# Patient Record
Sex: MALE | Race: WHITE | NOT HISPANIC OR LATINO | Employment: OTHER | URBAN - NONMETROPOLITAN AREA
[De-identification: names, ages, dates, MRNs, and addresses within clinical notes are randomized per-mention and may not be internally consistent; named-entity substitution may affect disease eponyms.]

---

## 2017-01-31 ENCOUNTER — LAB (OUTPATIENT)
Dept: LAB | Facility: HOSPITAL | Age: 77
End: 2017-01-31

## 2017-01-31 ENCOUNTER — TRANSCRIBE ORDERS (OUTPATIENT)
Dept: ADMINISTRATIVE | Facility: HOSPITAL | Age: 77
End: 2017-01-31

## 2017-01-31 DIAGNOSIS — E87.6 HYPOKALEMIA: Primary | ICD-10-CM

## 2017-01-31 DIAGNOSIS — E87.6 HYPOKALEMIA: ICD-10-CM

## 2017-01-31 LAB — POTASSIUM BLD-SCNC: 5.4 MMOL/L (ref 3.5–5.3)

## 2017-01-31 PROCEDURE — 84132 ASSAY OF SERUM POTASSIUM: CPT | Performed by: NURSE PRACTITIONER

## 2017-01-31 PROCEDURE — 36415 COLL VENOUS BLD VENIPUNCTURE: CPT

## 2017-02-08 ENCOUNTER — OFFICE VISIT (OUTPATIENT)
Dept: SURGERY | Facility: CLINIC | Age: 77
End: 2017-02-08

## 2017-02-08 VITALS — WEIGHT: 169.5 LBS | HEIGHT: 66 IN | BODY MASS INDEX: 27.24 KG/M2

## 2017-02-08 DIAGNOSIS — L72.0 EPIDERMAL CYST: Primary | ICD-10-CM

## 2017-02-08 PROCEDURE — 99212 OFFICE O/P EST SF 10 MIN: CPT | Performed by: SURGERY

## 2017-02-08 NOTE — PROGRESS NOTES
Subjective   Aramis Carson is a 76 y.o. male is being seen for consultation today at the request of Kieran ESPARZA    76 y.o. male presenting for evaluation of skin lesion. Lesion on Left posterior scalp with patient's observations stated as Growing cystic lesion, exam of this area shows sebaceous cyst, features raised, Growing, size 10 mm.    Past Medical History   Diagnosis Date   • Atrial fibrillation    • Coronary artery disease    • Hyperlipidemia        Family History   Problem Relation Age of Onset   • Cancer Sister    • Diabetes Sister    • Cancer Brother    • Heart disease Brother    • Diabetes Maternal Grandmother    • Hypertension Neg Hx        Social History     Social History   • Marital status:      Spouse name: N/A   • Number of children: N/A   • Years of education: N/A     Occupational History   • Not on file.     Social History Main Topics   • Smoking status: Former Smoker     Years: 20.00     Types: Pipe, Cigars   • Smokeless tobacco: Never Used   • Alcohol use No   • Drug use: No   • Sexual activity: Defer     Other Topics Concern   • Not on file     Social History Narrative       Past Surgical History   Procedure Laterality Date   • Cardiac catheterization     • Inguinal hernia repair     • Colonoscopy     • Laparoscopic cholecystectomy         The following portions of the patient's history were reviewed and updated as appropriate: allergies, current medications, past family history, past medical history, past social history, past surgical history and problem list.    Review of Systems   Constitutional: Negative for activity change, appetite change, chills and fever.   HENT: Negative for sore throat and trouble swallowing.    Eyes: Negative for visual disturbance.   Respiratory: Negative for cough and shortness of breath.    Cardiovascular: Negative for chest pain and palpitations.   Gastrointestinal: Negative for abdominal distention, abdominal pain, blood in stool, constipation,  "diarrhea, nausea and vomiting.   Endocrine: Negative for cold intolerance and heat intolerance.   Genitourinary: Negative for dysuria.   Musculoskeletal: Negative for joint swelling.   Skin: Negative for color change, rash and wound.   Allergic/Immunologic: Negative for immunocompromised state.   Neurological: Negative for dizziness, seizures, weakness and headaches.   Hematological: Negative for adenopathy. Does not bruise/bleed easily.   Psychiatric/Behavioral: Negative for agitation and confusion.         Visit Vitals   • Ht 66\" (167.6 cm)   • Wt 169 lb 8 oz (76.9 kg)   • BMI 27.36 kg/m2     Objective   Physical Exam   Constitutional: He is oriented to person, place, and time. He appears well-developed.   HENT:   Head: Normocephalic and atraumatic.   Mouth/Throat: Mucous membranes are normal.   Eyes: Conjunctivae are normal. Pupils are equal, round, and reactive to light.   Neck: Neck supple. No JVD present. No tracheal deviation present. No thyromegaly present.   Cardiovascular: Normal rate and regular rhythm.  Exam reveals no gallop and no friction rub.    No murmur heard.  Pulmonary/Chest: Effort normal and breath sounds normal.   Abdominal: Soft. He exhibits no distension. There is no splenomegaly or hepatomegaly. There is no tenderness. No hernia.   Musculoskeletal: Normal range of motion. He exhibits no deformity.   Neurological: He is alert and oriented to person, place, and time.   Skin: Skin is warm and dry.   1 cm left posterior scalp cyst no evidence of infection or drainage no tenderness to palpation   Psychiatric: He has a normal mood and affect.         Aramis was seen today for scalp cyst.    Diagnoses and all orders for this visit:    Epidermal cyst      Assessment     76-year-old male with a benign cyst of the left posterior scalp.  The patient is on Coumadin for atrial fibrillation.  Given the benign nature and asymptomatic behavior of the lesion we will defer excision at this time.  Should the " lesion continued to grow or become bothersome we'll hold Coumadin and excise the lesion in the office.

## 2017-02-13 RX ORDER — METOPROLOL SUCCINATE 50 MG
TABLET, EXTENDED RELEASE 24 HR ORAL
Qty: 90 TABLET | Refills: 1 | OUTPATIENT
Start: 2017-02-13

## 2017-05-03 ENCOUNTER — OFFICE VISIT (OUTPATIENT)
Dept: CARDIOLOGY | Facility: CLINIC | Age: 77
End: 2017-05-03

## 2017-05-03 VITALS
BODY MASS INDEX: 27.71 KG/M2 | WEIGHT: 172.4 LBS | DIASTOLIC BLOOD PRESSURE: 75 MMHG | HEART RATE: 96 BPM | OXYGEN SATURATION: 97 % | SYSTOLIC BLOOD PRESSURE: 133 MMHG | HEIGHT: 66 IN

## 2017-05-03 DIAGNOSIS — I48.20 CHRONIC ATRIAL FIBRILLATION (HCC): Primary | ICD-10-CM

## 2017-05-03 DIAGNOSIS — E78.01 FAMILIAL HYPERCHOLESTEROLEMIA: ICD-10-CM

## 2017-05-03 DIAGNOSIS — I10 ESSENTIAL HYPERTENSION: ICD-10-CM

## 2017-05-03 DIAGNOSIS — E11.9 CONTROLLED TYPE 2 DIABETES MELLITUS WITHOUT COMPLICATION, WITHOUT LONG-TERM CURRENT USE OF INSULIN (HCC): ICD-10-CM

## 2017-05-03 PROCEDURE — 99214 OFFICE O/P EST MOD 30 MIN: CPT | Performed by: INTERNAL MEDICINE

## 2017-05-03 RX ORDER — GABAPENTIN 400 MG/1
400 CAPSULE ORAL 3 TIMES DAILY
COMMUNITY
End: 2019-06-03 | Stop reason: ALTCHOICE

## 2017-05-08 ENCOUNTER — HOSPITAL ENCOUNTER (OUTPATIENT)
Dept: RESPIRATORY THERAPY | Facility: HOSPITAL | Age: 77
Discharge: HOME | End: 2017-05-08
Attending: INTERNAL MEDICINE | Admitting: INTERNAL MEDICINE

## 2017-05-08 DIAGNOSIS — I48.20 CHRONIC ATRIAL FIBRILLATION (HCC): ICD-10-CM

## 2017-05-08 PROCEDURE — 93226 XTRNL ECG REC<48 HR SCAN A/R: CPT

## 2017-05-08 PROCEDURE — 93227 XTRNL ECG REC<48 HR R&I: CPT | Performed by: INTERNAL MEDICINE

## 2017-05-08 PROCEDURE — 93225 XTRNL ECG REC<48 HRS REC: CPT

## 2017-05-12 ENCOUNTER — HOSPITAL ENCOUNTER (EMERGENCY)
Facility: HOSPITAL | Age: 77
Discharge: HOME | End: 2017-05-12
Attending: EMERGENCY MEDICINE | Admitting: EMERGENCY MEDICINE

## 2017-05-12 VITALS
TEMPERATURE: 98.7 F | RESPIRATION RATE: 17 BRPM | WEIGHT: 173 LBS | BODY MASS INDEX: 27.8 KG/M2 | HEIGHT: 66 IN | HEART RATE: 92 BPM | SYSTOLIC BLOOD PRESSURE: 133 MMHG | OXYGEN SATURATION: 97 % | DIASTOLIC BLOOD PRESSURE: 7 MMHG

## 2017-05-12 DIAGNOSIS — R04.0 ANTERIOR EPISTAXIS: Primary | ICD-10-CM

## 2017-05-12 PROCEDURE — 99283 EMERGENCY DEPT VISIT LOW MDM: CPT

## 2017-05-12 RX ORDER — OXYMETAZOLINE HYDROCHLORIDE 0.05 G/100ML
1 SPRAY NASAL ONCE
Status: COMPLETED | OUTPATIENT
Start: 2017-05-12 | End: 2017-05-12

## 2017-05-12 RX ADMIN — OXYMETAZOLINE HYDROCHLORIDE 1 SPRAY: 5 SPRAY NASAL at 19:57

## 2017-05-17 RX ORDER — METOPROLOL SUCCINATE 50 MG/1
50 TABLET, EXTENDED RELEASE ORAL DAILY
Qty: 30 TABLET | Refills: 0 | Status: SHIPPED | OUTPATIENT
Start: 2017-05-17 | End: 2017-05-17 | Stop reason: SDUPTHER

## 2017-05-17 RX ORDER — METOPROLOL SUCCINATE 50 MG/1
50 TABLET, EXTENDED RELEASE ORAL DAILY
Qty: 90 TABLET | Refills: 4 | Status: SHIPPED | OUTPATIENT
Start: 2017-05-17 | End: 2017-05-22 | Stop reason: SDUPTHER

## 2017-05-22 ENCOUNTER — TELEPHONE (OUTPATIENT)
Dept: CARDIOLOGY | Facility: CLINIC | Age: 77
End: 2017-05-22

## 2017-05-22 RX ORDER — METOPROLOL SUCCINATE 100 MG/1
100 TABLET, EXTENDED RELEASE ORAL DAILY
Qty: 90 TABLET | Refills: 4 | Status: SHIPPED | OUTPATIENT
Start: 2017-05-22 | End: 2018-01-22 | Stop reason: SDUPTHER

## 2017-05-22 RX ORDER — LOSARTAN POTASSIUM 25 MG/1
25 TABLET ORAL DAILY
Qty: 90 TABLET | Refills: 4 | Status: SHIPPED | OUTPATIENT
Start: 2017-05-22 | End: 2018-10-02 | Stop reason: SDUPTHER

## 2017-06-14 ENCOUNTER — OFFICE VISIT (OUTPATIENT)
Dept: CARDIOLOGY | Facility: CLINIC | Age: 77
End: 2017-06-14

## 2017-06-14 VITALS
BODY MASS INDEX: 27.93 KG/M2 | DIASTOLIC BLOOD PRESSURE: 80 MMHG | OXYGEN SATURATION: 96 % | WEIGHT: 173.8 LBS | HEIGHT: 66 IN | SYSTOLIC BLOOD PRESSURE: 127 MMHG | HEART RATE: 89 BPM

## 2017-06-14 DIAGNOSIS — E11.9 CONTROLLED TYPE 2 DIABETES MELLITUS WITHOUT COMPLICATION, WITHOUT LONG-TERM CURRENT USE OF INSULIN (HCC): ICD-10-CM

## 2017-06-14 DIAGNOSIS — E78.01 FAMILIAL HYPERCHOLESTEROLEMIA: ICD-10-CM

## 2017-06-14 DIAGNOSIS — I48.20 CHRONIC ATRIAL FIBRILLATION (HCC): Primary | ICD-10-CM

## 2017-06-14 DIAGNOSIS — I10 ESSENTIAL HYPERTENSION: ICD-10-CM

## 2017-06-14 PROCEDURE — 99214 OFFICE O/P EST MOD 30 MIN: CPT | Performed by: INTERNAL MEDICINE

## 2017-06-14 NOTE — PROGRESS NOTES
Subjective   Aramis Carson is a 77 y.o. male.     Chief Complaint   Patient presents with   • Follow-up   • Hypertension   • Atrial Fibrillation       History of Present Illness     Mr. Carson is a very pleasant 77-year-old gentleman who presents for follow-up of atrial fibrillation. More recently he states that he has been concerned that his heart rate seems to have been elevated to the high 90s and low 100s. Approximately one year ago he apparently presented with hypotension and very low heart rates and his diltiazem was discontinued at that time. It is unclear but it appears that he has had chronic atrial fibrillation for more than 20 years. Initially, he thought that he might be going in and out of atrial fibrillation however, record review over the past 2 years suggests that every ECG and telemetry tracing demonstrated atrial fibrillation. He did undergo a Holter monitor placement and with this he was noted to have a persistently elevated heart rate.  His metoprolol was increased and his losartan was decreased.  He notes that he feels better since these changes were made however, he has had several episodes of lightheadedness especially at night.  He denies any angina or anginal-like symptoms and lives an active lifestyle. He did have a stress Cardiolite test approximately one year ago which demonstrated no evidence of ischemia. He has been previously offered novel anticoagulants but prefers to continue Coumadin. He denies any bleeding events. He denies a prior stroke. He is not being treated for congestive heart failure.  He denies any heart failure symptoms.  He does have a history of hypertension and diabetes mellitus     The following portions of the patient's history were reviewed and updated as appropriate: allergies, current medications, past family history, past medical history, past social history, past surgical history and problem list.    Review of Systems   Constitutional: Negative for activity  change, appetite change and fatigue.   HENT: Negative for congestion and tinnitus.    Eyes: Negative for visual disturbance.   Respiratory: Negative for cough, chest tightness and shortness of breath.    Cardiovascular: Negative for chest pain, palpitations and leg swelling.   Gastrointestinal: Negative for blood in stool, nausea and vomiting.   Endocrine: Negative for cold intolerance, heat intolerance and polyuria.   Genitourinary: Negative for dysuria.   Musculoskeletal: Negative for myalgias and neck pain.   Skin: Negative for rash.   Neurological: Negative for dizziness, syncope, weakness and light-headedness.   Hematological: Bruises/bleeds easily.   Psychiatric/Behavioral: Negative for confusion and sleep disturbance.       Objective   Physical Exam   Constitutional: He is oriented to person, place, and time. He appears well-developed and well-nourished. No distress.   HENT:   Head: Normocephalic and atraumatic.   Nose: Nose normal.   Mouth/Throat: Oropharynx is clear and moist.   Eyes: Conjunctivae and EOM are normal. Right eye exhibits no discharge. Left eye exhibits no discharge. No scleral icterus.   Neck: Normal range of motion. No hepatojugular reflux and no JVD present. Carotid bruit is not present. No tracheal deviation present.   Cardiovascular: Normal rate, S1 normal, S2 normal and intact distal pulses.  An irregularly irregular rhythm present. PMI is not displaced.  Exam reveals no gallop, no S3, no S4 and no friction rub.    No murmur heard.  Pulmonary/Chest: Effort normal and breath sounds normal. No accessory muscle usage. No respiratory distress. He has no wheezes. He has no rales. He exhibits no tenderness.   Abdominal: Soft. Bowel sounds are normal. He exhibits no distension. There is no tenderness.   Musculoskeletal: Normal range of motion. He exhibits no edema or tenderness.       Vascular Status -  His exam exhibits no right foot edema. His exam exhibits no left foot edema.  Neurological:  He is alert and oriented to person, place, and time. No cranial nerve deficit. Coordination normal.   Skin: Skin is warm and dry. He is not diaphoretic.   Nursing note and vitals reviewed.      Procedures    Assessment/Plan     Chronic Afib In regard to his history of atrial fibrillation, this appears to be chronic atrial fibrillation. As best as can be ascertained he does not have any significant symptoms from his A. fib itself.  His rate is better controlled on his increased dose of metoprolol however, he may be having some episodes of symptomatic hypotension.  For now I've asked him to maintain a blood pressure diary as noted below.  I did again discuss the option of novel anticoagulants with him and at this time he does not wish to change from Coumadin.     Hypertension. The home blood pressure diary suggests reasonable control of the patient's HTN.  However, I am concerned that he may be having episodes of symptomatic hypotension.  I have asked them to maintain a blood pressure diary and if in fact his blood pressures are routinely low then I will discontinue his losartan.     DM  In regard to the patient's diabetes I have asked him to comply with his primary providers instructions and have discussed the importance of diabetes as it relates to coronary artery disease.     Hyperlipidemia In regard to their history of hyperlipidemia, again, I've asked him to follow with his primary provider.    In short, it is been a pleasure to participate in Aramis's care, I look forward to seeing him back in 4 months.

## 2017-07-15 ENCOUNTER — TRANSCRIBE ORDERS (OUTPATIENT)
Dept: LAB | Facility: HOSPITAL | Age: 77
End: 2017-07-15

## 2017-07-15 ENCOUNTER — LAB (OUTPATIENT)
Dept: LAB | Facility: HOSPITAL | Age: 77
End: 2017-07-15

## 2017-07-15 DIAGNOSIS — I10 ESSENTIAL HYPERTENSION: Primary | ICD-10-CM

## 2017-07-15 DIAGNOSIS — E78.5 HYPERLIPIDEMIA, UNSPECIFIED HYPERLIPIDEMIA TYPE: Primary | ICD-10-CM

## 2017-07-15 DIAGNOSIS — M77.11 LATERAL EPICONDYLITIS OF RIGHT ELBOW: ICD-10-CM

## 2017-07-15 DIAGNOSIS — I10 ESSENTIAL HYPERTENSION: ICD-10-CM

## 2017-07-15 DIAGNOSIS — E11.9 DIABETES MELLITUS WITHOUT COMPLICATION (HCC): ICD-10-CM

## 2017-07-15 LAB
ALBUMIN SERPL-MCNC: 4.2 G/DL (ref 3.4–4.8)
ALBUMIN/GLOB SERPL: 1.7 G/DL (ref 1.5–2.5)
ALP SERPL-CCNC: 59 U/L (ref 40–129)
ALT SERPL W P-5'-P-CCNC: 23 U/L (ref 10–44)
ANION GAP SERPL CALCULATED.3IONS-SCNC: 1 MMOL/L (ref 3.6–11.2)
AST SERPL-CCNC: 21 U/L (ref 10–34)
BASOPHILS # BLD AUTO: 0.02 10*3/MM3 (ref 0–0.3)
BASOPHILS NFR BLD AUTO: 0.3 % (ref 0–2)
BILIRUB SERPL-MCNC: 0.4 MG/DL (ref 0.2–1.8)
BUN BLD-MCNC: 20 MG/DL (ref 7–21)
BUN/CREAT SERPL: 19.4 (ref 7–25)
CALCIUM SPEC-SCNC: 10.1 MG/DL (ref 7.7–10)
CHLORIDE SERPL-SCNC: 109 MMOL/L (ref 99–112)
CHOLEST SERPL-MCNC: 127 MG/DL (ref 0–200)
CO2 SERPL-SCNC: 32 MMOL/L (ref 24.3–31.9)
CREAT BLD-MCNC: 1.03 MG/DL (ref 0.43–1.29)
DEPRECATED RDW RBC AUTO: 44.8 FL (ref 37–54)
EOSINOPHIL # BLD AUTO: 0.08 10*3/MM3 (ref 0–0.7)
EOSINOPHIL NFR BLD AUTO: 1 % (ref 0–7)
ERYTHROCYTE [DISTWIDTH] IN BLOOD BY AUTOMATED COUNT: 14.3 % (ref 11.5–14.5)
GFR SERPL CREATININE-BSD FRML MDRD: 70 ML/MIN/1.73
GLOBULIN UR ELPH-MCNC: 2.5 GM/DL
GLUCOSE BLD-MCNC: 116 MG/DL (ref 70–110)
HBA1C MFR BLD: 6.1 % (ref 4.5–5.7)
HCT VFR BLD AUTO: 42.7 % (ref 42–52)
HDLC SERPL-MCNC: 33 MG/DL (ref 60–100)
HGB BLD-MCNC: 13.6 G/DL (ref 14–18)
IMM GRANULOCYTES # BLD: 0.02 10*3/MM3 (ref 0–0.03)
IMM GRANULOCYTES NFR BLD: 0.3 % (ref 0–0.5)
LDLC SERPL CALC-MCNC: 52 MG/DL (ref 0–100)
LDLC/HDLC SERPL: 1.56 {RATIO}
LYMPHOCYTES # BLD AUTO: 2.36 10*3/MM3 (ref 1–3)
LYMPHOCYTES NFR BLD AUTO: 30.9 % (ref 16–46)
MCH RBC QN AUTO: 27.8 PG (ref 27–33)
MCHC RBC AUTO-ENTMCNC: 31.9 G/DL (ref 33–37)
MCV RBC AUTO: 87.3 FL (ref 80–94)
MONOCYTES # BLD AUTO: 0.58 10*3/MM3 (ref 0.1–0.9)
MONOCYTES NFR BLD AUTO: 7.6 % (ref 0–12)
NEUTROPHILS # BLD AUTO: 4.57 10*3/MM3 (ref 1.4–6.5)
NEUTROPHILS NFR BLD AUTO: 59.9 % (ref 40–75)
OSMOLALITY SERPL CALC.SUM OF ELEC: 286.7 MOSM/KG (ref 273–305)
PLATELET # BLD AUTO: 176 10*3/MM3 (ref 130–400)
PMV BLD AUTO: 10.9 FL (ref 6–10)
POTASSIUM BLD-SCNC: 5.7 MMOL/L (ref 3.5–5.3)
PROT SERPL-MCNC: 6.7 G/DL (ref 6–8)
RBC # BLD AUTO: 4.89 10*6/MM3 (ref 4.7–6.1)
SODIUM BLD-SCNC: 142 MMOL/L (ref 135–153)
TRIGL SERPL-MCNC: 212 MG/DL (ref 0–150)
TSH SERPL DL<=0.05 MIU/L-ACNC: 2.01 MIU/ML (ref 0.55–4.78)
VLDLC SERPL-MCNC: 42.4 MG/DL
WBC NRBC COR # BLD: 7.63 10*3/MM3 (ref 4.5–12.5)

## 2017-07-15 PROCEDURE — 85025 COMPLETE CBC W/AUTO DIFF WBC: CPT | Performed by: NURSE PRACTITIONER

## 2017-07-15 PROCEDURE — 80053 COMPREHEN METABOLIC PANEL: CPT | Performed by: NURSE PRACTITIONER

## 2017-07-15 PROCEDURE — 36415 COLL VENOUS BLD VENIPUNCTURE: CPT | Performed by: NURSE PRACTITIONER

## 2017-07-15 PROCEDURE — 84443 ASSAY THYROID STIM HORMONE: CPT | Performed by: NURSE PRACTITIONER

## 2017-07-15 PROCEDURE — 80061 LIPID PANEL: CPT | Performed by: NURSE PRACTITIONER

## 2017-07-15 PROCEDURE — 83036 HEMOGLOBIN GLYCOSYLATED A1C: CPT | Performed by: NURSE PRACTITIONER

## 2017-08-14 ENCOUNTER — TELEPHONE (OUTPATIENT)
Dept: CARDIOLOGY | Facility: CLINIC | Age: 77
End: 2017-08-14

## 2017-08-14 NOTE — TELEPHONE ENCOUNTER
----- Message from Michel Gusman MD sent at 8/14/2017  2:28 PM EDT -----  Let him know his BP and HR were all good.  If he is still having episodes of light-headedness then have him stop his losartan    Dr. Gusman      CALLED PT TO MAKE HIM AWARE THAT PER DR. GUSMAN HIS B/P & HR WERE ALL GOOD. & THAT IF HE IS STILL HAVING EPISODES OF LIGHT HEADEDNESS THEN HE CAN STOP LOSARTAN.    PT STATED HE DIDN'T HAVE ANY COMPLAINTS AT THE MOMENT & HE'D STAY ON THE MEDICINE.    PT AWARE & UNDERSTANDS.    CSMA

## 2018-01-22 ENCOUNTER — OFFICE VISIT (OUTPATIENT)
Dept: CARDIOLOGY | Facility: CLINIC | Age: 78
End: 2018-01-22

## 2018-01-22 VITALS
OXYGEN SATURATION: 92 % | HEIGHT: 66 IN | HEART RATE: 85 BPM | DIASTOLIC BLOOD PRESSURE: 78 MMHG | SYSTOLIC BLOOD PRESSURE: 153 MMHG | WEIGHT: 190 LBS | BODY MASS INDEX: 30.53 KG/M2

## 2018-01-22 DIAGNOSIS — E78.01 FAMILIAL HYPERCHOLESTEROLEMIA: ICD-10-CM

## 2018-01-22 DIAGNOSIS — I48.20 CHRONIC ATRIAL FIBRILLATION (HCC): ICD-10-CM

## 2018-01-22 DIAGNOSIS — I10 ESSENTIAL HYPERTENSION: Primary | ICD-10-CM

## 2018-01-22 PROCEDURE — 99213 OFFICE O/P EST LOW 20 MIN: CPT | Performed by: INTERNAL MEDICINE

## 2018-01-22 PROCEDURE — 93000 ELECTROCARDIOGRAM COMPLETE: CPT | Performed by: INTERNAL MEDICINE

## 2018-01-22 RX ORDER — LANOLIN ALCOHOL/MO/W.PET/CERES
1000 CREAM (GRAM) TOPICAL DAILY
COMMUNITY

## 2018-01-22 RX ORDER — METOPROLOL SUCCINATE 25 MG/1
75 TABLET, EXTENDED RELEASE ORAL DAILY
Qty: 270 TABLET | Refills: 6 | Status: SHIPPED | OUTPATIENT
Start: 2018-01-22 | End: 2018-01-29 | Stop reason: SDUPTHER

## 2018-01-22 RX ORDER — CETIRIZINE HYDROCHLORIDE 10 MG/1
10 TABLET ORAL DAILY
COMMUNITY
End: 2020-12-14 | Stop reason: ALTCHOICE

## 2018-01-22 NOTE — PROGRESS NOTES
[facility] CARDIOLOGY  [address]  [place] [address]  Phone: [phone]  Fax: [phone]    01/22/2018    Chief Complaint: Routine followup    History:   Aramis Carson is a 77 y.o. male seen in followup, hx chronic Afib on rate control and anticoagulation. EKg goes 1960's was known to have appearance of MI but has never had MI. Echo and ST are normal however. HR in 50's but no syncope. No dizzyness. No CP or SOB. Coumadin followed by PCP.    Past Medical History:   Diagnosis Date   • Atrial fibrillation    • Coronary artery disease    • Hyperlipidemia        Past Social History:  Social History     Social History   • Marital status:      Spouse name: N/A   • Number of children: N/A   • Years of education: N/A     Social History Main Topics   • Smoking status: Former Smoker     Years: 20.00     Types: Pipe, Cigars   • Smokeless tobacco: Never Used   • Alcohol use No   • Drug use: No   • Sexual activity: Defer     Other Topics Concern   • None     Social History Narrative       Past Family History:  Family History   Problem Relation Age of Onset   • Cancer Sister    • Diabetes Sister    • Cancer Brother    • Heart disease Brother    • Diabetes Maternal Grandmother    • Hypertension Neg Hx        Review of Systems:   Please see HPI  Constitution: No chills, no rigors, no unexplained weight loss or weight gain  Eyes:  No diplopia, no blurred vision, no loss of vision, conjunctiva is pink and sclera is anicteric  ENT:  No tinnitus, no otorrhea, no epistaxis, no sore throat   Respiratory: No cough, no hemoptysis  Cardiovascular: see HPI  Gastrointestinal: No nausea, no vomiting, no hematemesis, no diarrhea or constipation, no melena  Genitourinary: No frequency of dysuria no hematuria  Integument: No pruritis and  no skin rash  Hematologic / Lymphatic: No excessive bleeding, easy bruising, fatigue, lymphadenopathy and petechiae  Musculoskeletal: No joint pain, joint  stiffness, joint swelling, muscle pain, muscle weakness and neck pain  Neurological: No dizziness, headaches, light headedness, seizures and vertigo  Endocrine: No frequent urination and nocturia, temperature intolerance, weight gain, unintended and weight loss, unintended      Current Outpatient Prescriptions on File Prior to Visit   Medication Sig Dispense Refill   • esomeprazole (NexIUM) 40 MG capsule Take 40 mg by mouth Every Morning Before Breakfast.     • gabapentin (NEURONTIN) 400 MG capsule Take 400 mg by mouth 3 (Three) Times a Day.     • HYDROcodone-acetaminophen (NORCO) 7.5-325 MG per tablet Take 1 tablet by mouth Every 6 (Six) Hours As Needed for moderate pain (4-6).     • losartan (COZAAR) 25 MG tablet Take 1 tablet by mouth Daily. 90 tablet 4   • metFORMIN (GLUCOPHAGE) 500 MG tablet Take 500 mg by mouth 2 (Two) Times a Day With Meals.     • metoprolol succinate XL (TOPROL XL) 100 MG 24 hr tablet Take 1 tablet by mouth Daily. 90 tablet 4   • ranitidine (ZANTAC) 150 MG tablet Take 150 mg by mouth As Needed for heartburn.     • simvastatin (ZOCOR) 40 MG tablet Take 40 mg by mouth Daily.     • warfarin (COUMADIN) 5 MG tablet Take 5 mg by mouth Daily.       No current facility-administered medications on file prior to visit.        Allergies   Allergen Reactions   • Lisinopril        Objective:  Vitals:    01/22/18 0959   BP: 153/78   Pulse: 85   SpO2: 92%     Comfortable NAD  PERRL, conjunctiva clear  Neck supple, no JVD or thyromegaly appreciated  Irreg irrreg .S1/S2 RRR, no m/r/g  Lungs CTA B, normal effort  Abdomen S/NT/ND (+) BS, no HSM appreciated  Extremities warm, no clubbing, cyanosis, or edema  Normal gait  No visible or palpable skin lesions  A/Ox4, mood and affect appropriate  Pulse exam: Gabe's:    DATA:       Results for orders placed during the hospital encounter of 10/12/16   Adult Transthoracic Echo Complete    Narrative · All left ventricular wall segments contract normally.  · Left  ventricular function is normal. Estimated EF = 60%.  · Trace to mild aortic and mitral insufficiency  · Mild tricuspid valve regurgitation is present.  · Mild pulmonary hypertension  · Left atrial cavity size is moderately dilated.          Results for orders placed during the hospital encounter of 10/12/16   Stress Test With Myocardial Perfusion One Day    Narrative · Impressions are consistent with a low risk study.  · Left ventricular ejection fraction is hyperdynamic (Calculated EF >   70%).  · Myocardial perfusion imaging indicates a normal myocardial perfusion   study with no evidence of ischemia.  · Gated wall motion is normal          Results for orders placed during the hospital encounter of 10/12/16   Stress Test With Myocardial Perfusion One Day    Narrative · Impressions are consistent with a low risk study.  · Left ventricular ejection fraction is hyperdynamic (Calculated EF >   70%).  · Myocardial perfusion imaging indicates a normal myocardial perfusion   study with no evidence of ischemia.  · Gated wall motion is normal                   ECG 12 Lead  Date/Time: 1/22/2018 10:01 AM  Performed by: BARBIE BUTT  Authorized by: BARBIE BUTT               I personally viewed and interpreted the patient's EKG:      A/P:    Chornic Afib: continue rate control and anticoagulation. Will diminish toprol xl to 75mg daily due to bradycardia.      Thank you for allowing me to participate in the care of Aramis NICK Mariselakendall. Feel free to contact me directly with any further questions or concerns.

## 2018-01-29 ENCOUNTER — TELEPHONE (OUTPATIENT)
Dept: CARDIOLOGY | Facility: CLINIC | Age: 78
End: 2018-01-29

## 2018-01-29 RX ORDER — METOPROLOL SUCCINATE 25 MG/1
25 TABLET, EXTENDED RELEASE ORAL DAILY
Qty: 90 TABLET | Refills: 3 | Status: SHIPPED | OUTPATIENT
Start: 2018-01-29 | End: 2019-01-02 | Stop reason: SDUPTHER

## 2018-01-29 NOTE — TELEPHONE ENCOUNTER
Patient is calling requesting us to [facility] his Metoprolol XL 25mg. Dr. Welch had prescribed this at his last appointment but had sent it to wrong pharmacy. I told him that we would send this in.     Patient then added he could take his 100mg he had previously been prescribed and cut them into 4 pieces then take 3/4 of the tablet. I put patient on hold s/w Jaimie Nuno to confirm that because he is taking the XL tablet it cannot be crushed, chewed, or broken into pieces. Patient asked if he should dispose of these tablets since he no longer took that dose. I told him if he wanted to do that it would be completely up to him; Dr. Welch has him on 75mg.     Waiting on [facility] to send over request for 90 day supply.

## 2018-02-20 RX ORDER — METOPROLOL SUCCINATE 50 MG/1
50 TABLET, EXTENDED RELEASE ORAL DAILY
Qty: 90 TABLET | Refills: 3 | Status: SHIPPED | OUTPATIENT
Start: 2018-02-20 | End: 2018-07-02 | Stop reason: DRUGHIGH

## 2018-02-23 ENCOUNTER — APPOINTMENT (OUTPATIENT)
Dept: CT IMAGING | Facility: HOSPITAL | Age: 78
End: 2018-02-23

## 2018-02-23 ENCOUNTER — HOSPITAL ENCOUNTER (EMERGENCY)
Facility: HOSPITAL | Age: 78
Discharge: HOME | End: 2018-02-23
Admitting: EMERGENCY MEDICINE

## 2018-02-23 ENCOUNTER — TELEPHONE (OUTPATIENT)
Dept: CARDIOLOGY | Facility: CLINIC | Age: 78
End: 2018-02-23

## 2018-02-23 VITALS
HEART RATE: 75 BPM | OXYGEN SATURATION: 99 % | HEIGHT: 66 IN | WEIGHT: 179 LBS | SYSTOLIC BLOOD PRESSURE: 115 MMHG | TEMPERATURE: 98.2 F | BODY MASS INDEX: 28.77 KG/M2 | RESPIRATION RATE: 16 BRPM | DIASTOLIC BLOOD PRESSURE: 72 MMHG

## 2018-02-23 DIAGNOSIS — I48.91 ATRIAL FIBRILLATION, UNSPECIFIED TYPE (HCC): ICD-10-CM

## 2018-02-23 DIAGNOSIS — R55 ATYPICAL SYNCOPE: Primary | ICD-10-CM

## 2018-02-23 LAB
6-ACETYL MORPHINE: NEGATIVE
ALBUMIN SERPL-MCNC: 4.4 G/DL (ref 3.4–4.8)
ALBUMIN/GLOB SERPL: 1.8 G/DL (ref 1.5–2.5)
ALP SERPL-CCNC: 57 U/L (ref 40–129)
ALT SERPL W P-5'-P-CCNC: 35 U/L (ref 10–44)
AMPHET+METHAMPHET UR QL: NEGATIVE
ANION GAP SERPL CALCULATED.3IONS-SCNC: 4.7 MMOL/L (ref 3.6–11.2)
APTT PPP: 36.6 SECONDS (ref 23.8–36.1)
AST SERPL-CCNC: 34 U/L (ref 10–34)
BARBITURATES UR QL SCN: NEGATIVE
BASOPHILS # BLD AUTO: 0.03 10*3/MM3 (ref 0–0.3)
BASOPHILS NFR BLD AUTO: 0.4 % (ref 0–2)
BENZODIAZ UR QL SCN: NEGATIVE
BILIRUB SERPL-MCNC: 0.7 MG/DL (ref 0.2–1.8)
BILIRUB UR QL STRIP: NEGATIVE
BUN BLD-MCNC: 20 MG/DL (ref 7–21)
BUN/CREAT SERPL: 18 (ref 7–25)
BUPRENORPHINE SERPL-MCNC: NEGATIVE NG/ML
CALCIUM SPEC-SCNC: 9.5 MG/DL (ref 7.7–10)
CANNABINOIDS SERPL QL: NEGATIVE
CHLORIDE SERPL-SCNC: 109 MMOL/L (ref 99–112)
CLARITY UR: CLEAR
CO2 SERPL-SCNC: 27.3 MMOL/L (ref 24.3–31.9)
COCAINE UR QL: NEGATIVE
COLOR UR: YELLOW
CREAT BLD-MCNC: 1.11 MG/DL (ref 0.43–1.29)
DEPRECATED RDW RBC AUTO: 43.9 FL (ref 37–54)
EOSINOPHIL # BLD AUTO: 0.07 10*3/MM3 (ref 0–0.7)
EOSINOPHIL NFR BLD AUTO: 0.9 % (ref 0–7)
ERYTHROCYTE [DISTWIDTH] IN BLOOD BY AUTOMATED COUNT: 14.3 % (ref 11.5–14.5)
GFR SERPL CREATININE-BSD FRML MDRD: 64 ML/MIN/1.73
GLOBULIN UR ELPH-MCNC: 2.4 GM/DL
GLUCOSE BLD-MCNC: 95 MG/DL (ref 70–110)
GLUCOSE UR STRIP-MCNC: NEGATIVE MG/DL
HCT VFR BLD AUTO: 45.3 % (ref 42–52)
HGB BLD-MCNC: 14.6 G/DL (ref 14–18)
HGB UR QL STRIP.AUTO: NEGATIVE
IMM GRANULOCYTES # BLD: 0.01 10*3/MM3 (ref 0–0.03)
IMM GRANULOCYTES NFR BLD: 0.1 % (ref 0–0.5)
INR PPP: 2.03 (ref 0.9–1.1)
KETONES UR QL STRIP: NEGATIVE
LEUKOCYTE ESTERASE UR QL STRIP.AUTO: NEGATIVE
LYMPHOCYTES # BLD AUTO: 2.1 10*3/MM3 (ref 1–3)
LYMPHOCYTES NFR BLD AUTO: 26.6 % (ref 16–46)
MCH RBC QN AUTO: 27.1 PG (ref 27–33)
MCHC RBC AUTO-ENTMCNC: 32.2 G/DL (ref 33–37)
MCV RBC AUTO: 84.2 FL (ref 80–94)
METHADONE UR QL SCN: NEGATIVE
MONOCYTES # BLD AUTO: 0.75 10*3/MM3 (ref 0.1–0.9)
MONOCYTES NFR BLD AUTO: 9.5 % (ref 0–12)
NEUTROPHILS # BLD AUTO: 4.94 10*3/MM3 (ref 1.4–6.5)
NEUTROPHILS NFR BLD AUTO: 62.5 % (ref 40–75)
NITRITE UR QL STRIP: NEGATIVE
OPIATES UR QL: NEGATIVE
OSMOLALITY SERPL CALC.SUM OF ELEC: 283.7 MOSM/KG (ref 273–305)
OXYCODONE UR QL SCN: NEGATIVE
PCP UR QL SCN: NEGATIVE
PH UR STRIP.AUTO: 8 [PH] (ref 5–8)
PLATELET # BLD AUTO: 222 10*3/MM3 (ref 130–400)
PMV BLD AUTO: 10.8 FL (ref 6–10)
POTASSIUM BLD-SCNC: 5.5 MMOL/L (ref 3.5–5.3)
PROT SERPL-MCNC: 6.8 G/DL (ref 6–8)
PROT UR QL STRIP: NEGATIVE
PROTHROMBIN TIME: 23.2 SECONDS (ref 11–15.4)
RBC # BLD AUTO: 5.38 10*6/MM3 (ref 4.7–6.1)
SODIUM BLD-SCNC: 141 MMOL/L (ref 135–153)
SP GR UR STRIP: 1.02 (ref 1–1.03)
TROPONIN I SERPL-MCNC: <0.006 NG/ML
TROPONIN I SERPL-MCNC: <0.006 NG/ML
UROBILINOGEN UR QL STRIP: NORMAL
WBC NRBC COR # BLD: 7.9 10*3/MM3 (ref 4.5–12.5)

## 2018-02-23 PROCEDURE — 80307 DRUG TEST PRSMV CHEM ANLYZR: CPT | Performed by: NURSE PRACTITIONER

## 2018-02-23 PROCEDURE — 85730 THROMBOPLASTIN TIME PARTIAL: CPT | Performed by: NURSE PRACTITIONER

## 2018-02-23 PROCEDURE — 84484 ASSAY OF TROPONIN QUANT: CPT | Performed by: NURSE PRACTITIONER

## 2018-02-23 PROCEDURE — 93010 ELECTROCARDIOGRAM REPORT: CPT | Performed by: INTERNAL MEDICINE

## 2018-02-23 PROCEDURE — 36415 COLL VENOUS BLD VENIPUNCTURE: CPT

## 2018-02-23 PROCEDURE — 70450 CT HEAD/BRAIN W/O DYE: CPT | Performed by: RADIOLOGY

## 2018-02-23 PROCEDURE — 80053 COMPREHEN METABOLIC PANEL: CPT | Performed by: NURSE PRACTITIONER

## 2018-02-23 PROCEDURE — 99284 EMERGENCY DEPT VISIT MOD MDM: CPT

## 2018-02-23 PROCEDURE — 85025 COMPLETE CBC W/AUTO DIFF WBC: CPT | Performed by: NURSE PRACTITIONER

## 2018-02-23 PROCEDURE — 70450 CT HEAD/BRAIN W/O DYE: CPT

## 2018-02-23 PROCEDURE — 85610 PROTHROMBIN TIME: CPT | Performed by: NURSE PRACTITIONER

## 2018-02-23 PROCEDURE — 93005 ELECTROCARDIOGRAM TRACING: CPT | Performed by: NURSE PRACTITIONER

## 2018-02-23 PROCEDURE — 81003 URINALYSIS AUTO W/O SCOPE: CPT | Performed by: NURSE PRACTITIONER

## 2018-02-23 RX ORDER — SODIUM CHLORIDE 0.9 % (FLUSH) 0.9 %
10 SYRINGE (ML) INJECTION AS NEEDED
Status: DISCONTINUED | OUTPATIENT
Start: 2018-02-23 | End: 2018-02-23 | Stop reason: HOSPADM

## 2018-02-23 NOTE — TELEPHONE ENCOUNTER
Patient's wife Kelley called today stating Aramis had fell twice this morning and that his blood pressure was low. She had wanted him to come in today and I explained that  was not here today and that the best option would be to go to the ER. Kelley had stated that Aramis felt better and that his blood pressure had came back up and that he wasn't feeling dizzy anymore. She was concerned that his change of medicines may be a leading cause. Patient states that she will contact his PCP and schedule an appointment to get checked out and that they will use the ER as a last resort. Patient's wife did understand that no one was in office to see him today and did not seem upset.

## 2018-02-28 ENCOUNTER — TRANSCRIBE ORDERS (OUTPATIENT)
Dept: ADMINISTRATIVE | Facility: HOSPITAL | Age: 78
End: 2018-02-28

## 2018-02-28 ENCOUNTER — HOSPITAL ENCOUNTER (OUTPATIENT)
Dept: RESPIRATORY THERAPY | Facility: HOSPITAL | Age: 78
Discharge: HOME | End: 2018-02-28
Attending: INTERNAL MEDICINE | Admitting: INTERNAL MEDICINE

## 2018-02-28 DIAGNOSIS — R00.2 PALPITATIONS: Primary | ICD-10-CM

## 2018-02-28 DIAGNOSIS — R00.2 PALPITATIONS: ICD-10-CM

## 2018-02-28 PROCEDURE — 93226 XTRNL ECG REC<48 HR SCAN A/R: CPT

## 2018-02-28 PROCEDURE — 93225 XTRNL ECG REC<48 HRS REC: CPT

## 2018-02-28 PROCEDURE — 93227 XTRNL ECG REC<48 HR R&I: CPT | Performed by: INTERNAL MEDICINE

## 2018-03-05 ENCOUNTER — OFFICE VISIT (OUTPATIENT)
Dept: CARDIOLOGY | Facility: CLINIC | Age: 78
End: 2018-03-05

## 2018-03-05 VITALS
HEART RATE: 76 BPM | WEIGHT: 187.2 LBS | OXYGEN SATURATION: 95 % | SYSTOLIC BLOOD PRESSURE: 142 MMHG | HEIGHT: 66 IN | BODY MASS INDEX: 30.08 KG/M2 | DIASTOLIC BLOOD PRESSURE: 83 MMHG

## 2018-03-05 DIAGNOSIS — E78.01 FAMILIAL HYPERCHOLESTEROLEMIA: ICD-10-CM

## 2018-03-05 DIAGNOSIS — I48.20 CHRONIC ATRIAL FIBRILLATION (HCC): ICD-10-CM

## 2018-03-05 DIAGNOSIS — I10 ESSENTIAL HYPERTENSION: ICD-10-CM

## 2018-03-05 DIAGNOSIS — R55 SYNCOPE, UNSPECIFIED SYNCOPE TYPE: Primary | ICD-10-CM

## 2018-03-05 DIAGNOSIS — R94.31 ABNORMAL ELECTROCARDIOGRAM: ICD-10-CM

## 2018-03-05 PROCEDURE — 99214 OFFICE O/P EST MOD 30 MIN: CPT | Performed by: NURSE PRACTITIONER

## 2018-03-05 RX ORDER — OMEPRAZOLE 40 MG/1
40 CAPSULE, DELAYED RELEASE ORAL DAILY
COMMUNITY
End: 2021-11-19 | Stop reason: SDUPTHER

## 2018-03-05 RX ORDER — RANITIDINE 300 MG/1
300 TABLET ORAL 3 TIMES DAILY
COMMUNITY
End: 2018-07-02 | Stop reason: ALTCHOICE

## 2018-03-05 NOTE — PROGRESS NOTES
Subjective     Chief Complaint: Loss of Consciousness; Hypertension; and Atrial Fibrillation    History of Present Illness   Aramis Carson is a 78 y.o. male who presents with an ER follow-up for a syncopal event.  Mr. Greenberg is well-known to the practice.  We follow him for persistent atrial fibrillation and hypertension.  He is on Coumadin and followed by his PCP for this.  He was in his usual state of health when on February 23, 2018 he awoke at approximately 5:30 AM, went to the bathroom, had a bowel movement, and after leaving the bathroom he apparently had a syncopal episode.  It is uncertain how long the loss of consciousness lasted.  However his wife, who has accompanied him to this visit today, states that she heard him and immediately went to him and he was alert.  When he got up and walked through the koenig back to his bedroom he apparently had a near syncopal event in which he was simply lowered to the floor.  Later that day he presented to the ED for evaluation.  Labs that day were essentially normal with the exception of a slightly elevated potassium of 5.5.  Troponin was negative ×2.  CT showed no evidence of an acute intracranial abnormality.  He was sent home on a Holter monitor which simply showed atrial fibrillation.  He denies fever and chills on that date.  He denies chest pain on that date.  Both he and  his wife did not note any neuro deficits.    At today's visit, in addition to describe in the above event, Mr. Greenberg complains of lower extremity swelling.  He denies chest pain and palpitations.  He has not had any subsequent episodes of syncope or near syncope.       Current Outpatient Prescriptions:   •  cetirizine (zyrTEC) 10 MG tablet, Take 10 mg by mouth Daily., Disp: , Rfl:   •  GlipiZIDE (GLUCOTROL PO), Take 300 mg by mouth., Disp: , Rfl:   •  HYDROcodone-acetaminophen (NORCO) 7.5-325 MG per tablet, Take 1 tablet by mouth Every 6 (Six) Hours As Needed for moderate pain (4-6)., Disp: ,  Rfl:   •  losartan (COZAAR) 25 MG tablet, Take 1 tablet by mouth Daily., Disp: 90 tablet, Rfl: 4  •  metoprolol succinate XL (TOPROL-XL) 25 MG 24 hr tablet, Take 1 tablet by mouth Daily., Disp: 90 tablet, Rfl: 3  •  metoprolol succinate XL (TOPROL-XL) 50 MG 24 hr tablet, Take 1 tablet by mouth Daily. TO BE TAKEN WITH METOPROLOL XL 25MG TO PRODUCE 75MG DAILY, Disp: 90 tablet, Rfl: 3  •  omeprazole (PRILOSEC) 40 MG capsule, Take 40 mg by mouth Daily., Disp: , Rfl:   •  ranitidine (ZANTAC) 150 MG tablet, Take 150 mg by mouth As Needed for heartburn., Disp: , Rfl:   •  raNITIdine (ZANTAC) 300 MG tablet, Take 300 mg by mouth 3 (Three) Times a Day., Disp: , Rfl:   •  simvastatin (ZOCOR) 40 MG tablet, Take 40 mg by mouth Daily., Disp: , Rfl:   •  vitamin B-12 (CYANOCOBALAMIN) 1000 MCG tablet, Take 1,000 mcg by mouth Daily., Disp: , Rfl:   •  warfarin (COUMADIN) 5 MG tablet, Take 5 mg by mouth Daily., Disp: , Rfl:   •  esomeprazole (NexIUM) 40 MG capsule, Take 40 mg by mouth Every Morning Before Breakfast., Disp: , Rfl:   •  gabapentin (NEURONTIN) 400 MG capsule, Take 400 mg by mouth 3 (Three) Times a Day., Disp: , Rfl:      The following portions of the patient's history were reviewed and updated as appropriate: allergies, current medications, past family history, past medical history, past social history, past surgical history and problem list.    Review of Systems   Constitutional: Positive for fatigue. Negative for activity change and appetite change.   HENT: Negative for congestion and tinnitus.    Eyes: Negative for visual disturbance.   Respiratory: Negative for cough, chest tightness and shortness of breath.    Cardiovascular: Positive for leg swelling. Negative for chest pain and palpitations.   Gastrointestinal: Negative for blood in stool, nausea and vomiting.   Endocrine: Negative for cold intolerance, heat intolerance and polyuria.   Genitourinary: Negative for dysuria.   Musculoskeletal: Negative for myalgias  "and neck pain.   Skin: Negative for rash.   Neurological: Positive for dizziness, syncope, weakness and light-headedness.   Hematological: Does not bruise/bleed easily.   Psychiatric/Behavioral: Positive for behavioral problems. Negative for confusion and sleep disturbance.       Objective     /83 (BP Location: Left arm)  Pulse 76  Ht 167.6 cm (66\")  Wt 84.9 kg (187 lb 3.2 oz)  SpO2 95%  BMI 30.21 kg/m2    Physical Exam   Constitutional: He appears well-developed and well-nourished.   HENT:   Head: Normocephalic and atraumatic.   Eyes: Pupils are equal, round, and reactive to light.   Neck: No JVD present.   Cardiovascular: Normal rate, regular rhythm and intact distal pulses.  Exam reveals no gallop and no friction rub.    No murmur heard.  Pulses:       Dorsalis pedis pulses are 2+ on the right side, and 2+ on the left side.        Posterior tibial pulses are 2+ on the right side, and 2+ on the left side.   No lower extremity edema   Pulmonary/Chest: Effort normal and breath sounds normal. No respiratory distress. He has no wheezes. He has no rales.   Abdominal: Soft. He exhibits no mass. There is no tenderness. No hernia.   Skin: Skin is warm and dry.   Psychiatric: He has a normal mood and affect.   Vitals reviewed.      Procedures      Assessment/Plan       Aramis was seen today for loss of consciousness, hypertension and atrial fibrillation.    Diagnoses and all orders for this visit:    Syncope, unspecified syncope type      Work up for cardiac source of syncope-- echo, stress, event monitor   Carotid ultrasound    Chronic atrial fibrillation     Stable.   Anticoagulated on coumadin     Essential hypertension     Stable.   Continue current medications:  Metoprolol, losartan    Familial hypercholesterolemia                   ISAIAS Adames  "

## 2018-04-04 ENCOUNTER — APPOINTMENT (OUTPATIENT)
Dept: CARDIOLOGY | Facility: HOSPITAL | Age: 78
End: 2018-04-04

## 2018-04-04 ENCOUNTER — APPOINTMENT (OUTPATIENT)
Dept: NUCLEAR MEDICINE | Facility: HOSPITAL | Age: 78
End: 2018-04-04

## 2018-04-11 ENCOUNTER — HOSPITAL ENCOUNTER (OUTPATIENT)
Dept: NUCLEAR MEDICINE | Facility: HOSPITAL | Age: 78
Discharge: HOME | End: 2018-04-11

## 2018-04-11 ENCOUNTER — HOSPITAL ENCOUNTER (OUTPATIENT)
Dept: CARDIOLOGY | Facility: HOSPITAL | Age: 78
Discharge: HOME | End: 2018-04-11

## 2018-04-11 VITALS — BODY MASS INDEX: 29.21 KG/M2 | WEIGHT: 181 LBS

## 2018-04-11 DIAGNOSIS — R55 SYNCOPE, UNSPECIFIED SYNCOPE TYPE: ICD-10-CM

## 2018-04-11 DIAGNOSIS — R94.31 ABNORMAL ELECTROCARDIOGRAM: ICD-10-CM

## 2018-04-11 LAB
BH CV NUCLEAR PRIOR STUDY: 3
BH CV STRESS BP STAGE 1: NORMAL
BH CV STRESS BP STAGE 2: NORMAL
BH CV STRESS COMMENTS STAGE 1: NORMAL
BH CV STRESS COMMENTS STAGE 2: NORMAL
BH CV STRESS DOSE REGADENOSON STAGE 1: 0.4
BH CV STRESS DURATION MIN STAGE 1: 0
BH CV STRESS DURATION MIN STAGE 2: 4
BH CV STRESS DURATION SEC STAGE 1: 10
BH CV STRESS DURATION SEC STAGE 2: 0
BH CV STRESS HR STAGE 1: 83
BH CV STRESS HR STAGE 2: 76
BH CV STRESS PROTOCOL 1: NORMAL
BH CV STRESS RECOVERY BP: NORMAL MMHG
BH CV STRESS RECOVERY HR: 66 BPM
BH CV STRESS STAGE 1: 1
BH CV STRESS STAGE 2: 2
LV EF NUC BP: 64 %
MAXIMAL PREDICTED HEART RATE: 142 BPM
PERCENT MAX PREDICTED HR: 58.45 %
STRESS BASELINE BP: NORMAL MMHG
STRESS BASELINE HR: 57 BPM
STRESS PERCENT HR: 69 %
STRESS POST PEAK BP: NORMAL MMHG
STRESS POST PEAK HR: 83 BPM
STRESS TARGET HR: 121 BPM

## 2018-04-11 PROCEDURE — 93306 TTE W/DOPPLER COMPLETE: CPT

## 2018-04-11 PROCEDURE — 93880 EXTRACRANIAL BILAT STUDY: CPT

## 2018-04-11 PROCEDURE — 93306 TTE W/DOPPLER COMPLETE: CPT | Performed by: INTERNAL MEDICINE

## 2018-04-11 PROCEDURE — 0 TECHNETIUM SESTAMIBI: Performed by: NURSE PRACTITIONER

## 2018-04-11 PROCEDURE — 25010000002 REGADENOSON 0.4 MG/5ML SOLUTION: Performed by: NURSE PRACTITIONER

## 2018-04-11 PROCEDURE — 93880 EXTRACRANIAL BILAT STUDY: CPT | Performed by: RADIOLOGY

## 2018-04-11 PROCEDURE — A9500 TC99M SESTAMIBI: HCPCS | Performed by: NURSE PRACTITIONER

## 2018-04-11 PROCEDURE — 93017 CV STRESS TEST TRACING ONLY: CPT

## 2018-04-11 PROCEDURE — 78452 HT MUSCLE IMAGE SPECT MULT: CPT

## 2018-04-11 RX ADMIN — REGADENOSON 0.4 MG: 0.08 INJECTION, SOLUTION INTRAVENOUS at 11:05

## 2018-04-11 RX ADMIN — TECHNETIUM TC 99M SESTAMIBI 1 DOSE: 1 INJECTION INTRAVENOUS at 11:05

## 2018-04-11 RX ADMIN — TECHNETIUM TC 99M SESTAMIBI 1 DOSE: 1 INJECTION INTRAVENOUS at 08:50

## 2018-04-19 ENCOUNTER — TELEPHONE (OUTPATIENT)
Dept: CARDIOLOGY | Facility: CLINIC | Age: 78
End: 2018-04-19

## 2018-04-19 NOTE — TELEPHONE ENCOUNTER
----- Message from ISAIAS Samano sent at 4/17/2018  9:17 AM EDT -----  Please call Mr. Greenberg and tell him that his carotid Doppler was normal.  There is mild atherosclerosis noted however it is not hemodynamically significant.  He also has a stress test that was completed and is normal, it is consistent with a low risk study.    Thanks, Jaimie

## 2018-04-19 NOTE — TELEPHONE ENCOUNTER
Called patient and told him per Jaimie Nuno, we got there results of his carotid doppler and it was normal. There is mild atherosclerosis noted however it is not hemodynamically significant. I told him that we also received his stress test and that it was normal and consistent with a low risk study.    Jaimie had requested one of the MA's to also check and see if he had had any syncopal episodes since wearing his event monitor. Patient states that he has not.

## 2018-04-20 LAB
BH CV ECHO MEAS - % IVS THICK: 82.2 %
BH CV ECHO MEAS - % LVPW THICK: 59 %
BH CV ECHO MEAS - ACS: 2.1 CM
BH CV ECHO MEAS - AO MAX PG: 3.6 MMHG
BH CV ECHO MEAS - AO MEAN PG: 2 MMHG
BH CV ECHO MEAS - AO ROOT AREA (BSA CORRECTED): 1.5
BH CV ECHO MEAS - AO ROOT AREA: 6.6 CM^2
BH CV ECHO MEAS - AO ROOT DIAM: 2.9 CM
BH CV ECHO MEAS - AO V2 MAX: 95 CM/SEC
BH CV ECHO MEAS - AO V2 MEAN: 67.5 CM/SEC
BH CV ECHO MEAS - AO V2 VTI: 17.6 CM
BH CV ECHO MEAS - BSA(HAYCOCK): 2 M^2
BH CV ECHO MEAS - BSA: 1.9 M^2
BH CV ECHO MEAS - BZI_BMI: 30.2 KILOGRAMS/M^2
BH CV ECHO MEAS - BZI_METRIC_HEIGHT: 167.6 CM
BH CV ECHO MEAS - BZI_METRIC_WEIGHT: 84.8 KG
BH CV ECHO MEAS - CONTRAST EF 4CH: 66.1 ML/M^2
BH CV ECHO MEAS - EDV(CUBED): 94.1 ML
BH CV ECHO MEAS - EDV(MOD-SP4): 56 ML
BH CV ECHO MEAS - EDV(TEICH): 94.8 ML
BH CV ECHO MEAS - EF(CUBED): 69.4 %
BH CV ECHO MEAS - EF(MOD-SP4): 66.1 %
BH CV ECHO MEAS - EF(TEICH): 61.1 %
BH CV ECHO MEAS - ESV(CUBED): 28.7 ML
BH CV ECHO MEAS - ESV(MOD-SP4): 19 ML
BH CV ECHO MEAS - ESV(TEICH): 36.8 ML
BH CV ECHO MEAS - FS: 32.6 %
BH CV ECHO MEAS - IVS/LVPW: 1.1
BH CV ECHO MEAS - IVSD: 1 CM
BH CV ECHO MEAS - IVSS: 1.9 CM
BH CV ECHO MEAS - LA DIMENSION: 5.1 CM
BH CV ECHO MEAS - LA/AO: 1.8
BH CV ECHO MEAS - LV DIASTOLIC VOL/BSA (35-75): 28.8 ML/M^2
BH CV ECHO MEAS - LV MASS(C)D: 159.1 GRAMS
BH CV ECHO MEAS - LV MASS(C)DI: 81.8 GRAMS/M^2
BH CV ECHO MEAS - LV MASS(C)S: 208.6 GRAMS
BH CV ECHO MEAS - LV MASS(C)SI: 107.3 GRAMS/M^2
BH CV ECHO MEAS - LV SYSTOLIC VOL/BSA (12-30): 9.8 ML/M^2
BH CV ECHO MEAS - LVIDD: 4.5 CM
BH CV ECHO MEAS - LVIDS: 3.1 CM
BH CV ECHO MEAS - LVLD AP4: 6.2 CM
BH CV ECHO MEAS - LVLS AP4: 5.6 CM
BH CV ECHO MEAS - LVOT AREA (M): 3.8 CM^2
BH CV ECHO MEAS - LVOT AREA: 3.8 CM^2
BH CV ECHO MEAS - LVOT DIAM: 2.2 CM
BH CV ECHO MEAS - LVPWD: 0.99 CM
BH CV ECHO MEAS - LVPWS: 1.6 CM
BH CV ECHO MEAS - MV A MAX VEL: 22.2 CM/SEC
BH CV ECHO MEAS - MV E MAX VEL: 114.5 CM/SEC
BH CV ECHO MEAS - MV E/A: 5.2
BH CV ECHO MEAS - PA ACC SLOPE: 853.6 CM/SEC^2
BH CV ECHO MEAS - PA ACC TIME: 0.07 SEC
BH CV ECHO MEAS - PA PR(ACCEL): 47.3 MMHG
BH CV ECHO MEAS - RAP SYSTOLE: 10 MMHG
BH CV ECHO MEAS - RVDD: 2.9 CM
BH CV ECHO MEAS - RVSP: 43 MMHG
BH CV ECHO MEAS - SI(AO): 59.9 ML/M^2
BH CV ECHO MEAS - SI(CUBED): 33.6 ML/M^2
BH CV ECHO MEAS - SI(MOD-SP4): 19 ML/M^2
BH CV ECHO MEAS - SI(TEICH): 29.8 ML/M^2
BH CV ECHO MEAS - SV(AO): 116.5 ML
BH CV ECHO MEAS - SV(CUBED): 65.3 ML
BH CV ECHO MEAS - SV(MOD-SP4): 37 ML
BH CV ECHO MEAS - SV(TEICH): 57.9 ML
BH CV ECHO MEAS - TR MAX VEL: 287.2 CM/SEC
MAXIMAL PREDICTED HEART RATE: 142 BPM
STRESS TARGET HR: 121 BPM

## 2018-04-24 ENCOUNTER — TELEPHONE (OUTPATIENT)
Dept: CARDIOLOGY | Facility: CLINIC | Age: 78
End: 2018-04-24

## 2018-04-24 NOTE — TELEPHONE ENCOUNTER
----- Message from ISAIAS Samano sent at 4/23/2018  8:41 AM EDT -----  Please call pt.  His echo is fine.      Does he have an event monitor?  I do not see the results.    Thanks, Jaimie

## 2018-04-24 NOTE — TELEPHONE ENCOUNTER
Called patient to let him know per Jaimie Nuno, echo is fine. Patient aware and understands.    Confirmed he is still wearing his event monitor. Patient states that he is, I told him we will keep our eyes open for those daily reports. Patient understands. I asked per Jaimie's request if he had had anymore syncopal episodes and he states he has not.

## 2018-04-25 ENCOUNTER — TELEPHONE (OUTPATIENT)
Dept: CARDIOLOGY | Facility: CLINIC | Age: 78
End: 2018-04-25

## 2018-04-25 NOTE — TELEPHONE ENCOUNTER
Called pt to see if he was symptomatic of pauses noted on Event Monitor for 4/21, 4/23 and 4/24.  He denies syncope or near syncope.  Discussed the presence of pauses with Dr Chamorro.  He recommended that Mr Carson decrease his Toprol XL dose to 25 mg daily.      I asked Mr Carson to decrease his Toprol dose to 25 mg daily and he agreed to do so.  Event Monitor has 19 days left.    Jaimie Nuno, APRN

## 2018-05-02 ENCOUNTER — APPOINTMENT (OUTPATIENT)
Dept: CARDIOLOGY | Facility: HOSPITAL | Age: 78
End: 2018-05-02

## 2018-05-02 ENCOUNTER — OFFICE VISIT (OUTPATIENT)
Dept: CARDIOLOGY | Facility: CLINIC | Age: 78
End: 2018-05-02

## 2018-05-02 VITALS
SYSTOLIC BLOOD PRESSURE: 144 MMHG | HEART RATE: 81 BPM | HEIGHT: 66 IN | OXYGEN SATURATION: 96 % | DIASTOLIC BLOOD PRESSURE: 74 MMHG | BODY MASS INDEX: 30.33 KG/M2 | WEIGHT: 188.7 LBS

## 2018-05-02 DIAGNOSIS — I48.20 CHRONIC ATRIAL FIBRILLATION (HCC): ICD-10-CM

## 2018-05-02 DIAGNOSIS — I10 ESSENTIAL HYPERTENSION: Primary | ICD-10-CM

## 2018-05-02 DIAGNOSIS — E78.01 FAMILIAL HYPERCHOLESTEROLEMIA: ICD-10-CM

## 2018-05-02 PROCEDURE — 93000 ELECTROCARDIOGRAM COMPLETE: CPT | Performed by: NURSE PRACTITIONER

## 2018-05-02 PROCEDURE — 99214 OFFICE O/P EST MOD 30 MIN: CPT | Performed by: NURSE PRACTITIONER

## 2018-05-02 NOTE — PROGRESS NOTES
Subjective     Chief Complaint: Atrial Fibrillation and Hypertension    History of Present Illness   Aramis Carson is a 78 y.o. male who presents for follow-up of recent syncopal event and subsequent placement of a infant monitor.  His a past medical history significant for persistent atrial fibrillation and hypertension.  For the former he is on Coumadin and his PCP follows his INR.  In February 2018 he had 2 syncopal events which occurred within minutes of one another in the early morning hours.  He did present to the ED for evaluation and the only noted abnormality was a potassium elevation to 5.5.  CT showed no evidence of an acute intracranial abnormality.  He was discharged with a Holter monitor which showed atrial fibrillation which is his usual rhythm.  An event monitor was placed in mid April and there have been several occasions where he has had 3 second pauses.  He was notified of these pauses and his metoprolol was decreased to 25 mg daily.  At this point since the decrease of his metoprolol it does not appear that he has had any continued 3 second pauses noted on the event monitor.    Patient denies episode of syncope since his initial presentation to the ED in February.  He states that he feels good.  He denies chest pain, leg swelling and palpitations.  He denies chest tightness, cough and shortness of breath.  He denies orthopnea, PND.  He denies dizziness, lightheadedness and weakness.  He does report that he bruises easily.  He denies bright red blood per rectum or black tarry stools.    Activity: usually walks 30 minutes daily but has recently had to take care of his wife and has not been able to exercise regularly.  Plans to get back to it as she is starting to do better.        Current Outpatient Prescriptions:   •  cetirizine (zyrTEC) 10 MG tablet, Take 10 mg by mouth Daily., Disp: , Rfl:   •  esomeprazole (NexIUM) 40 MG capsule, Take 40 mg by mouth Every Morning Before Breakfast., Disp: ,  Rfl:   •  gabapentin (NEURONTIN) 400 MG capsule, Take 400 mg by mouth 3 (Three) Times a Day., Disp: , Rfl:   •  GlipiZIDE (GLUCOTROL PO), Take 300 mg by mouth., Disp: , Rfl:   •  HYDROcodone-acetaminophen (NORCO) 7.5-325 MG per tablet, Take 1 tablet by mouth Every 6 (Six) Hours As Needed for moderate pain (4-6)., Disp: , Rfl:   •  losartan (COZAAR) 25 MG tablet, Take 1 tablet by mouth Daily., Disp: 90 tablet, Rfl: 4  •  metoprolol succinate XL (TOPROL-XL) 25 MG 24 hr tablet, Take 1 tablet by mouth Daily., Disp: 90 tablet, Rfl: 3  •  omeprazole (PRILOSEC) 40 MG capsule, Take 40 mg by mouth Daily., Disp: , Rfl:   •  ranitidine (ZANTAC) 150 MG tablet, Take 150 mg by mouth As Needed for heartburn., Disp: , Rfl:   •  raNITIdine (ZANTAC) 300 MG tablet, Take 300 mg by mouth 3 (Three) Times a Day., Disp: , Rfl:   •  simvastatin (ZOCOR) 40 MG tablet, Take 40 mg by mouth Daily., Disp: , Rfl:   •  vitamin B-12 (CYANOCOBALAMIN) 1000 MCG tablet, Take 1,000 mcg by mouth Daily., Disp: , Rfl:   •  warfarin (COUMADIN) 5 MG tablet, Take 5 mg by mouth Daily., Disp: , Rfl:   •  metoprolol succinate XL (TOPROL-XL) 50 MG 24 hr tablet, Take 1 tablet by mouth Daily. TO BE TAKEN WITH METOPROLOL XL 25MG TO PRODUCE 75MG DAILY, Disp: 90 tablet, Rfl: 3     The following portions of the patient's history were reviewed and updated as appropriate: allergies, current medications, past family history, past medical history, past social history, past surgical history and problem list.    Review of Systems   Constitutional: Negative for activity change, appetite change and fatigue.   HENT: Negative for congestion and tinnitus.    Eyes: Negative for visual disturbance.   Respiratory: Negative for cough, chest tightness and shortness of breath.    Cardiovascular: Negative for chest pain, palpitations and leg swelling.   Gastrointestinal: Negative for blood in stool, nausea and vomiting.   Endocrine: Negative for cold intolerance, heat intolerance and  "polyuria.   Genitourinary: Negative for dysuria.   Musculoskeletal: Negative for myalgias and neck pain.   Skin: Negative for rash.   Neurological: Negative for dizziness, syncope, weakness and light-headedness.   Hematological: Bruises/bleeds easily.   Psychiatric/Behavioral: Negative for confusion and sleep disturbance.       Objective     /74 (BP Location: Right arm)   Pulse 81   Ht 167.6 cm (66\")   Wt 85.6 kg (188 lb 11.2 oz)   SpO2 96%   BMI 30.46 kg/m²     Physical Exam   Constitutional: He appears well-developed and well-nourished.   HENT:   Head: Normocephalic and atraumatic.   Eyes: Pupils are equal, round, and reactive to light.   Neck: Normal range of motion. No JVD present.   Cardiovascular: Normal rate and intact distal pulses.  An irregularly irregular rhythm present.   Pulmonary/Chest: Effort normal and breath sounds normal.         ECG 12 Lead  Date/Time: 5/2/2018 1:14 PM  Performed by: JASMIN VICTORIA  Authorized by: JASMIN VICTORIA   Comparison: compared with previous ECG from 2/23/2018  Similar to previous ECG  Comments: ECG 12-lead with atrial fibrillation with slow ventricular response.  Q waves present in leads 2, aVF, V 3 and V4.  Also present on EKG of 2/23/18  QTc 448              Assessment/Plan       Aramis was seen today for atrial fibrillation and hypertension.    Diagnoses and all orders for this visit:      Assessment:  1. Chronic atrial fibrillation  2. Essential hypertension  3. Familial hypercholesterolemia      Plan:  1. I have discussed with Mr. Greenberg the treatment plan which includes continued dosage of metoprolol XL 25 mg daily.  I have advised him to monitor his heart rate as with the decrease of the metoprolol his rate may increase.  I also discussed with him the possibility that he may need a permanent pacemaker at some point if we are unable to control his rate.  2. Patient to keep a blood pressures and heart rate diary and notify this office if his resting " heart rate increases to above 100  3. Return to clinic in 2 months        Jaimie Nuno, APRN

## 2018-07-02 ENCOUNTER — OFFICE VISIT (OUTPATIENT)
Dept: CARDIOLOGY | Facility: CLINIC | Age: 78
End: 2018-07-02

## 2018-07-02 VITALS
DIASTOLIC BLOOD PRESSURE: 79 MMHG | SYSTOLIC BLOOD PRESSURE: 148 MMHG | HEART RATE: 66 BPM | OXYGEN SATURATION: 99 % | BODY MASS INDEX: 30.46 KG/M2 | HEIGHT: 66 IN | WEIGHT: 189.5 LBS

## 2018-07-02 DIAGNOSIS — I10 ESSENTIAL HYPERTENSION: ICD-10-CM

## 2018-07-02 DIAGNOSIS — I48.20 CHRONIC ATRIAL FIBRILLATION (HCC): Primary | ICD-10-CM

## 2018-07-02 DIAGNOSIS — E78.01 FAMILIAL HYPERCHOLESTEROLEMIA: ICD-10-CM

## 2018-07-02 PROCEDURE — 99213 OFFICE O/P EST LOW 20 MIN: CPT | Performed by: NURSE PRACTITIONER

## 2018-07-02 NOTE — PATIENT INSTRUCTIONS
MyPlate from [employer]  The general, healthful diet is based on the 2010 Dietary Guidelines for Americans. The amount of food you need to eat from each food group depends on your age, sex, and level of physical activity and can be individualized by a dietitian. Go to ChooseMyPlate.gov for more information.  What do I need to know about the MyPlate plan?  · Enjoy your food, but eat less.  · Avoid oversized portions.  ? ½ of your plate should include fruits and vegetables.  ? ¼ of your plate should be grains.  ? ¼ of your plate should be protein.  Grains  · Make at least half of your grains whole grains.  · For a 2,000 calorie daily food plan, eat 6 oz every day.  · 1 oz is about 1 slice bread, 1 cup cereal, or ½ cup cooked rice, cereal, or pasta.  Vegetables  · Make half your plate fruits and vegetables.  · For a 2,000 calorie daily food plan, eat 2½ cups every day.  · 1 cup is about 1 cup raw or cooked vegetables or vegetable juice or 2 cups raw leafy greens.  Fruits  · Make half your plate fruits and vegetables.  · For a 2,000 calorie daily food plan, eat 2 cups every day.  · 1 cup is about 1 cup fruit or 100% fruit juice or ½ cup dried fruit.  Protein  · For a 2,000 calorie daily food plan, eat 5½ oz every day.  · 1 oz is about 1 oz meat, poultry, or fish, ¼ cup cooked beans, 1 egg, 1 Tbsp peanut butter, or ½ oz nuts or seeds.  Dairy  · Switch to fat-free or low-fat (1%) milk.  · For a 2,000 calorie daily food plan, eat 3 cups every day.  · 1 cup is about 1 cup milk or yogurt or soy milk (soy beverage), 1½ oz natural cheese, or 2 oz processed cheese.  Fats, Oils, and Empty Calories  · Only small amounts of oils are recommended.  · Empty calories are calories from solid fats or added sugars.  · Compare sodium in foods like soup, bread, and frozen meals. Choose the foods with lower numbers.  · Drink water instead of sugary drinks.  What foods can I eat?  Grains  Whole grains such as whole wheat, quinoa, millet, and  bulgur. Bread, rolls, and pasta made from whole grains. Brown or wild rice. Hot or cold cereals made from whole grains and without added sugar.  Vegetables  All fresh vegetables, especially fresh red, dark green, or orange vegetables. Peas and beans. Low-sodium frozen or canned vegetables prepared without added salt. Low-sodium vegetable juices.  Fruits  All fresh, frozen, and dried fruits. Canned fruit packed in water or fruit juice without added sugar. Fruit juices without added sugar.  Meats and Other Protein Sources  Boiled, baked, or grilled lean meat trimmed of fat. Skinless poultry. Fresh seafood and shellfish. Canned seafood packed in water. Unsalted nuts and unsalted nut butters. Tofu. Dried beans and pea. Eggs.  Dairy  Low-fat or fat-free milk, yogurt, and cheeses.  Sweets and Desserts  Frozen desserts made from low-fat milk.  Fats and Oils  Olive, peanut, and canola oils and margarine. Salad dressing and mayonnaise made from these oils.  Other  Soups and casseroles made from allowed ingredients and without added fat or salt.  The items listed above may not be a complete list of recommended foods or beverages. Contact your dietitian for more options.  What foods are not recommended?  Grains  Sweetened, low-fiber cereals. Packaged baked goods. Snack crackers and chips. Cheese crackers, butter crackers, and biscuits. Frozen waffles, sweet breads, doughnuts, pastries, packaged baking mixes, pancakes, cakes, and cookies.  Vegetables  Regular canned or frozen vegetables or vegetables prepared with salt. Canned tomatoes. Canned tomato sauce. Fried vegetables. Vegetables in cream sauce or cheese sauce.  Fruits  Fruits packed in syrup or made with added sugar.  Meats and Other Protein Sources  Marbled or fatty meats such as ribs. Poultry with skin. Fried meats, poultry, eggs, or fish. Sausages, hot dogs, and deli meats such as pastrami, bologna, or salami.  Dairy  Whole milk, cream, cheeses made from whole milk,  sour cream. Ice cream or yogurt made from whole milk or with added sugar.  Beverages  For adults, no more than one alcoholic drink per day. Regular soft drinks or other sugary beverages. Juice drinks.  Sweets and Desserts  Sugary or fatty desserts, candy, and other sweets.  Fats and Oils  Solid shortening or partially hydrogenated oils. Solid margarine. Margarine that contains trans fats. Butter.  The items listed above may not be a complete list of foods and beverages to avoid. Contact your dietitian for more information.  This information is not intended to replace advice given to you by your health care provider. Make sure you discuss any questions you have with your health care provider.  Document Released: 01/06/2009 Document Revised: 05/25/2017 Document Reviewed: 11/26/2014  Elsevier Interactive Patient Education © 2018 [employer]

## 2018-07-02 NOTE — PROGRESS NOTES
[facility]  [address] [place] [address] [address]  [place] [address]  Phone: [phone]  Fax: [phone]    07/02/2018    Chief Complaint: {[facility]:[id number]}    History:   Aramis Carson is a 78 y.o. male seen in followup,     Past Medical History:   Diagnosis Date   • Atrial fibrillation    • Coronary artery disease    • Hyperlipidemia    • Pre-diabetes        Past Social History:  Social History     Social History   • Marital status:      Social History Main Topics   • Smoking status: Former Smoker     Years: 20.00     Types: Pipe, Cigars   • Smokeless tobacco: Never Used   • Alcohol use No   • Drug use: No   • Sexual activity: Defer     Other Topics Concern   • Not on file       Past Family History:  Family History   Problem Relation Age of Onset   • Cancer Sister    • Diabetes Sister    • Cancer Brother    • Heart disease Brother    • Diabetes Maternal Grandmother    • Hypertension Neg Hx        Review of Systems:   Please see HPI  Constitution: No chills, no rigors, no unexplained weight loss or weight gain  Eyes:  No diplopia, no blurred vision, no loss of vision, conjunctiva is pink and sclera is anicteric  ENT:  No tinnitus, no otorrhea, no epistaxis, no sore throat   Respiratory: No cough, no hemoptysis  Cardiovascular: see HPI  Gastrointestinal: No nausea, no vomiting, no hematemesis, no diarrhea or constipation, no melena  Genitourinary: No frequency of dysuria no hematuria  Integument: No pruritis and  no skin rash  Hematologic / Lymphatic: No excessive bleeding, easy bruising, fatigue, lymphadenopathy and petechiae  Musculoskeletal: No joint pain, joint stiffness, joint swelling, muscle pain, muscle weakness and neck pain  Neurological: No dizziness, headaches, light headedness, seizures and vertigo  Endocrine: No frequent urination and nocturia, temperature intolerance, weight gain, unintended and weight loss, unintended      Current Outpatient Prescriptions on File Prior to  Visit   Medication Sig Dispense Refill   • cetirizine (zyrTEC) 10 MG tablet Take 10 mg by mouth Daily.     • gabapentin (NEURONTIN) 400 MG capsule Take 400 mg by mouth 3 (Three) Times a Day.     • GlipiZIDE (GLUCOTROL PO) Take 300 mg by mouth.     • HYDROcodone-acetaminophen (NORCO) 7.5-325 MG per tablet Take 1 tablet by mouth Every 6 (Six) Hours As Needed for moderate pain (4-6).     • losartan (COZAAR) 25 MG tablet Take 1 tablet by mouth Daily. 90 tablet 4   • metoprolol succinate XL (TOPROL-XL) 25 MG 24 hr tablet Take 1 tablet by mouth Daily. 90 tablet 3   • omeprazole (PRILOSEC) 40 MG capsule Take 40 mg by mouth Daily.     • raNITIdine (ZANTAC) 300 MG tablet Take 300 mg by mouth 3 (Three) Times a Day.     • simvastatin (ZOCOR) 40 MG tablet Take 40 mg by mouth Daily.     • vitamin B-12 (CYANOCOBALAMIN) 1000 MCG tablet Take 1,000 mcg by mouth Daily.     • warfarin (COUMADIN) 5 MG tablet Take 5 mg by mouth Daily.     • esomeprazole (NexIUM) 40 MG capsule Take 40 mg by mouth Every Morning Before Breakfast.     • metoprolol succinate XL (TOPROL-XL) 50 MG 24 hr tablet Take 1 tablet by mouth Daily. TO BE TAKEN WITH METOPROLOL XL 25MG TO PRODUCE 75MG DAILY 90 tablet 3   • ranitidine (ZANTAC) 150 MG tablet Take 150 mg by mouth As Needed for heartburn.       No current facility-administered medications on file prior to visit.        No Known Allergies    Objective:  There were no vitals filed for this visit.  Comfortable NAD  PERRL, conjunctiva clear  Neck supple, no JVD or thyromegaly appreciated  S1/S2 RRR, no m/r/g  Lungs CTA B, normal effort  Abdomen S/NT/ND (+) BS, no HSM appreciated  Extremities warm, no clubbing, cyanosis, or edema  Normal gait  No visible or palpable skin lesions  A/Ox4, mood and affect appropriate  Pulse exam: Gabe's:    DATA:       Results for orders placed during the hospital encounter of 04/11/18   Adult Transthoracic Echo Complete W/ Cont if Necessary Per Protocol    Narrative · Left  ventricular systolic function is normal. Estimated EF appears to be   in the range of 61 - 65%. Normal left ventricular cavity size and wall   thickness noted. All left ventricular wall segments contract normally.  · Left atrial cavity size is borderline dilated.  · Right atrial cavity size is mildly dilated.  · Mild pulmonic valve regurgitation is present.  · Mild mitral valve regurgitation is present  · Mild tricuspid valve regurgitation is present.          Results for orders placed during the hospital encounter of 04/11/18   Stress Test With Myocardial Perfusion One Day    Narrative · Patient experienced no chest pain and ECG showed no evidence of   ischemia.  · Myocardial perfusion imaging indicates a normal myocardial perfusion   study with no evidence of ischemia.  · Left ventricular ejection fraction is normal (Calculated EF = 64%).  · Normal LV cavity size. Normal LV wall motion noted.  · Impressions are consistent with a low risk study.          Results for orders placed during the hospital encounter of 04/11/18   Stress Test With Myocardial Perfusion One Day    Narrative · Patient experienced no chest pain and ECG showed no evidence of   ischemia.  · Myocardial perfusion imaging indicates a normal myocardial perfusion   study with no evidence of ischemia.  · Left ventricular ejection fraction is normal (Calculated EF = 64%).  · Normal LV cavity size. Normal LV wall motion noted.  · Impressions are consistent with a low risk study.               I personally viewed and interpreted the patient's EKG:      A/P:    Thank you for allowing me to participate in the care of Aramis Carson. Feel free to contact me directly with any further questions or concerns.

## 2018-07-02 NOTE — PROGRESS NOTES
"Subjective     Chief Complaint: Atrial Fibrillation and Hypertension    History of Present Illness   Aramis Carson is a 78 y.o. male who presents for follow-up of recent syncopal event and subsequent placement of a event monitor.  His a past medical history significant for persistent atrial fibrillation and hypertension.  For the former he is on Coumadin and his PCP follows his INR.  In February 2018 he had 2 syncopal events which occurred within minutes of one another in the early morning hours.  He did present to the ED for evaluation and the only noted abnormality was a potassium elevation to 5.5.  CT showed no evidence of an acute intracranial abnormality.  He was discharged with a Holter monitor which showed atrial fibrillation which is his usual rhythm.  An event monitor was placed in mid April and there were several occasions where he had 3 second pauses.  He was notified of these pauses and his metoprolol was decreased to 25 mg daily.   The 3 second pauses diminished after the reduction of metoprolol.  Mr. Greenberg is here today for follow-up of the reduction of his metoprolol succinate.    He does admit to me that he has not been following his blood pressures at home.  He denies chest pain and shortness of air.  He does report an irregular heartbeat which is \"normal\" for him.  He reports some minimal lower extremity swelling, resolves when he puts his feet up, and is stable and chronic.  He reports that he bruises easily.  He denies bright red blood per rectum or black tarry stools.      Current Outpatient Prescriptions:   •  cetirizine (zyrTEC) 10 MG tablet, Take 10 mg by mouth Daily., Disp: , Rfl:   •  gabapentin (NEURONTIN) 400 MG capsule, Take 400 mg by mouth 3 (Three) Times a Day., Disp: , Rfl:   •  GlipiZIDE (GLUCOTROL PO), Take 300 mg by mouth., Disp: , Rfl:   •  HYDROcodone-acetaminophen (NORCO) 7.5-325 MG per tablet, Take 1 tablet by mouth Every 6 (Six) Hours As Needed for moderate pain (4-6)., Disp: " ", Rfl:   •  losartan (COZAAR) 25 MG tablet, Take 1 tablet by mouth Daily., Disp: 90 tablet, Rfl: 4  •  metoprolol succinate XL (TOPROL-XL) 25 MG 24 hr tablet, Take 1 tablet by mouth Daily., Disp: 90 tablet, Rfl: 3  •  omeprazole (PRILOSEC) 40 MG capsule, Take 40 mg by mouth Daily., Disp: , Rfl:   •  simvastatin (ZOCOR) 40 MG tablet, Take 40 mg by mouth Daily., Disp: , Rfl:   •  vitamin B-12 (CYANOCOBALAMIN) 1000 MCG tablet, Take 1,000 mcg by mouth Daily., Disp: , Rfl:   •  warfarin (COUMADIN) 5 MG tablet, Take 5 mg by mouth Daily., Disp: , Rfl:      The following portions of the patient's history were reviewed and updated as appropriate: allergies, current medications, past family history, past medical history, past social history, past surgical history and problem list.    Review of Systems   Constitution: Positive for weight gain.   HENT: Negative.    Eyes: Negative.    Cardiovascular: Positive for irregular heartbeat and leg swelling.   Endocrine: Negative.    Hematologic/Lymphatic: Bruises/bleeds easily.   Skin: Negative.    Musculoskeletal: Negative.    Gastrointestinal: Negative.    Genitourinary: Negative.    Neurological: Negative.    Psychiatric/Behavioral: Negative.    Allergic/Immunologic: Negative.          Objective     /79 (BP Location: Right arm)   Pulse 66   Ht 167.6 cm (66\")   Wt 86 kg (189 lb 8 oz)   SpO2 99%   BMI 30.59 kg/m²     Physical Exam   Constitutional: He appears well-developed and well-nourished.   HENT:   Head: Normocephalic and atraumatic.   Eyes: Pupils are equal, round, and reactive to light.   Neck: No JVD present.   Cardiovascular: Normal rate, regular rhythm and intact distal pulses.  Exam reveals no gallop and no friction rub.    No murmur heard.  Pulses:       Dorsalis pedis pulses are 2+ on the right side, and 2+ on the left side.        Posterior tibial pulses are 2+ on the right side, and 2+ on the left side.   Minimal lower extremity edema   Pulmonary/Chest: " Effort normal and breath sounds normal. No respiratory distress. He has no wheezes. He has no rales.   Abdominal: Soft. He exhibits no mass. There is no tenderness. No hernia.   Skin: Skin is warm and dry.   Psychiatric: He has a normal mood and affect.   Vitals reviewed.      Procedures      Assessment/Plan       There are no diagnoses linked to this encounter.      Assessment:  1. Atrial fibrillation, chronic anticoagulation with Coumadin, followed per PCP  2. Essential hypertension      Plan:  1. CHADsVASc is at least 4 for hypertension, age, and diabetes.  Patient to continue Coumadin for anticoagulation and metoprolol succinate for rate control/hypertension.  2. I have asked Mr. Greenberg to keep a blood pressure diary and to notify this office if his systolic blood pressures are greater than 150.  Educational materials were given.  3. Patient to return to clinic in 3 months, sooner for any worsening or concerning symptoms.       Return in about 3 months (around 10/2/2018).      ISAIAS Adames

## 2018-10-02 ENCOUNTER — OFFICE VISIT (OUTPATIENT)
Dept: CARDIOLOGY | Facility: CLINIC | Age: 78
End: 2018-10-02

## 2018-10-02 VITALS
HEIGHT: 66 IN | SYSTOLIC BLOOD PRESSURE: 121 MMHG | HEART RATE: 51 BPM | OXYGEN SATURATION: 99 % | BODY MASS INDEX: 30.1 KG/M2 | WEIGHT: 187.3 LBS | DIASTOLIC BLOOD PRESSURE: 76 MMHG

## 2018-10-02 DIAGNOSIS — I10 ESSENTIAL HYPERTENSION: ICD-10-CM

## 2018-10-02 DIAGNOSIS — E78.01 FAMILIAL HYPERCHOLESTEROLEMIA: ICD-10-CM

## 2018-10-02 DIAGNOSIS — I48.20 CHRONIC ATRIAL FIBRILLATION (HCC): Primary | ICD-10-CM

## 2018-10-02 PROCEDURE — 93000 ELECTROCARDIOGRAM COMPLETE: CPT | Performed by: NURSE PRACTITIONER

## 2018-10-02 PROCEDURE — 99213 OFFICE O/P EST LOW 20 MIN: CPT | Performed by: NURSE PRACTITIONER

## 2018-10-02 RX ORDER — LOSARTAN POTASSIUM 25 MG/1
37.5 TABLET ORAL DAILY
Qty: 135 TABLET | Refills: 3 | Status: SHIPPED | OUTPATIENT
Start: 2018-10-02 | End: 2019-06-03 | Stop reason: ALTCHOICE

## 2018-10-02 NOTE — PROGRESS NOTES
Subjective     Chief Complaint: Atrial Fibrillation; Hypertension; and Hyperlipidemia    History of Present Illness   Aramis Carson is a 78 y.o. male who presents with a past medical history significant for persistent atrial fibrillation and hypertension.  For the former he is on Coumadin and his PCP follows his INR.  In February 2018 he had 2 syncopal events which occurred within minutes of one another in the early morning hours.  He did present to the ED for evaluation and the only noted abnormality was a potassium elevation to 5.5.  CT showed no evidence of an acute intracranial abnormality.  He was discharged with a Holter monitor which showed atrial fibrillation which is his usual rhythm.  An event monitor was placed in mid April and there were several occasions where he had 3 second pauses.  He was notified of these pauses and his metoprolol was decreased to 25 mg daily.   The 3 second pauses diminished after the reduction of metoprolol.   at his last visit his blood pressure was mildly elevated and I asked him to keep a blood pressure diary.  He is here today for follow-up    At today's visit Mr. Greenberg is accompanied by his wife.  He denies chest pain, palpitations, syncope or near syncopal event.  He does report stable, non worsening dyspnea on exertion.   He reports bruising easily, but denies bright red blood per rectum or black tarry stools.  He reports ankle swelling which occurs after prolonged standing.  Swelling decreases with elevation of legs and is usually gone by morning.      Mr Carson remains active in his garden and he and his wife have recently started to take brisk daily 30 minute walks.      He has brought his blood pressure diary with him today.  Blood pressures are generally 130s to 150s systolically and mostly 140s.      Current Outpatient Prescriptions:   •  cetirizine (zyrTEC) 10 MG tablet, Take 10 mg by mouth Daily., Disp: , Rfl:   •  gabapentin (NEURONTIN) 400 MG capsule, Take 400 mg  "by mouth 3 (Three) Times a Day., Disp: , Rfl:   •  GlipiZIDE (GLUCOTROL PO), Take 300 mg by mouth., Disp: , Rfl:   •  HYDROcodone-acetaminophen (NORCO) 7.5-325 MG per tablet, Take 1 tablet by mouth Every 6 (Six) Hours As Needed for moderate pain (4-6)., Disp: , Rfl:   •  losartan (COZAAR) 25 MG tablet, Take 1.5 tablets by mouth Daily., Disp: 135 tablet, Rfl: 3  •  metoprolol succinate XL (TOPROL-XL) 25 MG 24 hr tablet, Take 1 tablet by mouth Daily., Disp: 90 tablet, Rfl: 3  •  omeprazole (PRILOSEC) 40 MG capsule, Take 40 mg by mouth Daily., Disp: , Rfl:   •  simvastatin (ZOCOR) 40 MG tablet, Take 40 mg by mouth Daily., Disp: , Rfl:   •  vitamin B-12 (CYANOCOBALAMIN) 1000 MCG tablet, Take 1,000 mcg by mouth Daily., Disp: , Rfl:   •  warfarin (COUMADIN) 5 MG tablet, Take 5 mg by mouth Daily., Disp: , Rfl:      The following portions of the patient's history were reviewed and updated as appropriate: allergies, current medications, past family history, past medical history, past social history, past surgical history and problem list.    Review of Systems   Constitution: Positive for weight loss.   HENT: Negative.    Eyes: Negative.    Cardiovascular: Positive for dyspnea on exertion and leg swelling.   Endocrine: Negative.    Hematologic/Lymphatic: Bruises/bleeds easily.   Skin: Negative.    Musculoskeletal: Negative.    Gastrointestinal: Negative.    Genitourinary: Negative.    Neurological: Negative.    Psychiatric/Behavioral: Negative.    Allergic/Immunologic: Negative.          Objective     /76 (BP Location: Left arm)   Pulse 51   Ht 167.6 cm (66\")   Wt 85 kg (187 lb 4.8 oz)   SpO2 99%   BMI 30.23 kg/m²     Physical Exam   Constitutional: He appears well-developed and well-nourished.   HENT:   Head: Normocephalic and atraumatic.   Eyes: Pupils are equal, round, and reactive to light.   Neck: No JVD present.   Cardiovascular: Normal rate and intact distal pulses.  An irregularly irregular rhythm present. " Exam reveals no gallop and no friction rub.    No murmur heard.  Minimal LE swelling   Pulmonary/Chest: Effort normal and breath sounds normal. No respiratory distress. He has no wheezes. He has no rales.   Abdominal: Soft. He exhibits no mass. There is no tenderness. No hernia.   Skin: Skin is warm and dry.   Psychiatric: He has a normal mood and affect.   Vitals reviewed.        ECG 12 Lead  Date/Time: 10/2/2018 10:12 AM  Performed by: JASMIN VICTORIA  Authorized by: JASMIN VICTORIA   Comparison: compared with previous ECG from 5/2/2018  Similar to previous ECG  Comparison to previous ECG: Atrial fibrillation with slow ventricular rate, low QRS voltage in precordial leads, Q waves in II, aVF, V3 and V4  Rhythm: atrial fibrillation  Q waves: II, aVF, V3 and V4  Comments: QTc 435          4/11/2018 nuclear stress test    Interpretation Summary     · Patient experienced no chest pain and ECG showed no evidence of ischemia.  · Myocardial perfusion imaging indicates a normal myocardial perfusion study with no evidence of ischemia.  · Left ventricular ejection fraction is normal (Calculated EF = 64%).  · Normal LV cavity size. Normal LV wall motion noted.  · Impressions are consistent with a low risk study.     4/11/2018 transthoracic echocardiogram    Interpretation Summary     · Left ventricular systolic function is normal. Estimated EF appears to be in the range of 61 - 65%. Normal left ventricular cavity size and wall thickness noted. All left ventricular wall segments contract normally.  · Left atrial cavity size is borderline dilated.  · Right atrial cavity size is mildly dilated.  · Mild pulmonic valve regurgitation is present.  · Mild mitral valve regurgitation is present  · Mild tricuspid valve regurgitation is present.         Assessment/Plan       Aramis was seen today for atrial fibrillation, hypertension and hyperlipidemia.    Diagnoses and all orders for this visit:    Assessment:  1. Chronic atrial  fibrillation, rate controlled.  Anticoagulation with warfarin.  INRs followed per PCP  2. Essential hypertension, uncontrolled  3. Hyperlipidemia, followed per PCP      Plan:  1. Continue metoprolol as prescribed.  Continue warfarin  2. Increase losartan to 37.5 mg daily.  Monitor BP.  3. Return to clinic in 6 months, sooner for worsening or concerning symptoms.      Return in about 6 months (around 4/2/2019).      Jaimie Nuno, APRN

## 2019-01-02 RX ORDER — METOPROLOL SUCCINATE 25 MG/1
25 TABLET, EXTENDED RELEASE ORAL DAILY
Qty: 90 TABLET | Refills: 3 | Status: SHIPPED | OUTPATIENT
Start: 2019-01-02 | End: 2020-01-06

## 2019-04-16 ENCOUNTER — OFFICE VISIT (OUTPATIENT)
Dept: SURGERY | Facility: CLINIC | Age: 79
End: 2019-04-16

## 2019-04-16 VITALS — WEIGHT: 182 LBS | BODY MASS INDEX: 29.25 KG/M2 | HEIGHT: 66 IN

## 2019-04-16 DIAGNOSIS — M67.449 GANGLION CYST OF FINGER: Primary | ICD-10-CM

## 2019-04-16 PROCEDURE — 99213 OFFICE O/P EST LOW 20 MIN: CPT | Performed by: SURGERY

## 2019-04-17 PROBLEM — M67.449 GANGLION CYST OF FINGER: Status: ACTIVE | Noted: 2019-04-17

## 2019-04-17 NOTE — PROGRESS NOTES
Subjective   Aramis Carson is a 79 y.o. male is being seen for consultation today at the request of Kieran Blanca APRN    Aramis Carson is a 79 y.o. male With ganglion cyst of the right index finger at the DIP joint.  The patient has increasing size of the cyst and discomfort and has a tendency to both the cyst against things as he is right-hand dominant.  He also states he felt a pop in his finger and woke up one morning and the finger was in a flexed position and he had to extend it forcefully and since that time has had pain and swelling.There is no cellulitis or edema.         Past Medical History:   Diagnosis Date   • Atrial fibrillation (CMS/HCC)    • Coronary artery disease    • Hyperlipidemia    • Pre-diabetes        Family History   Problem Relation Age of Onset   • Cancer Sister    • Diabetes Sister    • Cancer Brother    • Heart disease Brother    • Diabetes Maternal Grandmother    • Hypertension Neg Hx        Social History     Socioeconomic History   • Marital status:      Spouse name: Not on file   • Number of children: Not on file   • Years of education: Not on file   • Highest education level: Not on file   Tobacco Use   • Smoking status: Former Smoker     Years: 20.00     Types: Pipe, Cigars   • Smokeless tobacco: Never Used   Substance and Sexual Activity   • Alcohol use: No   • Drug use: No   • Sexual activity: Defer       Past Surgical History:   Procedure Laterality Date   • CARDIAC CATHETERIZATION     • COLONOSCOPY     • INGUINAL HERNIA REPAIR     • LAPAROSCOPIC CHOLECYSTECTOMY         Review of Systems   Constitutional: Negative for activity change, appetite change, chills and fever.   HENT: Negative for sore throat and trouble swallowing.    Eyes: Negative for visual disturbance.   Respiratory: Negative for cough and shortness of breath.    Cardiovascular: Negative for chest pain and palpitations.   Gastrointestinal: Negative for abdominal distention, abdominal pain, blood  "in stool, constipation, diarrhea, nausea and vomiting.   Endocrine: Negative for cold intolerance and heat intolerance.   Genitourinary: Negative for dysuria.   Musculoskeletal: Positive for joint swelling.   Skin: Negative for color change, rash and wound.   Allergic/Immunologic: Negative for immunocompromised state.   Neurological: Negative for dizziness, seizures, weakness and headaches.   Hematological: Negative for adenopathy. Does not bruise/bleed easily.   Psychiatric/Behavioral: Negative for agitation and confusion.         Ht 167.6 cm (66\")   Wt 82.6 kg (182 lb)   BMI 29.38 kg/m²   Objective   Physical Exam   Constitutional: He is oriented to person, place, and time. He appears well-developed.   HENT:   Head: Normocephalic and atraumatic.   Mouth/Throat: Mucous membranes are normal.   Eyes: Conjunctivae are normal. Pupils are equal, round, and reactive to light.   Neck: Neck supple. No JVD present. No tracheal deviation present. No thyromegaly present.   Cardiovascular: Normal rate and regular rhythm. Exam reveals no gallop and no friction rub.   No murmur heard.  Pulmonary/Chest: Effort normal and breath sounds normal.   Abdominal: Soft. He exhibits no distension. There is no splenomegaly or hepatomegaly. There is no tenderness. No hernia.   Musculoskeletal: Normal range of motion. He exhibits no deformity.   Neurological: He is alert and oriented to person, place, and time.   Skin: Skin is warm and dry.   Right index finger distal interphalangeal 7 mm sebaceous cyst   Psychiatric: He has a normal mood and affect.             Giana Self was seen today for ganglion cyst.    Diagnoses and all orders for this visit:    Ganglion cyst of finger      Aramis Carson is a 79 y.o. male with right index finger ganglion cyst causing discomfort and increasing in size as well as possible tendinous injury or abnormality of the proximal finger.  He will be referred to orthopedic surgery to evaluate both " lesions and follow-up in my office as needed.    Patient's Body mass index is 29.38 kg/m². BMI is above normal parameters. Recommendations include: educational material.

## 2019-04-18 DIAGNOSIS — M67.40 GANGLION CYST: Primary | ICD-10-CM

## 2019-04-19 ENCOUNTER — OFFICE VISIT (OUTPATIENT)
Dept: CARDIOLOGY | Facility: CLINIC | Age: 79
End: 2019-04-19

## 2019-04-19 VITALS
HEIGHT: 66 IN | WEIGHT: 189.2 LBS | SYSTOLIC BLOOD PRESSURE: 125 MMHG | HEART RATE: 61 BPM | DIASTOLIC BLOOD PRESSURE: 83 MMHG | BODY MASS INDEX: 30.41 KG/M2 | OXYGEN SATURATION: 97 %

## 2019-04-19 DIAGNOSIS — I10 ESSENTIAL HYPERTENSION: ICD-10-CM

## 2019-04-19 DIAGNOSIS — I48.20 CHRONIC ATRIAL FIBRILLATION (HCC): Primary | ICD-10-CM

## 2019-04-19 DIAGNOSIS — R42 LIGHT HEADED: ICD-10-CM

## 2019-04-19 DIAGNOSIS — R07.2 PRECORDIAL PAIN: ICD-10-CM

## 2019-04-19 DIAGNOSIS — E78.01 FAMILIAL HYPERCHOLESTEROLEMIA: ICD-10-CM

## 2019-04-19 DIAGNOSIS — R53.83 FATIGUE, UNSPECIFIED TYPE: ICD-10-CM

## 2019-04-19 PROCEDURE — 99213 OFFICE O/P EST LOW 20 MIN: CPT | Performed by: NURSE PRACTITIONER

## 2019-04-19 NOTE — PROGRESS NOTES
Subjective     Chief Complaint: Atrial Fibrillation; Fatigue; Hypertension; and Hyperlipidemia    History of Present Illness   Aramis Carson is a 79 y.o. male who presents with a past medical history significant for persistent atrial fibrillation and hypertension.  For the former he is on Coumadin and his PCP follows his INR.  In February 2018 he had 2 syncopal events which occurred within minutes of one another in the early morning hours.  Workup included a event monitor which revealed occasional 3-second pauses.  His metoprolol was adjusted and the syncopal episodes have since resolved.  Also at that time, as part of the workup he underwent a echocardiogram and stress test both of which were benign.  He is here today for follow-up.    At today's visit Mr. Carson reports that he has been having intermittent chest pain.  He describes this as a sharp pain which lasts a minute or 2.  Has been happening about every couple of weeks.  Generally occurs at rest.  He does walk greater than a mile most days of the week and also uses exercise equipment.  In total he generally works out 40 minutes a day about 5 times a week.  He denies chest pain during that time.  Mr. Greenberg reports fatigue and lightheadedness as well as occasional lower extremity swelling.  He states that this is chronic and is not worsening.  He does bruise easily however denies bright red blood per rectum or black tarry stools.  INR is followed by PCP.      Current Outpatient Medications:   •  cetirizine (zyrTEC) 10 MG tablet, Take 10 mg by mouth Daily., Disp: , Rfl:   •  gabapentin (NEURONTIN) 400 MG capsule, Take 400 mg by mouth 3 (Three) Times a Day., Disp: , Rfl:   •  GlipiZIDE (GLUCOTROL PO), Take 300 mg by mouth., Disp: , Rfl:   •  HYDROcodone-acetaminophen (NORCO) 7.5-325 MG per tablet, Take 1 tablet by mouth Every 6 (Six) Hours As Needed for moderate pain (4-6)., Disp: , Rfl:   •  losartan (COZAAR) 25 MG tablet, Take 1.5 tablets by mouth Daily.,  "Disp: 135 tablet, Rfl: 3  •  metoprolol succinate XL (TOPROL-XL) 25 MG 24 hr tablet, Take 1 tablet by mouth Daily., Disp: 90 tablet, Rfl: 3  •  omeprazole (PRILOSEC) 40 MG capsule, Take 40 mg by mouth Daily., Disp: , Rfl:   •  simvastatin (ZOCOR) 40 MG tablet, Take 40 mg by mouth Daily., Disp: , Rfl:   •  vitamin B-12 (CYANOCOBALAMIN) 1000 MCG tablet, Take 1,000 mcg by mouth Daily., Disp: , Rfl:   •  warfarin (COUMADIN) 5 MG tablet, Take 5 mg by mouth Daily., Disp: , Rfl:      On this date:  The following portions of the patient's history were reviewed and updated as appropriate: allergies, current medications, past family history, past medical history, past social history, past surgical history and problem list.    Review of Systems   Constitution: Positive for malaise/fatigue.   HENT: Negative.    Eyes: Negative.    Cardiovascular: Positive for leg swelling.   Endocrine: Negative.    Hematologic/Lymphatic: Bruises/bleeds easily.   Skin: Negative.    Musculoskeletal: Negative.    Gastrointestinal: Negative.    Genitourinary: Negative.    Neurological: Positive for light-headedness.   Psychiatric/Behavioral: Negative.    Allergic/Immunologic: Negative.          Objective     /83 (BP Location: Left arm)   Pulse 61   Ht 167.6 cm (66\")   Wt 85.8 kg (189 lb 3.2 oz)   SpO2 97%   BMI 30.54 kg/m²     Physical Exam   Constitutional: He is oriented to person, place, and time. He appears well-developed and well-nourished.   HENT:   Head: Normocephalic and atraumatic.   Eyes: Pupils are equal, round, and reactive to light.   Neck: No JVD present.   Cardiovascular: Normal rate, regular rhythm and intact distal pulses. Exam reveals no gallop and no friction rub.   No murmur heard.  Pulmonary/Chest: Effort normal and breath sounds normal. No respiratory distress. He has no wheezes. He has no rales.   Neurological: He is alert and oriented to person, place, and time.   Skin: Skin is warm and dry.   Psychiatric: He has a " normal mood and affect.       Procedures      Assessment/Plan       Aramis was seen today for atrial fibrillation, fatigue, hypertension and hyperlipidemia.    Diagnoses and all orders for this visit:    Assessment:  1. Chest pain with typical and atypical features.  2. Chronic atrial fibrillation, rate controlled  3. Essential hypertension  4. Fatigue      Plan:  1. Nuclear stress test and echocardiogram ordered to evaluate patient's chest pain.  2. Continue medications as is  3. Return to clinic in 4 weeks, sooner for any worsening or concerning symptoms.      Return in about 4 weeks (around 5/17/2019).      Jaimie Nuno, APRN

## 2019-04-26 RX ORDER — AMLODIPINE BESYLATE 5 MG/1
5 TABLET ORAL DAILY
Qty: 30 TABLET | Refills: 5 | Status: SHIPPED | OUTPATIENT
Start: 2019-04-26 | End: 2019-04-26

## 2019-04-26 RX ORDER — AMLODIPINE BESYLATE 5 MG/1
5 TABLET ORAL DAILY
Qty: 30 TABLET | Refills: 0 | Status: SHIPPED | OUTPATIENT
Start: 2019-04-26 | End: 2019-10-30 | Stop reason: SDUPTHER

## 2019-04-26 RX ORDER — AMLODIPINE BESYLATE 5 MG/1
5 TABLET ORAL DAILY
Qty: 90 TABLET | Refills: 1 | Status: SHIPPED | OUTPATIENT
Start: 2019-04-26 | End: 2019-06-03 | Stop reason: SDUPTHER

## 2019-04-26 NOTE — TELEPHONE ENCOUNTER
Patient called stating his pharmacy had notified him that there had been a recall on Losartan and he would need to be switched to something else.     D/w Dr. Guerrero who is switching him to Amlodipine 5mg QD

## 2019-05-07 ENCOUNTER — OFFICE VISIT (OUTPATIENT)
Dept: ORTHOPEDIC SURGERY | Facility: CLINIC | Age: 79
End: 2019-05-07

## 2019-05-07 ENCOUNTER — HOSPITAL ENCOUNTER (OUTPATIENT)
Dept: GENERAL RADIOLOGY | Facility: HOSPITAL | Age: 79
Discharge: HOME | End: 2019-05-07
Admitting: ORTHOPAEDIC SURGERY

## 2019-05-07 VITALS
DIASTOLIC BLOOD PRESSURE: 83 MMHG | WEIGHT: 189 LBS | HEART RATE: 71 BPM | BODY MASS INDEX: 30.37 KG/M2 | SYSTOLIC BLOOD PRESSURE: 151 MMHG | HEIGHT: 66 IN

## 2019-05-07 DIAGNOSIS — M79.644 PAIN OF FINGER OF RIGHT HAND: Primary | ICD-10-CM

## 2019-05-07 PROCEDURE — 73140 X-RAY EXAM OF FINGER(S): CPT

## 2019-05-07 PROCEDURE — 73140 X-RAY EXAM OF FINGER(S): CPT | Performed by: RADIOLOGY

## 2019-05-07 PROCEDURE — 99202 OFFICE O/P NEW SF 15 MIN: CPT | Performed by: ORTHOPAEDIC SURGERY

## 2019-05-07 NOTE — PROGRESS NOTES
"Patient: Aramis Carson    YOB: 1940    Chief Complaint   Patient presents with   • Right Hand - new patient, Pain, Edema         History of Present Illness: Patient presents with complaint of pain and locking of his right index finger.  States is been going on for about 4 to 5 weeks now.  States he is always had a cyst at the end of his finger for about 15 years his wife thinks it might be getting a little bit bigger but he does not.  Denies any specific injury or trauma to his hand.  He is not diabetic.  States he woke up in the morning and just his DIP was flexed down and he had a pop and straight.  Denies any pain or catching or locking in his palm.  Denies any paresthesias or numbness.  Says he has had had some mild swelling of the finger    Past Medical History:   Diagnosis Date   • Atrial fibrillation (CMS/HCC)    • Coronary artery disease    • Hyperlipidemia    • Pre-diabetes         Social History     Socioeconomic History   • Marital status:      Spouse name: Not on file   • Number of children: Not on file   • Years of education: Not on file   • Highest education level: Not on file   Tobacco Use   • Smoking status: Former Smoker     Years: 20.00     Types: Pipe, Cigars   • Smokeless tobacco: Never Used   Substance and Sexual Activity   • Alcohol use: No   • Drug use: No   • Sexual activity: Defer           Physical Exam: 79 y.o. male  General Appearance:    Alert and oriented x 3, cooperative, in no acute distress                   Vitals:    05/07/19 1046   BP: 151/83   Pulse: 71   Weight: 85.7 kg (189 lb)   Height: 167.6 cm (65.98\")          Exam shows right hand grossly neurovascular intact.  She is to be just a mild amount of swelling of the entire index finger.  He does have a firm nodule in the middle aspect of his middle phalanx on the radial aspect.  Not particular tender to touch.  He has no pain along the flexor tendon.  There is no evidence of any catching or locking.  " There is no nodules noted.  No varus or valgus instability noted in any of the joints.  Sensation is grossly intact.  Good capillary refill      Radiology:       X-rays taken today are reviewed by myself show no obvious degenerative changes.  There is no acute findings.  There might be just a very mild calcification of the extensor tendon very small      Assessment/Plan: Right index finger cyst and pain and swelling.  This might be just simple dactylitis.  The cyst is been there for a long time.  Under a significant distance this morning I tried aspirated with 18-gauge needle into got nothing out of it suggesting may be an inclusion cyst.  He has no exam consistent with a trigger finger.  While I's I think he might just have an extensor tendon strain is a simple trial of nighttime splinting with AlumaFoam splint.  This will keep him from flexing his fingers at night.  We will see him back in 4 weeks and see how he is doing            Patient's Body mass index is 30.52 kg/m². BMI is above normal parameters. Recommendations include: referral to primary care.      Discussion/Summary:                This chart was completed utilizing the dragon speech recognition software.  Grammatical errors, random word insertions, pronoun errors, and incomplete sentences or occasional consequences of the system due to software limitations, ambient noise, and hardware issues.  Any questions or concerns about the content, text, or information contained within the body of this dictation should be directly addressed to the physician for clarification        This document was signed by George Woodard M.D. May 7, 2019 11:50 AM

## 2019-05-16 ENCOUNTER — HOSPITAL ENCOUNTER (OUTPATIENT)
Dept: NUCLEAR MEDICINE | Facility: HOSPITAL | Age: 79
Discharge: HOME | End: 2019-05-16

## 2019-05-16 ENCOUNTER — HOSPITAL ENCOUNTER (OUTPATIENT)
Dept: CARDIOLOGY | Facility: HOSPITAL | Age: 79
Discharge: HOME | End: 2019-05-16

## 2019-05-16 DIAGNOSIS — R07.2 PRECORDIAL PAIN: ICD-10-CM

## 2019-05-16 DIAGNOSIS — R53.83 FATIGUE, UNSPECIFIED TYPE: ICD-10-CM

## 2019-05-16 DIAGNOSIS — E78.01 FAMILIAL HYPERCHOLESTEROLEMIA: ICD-10-CM

## 2019-05-16 DIAGNOSIS — I48.20 CHRONIC ATRIAL FIBRILLATION (HCC): ICD-10-CM

## 2019-05-16 LAB
BH CV ECHO MEAS - % IVS THICK: 18.5 %
BH CV ECHO MEAS - % LVPW THICK: 76.9 %
BH CV ECHO MEAS - ACS: 2.2 CM
BH CV ECHO MEAS - AO MAX PG: 7 MMHG
BH CV ECHO MEAS - AO MEAN PG: 3.5 MMHG
BH CV ECHO MEAS - AO ROOT AREA (BSA CORRECTED): 1.6
BH CV ECHO MEAS - AO ROOT AREA: 7.2 CM^2
BH CV ECHO MEAS - AO ROOT DIAM: 3 CM
BH CV ECHO MEAS - AO V2 MAX: 132.7 CM/SEC
BH CV ECHO MEAS - AO V2 MEAN: 86.8 CM/SEC
BH CV ECHO MEAS - AO V2 VTI: 30.3 CM
BH CV ECHO MEAS - BSA(HAYCOCK): 2 M^2
BH CV ECHO MEAS - BSA: 1.9 M^2
BH CV ECHO MEAS - BZI_BMI: 31.5 KILOGRAMS/M^2
BH CV ECHO MEAS - BZI_METRIC_HEIGHT: 165.1 CM
BH CV ECHO MEAS - BZI_METRIC_WEIGHT: 85.7 KG
BH CV ECHO MEAS - EDV(CUBED): 68.2 ML
BH CV ECHO MEAS - EDV(MOD-SP4): 59 ML
BH CV ECHO MEAS - EDV(TEICH): 73.6 ML
BH CV ECHO MEAS - EF(CUBED): 75 %
BH CV ECHO MEAS - EF(MOD-SP4): 66.1 %
BH CV ECHO MEAS - EF(TEICH): 67.4 %
BH CV ECHO MEAS - ESV(CUBED): 17.1 ML
BH CV ECHO MEAS - ESV(MOD-SP4): 20 ML
BH CV ECHO MEAS - ESV(TEICH): 24 ML
BH CV ECHO MEAS - FS: 37 %
BH CV ECHO MEAS - IVS/LVPW: 1.3
BH CV ECHO MEAS - IVSD: 1.3 CM
BH CV ECHO MEAS - IVSS: 1.6 CM
BH CV ECHO MEAS - LA DIMENSION: 4.4 CM
BH CV ECHO MEAS - LA/AO: 1.5
BH CV ECHO MEAS - LV DIASTOLIC VOL/BSA (35-75): 30.6 ML/M^2
BH CV ECHO MEAS - LV MASS(C)D: 166 GRAMS
BH CV ECHO MEAS - LV MASS(C)DI: 85.9 GRAMS/M^2
BH CV ECHO MEAS - LV MASS(C)S: 162.8 GRAMS
BH CV ECHO MEAS - LV MASS(C)SI: 84.3 GRAMS/M^2
BH CV ECHO MEAS - LV SYSTOLIC VOL/BSA (12-30): 10.4 ML/M^2
BH CV ECHO MEAS - LVIDD: 4.1 CM
BH CV ECHO MEAS - LVIDS: 2.6 CM
BH CV ECHO MEAS - LVLD AP4: 6.9 CM
BH CV ECHO MEAS - LVLS AP4: 5.9 CM
BH CV ECHO MEAS - LVOT AREA (M): 2.8 CM^2
BH CV ECHO MEAS - LVOT AREA: 3 CM^2
BH CV ECHO MEAS - LVOT DIAM: 1.9 CM
BH CV ECHO MEAS - LVPWD: 1 CM
BH CV ECHO MEAS - LVPWS: 1.8 CM
BH CV ECHO MEAS - MV A MAX VEL: 32.6 CM/SEC
BH CV ECHO MEAS - MV E MAX VEL: 108.6 CM/SEC
BH CV ECHO MEAS - MV E/A: 3.3
BH CV ECHO MEAS - PA ACC SLOPE: 1328 CM/SEC^2
BH CV ECHO MEAS - PA ACC TIME: 0.06 SEC
BH CV ECHO MEAS - PA PR(ACCEL): 50.5 MMHG
BH CV ECHO MEAS - RAP SYSTOLE: 10 MMHG
BH CV ECHO MEAS - RVSP: 42.2 MMHG
BH CV ECHO MEAS - SI(AO): 112.7 ML/M^2
BH CV ECHO MEAS - SI(CUBED): 26.5 ML/M^2
BH CV ECHO MEAS - SI(MOD-SP4): 20.2 ML/M^2
BH CV ECHO MEAS - SI(TEICH): 25.7 ML/M^2
BH CV ECHO MEAS - SV(AO): 217.6 ML
BH CV ECHO MEAS - SV(CUBED): 51.2 ML
BH CV ECHO MEAS - SV(MOD-SP4): 39 ML
BH CV ECHO MEAS - SV(TEICH): 49.6 ML
BH CV ECHO MEAS - TR MAX VEL: 283.9 CM/SEC
BH CV NUCLEAR PRIOR STUDY: 3
BH CV STRESS BP STAGE 1: NORMAL
BH CV STRESS BP STAGE 2: NORMAL
BH CV STRESS COMMENTS STAGE 1: NORMAL
BH CV STRESS COMMENTS STAGE 2: NORMAL
BH CV STRESS DOSE REGADENOSON STAGE 1: 0.4
BH CV STRESS DURATION MIN STAGE 1: 0
BH CV STRESS DURATION MIN STAGE 2: 4
BH CV STRESS DURATION SEC STAGE 1: 10
BH CV STRESS DURATION SEC STAGE 2: 0
BH CV STRESS HR STAGE 1: 85
BH CV STRESS HR STAGE 2: 70
BH CV STRESS PROTOCOL 1: NORMAL
BH CV STRESS RECOVERY HR: 70 BPM
BH CV STRESS STAGE 1: 1
BH CV STRESS STAGE 2: 2
LV EF 2D ECHO EST: 70 %
LV EF NUC BP: 70 %
MAXIMAL PREDICTED HEART RATE: 141 BPM
MAXIMAL PREDICTED HEART RATE: 141 BPM
PERCENT MAX PREDICTED HR: 60.28 %
STRESS BASELINE BP: NORMAL MMHG
STRESS BASELINE HR: 59 BPM
STRESS PERCENT HR: 71 %
STRESS POST PEAK BP: NORMAL MMHG
STRESS POST PEAK HR: 85 BPM
STRESS TARGET HR: 120 BPM
STRESS TARGET HR: 120 BPM

## 2019-05-16 PROCEDURE — 0 TECHNETIUM SESTAMIBI: Performed by: NURSE PRACTITIONER

## 2019-05-16 PROCEDURE — A9500 TC99M SESTAMIBI: HCPCS | Performed by: NURSE PRACTITIONER

## 2019-05-16 PROCEDURE — 25010000002 REGADENOSON 0.4 MG/5ML SOLUTION: Performed by: NURSE PRACTITIONER

## 2019-05-16 PROCEDURE — 78452 HT MUSCLE IMAGE SPECT MULT: CPT

## 2019-05-16 PROCEDURE — 93017 CV STRESS TEST TRACING ONLY: CPT

## 2019-05-16 PROCEDURE — 78452 HT MUSCLE IMAGE SPECT MULT: CPT | Performed by: INTERNAL MEDICINE

## 2019-05-16 PROCEDURE — 93306 TTE W/DOPPLER COMPLETE: CPT

## 2019-05-16 PROCEDURE — 93306 TTE W/DOPPLER COMPLETE: CPT | Performed by: INTERNAL MEDICINE

## 2019-05-16 PROCEDURE — 93018 CV STRESS TEST I&R ONLY: CPT | Performed by: INTERNAL MEDICINE

## 2019-05-16 RX ADMIN — TECHNETIUM TC 99M SESTAMIBI 1 DOSE: 1 INJECTION INTRAVENOUS at 07:45

## 2019-05-16 RX ADMIN — REGADENOSON 0.4 MG: 0.08 INJECTION, SOLUTION INTRAVENOUS at 09:28

## 2019-05-16 RX ADMIN — TECHNETIUM TC 99M SESTAMIBI 1 DOSE: 1 INJECTION INTRAVENOUS at 09:28

## 2019-05-21 ENCOUNTER — TELEPHONE (OUTPATIENT)
Dept: CARDIOLOGY | Facility: CLINIC | Age: 79
End: 2019-05-21

## 2019-05-21 RX ORDER — AMLODIPINE BESYLATE 5 MG/1
5 TABLET ORAL DAILY
Qty: 30 TABLET | Refills: 0 | Status: SHIPPED | OUTPATIENT
Start: 2019-05-21 | End: 2019-06-03 | Stop reason: SDUPTHER

## 2019-05-21 RX ORDER — SIMVASTATIN 20 MG
20 TABLET ORAL NIGHTLY
Qty: 90 TABLET | Refills: 3 | Status: SHIPPED | OUTPATIENT
Start: 2019-05-21 | End: 2020-06-15

## 2019-05-21 NOTE — TELEPHONE ENCOUNTER
Patient called.  He stated that he was having a problem with [facility].  They would not fill his amlodipine until they spoke with someone here at this office.  He further stated that [facility] had told him that they had tried to contact the office several times but had no response.    I called [facility] at [phone] with reference #[id number] per Mr. Greenberg's instructions.  There is an interaction between amlodipine and simvastatin.  I will decrease simvastatin from 40 mg daily to 20 mg daily and he can continue amlodipine.    The patient was called back and informed to decrease simvastatin to 20 mg daily by either cutting his current dosage of 40 mg in half or alternately he could take 40 mg every other day until his current supply has been depleted.  I have sent a new prescription for simvastatin 20 to [facility]  Additionally since his mail order amlodipine has not yet been filled and he has only 4 doses left, I have sent a 30-day supply to [facility] for him.

## 2019-06-03 ENCOUNTER — OFFICE VISIT (OUTPATIENT)
Dept: CARDIOLOGY | Facility: CLINIC | Age: 79
End: 2019-06-03

## 2019-06-03 VITALS
WEIGHT: 190.6 LBS | SYSTOLIC BLOOD PRESSURE: 115 MMHG | OXYGEN SATURATION: 98 % | HEIGHT: 66 IN | DIASTOLIC BLOOD PRESSURE: 70 MMHG | HEART RATE: 71 BPM | BODY MASS INDEX: 30.63 KG/M2

## 2019-06-03 DIAGNOSIS — I10 ESSENTIAL HYPERTENSION: ICD-10-CM

## 2019-06-03 DIAGNOSIS — E11.9 CONTROLLED TYPE 2 DIABETES MELLITUS WITHOUT COMPLICATION, WITHOUT LONG-TERM CURRENT USE OF INSULIN (HCC): ICD-10-CM

## 2019-06-03 DIAGNOSIS — E78.01 FAMILIAL HYPERCHOLESTEROLEMIA: Primary | ICD-10-CM

## 2019-06-03 DIAGNOSIS — I48.20 CHRONIC ATRIAL FIBRILLATION (HCC): ICD-10-CM

## 2019-06-03 PROCEDURE — 99213 OFFICE O/P EST LOW 20 MIN: CPT | Performed by: NURSE PRACTITIONER

## 2019-06-03 NOTE — PATIENT INSTRUCTIONS
Mediterranean Diet  A Mediterranean diet refers to food and lifestyle choices that are based on the traditions of countries located on the [place]. This way of eating has been shown to help prevent certain conditions and improve outcomes for people who have chronic diseases, like kidney disease and heart disease.  What are tips for following this plan?  Lifestyle  · Cook and eat meals together with your family, when possible.  · Drink enough fluid to keep your urine clear or pale yellow.  · Be physically active every day. This includes:  ? Aerobic exercise like running or swimming.  ? Leisure activities like gardening, walking, or housework.  · Get 7-8 hours of sleep each night.  · If recommended by your health care provider, drink red wine in moderation. This means 1 glass a day for nonpregnant women and 2 glasses a day for men. A glass of wine equals 5 oz (150 mL).  Reading food labels  · Check the serving size of packaged foods. For foods such as rice and pasta, the serving size refers to the amount of cooked product, not dry.  · Check the total fat in packaged foods. Avoid foods that have saturated fat or trans fats.  · Check the ingredients list for added sugars, such as corn syrup.  Shopping  · At the grocery store, buy most of your food from the areas near the walls of the store. This includes:  ? Fresh fruits and vegetables (produce).  ? Grains, beans, nuts, and seeds. Some of these may be available in unpackaged forms or large amounts (in bulk).  ? Fresh seafood.  ? Poultry and eggs.  ? Low-fat dairy products.  · Buy whole ingredients instead of prepackaged foods.  · Buy fresh fruits and vegetables in-season from local farmers markets.  · Buy frozen fruits and vegetables in resealable bags.  · If you do not have access to quality fresh seafood, buy precooked frozen shrimp or canned fish, such as tuna, salmon, or sardines.  · Buy small amounts of raw or cooked vegetables, salads, or olives from the  deli or salad bar at your store.  · Stock your pantry so you always have certain foods on hand, such as olive oil, canned tuna, canned tomatoes, rice, pasta, and beans.  Cooking  · Cook foods with extra-virgin olive oil instead of using butter or other vegetable oils.  · Have meat as a side dish, and have vegetables or grains as your main dish. This means having meat in small portions or adding small amounts of meat to foods like pasta or stew.  · Use beans or vegetables instead of meat in common dishes like chili or lasagna.  · [place] with different cooking methods. Try roasting or broiling vegetables instead of steaming or sautéeing them.  · Add frozen vegetables to soups, stews, pasta, or rice.  · Add nuts or seeds for added healthy fat at each meal. You can add these to yogurt, salads, or vegetable dishes.  · Marinate fish or vegetables using olive oil, lemon juice, garlic, and fresh herbs.  Meal planning  · Plan to eat 1 vegetarian meal one day each week. Try to work up to 2 vegetarian meals, if possible.  · Eat seafood 2 or more times a week.  · Have healthy snacks readily available, such as:  ? Vegetable sticks with hummus.  ? Greek yogurt.  ? Fruit and nut trail mix.  · Eat balanced meals throughout the week. This includes:  ? Fruit: 2-3 servings a day  ? Vegetables: 4-5 servings a day  ? Low-fat dairy: 2 servings a day  ? Fish, poultry, or lean meat: 1 serving a day  ? Beans and legumes: 2 or more servings a week  ? Nuts and seeds: 1-2 servings a day  ? Whole grains: 6-8 servings a day  ? Extra-virgin olive oil: 3-4 servings a day  · Limit red meat and sweets to only a few servings a month  What are my food choices?  · Mediterranean diet  ? Recommended  ? Grains: Whole-grain pasta. Brown rice. Bulgar wheat. Polenta. Couscous. Whole-wheat bread. Oatmeal. Quinoa.  ? Vegetables: Artichokes. Beets. Broccoli. Cabbage. Carrots. Eggplant. Green beans. Chard. Kale. Spinach. Onions. Leeks. Peas. Squash.  Tomatoes. Peppers. Radishes.  ? Fruits: Apples. Apricots. Avocado. Berries. Bananas. Cherries. Dates. Figs. Grapes. [place]. Melon. Oranges. Peaches. Plums. Pomegranate.  ? Meats and other protein foods: Beans. Almonds. Sunflower seeds. Pine nuts. Peanuts. Cod. [place]. Scallops. Shrimp. Tuna. Tilapia. Clams. Oysters. Eggs.  ? Dairy: Low-fat milk. Cheese. Greek yogurt.  ? Beverages: Water. Red wine. Herbal tea.  ? Fats and oils: Extra virgin olive oil. Avocado oil. Grape seed oil.  ? Sweets and desserts: Greek yogurt with honey. Baked apples. Poached pears. Trail mix.  ? Seasoning and other foods: Basil. Cilantro. Coriander. Cumin. Mint. Parsley. John. Rosemary. Tarragon. Garlic. Oregano. Thyme. Pepper. Balsalmic vinegar. Tahini. Hummus. Tomato sauce. Olives. Mushrooms.  ? Limit these  ? Grains: Prepackaged pasta or rice dishes. Prepackaged cereal with added sugar.  ? Vegetables: Deep fried potatoes (french fries).  ? Fruits: Fruit canned in syrup.  ? Meats and other protein foods: Beef. Pork. Lamb. Poultry with skin. Hot dogs. [place].  ? Dairy: Ice cream. Sour cream. Whole milk.  ? Beverages: Juice. Sugar-sweetened soft drinks. Beer. Liquor and spirits.  ? Fats and oils: Butter. Canola oil. Vegetable oil. Beef fat (tallow). Lard.  ? Sweets and desserts: Cookies. Cakes. Pies. Candy.  ? Seasoning and other foods: Mayonnaise. Premade sauces and marinades.  ? The items listed may not be a complete list. Talk with your dietitian about what dietary choices are right for you.  Summary  · The Mediterranean diet includes both food and lifestyle choices.  · Eat a variety of fresh fruits and vegetables, beans, nuts, seeds, and whole grains.  · Limit the amount of red meat and sweets that you eat.  · Talk with your health care provider about whether it is safe for you to drink red wine in moderation. This means 1 glass a day for nonpregnant women and 2 glasses a day for men. A glass of wine equals 5 oz (150 mL).  This information  is not intended to replace advice given to you by your health care provider. Make sure you discuss any questions you have with your health care provider.  Document Released: 08/10/2017 Document Revised: 09/12/2017 Document Reviewed: 08/10/2017  [employer] Patient Education © 2019 [employer]

## 2019-06-03 NOTE — PROGRESS NOTES
Subjective     Chief Complaint: Chest Pain; Atrial Fibrillation; Hypertension; and Fatigue    History of Present Illness   Aramis Carson is a 79 y.o. male who presents with a past medical history significant for persistent atrial fibrillation and hypertension.  For the former he is on Coumadin and his PCP follows his INR.  In July 2018 Mr. Miramontes had 2 syncopal episodes, event monitor showed several 3-second, his metoprolol was adjusted and he has had no further problems.  His last visit here he reported some chest pain.  Echocardiogram and nuclear stress test were ordered.  He is here today for those results.    At today's visit Mr. Miramontes states that he has had no further episodes of chest pain.  He denies palpitations.  He denies dyspnea on exertion.  He denies syncope or near syncopal event.      Current Outpatient Medications:   •  amLODIPine (NORVASC) 5 MG tablet, Take 1 tablet by mouth Daily., Disp: 30 tablet, Rfl: 0  •  cetirizine (zyrTEC) 10 MG tablet, Take 10 mg by mouth Daily., Disp: , Rfl:   •  GlipiZIDE (GLUCOTROL PO), Take 2.5 mg by mouth., Disp: , Rfl:   •  HYDROcodone-acetaminophen (NORCO) 7.5-325 MG per tablet, Take 1 tablet by mouth Every 6 (Six) Hours As Needed for moderate pain (4-6)., Disp: , Rfl:   •  metoprolol succinate XL (TOPROL-XL) 25 MG 24 hr tablet, Take 1 tablet by mouth Daily., Disp: 90 tablet, Rfl: 3  •  omeprazole (PRILOSEC) 40 MG capsule, Take 40 mg by mouth Daily., Disp: , Rfl:   •  simvastatin (ZOCOR) 20 MG tablet, Take 1 tablet by mouth Every Night., Disp: 90 tablet, Rfl: 3  •  vitamin B-12 (CYANOCOBALAMIN) 1000 MCG tablet, Take 1,000 mcg by mouth Daily., Disp: , Rfl:   •  warfarin (COUMADIN) 5 MG tablet, Take 5 mg by mouth Daily., Disp: , Rfl:      On this date:  The following portions of the patient's history were reviewed and updated as appropriate: allergies, current medications, past family history, past medical history, past social history, past surgical history and problem  "list.    Review of Systems   Constitution: Negative for weakness and malaise/fatigue.   Cardiovascular: Negative for chest pain, dyspnea on exertion, irregular heartbeat, leg swelling, near-syncope, orthopnea, palpitations, paroxysmal nocturnal dyspnea and syncope.   Respiratory: Negative for shortness of breath.    Hematologic/Lymphatic: Does not bruise/bleed easily.   Neurological: Negative for dizziness and light-headedness.         Objective     /70 (BP Location: Left arm, Patient Position: Sitting)   Pulse 71   Ht 167.6 cm (66\")   Wt 86.5 kg (190 lb 9.6 oz)   SpO2 98%   BMI 30.76 kg/m²     Physical Exam   Constitutional: He appears well-developed and well-nourished.   HENT:   Head: Normocephalic and atraumatic.   Eyes: Pupils are equal, round, and reactive to light.   Neck: No JVD present.   Cardiovascular: Normal rate and intact distal pulses. An irregularly irregular rhythm present. Exam reveals no gallop and no friction rub.   No murmur heard.  No lower extremity edema     Pulmonary/Chest: Effort normal and breath sounds normal. No respiratory distress. He has no wheezes. He has no rales.   Abdominal: Soft. He exhibits no mass. There is no tenderness. No hernia.   Skin: Skin is warm and dry.   Psychiatric: He has a normal mood and affect.   Vitals reviewed.      Procedures    5/16/2019 nuclear stress test     Interpretation Summary     · Left ventricular ejection fraction is normal (Calculated EF = 70%).  · Findings consistent with a normal ECG stress test.  · Myocardial perfusion imaging indicates a small-sized, mildly severe area of ischemia located in the basal inferior lateral wall.  · Impressions are consistent with a low risk study.  · Defect inferolateral segment is small and medical management is appropriate. Clinical correlation is required.     5/16/2019 transthoracic echocardiogram    Interpretation Summary     · Estimated EF = 70%.  · Left ventricular systolic function is normal.  · No " significant valvular abnormality  · Left atrial cavity size is borderline dilated.  · Bi-atrial mild enlargement  · No significant change since prior study of 4/11/18         Assessment/Plan       Aramis was seen today for chest pain, atrial fibrillation, hypertension and fatigue.    Diagnoses and all orders for this visit:    Assessment:  1. Chest pain with typical and atypical features, resolved.  2. Chronic atrial fibrillation, rate controlled  3. Essential hypertension  4. Fatigue, chronic        Plan:  1. Nuclear stress test and echocardiogram results were reviewed with the patient.  2. Plan of care will continue as previously.  3. Patient to continue Toprol XL, Norvasc, and simvastatin.  He is to continue Coumadin, INR is to be checked by his PCP.  4. Return to clinic in 6 months, sooner for any worsening or concerning symptoms.        Return in about 6 months (around 12/3/2019).    ISAIAS Adames

## 2019-08-24 ENCOUNTER — APPOINTMENT (OUTPATIENT)
Dept: GENERAL RADIOLOGY | Facility: HOSPITAL | Age: 79
End: 2019-08-24

## 2019-08-24 ENCOUNTER — HOSPITAL ENCOUNTER (EMERGENCY)
Facility: HOSPITAL | Age: 79
Discharge: HOME | End: 2019-08-24
Attending: EMERGENCY MEDICINE | Admitting: EMERGENCY MEDICINE

## 2019-08-24 VITALS
WEIGHT: 182 LBS | OXYGEN SATURATION: 98 % | RESPIRATION RATE: 18 BRPM | TEMPERATURE: 97.8 F | SYSTOLIC BLOOD PRESSURE: 143 MMHG | HEIGHT: 66 IN | DIASTOLIC BLOOD PRESSURE: 84 MMHG | BODY MASS INDEX: 29.25 KG/M2 | HEART RATE: 59 BPM

## 2019-08-24 DIAGNOSIS — R07.89 ATYPICAL CHEST PAIN: Primary | ICD-10-CM

## 2019-08-24 LAB
ALBUMIN SERPL-MCNC: 4.22 G/DL (ref 3.5–5.2)
ALBUMIN/GLOB SERPL: 1.6 G/DL
ALP SERPL-CCNC: 73 U/L (ref 39–117)
ALT SERPL W P-5'-P-CCNC: 24 U/L (ref 1–41)
ANION GAP SERPL CALCULATED.3IONS-SCNC: 9.6 MMOL/L (ref 5–15)
AST SERPL-CCNC: 23 U/L (ref 1–40)
BASOPHILS # BLD AUTO: 0.02 10*3/MM3 (ref 0–0.2)
BASOPHILS NFR BLD AUTO: 0.2 % (ref 0–1.5)
BILIRUB SERPL-MCNC: 0.4 MG/DL (ref 0.2–1.2)
BUN BLD-MCNC: 22 MG/DL (ref 8–23)
BUN/CREAT SERPL: 19.1 (ref 7–25)
CALCIUM SPEC-SCNC: 9.7 MG/DL (ref 8.6–10.5)
CHLORIDE SERPL-SCNC: 107 MMOL/L (ref 98–107)
CO2 SERPL-SCNC: 27.4 MMOL/L (ref 22–29)
CREAT BLD-MCNC: 1.15 MG/DL (ref 0.76–1.27)
D DIMER PPP FEU-MCNC: <0.27 MCGFEU/ML (ref 0–0.5)
DEPRECATED RDW RBC AUTO: 44 FL (ref 37–54)
EOSINOPHIL # BLD AUTO: 0.09 10*3/MM3 (ref 0–0.4)
EOSINOPHIL NFR BLD AUTO: 1 % (ref 0.3–6.2)
ERYTHROCYTE [DISTWIDTH] IN BLOOD BY AUTOMATED COUNT: 14.3 % (ref 12.3–15.4)
GFR SERPL CREATININE-BSD FRML MDRD: 61 ML/MIN/1.73
GLOBULIN UR ELPH-MCNC: 2.7 GM/DL
GLUCOSE BLD-MCNC: 120 MG/DL (ref 65–99)
HCT VFR BLD AUTO: 45.3 % (ref 37.5–51)
HGB BLD-MCNC: 14.4 G/DL (ref 13–17.7)
HOLD SPECIMEN: NORMAL
HOLD SPECIMEN: NORMAL
IMM GRANULOCYTES # BLD AUTO: 0.01 10*3/MM3 (ref 0–0.05)
IMM GRANULOCYTES NFR BLD AUTO: 0.1 % (ref 0–0.5)
INR PPP: 1.95 (ref 0.9–1.1)
LIPASE SERPL-CCNC: 40 U/L (ref 13–60)
LYMPHOCYTES # BLD AUTO: 2.21 10*3/MM3 (ref 0.7–3.1)
LYMPHOCYTES NFR BLD AUTO: 25.6 % (ref 19.6–45.3)
MCH RBC QN AUTO: 26.9 PG (ref 26.6–33)
MCHC RBC AUTO-ENTMCNC: 31.8 G/DL (ref 31.5–35.7)
MCV RBC AUTO: 84.5 FL (ref 79–97)
MONOCYTES # BLD AUTO: 0.72 10*3/MM3 (ref 0.1–0.9)
MONOCYTES NFR BLD AUTO: 8.4 % (ref 5–12)
NEUTROPHILS # BLD AUTO: 5.57 10*3/MM3 (ref 1.7–7)
NEUTROPHILS NFR BLD AUTO: 64.7 % (ref 42.7–76)
NT-PROBNP SERPL-MCNC: 934.2 PG/ML (ref 5–1800)
PLATELET # BLD AUTO: 210 10*3/MM3 (ref 140–450)
PMV BLD AUTO: 10.9 FL (ref 6–12)
POTASSIUM BLD-SCNC: 5 MMOL/L (ref 3.5–5.2)
PROT SERPL-MCNC: 6.9 G/DL (ref 6–8.5)
PROTHROMBIN TIME: 23.2 SECONDS (ref 11–15.4)
RBC # BLD AUTO: 5.36 10*6/MM3 (ref 4.14–5.8)
SODIUM BLD-SCNC: 144 MMOL/L (ref 136–145)
TROPONIN T SERPL-MCNC: <0.01 NG/ML (ref 0–0.03)
WBC NRBC COR # BLD: 8.62 10*3/MM3 (ref 3.4–10.8)
WHOLE BLOOD HOLD SPECIMEN: NORMAL
WHOLE BLOOD HOLD SPECIMEN: NORMAL

## 2019-08-24 PROCEDURE — 83690 ASSAY OF LIPASE: CPT | Performed by: EMERGENCY MEDICINE

## 2019-08-24 PROCEDURE — 85610 PROTHROMBIN TIME: CPT | Performed by: EMERGENCY MEDICINE

## 2019-08-24 PROCEDURE — 71045 X-RAY EXAM CHEST 1 VIEW: CPT

## 2019-08-24 PROCEDURE — 85025 COMPLETE CBC W/AUTO DIFF WBC: CPT | Performed by: EMERGENCY MEDICINE

## 2019-08-24 PROCEDURE — 85379 FIBRIN DEGRADATION QUANT: CPT | Performed by: EMERGENCY MEDICINE

## 2019-08-24 PROCEDURE — 93005 ELECTROCARDIOGRAM TRACING: CPT | Performed by: EMERGENCY MEDICINE

## 2019-08-24 PROCEDURE — 99284 EMERGENCY DEPT VISIT MOD MDM: CPT

## 2019-08-24 PROCEDURE — 80053 COMPREHEN METABOLIC PANEL: CPT | Performed by: EMERGENCY MEDICINE

## 2019-08-24 PROCEDURE — 93010 ELECTROCARDIOGRAM REPORT: CPT | Performed by: INTERNAL MEDICINE

## 2019-08-24 PROCEDURE — 83880 ASSAY OF NATRIURETIC PEPTIDE: CPT | Performed by: EMERGENCY MEDICINE

## 2019-08-24 PROCEDURE — 84484 ASSAY OF TROPONIN QUANT: CPT | Performed by: EMERGENCY MEDICINE

## 2019-08-24 RX ORDER — SODIUM CHLORIDE 0.9 % (FLUSH) 0.9 %
10 SYRINGE (ML) INJECTION AS NEEDED
Status: DISCONTINUED | OUTPATIENT
Start: 2019-08-24 | End: 2019-08-24 | Stop reason: HOSPADM

## 2019-08-24 RX ORDER — ASPIRIN 325 MG
325 TABLET ORAL ONCE
Status: COMPLETED | OUTPATIENT
Start: 2019-08-24 | End: 2019-08-24

## 2019-08-24 RX ADMIN — ASPIRIN 325 MG: 325 TABLET ORAL at 13:59

## 2019-10-14 ENCOUNTER — APPOINTMENT (OUTPATIENT)
Dept: LAB | Facility: HOSPITAL | Age: 79
End: 2019-10-14

## 2019-10-14 ENCOUNTER — TRANSCRIBE ORDERS (OUTPATIENT)
Dept: ADMINISTRATIVE | Facility: HOSPITAL | Age: 79
End: 2019-10-14

## 2019-10-14 DIAGNOSIS — E87.5 HYPERKALEMIA: Primary | ICD-10-CM

## 2019-10-14 PROCEDURE — 36415 COLL VENOUS BLD VENIPUNCTURE: CPT | Performed by: NURSE PRACTITIONER

## 2019-10-14 PROCEDURE — 84132 ASSAY OF SERUM POTASSIUM: CPT | Performed by: NURSE PRACTITIONER

## 2019-10-15 LAB — POTASSIUM BLD-SCNC: 4.7 MMOL/L (ref 3.5–5.2)

## 2019-10-30 RX ORDER — AMLODIPINE BESYLATE 5 MG/1
TABLET ORAL
Qty: 90 TABLET | Refills: 4 | Status: SHIPPED | OUTPATIENT
Start: 2019-10-30 | End: 2020-12-14 | Stop reason: DRUGHIGH

## 2019-12-06 ENCOUNTER — OFFICE VISIT (OUTPATIENT)
Dept: CARDIOLOGY | Facility: CLINIC | Age: 79
End: 2019-12-06

## 2019-12-06 VITALS
SYSTOLIC BLOOD PRESSURE: 157 MMHG | BODY MASS INDEX: 31.18 KG/M2 | WEIGHT: 194 LBS | HEIGHT: 66 IN | HEART RATE: 80 BPM | DIASTOLIC BLOOD PRESSURE: 78 MMHG | OXYGEN SATURATION: 98 %

## 2019-12-06 DIAGNOSIS — I10 ESSENTIAL HYPERTENSION: ICD-10-CM

## 2019-12-06 DIAGNOSIS — I48.20 CHRONIC ATRIAL FIBRILLATION (HCC): Primary | ICD-10-CM

## 2019-12-06 DIAGNOSIS — E78.01 FAMILIAL HYPERCHOLESTEROLEMIA: ICD-10-CM

## 2019-12-06 DIAGNOSIS — E11.9 CONTROLLED TYPE 2 DIABETES MELLITUS WITHOUT COMPLICATION, WITHOUT LONG-TERM CURRENT USE OF INSULIN (HCC): ICD-10-CM

## 2019-12-06 PROCEDURE — 99213 OFFICE O/P EST LOW 20 MIN: CPT | Performed by: NURSE PRACTITIONER

## 2019-12-06 NOTE — PROGRESS NOTES
Subjective     Chief Complaint: Atrial Fibrillation; Hypertension; and Hyperlipidemia    History of Present Illness   Aramis Carson is a 79 y.o. male who presents with a past medical history significant for persistent atrial fibrillation and hypertension.  For the former he is on Coumadin and his PCP follows his INR.  In July 2018 Mr. Carson had 2 syncopal episodes, event monitor showed several 3-second pauses, his metoprolol was adjusted and he has had no further problems. He is here today for follow up.    He denies chest pain, SOA, DURANT, orthopnea.  He states that he has been doing well.  He is in physical therapy for hip bursitis.  INR checked by PCP has been doing well.  Reports no recent labs at PCP office.      Mr Carson state that his blood presure at home is generally in the 120s systolically.      Current Outpatient Medications:   •  amLODIPine (NORVASC) 5 MG tablet, TAKE 1 TABLET DAILY, Disp: 90 tablet, Rfl: 4  •  cetirizine (zyrTEC) 10 MG tablet, Take 10 mg by mouth Daily., Disp: , Rfl:   •  GlipiZIDE (GLUCOTROL PO), Take 2.5 mg by mouth., Disp: , Rfl:   •  HYDROcodone-acetaminophen (NORCO) 7.5-325 MG per tablet, Take 1 tablet by mouth Every 6 (Six) Hours As Needed for moderate pain (4-6)., Disp: , Rfl:   •  metoprolol succinate XL (TOPROL-XL) 25 MG 24 hr tablet, Take 1 tablet by mouth Daily., Disp: 90 tablet, Rfl: 3  •  omeprazole (PRILOSEC) 40 MG capsule, Take 40 mg by mouth Daily., Disp: , Rfl:   •  simvastatin (ZOCOR) 20 MG tablet, Take 1 tablet by mouth Every Night., Disp: 90 tablet, Rfl: 3  •  vitamin B-12 (CYANOCOBALAMIN) 1000 MCG tablet, Take 1,000 mcg by mouth Daily., Disp: , Rfl:   •  warfarin (COUMADIN) 5 MG tablet, Take 5 mg by mouth Daily., Disp: , Rfl:      On this date:  The following portions of the patient's history were reviewed and updated as appropriate: allergies, current medications, past family history, past medical history, past social history, past surgical history and problem  "list.    Review of Systems   Constitution: Negative for weakness and malaise/fatigue.   Cardiovascular: Positive for leg swelling (mild ankle swelling at times). Negative for chest pain, dyspnea on exertion, irregular heartbeat, near-syncope, orthopnea, palpitations, paroxysmal nocturnal dyspnea and syncope.   Respiratory: Negative for shortness of breath.    Hematologic/Lymphatic: Does not bruise/bleed easily.   Neurological: Negative for dizziness and light-headedness.         Objective     /78 (BP Location: Left arm, Patient Position: Sitting, Cuff Size: Adult)   Pulse 80   Ht 167.6 cm (66\")   Wt 88 kg (194 lb)   SpO2 98%   BMI 31.31 kg/m²     Physical Exam   Constitutional: He appears well-developed and well-nourished.   HENT:   Head: Normocephalic and atraumatic.   Eyes: Pupils are equal, round, and reactive to light.   Neck: No JVD present.   Cardiovascular: Normal rate and intact distal pulses. An irregularly irregular rhythm present. Exam reveals no gallop and no friction rub.   No murmur heard.  No lower extremity edema   Pulmonary/Chest: Effort normal and breath sounds normal. No respiratory distress. He has no wheezes. He has no rales.   Abdominal: Soft. He exhibits no mass. There is no tenderness. No hernia.   Skin: Skin is warm and dry.   Psychiatric: He has a normal mood and affect.   Vitals reviewed.      Procedures      Assessment/Plan       Aramis was seen today for atrial fibrillation, hypertension and hyperlipidemia.    Diagnoses and all orders for this visit:    Chronic atrial fibrillation   Rate is controlled   CHADsVASc is at least 3 for hypertension and age.   Patient is chronically anticoagulated on Coumadin, followed per PCP   Continue metoprolol succinate and Coumadin    Familial hypercholesterolemia   Labs have been ordered today, including lipid panel   Continue simvastatin    Controlled type 2 diabetes mellitus without complication, without long-term current use of insulin " (CMS/[facility])  -     Hemoglobin A1c     Essential hypertension   Blood pressures at home are controlled   Continue metoprolol succinate and amlodipine   Recommended patient keep a blood pressure diary.    Patient's medications and plan of care were reviewed.  No changes were made to his medication regimen at this time.  Since patient had not had labs completed recently at PCP office and states he prefers to have his labs drawn here at the hospital, I have ordered labs.  He will complete them next week.    Return in about 6 months (around 6/6/2020).      Jaimie Nuno, APRN

## 2019-12-09 ENCOUNTER — APPOINTMENT (OUTPATIENT)
Dept: LAB | Facility: HOSPITAL | Age: 79
End: 2019-12-09

## 2019-12-09 LAB
ALBUMIN SERPL-MCNC: 4 G/DL (ref 3.5–5.2)
ALBUMIN/GLOB SERPL: 1.4 G/DL
ALP SERPL-CCNC: 63 U/L (ref 39–117)
ALT SERPL W P-5'-P-CCNC: 28 U/L (ref 1–41)
ANION GAP SERPL CALCULATED.3IONS-SCNC: 10.3 MMOL/L (ref 5–15)
AST SERPL-CCNC: 23 U/L (ref 1–40)
BILIRUB SERPL-MCNC: 0.3 MG/DL (ref 0.2–1.2)
BUN BLD-MCNC: 17 MG/DL (ref 8–23)
BUN/CREAT SERPL: 16.2 (ref 7–25)
CALCIUM SPEC-SCNC: 9.6 MG/DL (ref 8.6–10.5)
CHLORIDE SERPL-SCNC: 107 MMOL/L (ref 98–107)
CHOLEST SERPL-MCNC: 125 MG/DL (ref 0–200)
CO2 SERPL-SCNC: 27.7 MMOL/L (ref 22–29)
CREAT BLD-MCNC: 1.05 MG/DL (ref 0.76–1.27)
DEPRECATED RDW RBC AUTO: 41 FL (ref 37–54)
ERYTHROCYTE [DISTWIDTH] IN BLOOD BY AUTOMATED COUNT: 13.9 % (ref 12.3–15.4)
GFR SERPL CREATININE-BSD FRML MDRD: 68 ML/MIN/1.73
GLOBULIN UR ELPH-MCNC: 2.9 GM/DL
GLUCOSE BLD-MCNC: 118 MG/DL (ref 65–99)
HBA1C MFR BLD: 6.4 % (ref 4.8–5.6)
HCT VFR BLD AUTO: 43.1 % (ref 37.5–51)
HDLC SERPL-MCNC: 33 MG/DL (ref 40–60)
HGB BLD-MCNC: 14.4 G/DL (ref 13–17.7)
LDLC SERPL CALC-MCNC: 53 MG/DL (ref 0–100)
LDLC/HDLC SERPL: 1.61 {RATIO}
MCH RBC QN AUTO: 27.6 PG (ref 26.6–33)
MCHC RBC AUTO-ENTMCNC: 33.4 G/DL (ref 31.5–35.7)
MCV RBC AUTO: 82.6 FL (ref 79–97)
PLATELET # BLD AUTO: 237 10*3/MM3 (ref 140–450)
PMV BLD AUTO: 10.9 FL (ref 6–12)
POTASSIUM BLD-SCNC: 5.3 MMOL/L (ref 3.5–5.2)
PROT SERPL-MCNC: 6.9 G/DL (ref 6–8.5)
RBC # BLD AUTO: 5.22 10*6/MM3 (ref 4.14–5.8)
SODIUM BLD-SCNC: 145 MMOL/L (ref 136–145)
TRIGL SERPL-MCNC: 194 MG/DL (ref 0–150)
VLDLC SERPL-MCNC: 38.8 MG/DL (ref 5–40)
WBC NRBC COR # BLD: 7.58 10*3/MM3 (ref 3.4–10.8)

## 2019-12-09 PROCEDURE — 36415 COLL VENOUS BLD VENIPUNCTURE: CPT | Performed by: NURSE PRACTITIONER

## 2019-12-09 PROCEDURE — 80053 COMPREHEN METABOLIC PANEL: CPT | Performed by: NURSE PRACTITIONER

## 2019-12-09 PROCEDURE — 85027 COMPLETE CBC AUTOMATED: CPT | Performed by: NURSE PRACTITIONER

## 2019-12-09 PROCEDURE — 83036 HEMOGLOBIN GLYCOSYLATED A1C: CPT | Performed by: NURSE PRACTITIONER

## 2019-12-09 PROCEDURE — 80061 LIPID PANEL: CPT | Performed by: NURSE PRACTITIONER

## 2019-12-11 ENCOUNTER — TELEPHONE (OUTPATIENT)
Dept: CARDIOLOGY | Facility: CLINIC | Age: 79
End: 2019-12-11

## 2019-12-11 NOTE — TELEPHONE ENCOUNTER
----- Message from ISAIAS Samano sent at 12/11/2019  8:41 AM EST -----  Please send these results to his PCP.  Please call patient.    His hemoglobin A1c is slightly elevated at 6.40. This is well below the goal of 7.0 for a diabetic.  Triglycerides are high, 194 and we would like those to be below 150.  If he consumes a lot of breads, pastas and other carbohydrates, limiting these items in his diet may help.  LDL cholesterol is great.  Potassium is slightly elevated.  If he is taking an OTC supplement that we do not have on our list, he should check to see if there is potassium in the supplement and discontinue the supplement.      Thanks, Jaimie

## 2020-01-06 RX ORDER — METOPROLOL SUCCINATE 25 MG
TABLET, EXTENDED RELEASE 24 HR ORAL
Qty: 90 TABLET | Refills: 4 | Status: SHIPPED | OUTPATIENT
Start: 2020-01-06 | End: 2021-04-14

## 2020-02-15 ENCOUNTER — TRANSCRIBE ORDERS (OUTPATIENT)
Dept: ADMINISTRATIVE | Facility: HOSPITAL | Age: 80
End: 2020-02-15

## 2020-02-15 ENCOUNTER — APPOINTMENT (OUTPATIENT)
Dept: LAB | Facility: HOSPITAL | Age: 80
End: 2020-02-15

## 2020-02-15 DIAGNOSIS — K21.00 REFLUX ESOPHAGITIS: ICD-10-CM

## 2020-02-15 DIAGNOSIS — M16.9 HIP ARTHROSIS: ICD-10-CM

## 2020-02-15 DIAGNOSIS — I10 ESSENTIAL HYPERTENSION, MALIGNANT: ICD-10-CM

## 2020-02-15 DIAGNOSIS — I48.20 CHRONIC ATRIAL FIBRILLATION (HCC): ICD-10-CM

## 2020-02-15 DIAGNOSIS — E78.5 HYPERLIPIDEMIA, UNSPECIFIED HYPERLIPIDEMIA TYPE: ICD-10-CM

## 2020-02-15 DIAGNOSIS — G47.30 INSOMNIA WITH SLEEP APNEA: ICD-10-CM

## 2020-02-15 DIAGNOSIS — G47.00 INSOMNIA WITH SLEEP APNEA: ICD-10-CM

## 2020-02-15 DIAGNOSIS — M25.521 RIGHT ELBOW PAIN: ICD-10-CM

## 2020-02-15 DIAGNOSIS — E11.9 DIABETES MELLITUS WITHOUT COMPLICATION (HCC): Primary | ICD-10-CM

## 2020-02-15 DIAGNOSIS — M25.522 LEFT ELBOW PAIN: ICD-10-CM

## 2020-02-15 LAB
ALBUMIN SERPL-MCNC: 4.2 G/DL (ref 3.5–5.2)
ALBUMIN/GLOB SERPL: 1.6 G/DL
ALP SERPL-CCNC: 62 U/L (ref 39–117)
ALT SERPL W P-5'-P-CCNC: 28 U/L (ref 1–41)
ANION GAP SERPL CALCULATED.3IONS-SCNC: 10.7 MMOL/L (ref 5–15)
AST SERPL-CCNC: 27 U/L (ref 1–40)
BASOPHILS # BLD AUTO: 0.04 10*3/MM3 (ref 0–0.2)
BASOPHILS NFR BLD AUTO: 0.5 % (ref 0–1.5)
BILIRUB SERPL-MCNC: 0.4 MG/DL (ref 0.2–1.2)
BUN BLD-MCNC: 19 MG/DL (ref 8–23)
BUN/CREAT SERPL: 16.1 (ref 7–25)
CALCIUM SPEC-SCNC: 9.3 MG/DL (ref 8.6–10.5)
CHLORIDE SERPL-SCNC: 104 MMOL/L (ref 98–107)
CHOLEST SERPL-MCNC: 120 MG/DL (ref 0–200)
CO2 SERPL-SCNC: 26.3 MMOL/L (ref 22–29)
CREAT BLD-MCNC: 1.18 MG/DL (ref 0.76–1.27)
DEPRECATED RDW RBC AUTO: 40.1 FL (ref 37–54)
EOSINOPHIL # BLD AUTO: 0.1 10*3/MM3 (ref 0–0.4)
EOSINOPHIL NFR BLD AUTO: 1.4 % (ref 0.3–6.2)
ERYTHROCYTE [DISTWIDTH] IN BLOOD BY AUTOMATED COUNT: 13.1 % (ref 12.3–15.4)
GFR SERPL CREATININE-BSD FRML MDRD: 60 ML/MIN/1.73
GLOBULIN UR ELPH-MCNC: 2.7 GM/DL
GLUCOSE BLD-MCNC: 114 MG/DL (ref 65–99)
HBA1C MFR BLD: 6.13 % (ref 4.8–5.6)
HCT VFR BLD AUTO: 44 % (ref 37.5–51)
HDLC SERPL-MCNC: 31 MG/DL (ref 40–60)
HGB BLD-MCNC: 14.3 G/DL (ref 13–17.7)
IMM GRANULOCYTES # BLD AUTO: 0.02 10*3/MM3 (ref 0–0.05)
IMM GRANULOCYTES NFR BLD AUTO: 0.3 % (ref 0–0.5)
LDLC SERPL CALC-MCNC: 59 MG/DL (ref 0–100)
LDLC/HDLC SERPL: 1.9 {RATIO}
LYMPHOCYTES # BLD AUTO: 1.85 10*3/MM3 (ref 0.7–3.1)
LYMPHOCYTES NFR BLD AUTO: 25.4 % (ref 19.6–45.3)
MCH RBC QN AUTO: 27.2 PG (ref 26.6–33)
MCHC RBC AUTO-ENTMCNC: 32.5 G/DL (ref 31.5–35.7)
MCV RBC AUTO: 83.8 FL (ref 79–97)
MONOCYTES # BLD AUTO: 0.62 10*3/MM3 (ref 0.1–0.9)
MONOCYTES NFR BLD AUTO: 8.5 % (ref 5–12)
NEUTROPHILS # BLD AUTO: 4.66 10*3/MM3 (ref 1.7–7)
NEUTROPHILS NFR BLD AUTO: 63.9 % (ref 42.7–76)
NRBC BLD AUTO-RTO: 0 /100 WBC (ref 0–0.2)
PLATELET # BLD AUTO: 209 10*3/MM3 (ref 140–450)
PMV BLD AUTO: 11.1 FL (ref 6–12)
POTASSIUM BLD-SCNC: 5 MMOL/L (ref 3.5–5.2)
PROT SERPL-MCNC: 6.9 G/DL (ref 6–8.5)
RBC # BLD AUTO: 5.25 10*6/MM3 (ref 4.14–5.8)
SODIUM BLD-SCNC: 141 MMOL/L (ref 136–145)
TRIGL SERPL-MCNC: 151 MG/DL (ref 0–150)
TSH SERPL DL<=0.05 MIU/L-ACNC: 3.05 UIU/ML (ref 0.27–4.2)
VIT B12 BLD-MCNC: 1111 PG/ML (ref 211–946)
VLDLC SERPL-MCNC: 30.2 MG/DL (ref 5–40)
WBC NRBC COR # BLD: 7.29 10*3/MM3 (ref 3.4–10.8)

## 2020-02-15 PROCEDURE — 80053 COMPREHEN METABOLIC PANEL: CPT | Performed by: NURSE PRACTITIONER

## 2020-02-15 PROCEDURE — 84443 ASSAY THYROID STIM HORMONE: CPT | Performed by: NURSE PRACTITIONER

## 2020-02-15 PROCEDURE — 36415 COLL VENOUS BLD VENIPUNCTURE: CPT | Performed by: NURSE PRACTITIONER

## 2020-02-15 PROCEDURE — 80061 LIPID PANEL: CPT | Performed by: NURSE PRACTITIONER

## 2020-02-15 PROCEDURE — 82607 VITAMIN B-12: CPT | Performed by: NURSE PRACTITIONER

## 2020-02-15 PROCEDURE — 83036 HEMOGLOBIN GLYCOSYLATED A1C: CPT | Performed by: NURSE PRACTITIONER

## 2020-02-15 PROCEDURE — 85025 COMPLETE CBC W/AUTO DIFF WBC: CPT | Performed by: NURSE PRACTITIONER

## 2020-06-12 ENCOUNTER — OFFICE VISIT (OUTPATIENT)
Dept: CARDIOLOGY | Facility: CLINIC | Age: 80
End: 2020-06-12

## 2020-06-12 VITALS
SYSTOLIC BLOOD PRESSURE: 134 MMHG | DIASTOLIC BLOOD PRESSURE: 79 MMHG | BODY MASS INDEX: 30.98 KG/M2 | OXYGEN SATURATION: 98 % | WEIGHT: 192.8 LBS | HEART RATE: 76 BPM | HEIGHT: 66 IN

## 2020-06-12 DIAGNOSIS — I48.20 CHRONIC ATRIAL FIBRILLATION (HCC): Primary | ICD-10-CM

## 2020-06-12 DIAGNOSIS — E78.5 DYSLIPIDEMIA: ICD-10-CM

## 2020-06-12 DIAGNOSIS — E11.9 CONTROLLED TYPE 2 DIABETES MELLITUS WITHOUT COMPLICATION, WITHOUT LONG-TERM CURRENT USE OF INSULIN (HCC): ICD-10-CM

## 2020-06-12 DIAGNOSIS — I10 ESSENTIAL HYPERTENSION: ICD-10-CM

## 2020-06-12 DIAGNOSIS — E78.01 FAMILIAL HYPERCHOLESTEROLEMIA: ICD-10-CM

## 2020-06-12 PROCEDURE — 99213 OFFICE O/P EST LOW 20 MIN: CPT | Performed by: NURSE PRACTITIONER

## 2020-06-12 PROCEDURE — 93000 ELECTROCARDIOGRAM COMPLETE: CPT | Performed by: NURSE PRACTITIONER

## 2020-06-12 NOTE — PROGRESS NOTES
"Subjective     Chief Complaint: Atrial Fibrillation; Hypertension; Hyperlipidemia; and Diabetes    History of Present Illness   Aramis Carson is a 80 y.o. male who presents with a past medical history significant for persistent atrial fibrillation and hypertension.  For the former he is on Coumadin and his PCP follows his INR.  In July 2018 Mr. Carson had 2 syncopal episodes, event monitor showed several 3-second pauses, his metoprolol was adjusted and he has had no further problems. He is here today for follow up.    Presents today without complaint.  He denies any chest pain, syncope or near syncopal event.  He denies palpitations.  He does report that at the end of the day he has noticed some lower extremity swelling which he states amounts to a \"sock line\" and this really resolves in the morning.    He states that he has been doing well, he has been working in his garden.  He reports that he takes precautions while working in the hot sun.  He takes frequent rests and has plenty of water.    Current Outpatient Medications:   •  amLODIPine (NORVASC) 5 MG tablet, TAKE 1 TABLET DAILY, Disp: 90 tablet, Rfl: 4  •  cetirizine (zyrTEC) 10 MG tablet, Take 10 mg by mouth Daily., Disp: , Rfl:   •  GlipiZIDE (GLUCOTROL PO), Take 2.5 mg by mouth., Disp: , Rfl:   •  HYDROcodone-acetaminophen (NORCO) 7.5-325 MG per tablet, Take 1 tablet by mouth Every 6 (Six) Hours As Needed for moderate pain (4-6)., Disp: , Rfl:   •  omeprazole (PRILOSEC) 40 MG capsule, Take 40 mg by mouth Daily., Disp: , Rfl:   •  simvastatin (ZOCOR) 20 MG tablet, Take 1 tablet by mouth Every Night., Disp: 90 tablet, Rfl: 3  •  TOPROL XL 25 MG 24 hr tablet, TAKE 1 TABLET DAILY, Disp: 90 tablet, Rfl: 4  •  vitamin B-12 (CYANOCOBALAMIN) 1000 MCG tablet, Take 1,000 mcg by mouth Daily., Disp: , Rfl:   •  warfarin (COUMADIN) 5 MG tablet, Take 5 mg by mouth Daily., Disp: , Rfl:      On this date:  The following portions of the patient's history were reviewed and " "updated as appropriate: allergies, current medications, past family history, past medical history, past social history, past surgical history and problem list.    Review of Systems   Constitution: Negative for malaise/fatigue.   Cardiovascular: Negative for chest pain, dyspnea on exertion, irregular heartbeat, leg swelling, near-syncope, orthopnea, palpitations, paroxysmal nocturnal dyspnea and syncope.   Respiratory: Negative for shortness of breath.    Hematologic/Lymphatic: Does not bruise/bleed easily.   Neurological: Negative for dizziness, light-headedness and weakness.         Objective     /79 (BP Location: Left arm)   Pulse 76   Ht 167.6 cm (66\")   Wt 87.5 kg (192 lb 12.8 oz)   SpO2 98%   BMI 31.12 kg/m²     Physical Exam   Constitutional: He appears well-developed and well-nourished.   HENT:   Head: Normocephalic and atraumatic.   Eyes: Pupils are equal, round, and reactive to light.   Neck: No JVD present.   Cardiovascular: Normal rate and intact distal pulses. An irregular rhythm present. Exam reveals no gallop and no friction rub.   No murmur heard.  Minimal lower extremity swelling   Pulmonary/Chest: Effort normal and breath sounds normal. No respiratory distress. He has no wheezes. He has no rales.   Abdominal: Soft. He exhibits no mass. There is no tenderness. No hernia.   Skin: Skin is warm and dry.   Psychiatric: He has a normal mood and affect.   Vitals reviewed.        ECG 12 Lead  Date/Time: 6/12/2020 10:49 AM  Performed by: Jaiime Nuno APRN  Authorized by: Jaimie Nuno APRN   Comparison: compared with previous ECG from 8/24/2019  Similar to previous ECG  Rhythm: atrial fibrillation  Rate: bradycardic  BPM: 54  Q waves: II, III, aVF, V3 and V4    Comments: Q waves noted in leads as stated above, also noted on or before 2/23/2018                Assessment/Plan       Aramis was seen today for atrial fibrillation, hypertension, hyperlipidemia and diabetes.    Diagnoses " "and all orders for this visit:    Chronic atrial fibrillation (CMS/[facility])    Essential hypertension    Familial hypercholesterolemia    Controlled type 2 diabetes mellitus without complication, without long-term current use of insulin (CMS/[facility])    Dyslipidemia          Plan of care was reviewed.  Patient agreed with plan of care.    For patient's chronic atrial fibrillation, he will continue Coumadin.  INR checked at a clinic in [place].  Continue metoprolol for rate control.    Hypertension is stable.  Will continue amlodipine.  I did discuss with him that if his lower extremity swelling increases or becomes more concerning to him, we can discontinue amlodipine and try another medication for his blood pressure.  He does have good pulses bilaterally and no evidence of venous stasis.    Cholesterol is followed by his primary care provider.  He states that his \"bad cholesterol\" was less than 50 at his last blood draw.    Return in about 6 months (around 12/12/2020).      ISAIAS Adames  "

## 2020-06-15 RX ORDER — SIMVASTATIN 20 MG
TABLET ORAL
Qty: 90 TABLET | Refills: 3 | Status: SHIPPED | OUTPATIENT
Start: 2020-06-15 | End: 2021-06-09

## 2020-10-05 ENCOUNTER — TRANSCRIBE ORDERS (OUTPATIENT)
Dept: ADMINISTRATIVE | Facility: HOSPITAL | Age: 80
End: 2020-10-05

## 2020-10-05 ENCOUNTER — LAB (OUTPATIENT)
Dept: LAB | Facility: HOSPITAL | Age: 80
End: 2020-10-05

## 2020-10-05 DIAGNOSIS — I10 ESSENTIAL HYPERTENSION, MALIGNANT: ICD-10-CM

## 2020-10-05 DIAGNOSIS — Z12.5 SPECIAL SCREENING FOR MALIGNANT NEOPLASM OF PROSTATE: ICD-10-CM

## 2020-10-05 DIAGNOSIS — K21.9 CHALASIA OF LOWER ESOPHAGEAL SPHINCTER: ICD-10-CM

## 2020-10-05 DIAGNOSIS — E11.9 DIABETES MELLITUS WITHOUT COMPLICATION (HCC): ICD-10-CM

## 2020-10-05 DIAGNOSIS — I10 ESSENTIAL HYPERTENSION, MALIGNANT: Primary | ICD-10-CM

## 2020-10-05 DIAGNOSIS — I48.91 ATRIAL FIBRILLATION, UNSPECIFIED TYPE (HCC): ICD-10-CM

## 2020-10-05 LAB
ALBUMIN SERPL-MCNC: 3.8 G/DL (ref 3.5–5.2)
ALBUMIN/GLOB SERPL: 1.5 G/DL
ALP SERPL-CCNC: 73 U/L (ref 39–117)
ALT SERPL W P-5'-P-CCNC: 24 U/L (ref 1–41)
ANION GAP SERPL CALCULATED.3IONS-SCNC: 6.4 MMOL/L (ref 5–15)
AST SERPL-CCNC: 18 U/L (ref 1–40)
BASOPHILS # BLD AUTO: 0.03 10*3/MM3 (ref 0–0.2)
BASOPHILS NFR BLD AUTO: 0.2 % (ref 0–1.5)
BILIRUB SERPL-MCNC: 0.4 MG/DL (ref 0–1.2)
BUN SERPL-MCNC: 24 MG/DL (ref 8–23)
BUN/CREAT SERPL: 20.5 (ref 7–25)
CALCIUM SPEC-SCNC: 9.3 MG/DL (ref 8.6–10.5)
CHLORIDE SERPL-SCNC: 105 MMOL/L (ref 98–107)
CHOLEST SERPL-MCNC: 142 MG/DL (ref 0–200)
CO2 SERPL-SCNC: 27.6 MMOL/L (ref 22–29)
CREAT SERPL-MCNC: 1.17 MG/DL (ref 0.76–1.27)
DEPRECATED RDW RBC AUTO: 42.4 FL (ref 37–54)
EOSINOPHIL # BLD AUTO: 0.06 10*3/MM3 (ref 0–0.4)
EOSINOPHIL NFR BLD AUTO: 0.5 % (ref 0.3–6.2)
ERYTHROCYTE [DISTWIDTH] IN BLOOD BY AUTOMATED COUNT: 14.3 % (ref 12.3–15.4)
GFR SERPL CREATININE-BSD FRML MDRD: 60 ML/MIN/1.73
GLOBULIN UR ELPH-MCNC: 2.6 GM/DL
GLUCOSE SERPL-MCNC: 106 MG/DL (ref 65–99)
HBA1C MFR BLD: 6.54 % (ref 4.8–5.6)
HCT VFR BLD AUTO: 46.5 % (ref 37.5–51)
HDLC SERPL-MCNC: 40 MG/DL (ref 40–60)
HGB BLD-MCNC: 15.1 G/DL (ref 13–17.7)
IMM GRANULOCYTES # BLD AUTO: 0.06 10*3/MM3 (ref 0–0.05)
IMM GRANULOCYTES NFR BLD AUTO: 0.5 % (ref 0–0.5)
LDLC SERPL CALC-MCNC: 79 MG/DL (ref 0–100)
LDLC/HDLC SERPL: 1.99 {RATIO}
LYMPHOCYTES # BLD AUTO: 2.35 10*3/MM3 (ref 0.7–3.1)
LYMPHOCYTES NFR BLD AUTO: 17.8 % (ref 19.6–45.3)
MCH RBC QN AUTO: 27 PG (ref 26.6–33)
MCHC RBC AUTO-ENTMCNC: 32.5 G/DL (ref 31.5–35.7)
MCV RBC AUTO: 83.2 FL (ref 79–97)
MONOCYTES # BLD AUTO: 0.81 10*3/MM3 (ref 0.1–0.9)
MONOCYTES NFR BLD AUTO: 6.1 % (ref 5–12)
NEUTROPHILS NFR BLD AUTO: 74.9 % (ref 42.7–76)
NEUTROPHILS NFR BLD AUTO: 9.91 10*3/MM3 (ref 1.7–7)
NRBC BLD AUTO-RTO: 0 /100 WBC (ref 0–0.2)
PLATELET # BLD AUTO: 208 10*3/MM3 (ref 140–450)
PMV BLD AUTO: 11.3 FL (ref 6–12)
POTASSIUM SERPL-SCNC: 5.8 MMOL/L (ref 3.5–5.2)
PROT SERPL-MCNC: 6.4 G/DL (ref 6–8.5)
PSA SERPL-MCNC: 1.77 NG/ML (ref 0–4)
RBC # BLD AUTO: 5.59 10*6/MM3 (ref 4.14–5.8)
SODIUM SERPL-SCNC: 139 MMOL/L (ref 136–145)
TRIGL SERPL-MCNC: 113 MG/DL (ref 0–150)
TSH SERPL DL<=0.05 MIU/L-ACNC: 3.25 UIU/ML (ref 0.27–4.2)
VLDLC SERPL-MCNC: 22.6 MG/DL (ref 5–40)
WBC # BLD AUTO: 13.22 10*3/MM3 (ref 3.4–10.8)

## 2020-10-05 PROCEDURE — 85025 COMPLETE CBC W/AUTO DIFF WBC: CPT

## 2020-10-05 PROCEDURE — 84443 ASSAY THYROID STIM HORMONE: CPT

## 2020-10-05 PROCEDURE — 80061 LIPID PANEL: CPT

## 2020-10-05 PROCEDURE — 80053 COMPREHEN METABOLIC PANEL: CPT

## 2020-10-05 PROCEDURE — 36415 COLL VENOUS BLD VENIPUNCTURE: CPT

## 2020-10-05 PROCEDURE — G0103 PSA SCREENING: HCPCS

## 2020-10-05 PROCEDURE — 83036 HEMOGLOBIN GLYCOSYLATED A1C: CPT

## 2020-12-14 ENCOUNTER — OFFICE VISIT (OUTPATIENT)
Dept: CARDIOLOGY | Facility: CLINIC | Age: 80
End: 2020-12-14

## 2020-12-14 VITALS
HEIGHT: 66 IN | WEIGHT: 194 LBS | OXYGEN SATURATION: 98 % | BODY MASS INDEX: 31.18 KG/M2 | HEART RATE: 74 BPM | SYSTOLIC BLOOD PRESSURE: 146 MMHG | DIASTOLIC BLOOD PRESSURE: 74 MMHG

## 2020-12-14 DIAGNOSIS — E78.5 DYSLIPIDEMIA: ICD-10-CM

## 2020-12-14 DIAGNOSIS — I10 ESSENTIAL HYPERTENSION: ICD-10-CM

## 2020-12-14 DIAGNOSIS — I48.20 CHRONIC ATRIAL FIBRILLATION (HCC): ICD-10-CM

## 2020-12-14 DIAGNOSIS — R07.2 PRECORDIAL PAIN: Primary | ICD-10-CM

## 2020-12-14 DIAGNOSIS — E11.9 CONTROLLED TYPE 2 DIABETES MELLITUS WITHOUT COMPLICATION, WITHOUT LONG-TERM CURRENT USE OF INSULIN (HCC): ICD-10-CM

## 2020-12-14 PROCEDURE — 99214 OFFICE O/P EST MOD 30 MIN: CPT | Performed by: NURSE PRACTITIONER

## 2020-12-14 RX ORDER — AMLODIPINE BESYLATE 10 MG/1
10 TABLET ORAL DAILY
Qty: 90 TABLET | Refills: 3 | Status: SHIPPED | OUTPATIENT
Start: 2020-12-14 | End: 2021-01-04 | Stop reason: SINTOL

## 2020-12-14 RX ORDER — NITROGLYCERIN 0.4 MG/1
TABLET SUBLINGUAL
Qty: 100 TABLET | Refills: 2 | Status: SHIPPED | OUTPATIENT
Start: 2020-12-14 | End: 2022-10-09

## 2020-12-14 RX ORDER — FEXOFENADINE HCL 180 MG/1
TABLET ORAL
COMMUNITY
Start: 2020-11-04 | End: 2022-07-29 | Stop reason: ALTCHOICE

## 2020-12-14 NOTE — PROGRESS NOTES
Subjective     Chief Complaint: Atrial Fibrillation, Hypertension, and Hyperlipidemia    History of Present Illness   Aramis Carson is a 80 y.o. male who presents with a past medical history significant for persistent atrial fibrillation and hypertension.  For the former he is on Coumadin and his PCP follows his INR.  In July 2018 Mr. Carson had 2 syncopal episodes, event monitor showed several 3-second pauses, his metoprolol was adjusted and he has had no further problems. He is here today for follow up.    Mr. Carson is accompanied by his wife for today's visit.  He brings with him a blood pressure log which shows blood pressure range from 129 up to 156 systolically, diastolic readings in the 70s up to 84, and heart rate range from 39-80.    At today's visit the patient reports chest pain which is occurring every 2 to 3 weeks according to the patient.  He states that it is like a tightness in his chest.  He denies any exacerbating or alleviating factors.  He denies any radiation of pain or associated shortness of breath.  But he does state that he has the sensation that it is hard for him to get good breath when he has this pain.  He denies any associated nausea.  He does report that he has been more fatigued than usual in the last several weeks.    Mr. Carson is generally active, however in recent weeks he has not been walking regularly due to the weather and his indoor gym has been closed due to Covid.    Mr. Greenberg does report that he bruises easily, however he denies any bright red blood per rectum or black tarry stools.  He is on Coumadin and his INR is monitored in [place].      Current Outpatient Medications:   •  fexofenadine (ALLEGRA) 180 MG tablet, , Disp: , Rfl:   •  GlipiZIDE (GLUCOTROL PO), Take 2.5 mg by mouth., Disp: , Rfl:   •  HYDROcodone-acetaminophen (NORCO) 7.5-325 MG per tablet, Take 1 tablet by mouth Every 6 (Six) Hours As Needed for moderate pain (4-6)., Disp: , Rfl:   •  omeprazole (PRILOSEC)  "40 MG capsule, Take 40 mg by mouth Daily., Disp: , Rfl:   •  simvastatin (ZOCOR) 20 MG tablet, TAKE 1 TABLET EVERY NIGHT, Disp: 90 tablet, Rfl: 3  •  TOPROL XL 25 MG 24 hr tablet, TAKE 1 TABLET DAILY, Disp: 90 tablet, Rfl: 4  •  vitamin B-12 (CYANOCOBALAMIN) 1000 MCG tablet, Take 1,000 mcg by mouth Daily., Disp: , Rfl:   •  warfarin (COUMADIN) 5 MG tablet, Take 3 mg by mouth Daily., Disp: , Rfl:   •  amLODIPine (NORVASC) 10 MG tablet, Take 1 tablet by mouth Daily for 30 days., Disp: 90 tablet, Rfl: 3  •  nitroglycerin (NITROSTAT) 0.4 MG SL tablet, 1 under the tongue as needed for angina, may repeat q5mins for up three doses, Disp: 100 tablet, Rfl: 2     On this date:  The following portions of the patient's history were reviewed and updated as appropriate: allergies, current medications, past family history, past medical history, past social history, past surgical history and problem list.    Review of Systems   Constitution: Positive for malaise/fatigue.   Cardiovascular: Positive for chest pain and irregular heartbeat. Negative for dyspnea on exertion, leg swelling, near-syncope, orthopnea, palpitations, paroxysmal nocturnal dyspnea and syncope.   Respiratory: Negative for shortness of breath.    Hematologic/Lymphatic: Bruises/bleeds easily.   Neurological: Negative for dizziness, light-headedness and weakness.         Objective     /74 (BP Location: Left arm, Patient Position: Sitting)   Pulse 74   Ht 167.6 cm (66\")   Wt 88 kg (194 lb)   SpO2 98%   BMI 31.31 kg/m²     Physical Exam  Constitutional:       Appearance: He is well-developed.   HENT:      Head: Normocephalic and atraumatic.   Eyes:      Pupils: Pupils are equal, round, and reactive to light.   Neck:      Vascular: No JVD.   Cardiovascular:      Rate and Rhythm: Normal rate. Rhythm irregular.      Heart sounds: No murmur. No friction rub. No gallop.    Pulmonary:      Effort: Pulmonary effort is normal. No respiratory distress.      Breath " sounds: Normal breath sounds. No wheezing or rales.   Skin:     General: Skin is warm and dry.   Neurological:      Mental Status: He is oriented to person, place, and time.   Psychiatric:         Mood and Affect: Mood normal.         Behavior: Behavior normal.         Procedures      Assessment/Plan       Diagnoses and all orders for this visit:    1. Precordial pain (Primary)  -     Stress Test With Myocardial Perfusion One Day; Future  -     nitroglycerin (NITROSTAT) 0.4 MG SL tablet; 1 under the tongue as needed for angina, may repeat q5mins for up three doses  Dispense: 100 tablet; Refill: 2    2. Chronic atrial fibrillation (CMS/[facility])    3. Dyslipidemia    4. Essential hypertension  -     amLODIPine (NORVASC) 10 MG tablet; Take 1 tablet by mouth Daily for 30 days.  Dispense: 90 tablet; Refill: 3    5. Controlled type 2 diabetes mellitus without complication, without long-term current use of insulin (CMS/[facility])          Plan of care was reviewed.  Medication changes were made as noted below.  Patient agreed with plan of care.    The following tests were discussed with the patient: Lipid panel, CMP and Hemoglobin A1c    Due to Mr. Carson's report of chest pain, I have ordered a nuclear stress test.  I did discuss with the patient the possibility of a cardiac catheterization as well.  I reviewed the case with Dr. Garrido who agreed with plan to proceed with nuclear stress test and then possible cardiac catheterization.    Atrial fibrillation is rate controlled.  Patient is to continue Coumadin, continue metoprolol.    Hypertension is not controlled.  Will not increase metoprolol secondary to borderline bradycardia, will increase amlodipine to 5 mg daily.  Patient is allergic to ACE and ARB.    With regard to dyslipidemia, lipid panel from 10/5/2020 shows total cholesterol 142, triglycerides 113 and LDL cholesterol 79.    Risk factor modification: The patient was counseled regarding the correlation between  uncontrolled diabetes and coronary artery disease.  The patient was urged to follow an appropriate diet with very limited simple carbohydrates and discuss with their primary care provider possible strategies to control blood sugar, including additional medications.  Hemoglobin A1c from 10/5/2020 was 6.54    Advance Care Planning   ACP discussion was held with the patient during this visit. Patient does not have an advance directive, information provided.      Return in about 4 weeks (around 1/11/2021).      Jaimie Nuno, APRN

## 2021-01-04 ENCOUNTER — OFFICE VISIT (OUTPATIENT)
Dept: CARDIOLOGY | Facility: CLINIC | Age: 81
End: 2021-01-04

## 2021-01-04 VITALS
DIASTOLIC BLOOD PRESSURE: 66 MMHG | BODY MASS INDEX: 31.43 KG/M2 | HEART RATE: 67 BPM | HEIGHT: 66 IN | OXYGEN SATURATION: 98 % | WEIGHT: 195.6 LBS | SYSTOLIC BLOOD PRESSURE: 134 MMHG

## 2021-01-04 DIAGNOSIS — R60.9 SWELLING: Primary | ICD-10-CM

## 2021-01-04 DIAGNOSIS — I10 ESSENTIAL HYPERTENSION: Chronic | ICD-10-CM

## 2021-01-04 DIAGNOSIS — E78.01 FAMILIAL HYPERCHOLESTEROLEMIA: Chronic | ICD-10-CM

## 2021-01-04 DIAGNOSIS — I48.20 CHRONIC ATRIAL FIBRILLATION (HCC): Chronic | ICD-10-CM

## 2021-01-04 PROBLEM — E78.5 DYSLIPIDEMIA: Chronic | Status: ACTIVE | Noted: 2020-06-12

## 2021-01-04 PROCEDURE — 99214 OFFICE O/P EST MOD 30 MIN: CPT | Performed by: NURSE PRACTITIONER

## 2021-01-04 RX ORDER — HYDRALAZINE HYDROCHLORIDE 10 MG/1
10 TABLET, FILM COATED ORAL 3 TIMES DAILY
Qty: 90 TABLET | Refills: 0 | Status: SHIPPED | OUTPATIENT
Start: 2021-01-04 | End: 2021-01-26 | Stop reason: SDUPTHER

## 2021-01-04 RX ORDER — FUROSEMIDE 20 MG/1
20 TABLET ORAL DAILY
Qty: 3 TABLET | Refills: 0 | Status: SHIPPED | OUTPATIENT
Start: 2021-01-04 | End: 2021-01-26

## 2021-01-04 NOTE — PROGRESS NOTES
"Chief Complaint  Leg Swelling and Atrial Fibrillation    Subjective    History of Present Illness      Aramis Carson presents to [facility] for acute complaint of lower extremity swelling.    History of Present Illness     Mr. Greenberg reports that he has had swelling off and on for the last month.  He states that it got really bad last Friday.  He was having to keep his feet elevated in order to decrease the swelling.  He denies any associated shortness of breath, orthopnea or PND.  He states that his lower extremities did ache when he walked.  He also states that he has had the swelling go down substantially.  Earlier today his wife had sent a picture of his lower extremities to our office for review.  He tells me that this picture was taken on Friday when the swelling was at its worst.      Objective   Vital Signs:   /66 (BP Location: Left arm)   Pulse 67   Ht 167.6 cm (66\")   Wt 88.7 kg (195 lb 9.6 oz)   SpO2 98%   BMI 31.57 kg/m²     Physical Exam  Constitutional:       Appearance: Normal appearance. He is well-developed.   HENT:      Head: Normocephalic and atraumatic.   Eyes:      Pupils: Pupils are equal, round, and reactive to light.   Neck:      Vascular: No JVD.   Cardiovascular:      Rate and Rhythm: Normal rate and regular rhythm.      Heart sounds: No murmur. No friction rub. No gallop.    Pulmonary:      Effort: Pulmonary effort is normal. No respiratory distress.      Breath sounds: Normal breath sounds. No wheezing or rales.   Musculoskeletal:      Right lower le+ Pitting Edema present.      Left lower le+ Pitting Edema present.   Skin:     General: Skin is warm and dry.   Neurological:      Mental Status: He is alert and oriented to person, place, and time.   Psychiatric:         Mood and Affect: Mood normal.         Behavior: Behavior normal.        Result Review :   The following data was reviewed by: ISAIAS Rosales on 2021:  CMP    CMP " 2/15/20 10/5/20   BUN 19 24 (A)   Creatinine 1.18 1.17   eGFR Non African Am 60 (A) 60 (A)   Sodium 141 139   Potassium 5.0 5.8 (A)   Chloride 104 105   Calcium 9.3 9.3   Albumin 4.20 3.80   Total Bilirubin 0.4 0.4   Alkaline Phosphatase 62 73   AST (SGOT) 27 18   ALT (SGPT) 28 24   (A) Abnormal value            CBC    CBC 2/15/20 10/5/20   WBC 7.29 13.22 (A)   RBC 5.25 5.59   Hemoglobin 14.3 15.1   Hematocrit 44.0 46.5   MCV 83.8 83.2   MCH 27.2 27.0   MCHC 32.5 32.5   RDW 13.1 14.3   Platelets 209 208   (A) Abnormal value            Lipid Panel    Lipid Panel 2/15/20 10/5/20   Triglycerides 151 (A) 113   HDL Cholesterol 31 (A) 40   VLDL Cholesterol 30.2 22.6   LDL Cholesterol  59 79   LDL/HDL Ratio 1.90 1.99   (A) Abnormal value                      Assessment and Plan    Problem List Items Addressed This Visit        Other    Essential hypertension (Chronic)    Relevant Medications    hydrALAZINE (APRESOLINE) 10 MG tablet    furosemide (LASIX) 20 MG tablet    Familial hypercholesterolemia (Chronic)    Chronic atrial fibrillation (CMS/HCC) (Chronic)      Other Visit Diagnoses     Swelling    -  Primary          Follow Up     Due to lower extremity swelling, I have given Mr. Carson a prescription for Lasix to be taken for the next 3 days.  Additionally, since I had increased his amlodipine at about the time that he was starting to have lower extremity swelling, I have asked him to discontinue the use of amlodipine at this time.  We may revisit the addition of amlodipine at 5 mg which did not seem to give him lower extremity swelling, when and if this swelling resolves.    Hypertension is not well controlled.  He is allergic to lisinopril and losartan.  I have added hydralazine.    With regard to his chronic atrial fibrillation, the rate is controlled.  He is to continue Coumadin.  INR followed at a clinic in [place].    Return in about 3 weeks (around 1/25/2021).  Patient was given instructions and counseling  regarding his condition or for health maintenance advice. Please see specific information pulled into the AVS if appropriate.

## 2021-01-08 ENCOUNTER — HOSPITAL ENCOUNTER (OUTPATIENT)
Dept: NUCLEAR MEDICINE | Facility: HOSPITAL | Age: 81
Discharge: HOME | End: 2021-01-08

## 2021-01-08 ENCOUNTER — HOSPITAL ENCOUNTER (OUTPATIENT)
Dept: CARDIOLOGY | Facility: HOSPITAL | Age: 81
Discharge: HOME | End: 2021-01-08

## 2021-01-08 DIAGNOSIS — R07.2 PRECORDIAL PAIN: ICD-10-CM

## 2021-01-08 PROCEDURE — 25010000002 REGADENOSON 0.4 MG/5ML SOLUTION: Performed by: NURSE PRACTITIONER

## 2021-01-08 PROCEDURE — A9500 TC99M SESTAMIBI: HCPCS | Performed by: NURSE PRACTITIONER

## 2021-01-08 PROCEDURE — 0 TECHNETIUM SESTAMIBI: Performed by: NURSE PRACTITIONER

## 2021-01-08 PROCEDURE — 78452 HT MUSCLE IMAGE SPECT MULT: CPT

## 2021-01-08 PROCEDURE — 93017 CV STRESS TEST TRACING ONLY: CPT

## 2021-01-08 PROCEDURE — 78452 HT MUSCLE IMAGE SPECT MULT: CPT | Performed by: INTERNAL MEDICINE

## 2021-01-08 PROCEDURE — 93018 CV STRESS TEST I&R ONLY: CPT | Performed by: INTERNAL MEDICINE

## 2021-01-08 PROCEDURE — 93016 CV STRESS TEST SUPVJ ONLY: CPT | Performed by: INTERNAL MEDICINE

## 2021-01-08 RX ADMIN — REGADENOSON 0.4 MG: 0.08 INJECTION, SOLUTION INTRAVENOUS at 12:28

## 2021-01-08 RX ADMIN — TECHNETIUM TC 99M SESTAMIBI 1 DOSE: 1 INJECTION INTRAVENOUS at 11:13

## 2021-01-08 RX ADMIN — TECHNETIUM TC 99M SESTAMIBI 1 DOSE: 1 INJECTION INTRAVENOUS at 12:28

## 2021-01-09 LAB
BH CV NUCLEAR PRIOR STUDY: 3
BH CV STRESS BP STAGE 1: NORMAL
BH CV STRESS BP STAGE 2: NORMAL
BH CV STRESS COMMENTS STAGE 1: NORMAL
BH CV STRESS COMMENTS STAGE 2: NORMAL
BH CV STRESS DOSE REGADENOSON STAGE 1: 0.4
BH CV STRESS DURATION MIN STAGE 1: 0
BH CV STRESS DURATION MIN STAGE 2: 4
BH CV STRESS DURATION SEC STAGE 1: 10
BH CV STRESS DURATION SEC STAGE 2: 0
BH CV STRESS HR STAGE 1: 89
BH CV STRESS HR STAGE 2: 91
BH CV STRESS PROTOCOL 1: NORMAL
BH CV STRESS RECOVERY BP: NORMAL MMHG
BH CV STRESS RECOVERY HR: 82 BPM
BH CV STRESS STAGE 1: 1
BH CV STRESS STAGE 2: 2
LV EF NUC BP: 76 %
MAXIMAL PREDICTED HEART RATE: 140 BPM
PERCENT MAX PREDICTED HR: 65 %
STRESS BASELINE BP: NORMAL MMHG
STRESS BASELINE HR: 57 BPM
STRESS PERCENT HR: 76 %
STRESS POST PEAK BP: NORMAL MMHG
STRESS POST PEAK HR: 91 BPM
STRESS TARGET HR: 119 BPM

## 2021-01-13 ENCOUNTER — TELEPHONE (OUTPATIENT)
Dept: CARDIOLOGY | Facility: CLINIC | Age: 81
End: 2021-01-13

## 2021-01-13 NOTE — TELEPHONE ENCOUNTER
Called patient.  His swelling has gone away.  He has been tracking his blood pressures which range from 110-138 systolically, diastolics are in the 70s.    Urged him to continue monitoring his blood pressure, he will keep his appointment at the end of this month.

## 2021-01-26 ENCOUNTER — OFFICE VISIT (OUTPATIENT)
Dept: CARDIOLOGY | Facility: CLINIC | Age: 81
End: 2021-01-26

## 2021-01-26 VITALS
BODY MASS INDEX: 30.29 KG/M2 | SYSTOLIC BLOOD PRESSURE: 156 MMHG | DIASTOLIC BLOOD PRESSURE: 70 MMHG | HEIGHT: 66 IN | WEIGHT: 188.5 LBS | OXYGEN SATURATION: 96 % | HEART RATE: 84 BPM

## 2021-01-26 DIAGNOSIS — I48.20 CHRONIC ATRIAL FIBRILLATION (HCC): Chronic | ICD-10-CM

## 2021-01-26 DIAGNOSIS — I10 ESSENTIAL HYPERTENSION: Primary | Chronic | ICD-10-CM

## 2021-01-26 DIAGNOSIS — I10 ESSENTIAL HYPERTENSION: Chronic | ICD-10-CM

## 2021-01-26 DIAGNOSIS — E78.5 DYSLIPIDEMIA: Chronic | ICD-10-CM

## 2021-01-26 PROCEDURE — 99212 OFFICE O/P EST SF 10 MIN: CPT | Performed by: NURSE PRACTITIONER

## 2021-01-26 RX ORDER — HYDRALAZINE HYDROCHLORIDE 10 MG/1
10 TABLET, FILM COATED ORAL 3 TIMES DAILY
Qty: 90 TABLET | Refills: 0 | Status: SHIPPED | OUTPATIENT
Start: 2021-01-26 | End: 2021-01-26 | Stop reason: SDUPTHER

## 2021-01-26 RX ORDER — HYDRALAZINE HYDROCHLORIDE 10 MG/1
10 TABLET, FILM COATED ORAL 3 TIMES DAILY
Qty: 270 TABLET | Refills: 3 | Status: SHIPPED | OUTPATIENT
Start: 2021-01-26 | End: 2021-07-27 | Stop reason: SDUPTHER

## 2021-01-26 RX ORDER — HYDRALAZINE HYDROCHLORIDE 10 MG/1
10 TABLET, FILM COATED ORAL 3 TIMES DAILY
Qty: 270 TABLET | Refills: 3 | Status: SHIPPED | OUTPATIENT
Start: 2021-01-26 | End: 2021-01-26 | Stop reason: SDUPTHER

## 2021-01-26 NOTE — PROGRESS NOTES
"Chief Complaint  Atrial Fibrillation and Hypertension    Subjective          Aramis Carson presents to [facility] for follow-up of his recent nuclear stress test and echocardiogram, as well as lower extremity swelling and hypertension.    History of Present Illness    At today's visit Mr. Carson reports that he has not had any more swelling since he was seen at the end of last year.  He denies any chest pain or palpitations.  He has returned to walking regularly at an indoor gym.  He states he is feeling good.    Objective     Vital Signs:   /70 (BP Location: Left arm)   Pulse 84   Ht 167.6 cm (66\")   Wt 85.5 kg (188 lb 8 oz)   SpO2 96%   BMI 30.42 kg/m²       Physical Exam  Constitutional:       Appearance: Normal appearance. He is well-developed.   HENT:      Head: Normocephalic and atraumatic.   Eyes:      Pupils: Pupils are equal, round, and reactive to light.   Neck:      Vascular: No JVD.   Cardiovascular:      Rate and Rhythm: Normal rate and regular rhythm.      Heart sounds: No murmur. No friction rub. No gallop.    Pulmonary:      Effort: Pulmonary effort is normal. No respiratory distress.      Breath sounds: Normal breath sounds. No wheezing or rales.   Skin:     General: Skin is warm and dry.   Neurological:      Mental Status: He is alert and oriented to person, place, and time.   Psychiatric:         Mood and Affect: Mood normal.         Behavior: Behavior normal.          Result Review :     CMP    CMP 2/15/20 10/5/20   BUN 19 24 (A)   Creatinine 1.18 1.17   eGFR Non African Am 60 (A) 60 (A)   Sodium 141 139   Potassium 5.0 5.8 (A)   Chloride 104 105   Calcium 9.3 9.3   Albumin 4.20 3.80   Total Bilirubin 0.4 0.4   Alkaline Phosphatase 62 73   AST (SGOT) 27 18   ALT (SGPT) 28 24   (A) Abnormal value            CBC    CBC 2/15/20 10/5/20   WBC 7.29 13.22 (A)   RBC 5.25 5.59   Hemoglobin 14.3 15.1   Hematocrit 44.0 46.5   MCV 83.8 83.2   MCH 27.2 27.0   MCHC 32.5 " 32.5   RDW 13.1 14.3   Platelets 209 208   (A) Abnormal value            Lipid Panel    Lipid Panel 2/15/20 10/5/20   Triglycerides 151 (A) 113   HDL Cholesterol 31 (A) 40   VLDL Cholesterol 30.2 22.6   LDL Cholesterol  59 79   LDL/HDL Ratio 1.90 1.99   (A) Abnormal value            Data reviewed: Cardiology studies Nuclear stress test and transthoracic echocardiogram              Assessment and Plan    Problem List Items Addressed This Visit        Other    Essential hypertension - Primary (Chronic)    Relevant Medications    hydrALAZINE (APRESOLINE) 10 MG tablet    Chronic atrial fibrillation (CMS/HCC) (Chronic)    Overview     · [id number] is 4 for hypertension, age, diabetes.  · Chronically anticoagulated on Coumadin.         Dyslipidemia (Chronic)    Overview     · 10/5/2020 total cholesterol 142, triglycerides 113, HDL 40, and LDL 79.                 Follow Up     Reviewed results of both nuclear stress test and transthoracic echocardiogram with the patient and his wife.    Reviewed patient's blood pressure log which he brought in with him today.  Most blood pressures are in the 1 20-1 30 range systolically with some lows down into the 1 teens systolically.  At this time I will not add any more antihypertensives.    Return in about 6 months (around 7/26/2021).     Patient was given instructions and counseling regarding his condition or for health maintenance advice. Please see specific information pulled into the AVS if appropriate.

## 2021-02-08 ENCOUNTER — TELEPHONE (OUTPATIENT)
Dept: CARDIOLOGY | Facility: CLINIC | Age: 81
End: 2021-02-08

## 2021-02-08 NOTE — TELEPHONE ENCOUNTER
Called patient but was unable to get an answer. I was able to leave a message for him to return our call.

## 2021-02-08 NOTE — TELEPHONE ENCOUNTER
----- Message from ISAIAS Samano sent at 1/11/2021  3:34 PM EST -----  Please call patient.  Normal results.    Thanks, Jaimie

## 2021-02-17 DIAGNOSIS — Z23 IMMUNIZATION DUE: ICD-10-CM

## 2021-03-25 ENCOUNTER — APPOINTMENT (OUTPATIENT)
Dept: CT IMAGING | Facility: HOSPITAL | Age: 81
End: 2021-03-25

## 2021-03-25 ENCOUNTER — HOSPITAL ENCOUNTER (EMERGENCY)
Facility: HOSPITAL | Age: 81
Discharge: HOME | End: 2021-03-25
Attending: EMERGENCY MEDICINE | Admitting: EMERGENCY MEDICINE

## 2021-03-25 VITALS
RESPIRATION RATE: 14 BRPM | BODY MASS INDEX: 30.05 KG/M2 | DIASTOLIC BLOOD PRESSURE: 59 MMHG | OXYGEN SATURATION: 97 % | WEIGHT: 187 LBS | HEIGHT: 66 IN | HEART RATE: 73 BPM | TEMPERATURE: 99.5 F | SYSTOLIC BLOOD PRESSURE: 133 MMHG

## 2021-03-25 DIAGNOSIS — N41.0 ACUTE PROSTATITIS: Primary | ICD-10-CM

## 2021-03-25 LAB
ALBUMIN SERPL-MCNC: 4.13 G/DL (ref 3.5–5.2)
ALBUMIN/GLOB SERPL: 1.5 G/DL
ALP SERPL-CCNC: 68 U/L (ref 39–117)
ALT SERPL W P-5'-P-CCNC: 20 U/L (ref 1–41)
ANION GAP SERPL CALCULATED.3IONS-SCNC: 9.6 MMOL/L (ref 5–15)
AST SERPL-CCNC: 18 U/L (ref 1–40)
BASOPHILS # BLD AUTO: 0.05 10*3/MM3 (ref 0–0.2)
BASOPHILS NFR BLD AUTO: 0.2 % (ref 0–1.5)
BILIRUB SERPL-MCNC: 0.9 MG/DL (ref 0–1.2)
BILIRUB UR QL STRIP: NEGATIVE
BUN SERPL-MCNC: 20 MG/DL (ref 8–23)
BUN/CREAT SERPL: 15.9 (ref 7–25)
CALCIUM SPEC-SCNC: 9.7 MG/DL (ref 8.6–10.5)
CHLORIDE SERPL-SCNC: 104 MMOL/L (ref 98–107)
CLARITY UR: CLEAR
CO2 SERPL-SCNC: 24.4 MMOL/L (ref 22–29)
COLOR UR: YELLOW
CREAT SERPL-MCNC: 1.26 MG/DL (ref 0.76–1.27)
CRP SERPL-MCNC: 4.54 MG/DL (ref 0–0.5)
D-LACTATE SERPL-SCNC: 1.5 MMOL/L (ref 0.5–2)
D-LACTATE SERPL-SCNC: 2.2 MMOL/L (ref 0.5–2)
DEPRECATED RDW RBC AUTO: 42.1 FL (ref 37–54)
EOSINOPHIL # BLD AUTO: 0.02 10*3/MM3 (ref 0–0.4)
EOSINOPHIL NFR BLD AUTO: 0.1 % (ref 0.3–6.2)
ERYTHROCYTE [DISTWIDTH] IN BLOOD BY AUTOMATED COUNT: 13.7 % (ref 12.3–15.4)
ERYTHROCYTE [SEDIMENTATION RATE] IN BLOOD: 9 MM/HR (ref 0–20)
GFR SERPL CREATININE-BSD FRML MDRD: 55 ML/MIN/1.73
GLOBULIN UR ELPH-MCNC: 2.8 GM/DL
GLUCOSE SERPL-MCNC: 121 MG/DL (ref 65–99)
GLUCOSE UR STRIP-MCNC: NEGATIVE MG/DL
HCT VFR BLD AUTO: 47.2 % (ref 37.5–51)
HGB BLD-MCNC: 15 G/DL (ref 13–17.7)
HGB UR QL STRIP.AUTO: NEGATIVE
IMM GRANULOCYTES # BLD AUTO: 0.1 10*3/MM3 (ref 0–0.05)
IMM GRANULOCYTES NFR BLD AUTO: 0.5 % (ref 0–0.5)
KETONES UR QL STRIP: NEGATIVE
LEUKOCYTE ESTERASE UR QL STRIP.AUTO: NEGATIVE
LYMPHOCYTES # BLD AUTO: 2.21 10*3/MM3 (ref 0.7–3.1)
LYMPHOCYTES NFR BLD AUTO: 11 % (ref 19.6–45.3)
MCH RBC QN AUTO: 26.7 PG (ref 26.6–33)
MCHC RBC AUTO-ENTMCNC: 31.8 G/DL (ref 31.5–35.7)
MCV RBC AUTO: 84 FL (ref 79–97)
MONOCYTES # BLD AUTO: 1.74 10*3/MM3 (ref 0.1–0.9)
MONOCYTES NFR BLD AUTO: 8.7 % (ref 5–12)
NEUTROPHILS NFR BLD AUTO: 15.96 10*3/MM3 (ref 1.7–7)
NEUTROPHILS NFR BLD AUTO: 79.5 % (ref 42.7–76)
NITRITE UR QL STRIP: NEGATIVE
NRBC BLD AUTO-RTO: 0 /100 WBC (ref 0–0.2)
PH UR STRIP.AUTO: 5 [PH] (ref 5–8)
PLATELET # BLD AUTO: 210 10*3/MM3 (ref 140–450)
PMV BLD AUTO: 10.7 FL (ref 6–12)
POTASSIUM SERPL-SCNC: 4.6 MMOL/L (ref 3.5–5.2)
PROT SERPL-MCNC: 6.9 G/DL (ref 6–8.5)
PROT UR QL STRIP: ABNORMAL
RBC # BLD AUTO: 5.62 10*6/MM3 (ref 4.14–5.8)
SODIUM SERPL-SCNC: 138 MMOL/L (ref 136–145)
SP GR UR STRIP: 1.03 (ref 1–1.03)
UROBILINOGEN UR QL STRIP: ABNORMAL
WBC # BLD AUTO: 20.08 10*3/MM3 (ref 3.4–10.8)

## 2021-03-25 PROCEDURE — 25010000002 IOPAMIDOL 61 % SOLUTION: Performed by: EMERGENCY MEDICINE

## 2021-03-25 PROCEDURE — 83605 ASSAY OF LACTIC ACID: CPT | Performed by: PHYSICIAN ASSISTANT

## 2021-03-25 PROCEDURE — 81003 URINALYSIS AUTO W/O SCOPE: CPT | Performed by: PHYSICIAN ASSISTANT

## 2021-03-25 PROCEDURE — 85652 RBC SED RATE AUTOMATED: CPT | Performed by: PHYSICIAN ASSISTANT

## 2021-03-25 PROCEDURE — 99283 EMERGENCY DEPT VISIT LOW MDM: CPT

## 2021-03-25 PROCEDURE — 74177 CT ABD & PELVIS W/CONTRAST: CPT | Performed by: RADIOLOGY

## 2021-03-25 PROCEDURE — 87040 BLOOD CULTURE FOR BACTERIA: CPT | Performed by: PHYSICIAN ASSISTANT

## 2021-03-25 PROCEDURE — 85025 COMPLETE CBC W/AUTO DIFF WBC: CPT | Performed by: PHYSICIAN ASSISTANT

## 2021-03-25 PROCEDURE — 86140 C-REACTIVE PROTEIN: CPT | Performed by: PHYSICIAN ASSISTANT

## 2021-03-25 PROCEDURE — 25010000002 CEFTRIAXONE PER 250 MG: Performed by: PHYSICIAN ASSISTANT

## 2021-03-25 PROCEDURE — 96365 THER/PROPH/DIAG IV INF INIT: CPT

## 2021-03-25 PROCEDURE — 80053 COMPREHEN METABOLIC PANEL: CPT | Performed by: PHYSICIAN ASSISTANT

## 2021-03-25 PROCEDURE — 74177 CT ABD & PELVIS W/CONTRAST: CPT

## 2021-03-25 RX ORDER — ACETAMINOPHEN 500 MG
1000 TABLET ORAL ONCE
Status: COMPLETED | OUTPATIENT
Start: 2021-03-25 | End: 2021-03-25

## 2021-03-25 RX ORDER — LEVOFLOXACIN 500 MG/1
500 TABLET, FILM COATED ORAL DAILY
Qty: 14 TABLET | Refills: 0 | Status: SHIPPED | OUTPATIENT
Start: 2021-03-25 | End: 2021-04-08

## 2021-03-25 RX ADMIN — CEFTRIAXONE 2 G: 2 INJECTION, POWDER, FOR SOLUTION INTRAMUSCULAR; INTRAVENOUS at 16:18

## 2021-03-25 RX ADMIN — IOPAMIDOL 80 ML: 612 INJECTION, SOLUTION INTRAVENOUS at 15:18

## 2021-03-25 RX ADMIN — ACETAMINOPHEN 1000 MG: 500 TABLET ORAL at 14:19

## 2021-03-25 NOTE — ED PROVIDER NOTES
Subjective   Patient is an 81-year-old male with hyperlipidemia, coronary artery disease, diabetes, and atrial fibrillation that presents to the ED with complaints of dysuria and frequency that started last night.  He states that he has had some painful burning sensation whenever he tries to urinate.  He states he is also had to use the restroom more often than usual.  He denies chest pain, shortness of breath, fever, chills, nausea, vomiting, abdominal pain, headache, dizziness, hematuria, diarrhea, or constipation.      History provided by:  Patient   used: No    Dysuria  Severity:  Moderate  Onset quality:  Sudden  Duration:  1 day  Timing:  Constant  Progression:  Worsening  Chronicity:  New  Context:  Patient complaining of dysuria and frequency  Associated symptoms: no abdominal pain, no chest pain, no congestion, no cough, no diarrhea, no ear pain, no fatigue, no fever, no headaches, no loss of consciousness, no myalgias, no nausea, no rash, no rhinorrhea, no shortness of breath, no sore throat, no vomiting and no wheezing        Review of Systems   Constitutional: Positive for chills. Negative for fatigue and fever.   HENT: Negative.  Negative for congestion, drooling, ear discharge, ear pain, facial swelling, postnasal drip, rhinorrhea, sinus pressure, sinus pain, sneezing, sore throat, tinnitus and trouble swallowing.    Eyes: Negative.  Negative for photophobia, pain, discharge, redness and itching.   Respiratory: Negative.  Negative for cough, shortness of breath and wheezing.    Cardiovascular: Negative.  Negative for chest pain.   Gastrointestinal: Negative.  Negative for abdominal distention, abdominal pain, diarrhea, nausea and vomiting.   Endocrine: Negative.    Genitourinary: Positive for dysuria and frequency. Negative for difficulty urinating, flank pain and urgency.   Musculoskeletal: Negative.  Negative for arthralgias, back pain, gait problem, joint swelling, myalgias, neck  pain and neck stiffness.   Skin: Negative.  Negative for rash.   Neurological: Negative.  Negative for dizziness, loss of consciousness, syncope, facial asymmetry, weakness, light-headedness, numbness and headaches.   Psychiatric/Behavioral: Negative.  Negative for confusion.   All other systems reviewed and are negative.      Past Medical History:   Diagnosis Date   • Atrial fibrillation (CMS/HCC)    • Coronary artery disease    • Hyperlipidemia    • Pre-diabetes        Allergies   Allergen Reactions   • Lisinopril Unknown (See Comments)     Pt. States that he is allergic however, it has been so long since he took it that he cannot remember the reaction that he had at the time.    • Losartan Unknown - Low Severity       Past Surgical History:   Procedure Laterality Date   • CARDIAC CATHETERIZATION     • COLONOSCOPY     • INGUINAL HERNIA REPAIR     • LAPAROSCOPIC CHOLECYSTECTOMY         Family History   Problem Relation Age of Onset   • Cancer Sister    • Diabetes Sister    • Cancer Brother    • Heart disease Brother    • Diabetes Maternal Grandmother    • Hypertension Neg Hx        Social History     Socioeconomic History   • Marital status:      Spouse name: Not on file   • Number of children: Not on file   • Years of education: Not on file   • Highest education level: Not on file   Tobacco Use   • Smoking status: Former Smoker     Years: 20.00     Types: Pipe, Cigars   • Smokeless tobacco: Never Used   Substance and Sexual Activity   • Alcohol use: No   • Drug use: No   • Sexual activity: Defer           Objective   Physical Exam  Vitals and nursing note reviewed.   Constitutional:       General: He is not in acute distress.     Appearance: He is well-developed. He is not diaphoretic.   HENT:      Head: Normocephalic and atraumatic.      Right Ear: External ear normal.      Left Ear: External ear normal.      Nose: Nose normal.      Mouth/Throat:      Mouth: Mucous membranes are moist.      Pharynx:  Oropharynx is clear.   Eyes:      Extraocular Movements: Extraocular movements intact.      Conjunctiva/sclera: Conjunctivae normal.      Pupils: Pupils are equal, round, and reactive to light.   Neck:      Vascular: No JVD.      Trachea: No tracheal deviation.   Cardiovascular:      Rate and Rhythm: Normal rate and regular rhythm.      Heart sounds: Normal heart sounds. No murmur heard.     Pulmonary:      Effort: Pulmonary effort is normal. No respiratory distress.      Breath sounds: Normal breath sounds. No wheezing.   Abdominal:      General: Bowel sounds are normal. There is no distension.      Palpations: Abdomen is soft.      Tenderness: There is no abdominal tenderness. There is no guarding or rebound.   Musculoskeletal:         General: No deformity. Normal range of motion.      Cervical back: Normal range of motion and neck supple.      Right lower leg: No edema.      Left lower leg: No edema.   Skin:     General: Skin is warm and dry.      Coloration: Skin is not pale.      Findings: No erythema or rash.   Neurological:      Mental Status: He is alert and oriented to person, place, and time.      Cranial Nerves: No cranial nerve deficit.   Psychiatric:         Behavior: Behavior normal.         Thought Content: Thought content normal.         Procedures           ED Course  ED Course as of Mar 25 1810   Thu Mar 25, 2021   1601 IMPRESSION:     1. Prostate enlargement. Prostate is heterogeneous     2. Other incidental findings as discussed above.        3. moderate to large volume stool   CT Abdomen Pelvis With Contrast [ES]   1736 Discussed case with Dr. Ann.  We reviewed CT scan and blood work.    [MH]   1736 Discussed plan of care with patient.  Advised if symptoms worsen return to the ED.  Advised to follow-up with urology in 1 to 2 days.    []      ED Course User Index  [ES] Lowell Hall MD  [MH] Alice Benavides PA-C                                           [facility]    Final  diagnoses:   Acute prostatitis       ED Disposition  ED Disposition     ED Disposition Condition Comment    Discharge Stable           Franko Chavez MD  [address] [id number]  [phone]    Schedule an appointment as soon as possible for a visit in 1 day           Medication List      New Prescriptions    levoFLOXacin 500 MG tablet  Commonly known as: LEVAQUIN  Take 1 tablet by mouth Daily for 14 days.           Where to Get Your Medications      You can get these medications from any pharmacy    Bring a paper prescription for each of these medications  · levoFLOXacin 500 MG tablet          Alice Benavides PA-C  03/25/21 [id number]

## 2021-03-25 NOTE — ED NOTES
Gatorade given to pt per Alice MCDERMOTT request in an effort to obtain urine specimen.     Nic Spicer RN  03/25/21 6551

## 2021-03-26 ENCOUNTER — OFFICE VISIT (OUTPATIENT)
Dept: UROLOGY | Facility: CLINIC | Age: 81
End: 2021-03-26

## 2021-03-26 ENCOUNTER — EPISODE CHANGES (OUTPATIENT)
Dept: CASE MANAGEMENT | Facility: OTHER | Age: 81
End: 2021-03-26

## 2021-03-26 VITALS — TEMPERATURE: 97.8 F | WEIGHT: 187 LBS | HEIGHT: 66 IN | BODY MASS INDEX: 30.05 KG/M2

## 2021-03-26 DIAGNOSIS — N41.0 PROSTATITIS, ACUTE: Primary | ICD-10-CM

## 2021-03-26 DIAGNOSIS — N41.0 ACUTE PROSTATITIS: ICD-10-CM

## 2021-03-26 PROCEDURE — 99204 OFFICE O/P NEW MOD 45 MIN: CPT | Performed by: UROLOGY

## 2021-03-26 RX ORDER — SULFAMETHOXAZOLE AND TRIMETHOPRIM 800; 160 MG/1; MG/1
1 TABLET ORAL 2 TIMES DAILY
Qty: 84 TABLET | Refills: 2 | Status: SHIPPED | OUTPATIENT
Start: 2021-03-26 | End: 2022-01-25

## 2021-03-26 NOTE — PROGRESS NOTES
Chief Complaint:          Chief Complaint   Patient presents with   • Dysuria       HPI:   81 y.o. male is referred from the emergency room apparently was there from 11 30-5 45 yesterday.  Had prostatitis.  Gets PSAs every 6 months.  With [facility]ier[facility] Blanca had before this had a decreased force of stream no treatment got Levaquin and Rocephin said 3 TURPs he has a right inguinal hernia.  I will start him on appropriate antibiotics for prostatitis.  He is status post a laser vaporization of the prostate, bladder stone, and a repeat TURP.  He still has a significant decreased force of stream I will see him back in 3 weeks if he continues with pain and problems he will need a cystoscopy    Past Medical History:        Past Medical History:   Diagnosis Date   • Atrial fibrillation (CMS/HCC)    • Coronary artery disease    • Hyperlipidemia    • Pre-diabetes          Current Meds:     Current Outpatient Medications   Medication Sig Dispense Refill   • fexofenadine (ALLEGRA) 180 MG tablet      • GlipiZIDE (GLUCOTROL PO) Take 2.5 mg by mouth.     • hydrALAZINE (APRESOLINE) 10 MG tablet Take 1 tablet by mouth 3 (Three) Times a Day. 270 tablet 3   • HYDROcodone-acetaminophen (NORCO) 7.5-325 MG per tablet Take 1 tablet by mouth Every 6 (Six) Hours As Needed for moderate pain (4-6).     • levoFLOXacin (LEVAQUIN) 500 MG tablet Take 1 tablet by mouth Daily for 14 days. 14 tablet 0   • nitroglycerin (NITROSTAT) 0.4 MG SL tablet 1 under the tongue as needed for angina, may repeat q5mins for up three doses 100 tablet 2   • omeprazole (PRILOSEC) 40 MG capsule Take 40 mg by mouth Daily.     • simvastatin (ZOCOR) 20 MG tablet TAKE 1 TABLET EVERY NIGHT 90 tablet 3   • TOPROL XL 25 MG 24 hr tablet TAKE 1 TABLET DAILY 90 tablet 4   • vitamin B-12 (CYANOCOBALAMIN) 1000 MCG tablet Take 1,000 mcg by mouth Daily.     • warfarin (COUMADIN) 5 MG tablet Take 3 mg by mouth Daily.       No current facility-administered medications for this visit.          Allergies:      Allergies   Allergen Reactions   • Lisinopril Unknown (See Comments)     Pt. States that he is allergic however, it has been so long since he took it that he cannot remember the reaction that he had at the time.    • Losartan Unknown - Low Severity        Past Surgical History:     Past Surgical History:   Procedure Laterality Date   • CARDIAC CATHETERIZATION     • COLONOSCOPY     • INGUINAL HERNIA REPAIR     • LAPAROSCOPIC CHOLECYSTECTOMY           Social History:     Social History     Socioeconomic History   • Marital status:      Spouse name: Not on file   • Number of children: Not on file   • Years of education: Not on file   • Highest education level: Not on file   Tobacco Use   • Smoking status: Former Smoker     Years: 20.00     Types: Pipe, Cigars   • Smokeless tobacco: Never Used   Substance and Sexual Activity   • Alcohol use: No   • Drug use: No   • Sexual activity: Defer       Family History:     Family History   Problem Relation Age of Onset   • Cancer Sister    • Diabetes Sister    • Cancer Brother    • Heart disease Brother    • Diabetes Maternal Grandmother    • Hypertension Neg Hx        Review of Systems:     Review of Systems   Constitutional: Negative.    HENT: Negative.    Eyes: Negative.    Respiratory: Negative.    Cardiovascular: Negative.    Gastrointestinal: Negative.    Endocrine: Negative.    Musculoskeletal: Negative.    Allergic/Immunologic: Negative.    Neurological: Negative.    Hematological: Negative.    Psychiatric/Behavioral: Negative.        Physical Exam:     Physical Exam  Vitals and nursing note reviewed.   Constitutional:       Appearance: He is well-developed.   HENT:      Head: Normocephalic and atraumatic.   Eyes:      Conjunctiva/sclera: Conjunctivae normal.      Pupils: Pupils are equal, round, and reactive to light.   Cardiovascular:      Rate and Rhythm: Normal rate and regular rhythm.      Heart sounds: Normal heart sounds.   Pulmonary:       Effort: Pulmonary effort is normal.      Breath sounds: Normal breath sounds.   Abdominal:      General: Bowel sounds are normal.      Palpations: Abdomen is soft.   Genitourinary:     Comments: Very tender prostate enlarged  Musculoskeletal:         General: Normal range of motion.      Cervical back: Normal range of motion.   Skin:     General: Skin is warm and dry.   Neurological:      Mental Status: He is alert and oriented to person, place, and time.      Deep Tendon Reflexes: Reflexes are normal and symmetric.   Psychiatric:         Behavior: Behavior normal.         Thought Content: Thought content normal.         Judgment: Judgment normal.         I have reviewed the following portions of the patient's history: allergies, current medications, past family history, past medical history, past social history, past surgical history, problem list and ROS and confirm it's accurate.      Procedure:       Assessment/Plan:   Prostatitis-we discussed the differential diagnosis of prostatitis including the National Institutes of Health classification system I through IV.  I discussed that type I prostatitis is  acute bacterial prostatitis and an associated with high fevers typically hematuria and severe pain with voiding.  We discussed the fact that it necessitates 6 weeks of chronic antibiotics to be sure it doesn't turn into a chronic bacterial prostatitis that can have lifelong ramifications.  We discussed National Institutes of Health type II chronic bacterial prostatitis.  We discussed the fact that this a chronic bacterial infection of the prostate that often requires suppressive antibiotics.  We discussed type III being related more to nonspecific urethritis as well as tight for being related to finding inflammation and a biopsy in the absence of symptomatology.  I discussed the diagnostic strategies including the VB 1 through 4 urine culture and discussed empiric therapy.  I emphasized that with the use of  chronic antibiotics I strongly recommended the concomitant use of probiotics.  Additionally, we discussed the various antibiotics used and the risks and benefits associated with each particularly the use of long-term ciprofloxacin and the effect on joints and collagen in human beings.  He has a serious case of prostatitis  BPH: Discussed the pathophysiology of BPH and obstruction.  We discussed the static and dynamic effect effects of BPH as well as using 5 alpha reductase inhibitors versus alpha blockade.  We discussed the indications for transurethral surgery as well.  And/ or other therapeutic options available including all of the newer techniques.  He has a worsening stream despite vaporization of the prostate, and a TURP  Bladder stone-status post endoscopic removal  PSA testing-I am recommending a PSA blood test that stands for prostate specific antigen.  I discussed the pathophysiology of PSA testing indicating its use in the diagnosis and management of prostate cancer.  I discussed the normal range being 0-4 but more appropriately being much closer to 0-2 in a normal male.  I discussed the fact that after certain age we don't recommend PSA testing especially in view of numerous comorbidities.  That this will not be a useful test.  I discussed many of the things that can artificially raised PSA including a recent infection, urinary tract infection, recent sexual intercourse.  Where even the type of movement such as manipulation of the prostate  riding a bicycle.  After all this is taken into account when the test is reviewed  the most important use of PSA which is the velocity measurement in other words the change of PSA with time as a very important factor in the use and that we look for greater than 20% rise over a year to help us make the prediction of prostate cancer.  I also discussed that the use with prostate cancer indicating that after a radical prostatectomy the PSA should be 0 and any rise  indicates an early biochemical recurrence he states he gets his PSAs at Kieran Blanca's office and they have been normal            Patient's Body mass index is 30.2 kg/m². BMI is above normal parameters. Recommendations include: educational material.              This document has been electronically signed by JULIÁN CRUZ MD March 26, 2021 14:36 EDT

## 2021-03-29 PROBLEM — N41.0 ACUTE PROSTATITIS: Status: ACTIVE | Noted: 2021-03-29

## 2021-03-30 LAB
BACTERIA SPEC AEROBE CULT: NORMAL
BACTERIA SPEC AEROBE CULT: NORMAL

## 2021-04-14 RX ORDER — METOPROLOL SUCCINATE 25 MG/1
TABLET, EXTENDED RELEASE ORAL
Qty: 90 TABLET | Refills: 3 | Status: SHIPPED | OUTPATIENT
Start: 2021-04-14 | End: 2021-07-27 | Stop reason: SDUPTHER

## 2021-04-20 ENCOUNTER — OFFICE VISIT (OUTPATIENT)
Dept: UROLOGY | Facility: CLINIC | Age: 81
End: 2021-04-20

## 2021-04-20 VITALS — HEIGHT: 66 IN | BODY MASS INDEX: 31.37 KG/M2 | TEMPERATURE: 97.7 F | WEIGHT: 195.2 LBS

## 2021-04-20 DIAGNOSIS — N41.0 ACUTE PROSTATITIS: Primary | ICD-10-CM

## 2021-04-20 PROCEDURE — 99213 OFFICE O/P EST LOW 20 MIN: CPT | Performed by: UROLOGY

## 2021-04-20 NOTE — PROGRESS NOTES
Chief Complaint:          Chief Complaint   Patient presents with   • Prostatitis     3 week fu        HPI:   81 y.o. male.  Returns today had a history of prostatitis.  His PSA was noted to be 1.77 on October 5, 2020.  He has no more stinging, no dysuria, the Septra he felt messed up his INR.  Otherwise he is doing great he is pushing fluids I will see him back in 6 months.  I gave him reassurance.    Past Medical History:        Past Medical History:   Diagnosis Date   • Atrial fibrillation (CMS/HCC)    • Coronary artery disease    • Hyperlipidemia    • Pre-diabetes          Current Meds:     Current Outpatient Medications   Medication Sig Dispense Refill   • fexofenadine (ALLEGRA) 180 MG tablet      • GlipiZIDE (GLUCOTROL PO) Take 2.5 mg by mouth.     • hydrALAZINE (APRESOLINE) 10 MG tablet Take 1 tablet by mouth 3 (Three) Times a Day. 270 tablet 3   • HYDROcodone-acetaminophen (NORCO) 7.5-325 MG per tablet Take 1 tablet by mouth Every 6 (Six) Hours As Needed for moderate pain (4-6).     • metoprolol succinate XL (TOPROL-XL) 25 MG 24 hr tablet TAKE 1 TABLET DAILY 90 tablet 3   • nitroglycerin (NITROSTAT) 0.4 MG SL tablet 1 under the tongue as needed for angina, may repeat q5mins for up three doses 100 tablet 2   • omeprazole (PRILOSEC) 40 MG capsule Take 40 mg by mouth Daily.     • simvastatin (ZOCOR) 20 MG tablet TAKE 1 TABLET EVERY NIGHT 90 tablet 3   • sulfamethoxazole-trimethoprim (Bactrim DS) 800-160 MG per tablet Take 1 tablet by mouth 2 (Two) Times a Day. 84 tablet 2   • vitamin B-12 (CYANOCOBALAMIN) 1000 MCG tablet Take 1,000 mcg by mouth Daily.     • warfarin (COUMADIN) 5 MG tablet Take 3 mg by mouth Daily.       No current facility-administered medications for this visit.        Allergies:      Allergies   Allergen Reactions   • Lisinopril Unknown (See Comments)     Pt. States that he is allergic however, it has been so long since he took it that he cannot remember the reaction that he had at the time.     • Losartan Unknown - Low Severity        Past Surgical History:     Past Surgical History:   Procedure Laterality Date   • CARDIAC CATHETERIZATION     • COLONOSCOPY     • INGUINAL HERNIA REPAIR     • LAPAROSCOPIC CHOLECYSTECTOMY           Social History:     Social History     Socioeconomic History   • Marital status:      Spouse name: Not on file   • Number of children: Not on file   • Years of education: Not on file   • Highest education level: Not on file   Tobacco Use   • Smoking status: Former Smoker     Years: 20.00     Types: Pipe, Cigars   • Smokeless tobacco: Never Used   Substance and Sexual Activity   • Alcohol use: No   • Drug use: No   • Sexual activity: Defer       Family History:     Family History   Problem Relation Age of Onset   • Cancer Sister    • Diabetes Sister    • Cancer Brother    • Heart disease Brother    • Diabetes Maternal Grandmother    • Hypertension Neg Hx        Review of Systems:     Review of Systems   Constitutional: Negative.    HENT: Negative.    Eyes: Negative.    Respiratory: Negative.    Cardiovascular: Negative.    Gastrointestinal: Negative.    Endocrine: Negative.    Musculoskeletal: Negative.    Allergic/Immunologic: Negative.    Neurological: Negative.    Hematological: Negative.    Psychiatric/Behavioral: Negative.        Physical Exam:     Physical Exam  Vitals and nursing note reviewed.   Constitutional:       Appearance: He is well-developed.   HENT:      Head: Normocephalic and atraumatic.   Eyes:      Conjunctiva/sclera: Conjunctivae normal.      Pupils: Pupils are equal, round, and reactive to light.   Cardiovascular:      Rate and Rhythm: Normal rate and regular rhythm.      Heart sounds: Normal heart sounds.   Pulmonary:      Effort: Pulmonary effort is normal.      Breath sounds: Normal breath sounds.   Abdominal:      General: Bowel sounds are normal.      Palpations: Abdomen is soft.   Musculoskeletal:         General: Normal range of motion.       Cervical back: Normal range of motion.   Skin:     General: Skin is warm and dry.   Neurological:      Mental Status: He is alert and oriented to person, place, and time.      Deep Tendon Reflexes: Reflexes are normal and symmetric.   Psychiatric:         Behavior: Behavior normal.         Thought Content: Thought content normal.         Judgment: Judgment normal.         I have reviewed the following portions of the patient's history: allergies, current medications, past family history, past medical history, past social history, past surgical history, problem list and ROS and confirm it's accurate.      Procedure:       Assessment/Plan:   Prostatitis-we discussed the differential diagnosis of prostatitis including the National Institutes of Health classification system I through IV.  I discussed that type I prostatitis is  acute bacterial prostatitis and an associated with high fevers typically hematuria and severe pain with voiding.  We discussed the fact that it necessitates 6 weeks of chronic antibiotics to be sure it doesn't turn into a chronic bacterial prostatitis that can have lifelong ramifications.  We discussed National Institutes of Health type II chronic bacterial prostatitis.  We discussed the fact that this a chronic bacterial infection of the prostate that often requires suppressive antibiotics.  We discussed type III being related more to nonspecific urethritis as well as tight for being related to finding inflammation and a biopsy in the absence of symptomatology.  I discussed the diagnostic strategies including the VB 1 through 4 urine culture and discussed empiric therapy.  I emphasized that with the use of chronic antibiotics I strongly recommended the concomitant use of probiotics.  Additionally, we discussed the various antibiotics used and the risks and benefits associated with each particularly the use of long-term ciprofloxacin and the effect on joints and collagen in human beings.  He has  been treated appropriately for prostatitis we have recurrence.  PSA testing-I am recommending a PSA blood test that stands for prostate specific antigen.  I discussed the pathophysiology of PSA testing indicating its use in the diagnosis and management of prostate cancer.  I discussed the normal range being 0-4 but more appropriately being much closer to 0-2 in a normal male.  I discussed the fact that after certain age we don't recommend PSA testing especially in view of numerous comorbidities.  That this will not be a useful test.  I discussed many of the things that can artificially raised PSA including a recent infection, urinary tract infection, recent sexual intercourse.  Where even the type of movement such as manipulation of the prostate  riding a bicycle.  After all this is taken into account when the test is reviewed  the most important use of PSA which is the velocity measurement in other words the change of PSA with time as a very important factor in the use and that we look for greater than 20% rise over a year to help us make the prediction of prostate cancer.  I also discussed that the use with prostate cancer indicating that after a radical prostatectomy the PSA should be 0 and any rise indicates an early biochemical recurrence his PSA is in normal range.        Patient's Body mass index is 31.53 kg/m². BMI is above normal parameters. Recommendations include: educational material.              This document has been electronically signed by JULIÁN CRUZ MD April 20, 2021 07:58 ED

## 2021-06-09 RX ORDER — SIMVASTATIN 20 MG
TABLET ORAL
Qty: 90 TABLET | Refills: 3 | Status: SHIPPED | OUTPATIENT
Start: 2021-06-09 | End: 2021-07-27 | Stop reason: SDUPTHER

## 2021-07-27 ENCOUNTER — OFFICE VISIT (OUTPATIENT)
Dept: CARDIOLOGY | Facility: CLINIC | Age: 81
End: 2021-07-27

## 2021-07-27 VITALS
OXYGEN SATURATION: 98 % | SYSTOLIC BLOOD PRESSURE: 149 MMHG | BODY MASS INDEX: 29.81 KG/M2 | WEIGHT: 185.5 LBS | DIASTOLIC BLOOD PRESSURE: 68 MMHG | HEIGHT: 66 IN | HEART RATE: 67 BPM

## 2021-07-27 DIAGNOSIS — E78.01 FAMILIAL HYPERCHOLESTEROLEMIA: ICD-10-CM

## 2021-07-27 DIAGNOSIS — I10 ESSENTIAL HYPERTENSION: Chronic | ICD-10-CM

## 2021-07-27 DIAGNOSIS — R60.0 BILATERAL LEG EDEMA: ICD-10-CM

## 2021-07-27 DIAGNOSIS — I48.20 CHRONIC ATRIAL FIBRILLATION (HCC): Primary | Chronic | ICD-10-CM

## 2021-07-27 PROCEDURE — 99214 OFFICE O/P EST MOD 30 MIN: CPT | Performed by: NURSE PRACTITIONER

## 2021-07-27 PROCEDURE — 93000 ELECTROCARDIOGRAM COMPLETE: CPT | Performed by: NURSE PRACTITIONER

## 2021-07-27 RX ORDER — AMLODIPINE BESYLATE 5 MG/1
5 TABLET ORAL DAILY
COMMUNITY
End: 2021-07-27 | Stop reason: SDUPTHER

## 2021-07-27 RX ORDER — METOPROLOL SUCCINATE 25 MG/1
25 TABLET, EXTENDED RELEASE ORAL DAILY
Qty: 90 TABLET | Refills: 2 | Status: SHIPPED | OUTPATIENT
Start: 2021-07-27 | End: 2022-09-19 | Stop reason: SDUPTHER

## 2021-07-27 RX ORDER — FUROSEMIDE 20 MG/1
TABLET ORAL
Qty: 30 TABLET | Refills: 0 | Status: SHIPPED | OUTPATIENT
Start: 2021-07-27 | End: 2021-11-19

## 2021-07-27 RX ORDER — AMLODIPINE BESYLATE 5 MG/1
5 TABLET ORAL DAILY
Qty: 90 TABLET | Refills: 2 | Status: SHIPPED | OUTPATIENT
Start: 2021-07-27 | End: 2021-11-19

## 2021-07-27 RX ORDER — HYDRALAZINE HYDROCHLORIDE 10 MG/1
10 TABLET, FILM COATED ORAL 3 TIMES DAILY
Qty: 270 TABLET | Refills: 3 | Status: SHIPPED | OUTPATIENT
Start: 2021-07-27 | End: 2023-01-26 | Stop reason: DRUGHIGH

## 2021-07-27 RX ORDER — SIMVASTATIN 20 MG
20 TABLET ORAL NIGHTLY
Qty: 90 TABLET | Refills: 2 | Status: SHIPPED | OUTPATIENT
Start: 2021-07-27 | End: 2022-07-29

## 2021-07-27 NOTE — PROGRESS NOTES
"Chief Complaint  Hypertension, Leg Swelling, Chest Pain, and Atrial Fibrillation    Subjective          Aramis Carson presents to [facility] for his usual follow-up.    History of Present Illness    Mr. Greenberg denies any chest pain.  He does report occasional palpitations.  He states that these are no worse than usual.  He does report that he has had intermittent lower extremity swelling.  He shows me a picture from his camera of his feet which is evidence of the swelling.  He states that the photos were taken just a few days ago and the next morning much of the swelling was gone although not all of it.  He also states that he has been trying to keep his legs elevated as much as possible.  He denies any shortness of breath or dyspnea on exertion.  Denies orthopnea or PND.    Objective     Vital Signs:   /68 (BP Location: Right arm, Patient Position: Sitting)   Pulse 67   Ht 167.6 cm (65.98\")   Wt 84.1 kg (185 lb 8 oz)   SpO2 98%   BMI 29.96 kg/m²       Physical Exam  Constitutional:       Appearance: Normal appearance. He is well-developed.   Cardiovascular:      Rate and Rhythm: Normal rate and regular rhythm.      Heart sounds: No murmur heard.   No friction rub. No gallop.    Pulmonary:      Effort: Pulmonary effort is normal. No respiratory distress.      Breath sounds: Normal breath sounds. No wheezing or rales.   Musculoskeletal:      Right lower le+ Pitting Edema present.      Left lower le+ Pitting Edema present.   Skin:     General: Skin is warm and dry.   Neurological:      Mental Status: He is alert and oriented to person, place, and time.   Psychiatric:         Mood and Affect: Mood normal.         Behavior: Behavior normal.          Result Review :            ECG 12 Lead    Date/Time: 2021 11:12 AM  Performed by: Jaimie Nuno APRN  Authorized by: Jaimie Nuno APRN   Comparison: compared with previous ECG from 2020  Similar to previous " ECG  Comparison to previous ECG: A. fib with slow ventricular rate  Rhythm: atrial fibrillation  BPM: 66  Q waves: V3, V4, II, III and aVF    Comments:              Current Outpatient Medications   Medication Sig Dispense Refill   • amLODIPine (NORVASC) 5 MG tablet Take 1 tablet by mouth Daily. 90 tablet 2   • fexofenadine (ALLEGRA) 180 MG tablet      • GlipiZIDE (GLUCOTROL PO) Take 2.5 mg by mouth.     • hydrALAZINE (APRESOLINE) 10 MG tablet Take 1 tablet by mouth 3 (Three) Times a Day. 270 tablet 3   • HYDROcodone-acetaminophen (NORCO) 7.5-325 MG per tablet Take 1 tablet by mouth Every 6 (Six) Hours As Needed for moderate pain (4-6).     • metoprolol succinate XL (TOPROL-XL) 25 MG 24 hr tablet Take 1 tablet by mouth Daily. 90 tablet 2   • omeprazole (PRILOSEC) 40 MG capsule Take 40 mg by mouth Daily.     • simvastatin (ZOCOR) 20 MG tablet Take 1 tablet by mouth Every Night. 90 tablet 2   • vitamin B-12 (CYANOCOBALAMIN) 1000 MCG tablet Take 1,000 mcg by mouth Daily.     • warfarin (COUMADIN) 5 MG tablet Take 3 mg by mouth Daily.     • furosemide (LASIX) 20 MG tablet Take one tab daily for 3 days, then take as needed for LE swelling 30 tablet 0   • nitroglycerin (NITROSTAT) 0.4 MG SL tablet 1 under the tongue as needed for angina, may repeat q5mins for up three doses 100 tablet 2   • sulfamethoxazole-trimethoprim (Bactrim DS) 800-160 MG per tablet Take 1 tablet by mouth 2 (Two) Times a Day. 84 tablet 2     No current facility-administered medications for this visit.            Assessment and Plan    Problem List Items Addressed This Visit        Cardiac and Vasculature    Essential hypertension (Chronic)    Relevant Medications    hydrALAZINE (APRESOLINE) 10 MG tablet    metoprolol succinate XL (TOPROL-XL) 25 MG 24 hr tablet    amLODIPine (NORVASC) 5 MG tablet    furosemide (LASIX) 20 MG tablet    Familial hypercholesterolemia (Chronic)    Relevant Medications    simvastatin (ZOCOR) 20 MG tablet    Chronic  atrial fibrillation (CMS/HCC) - Primary (Chronic)    Overview     · [id number] is 4 for hypertension, age, diabetes.  · Chronically anticoagulated on Coumadin.         Relevant Medications    metoprolol succinate XL (TOPROL-XL) 25 MG 24 hr tablet    amLODIPine (NORVASC) 5 MG tablet    Other Relevant Orders    ECG 12 Lead      Other Visit Diagnoses     Bilateral leg edema        Relevant Medications    furosemide (LASIX) 20 MG tablet              Follow Up     Medications were reviewed with the patient.    Bilaterally Mr. Carson does have lower extremity edema, although it does not appear to be as bad as it was in the photos that he has brought with him today.  I have given him a prescription for Lasix to take for the next 3 days and then I have instructed him that should he have any further swelling he needs to take the Lasix.  I have recommended that he take Lasix no later than noon on any given day.    Mr. Carson was not given a prescription for potassium since he generally has a high serum potassium level.    Mr. Miramontes is to continue metoprolol succinate, Apresoline and amlodipine.  Continue simvastatin.  Continue warfarin, INR monitored in [place] by [facility]..    Return in about 6 months (around 1/27/2022).    Patient was given instructions and counseling regarding his condition or for health maintenance advice. Please see specific information pulled into the AVS if appropriate.

## 2021-08-02 ENCOUNTER — TELEPHONE (OUTPATIENT)
Dept: CARDIOLOGY | Facility: CLINIC | Age: 81
End: 2021-08-02

## 2021-08-02 NOTE — TELEPHONE ENCOUNTER
Patient called and stated that his Lasix was not working, and he needed to get something else, Patient's call back number is [phone].

## 2021-08-04 RX ORDER — METOLAZONE 5 MG/1
5 TABLET ORAL DAILY
Qty: 5 TABLET | Refills: 0 | Status: SHIPPED | OUTPATIENT
Start: 2021-08-04 | End: 2022-01-25

## 2021-08-04 NOTE — PROGRESS NOTES
Mr. Carson called the office and stated that he has increased lower extremity swelling and that his Lasix is not taking care of it. He denies any associated shortness of breath. I will call in a 5-day supply of metolazone for him to start taking. He is to call the office on Monday and report on his progress.

## 2021-08-09 ENCOUNTER — TELEPHONE (OUTPATIENT)
Dept: CARDIOLOGY | Facility: CLINIC | Age: 81
End: 2021-08-09

## 2021-08-09 NOTE — TELEPHONE ENCOUNTER
Pt called.  Swelling has gone.  He only took 3 days of metolazone and he became dizzy.  He felt that this was secondary to the metolazone so he stopped the metolazone.  He does however remain dizzy at times.    I have asked him to check his blood pressure and his pulse and call me back.    Patient called back.  His blood pressure was 128/74 and his pulse was 74.

## 2021-08-09 NOTE — TELEPHONE ENCOUNTER
BP:  2PM  128/74  PULSE 74    PT UNAVAILABLE UNTIL AFTER 4P  OR YOU CAN LEAVE MESSAGE ON MACHINE.    CFM

## 2021-08-10 NOTE — TELEPHONE ENCOUNTER
I called the patient.  He states that the dizziness is gone.  His blood pressure has been normal.  He has not had any more lower extremity swelling.  He states that he feels good today.  He states that he was up melo tomatoes most of yesterday as well and did not have any swelling.

## 2021-09-07 ENCOUNTER — TRANSCRIBE ORDERS (OUTPATIENT)
Dept: ADMINISTRATIVE | Facility: HOSPITAL | Age: 81
End: 2021-09-07

## 2021-09-08 ENCOUNTER — TRANSCRIBE ORDERS (OUTPATIENT)
Dept: ADMINISTRATIVE | Facility: HOSPITAL | Age: 81
End: 2021-09-08

## 2021-09-08 DIAGNOSIS — M79.604 BILATERAL LEG PAIN: Primary | ICD-10-CM

## 2021-09-08 DIAGNOSIS — M79.662 BILATERAL CALF PAIN: ICD-10-CM

## 2021-09-08 DIAGNOSIS — M79.605 BILATERAL LEG PAIN: Primary | ICD-10-CM

## 2021-09-08 DIAGNOSIS — M79.604 LEG PAIN, BILATERAL: Primary | ICD-10-CM

## 2021-09-08 DIAGNOSIS — M79.661 BILATERAL CALF PAIN: ICD-10-CM

## 2021-09-08 DIAGNOSIS — M79.605 LEG PAIN, BILATERAL: Primary | ICD-10-CM

## 2021-09-08 DIAGNOSIS — R60.0 LOCALIZED EDEMA: Primary | ICD-10-CM

## 2021-09-09 ENCOUNTER — HOSPITAL ENCOUNTER (OUTPATIENT)
Dept: CARDIOLOGY | Facility: HOSPITAL | Age: 81
Discharge: HOME | End: 2021-09-09

## 2021-09-09 ENCOUNTER — HOSPITAL ENCOUNTER (OUTPATIENT)
Dept: ULTRASOUND IMAGING | Facility: HOSPITAL | Age: 81
Discharge: HOME | End: 2021-09-09

## 2021-09-09 DIAGNOSIS — M79.604 LEG PAIN, BILATERAL: ICD-10-CM

## 2021-09-09 DIAGNOSIS — M79.605 BILATERAL LEG PAIN: ICD-10-CM

## 2021-09-09 DIAGNOSIS — M79.662 BILATERAL CALF PAIN: ICD-10-CM

## 2021-09-09 DIAGNOSIS — M79.605 LEG PAIN, BILATERAL: ICD-10-CM

## 2021-09-09 DIAGNOSIS — M79.604 BILATERAL LEG PAIN: ICD-10-CM

## 2021-09-09 DIAGNOSIS — M79.661 BILATERAL CALF PAIN: ICD-10-CM

## 2021-09-09 PROCEDURE — 93925 LOWER EXTREMITY STUDY: CPT

## 2021-09-09 PROCEDURE — 93925 LOWER EXTREMITY STUDY: CPT | Performed by: RADIOLOGY

## 2021-09-09 PROCEDURE — 93970 EXTREMITY STUDY: CPT

## 2021-09-09 PROCEDURE — 93970 EXTREMITY STUDY: CPT | Performed by: RADIOLOGY

## 2021-09-28 ENCOUNTER — OFFICE VISIT (OUTPATIENT)
Dept: SURGERY | Facility: CLINIC | Age: 81
End: 2021-09-28

## 2021-09-28 VITALS
HEIGHT: 66 IN | DIASTOLIC BLOOD PRESSURE: 75 MMHG | SYSTOLIC BLOOD PRESSURE: 131 MMHG | BODY MASS INDEX: 29.73 KG/M2 | HEART RATE: 75 BPM | WEIGHT: 185 LBS

## 2021-09-28 DIAGNOSIS — L72.0 EPIDERMAL CYST: ICD-10-CM

## 2021-09-28 DIAGNOSIS — I48.20 CHRONIC ATRIAL FIBRILLATION (HCC): Primary | ICD-10-CM

## 2021-09-28 PROBLEM — I48.91 ATRIAL FIBRILLATION: Status: ACTIVE | Noted: 2017-05-03

## 2021-09-28 PROCEDURE — 99213 OFFICE O/P EST LOW 20 MIN: CPT | Performed by: SURGERY

## 2021-09-28 NOTE — PROGRESS NOTES
Subjective   Aramis Carson is a 81 y.o. male is being seen for consultation today at the request of No ref. provider found    81 y.o. male presenting for evaluation of skin lesion. Lesion on Left scalp in the posterior auricular position with with patient's observations stated as increasing diameter, increasing thickness, exam of this area shows sebaceous cyst, features Ovoid dermal fluctuant mass consistent with sebaceous cyst, size 25 mm.    Past Medical History:   Diagnosis Date   • Atrial fibrillation (CMS/HCC)    • Coronary artery disease    • Hyperlipidemia    • Pre-diabetes        Family History   Problem Relation Age of Onset   • Cancer Sister    • Diabetes Sister    • Cancer Brother    • Heart disease Brother    • Diabetes Maternal Grandmother    • Hypertension Neg Hx        Social History     Socioeconomic History   • Marital status:      Spouse name: Not on file   • Number of children: Not on file   • Years of education: Not on file   • Highest education level: Not on file   Tobacco Use   • Smoking status: Former Smoker     Years: 20.00     Types: Pipe, Cigars   • Smokeless tobacco: Never Used   Vaping Use   • Vaping Use: Never used   Substance and Sexual Activity   • Alcohol use: No   • Drug use: No   • Sexual activity: Defer       Past Surgical History:   Procedure Laterality Date   • CARDIAC CATHETERIZATION     • COLONOSCOPY     • INGUINAL HERNIA REPAIR     • LAPAROSCOPIC CHOLECYSTECTOMY         Review of Systems   Constitutional: Negative for activity change, appetite change, chills and fever.   HENT: Negative for sore throat and trouble swallowing.    Eyes: Negative for visual disturbance.   Respiratory: Negative for cough and shortness of breath.    Cardiovascular: Negative for chest pain and palpitations.   Gastrointestinal: Negative for abdominal distention, abdominal pain, blood in stool, constipation, diarrhea, nausea and vomiting.   Endocrine: Negative for cold intolerance and heat  "intolerance.   Genitourinary: Negative for dysuria.   Musculoskeletal: Negative for joint swelling.   Skin: Negative for color change, rash and wound.   Allergic/Immunologic: Negative for immunocompromised state.   Neurological: Negative for dizziness, seizures, weakness and headaches.   Hematological: Negative for adenopathy. Does not bruise/bleed easily.   Psychiatric/Behavioral: Negative for agitation and confusion.         /75   Pulse 75   Ht 167.6 cm (65.98\")   Wt 83.9 kg (185 lb)   BMI 29.87 kg/m²   Objective   Physical Exam  Skin:     Comments: 2.5 cm sebaceous cyst posterior auricular scalp in the left           Diagnoses and all orders for this visit:    1. Chronic atrial fibrillation (HCC) (Primary)    2. Epidermal cyst        Assessment     Aramis Carson is a 81 y.o. male with 2.5 cm sebaceous cyst of the left scalp just posterior to the ear.  The patient is on chronic anticoagulation with Coumadin and will hold 5 days before in office procedure.  He is aware of the risks and benefits of the procedure as well as the risks of cessation of anticoagulation.      Patient's Body mass index is 29.87 kg/m². indicating that he is overweight (BMI 25-29.9). Obesity-related health conditions include the following: hypertension, diabetes mellitus, dyslipidemias and GERD. Obesity is unchanged. BMI is is above average; BMI management plan is completed. We discussed portion control and increasing exercise..           "

## 2021-10-19 ENCOUNTER — PROCEDURE VISIT (OUTPATIENT)
Dept: SURGERY | Facility: CLINIC | Age: 81
End: 2021-10-19

## 2021-10-19 VITALS — BODY MASS INDEX: 29.73 KG/M2 | HEIGHT: 66 IN | WEIGHT: 185 LBS

## 2021-10-19 DIAGNOSIS — L72.0 EPIDERMAL CYST: Primary | ICD-10-CM

## 2021-10-19 PROCEDURE — 11422 EXC H-F-NK-SP B9+MARG 1.1-2: CPT | Performed by: SURGERY

## 2021-10-20 NOTE — PROGRESS NOTES
Sebaceous cyst excision left posterior scalp due to increasing size and discomfort measuring 1.5 cm    Patient left posterior scalp prepped and draped in usual sterile fashion.  Timeout procedure was performed.  After injection of local anesthetic an elliptical incision around the dominant sinus of the cyst was made and dissection was carried down to the cyst capsule.  The cyst capsule was then removed intact from the surrounding tissues and sent to pathology.  The incision was hemostatic and closed with interrupted nylon sutures and dressed with bacitracin ointment.    Estimated blood loss less than 5 mL    Specimens: None    Complications: None

## 2021-11-02 ENCOUNTER — OFFICE VISIT (OUTPATIENT)
Dept: SURGERY | Facility: CLINIC | Age: 81
End: 2021-11-02

## 2021-11-02 VITALS — BODY MASS INDEX: 29.73 KG/M2 | WEIGHT: 185 LBS | HEIGHT: 66 IN

## 2021-11-02 DIAGNOSIS — L72.0 EPIDERMAL CYST: Primary | ICD-10-CM

## 2021-11-02 PROCEDURE — 99212 OFFICE O/P EST SF 10 MIN: CPT | Performed by: SURGERY

## 2021-11-02 NOTE — PROGRESS NOTES
Subjective   Aramis Carson is a 81 y.o. male  is here today for follow-up.         Aramis Carson is a 81 y.o. male here for follow up after sebaceous cyst excision from the left posterior scalp.  His incision has healed well without complication or abnormality.  Sutures removed in the office today.          Assessment     Diagnoses and all orders for this visit:    1. Epidermal cyst (Primary)      Aramis Carson is a 81 y.o. male doing well after excision of left posterior scalp sebaceous cyst.  His incision has healed well without complication or recurrence.  Follow-up as needed.

## 2021-11-19 ENCOUNTER — OFFICE VISIT (OUTPATIENT)
Dept: UROLOGY | Facility: CLINIC | Age: 81
End: 2021-11-19

## 2021-11-19 VITALS — BODY MASS INDEX: 27.8 KG/M2 | HEIGHT: 66 IN | WEIGHT: 173 LBS

## 2021-11-19 DIAGNOSIS — N41.1 CHRONIC PROSTATITIS: Primary | ICD-10-CM

## 2021-11-19 PROCEDURE — 99213 OFFICE O/P EST LOW 20 MIN: CPT | Performed by: UROLOGY

## 2021-11-19 RX ORDER — OMEPRAZOLE MAGNESIUM 10 MG/1
GRANULE, DELAYED RELEASE ORAL
COMMUNITY
Start: 2021-11-10

## 2021-11-19 RX ORDER — HYDROCHLOROTHIAZIDE 12.5 MG/1
TABLET ORAL
COMMUNITY
Start: 2021-11-16 | End: 2022-01-25

## 2021-11-19 RX ORDER — FLUTICASONE PROPIONATE 50 MCG
SPRAY, SUSPENSION (ML) NASAL
COMMUNITY
Start: 2021-09-16 | End: 2022-07-29 | Stop reason: ALTCHOICE

## 2021-11-19 RX ORDER — GLIPIZIDE 2.5 MG/1
TABLET, EXTENDED RELEASE ORAL
COMMUNITY
Start: 2021-10-06

## 2021-11-19 NOTE — PROGRESS NOTES
Chief Complaint:          Chief Complaint   Patient presents with   • Prostatitis       HPI:   81 y.o. male returns today for 6-month follow-up of prostatitis currently no dysuria, he gets up once at night, PSA 1.770, overall is doing great I will see him back on an as needed basis    Past Medical History:        Past Medical History:   Diagnosis Date   • Atrial fibrillation (HCC)    • Coronary artery disease    • Hyperlipidemia    • Pre-diabetes          Current Meds:     Current Outpatient Medications   Medication Sig Dispense Refill   • amLODIPine (NORVASC) 5 MG tablet Take 1 tablet by mouth Daily. 90 tablet 2   • fexofenadine (ALLEGRA) 180 MG tablet      • fluticasone (FLONASE) 50 MCG/ACT nasal spray USE 2 SPRAYS IN EACH NOSTRIL DAILY FOR ALLERGIES AND CONGESTION     • furosemide (LASIX) 20 MG tablet Take one tab daily for 3 days, then take as needed for LE swelling 30 tablet 0   • glipiZIDE XL 2.5 MG 24 hr tablet      • hydrALAZINE (APRESOLINE) 10 MG tablet Take 1 tablet by mouth 3 (Three) Times a Day. 270 tablet 3   • hydroCHLOROthiazide (HYDRODIURIL) 12.5 MG tablet      • HYDROcodone-acetaminophen (NORCO) 7.5-325 MG per tablet Take 1 tablet by mouth Every 6 (Six) Hours As Needed for moderate pain (4-6).     • metOLazone (ZAROXOLYN) 5 MG tablet Take 1 tablet by mouth Daily. 5 tablet 0   • metoprolol succinate XL (TOPROL-XL) 25 MG 24 hr tablet Take 1 tablet by mouth Daily. 90 tablet 2   • nitroglycerin (NITROSTAT) 0.4 MG SL tablet 1 under the tongue as needed for angina, may repeat q5mins for up three doses 100 tablet 2   • PrilOSEC 10 MG pack      • simvastatin (ZOCOR) 20 MG tablet Take 1 tablet by mouth Every Night. 90 tablet 2   • sulfamethoxazole-trimethoprim (Bactrim DS) 800-160 MG per tablet Take 1 tablet by mouth 2 (Two) Times a Day. 84 tablet 2   • vitamin B-12 (CYANOCOBALAMIN) 1000 MCG tablet Take 1,000 mcg by mouth Daily.     • warfarin (COUMADIN) 5 MG tablet Take 3 mg by mouth Daily.       No current  facility-administered medications for this visit.        Allergies:      Allergies   Allergen Reactions   • Lisinopril Unknown (See Comments)     Pt. States that he is allergic however, it has been so long since he took it that he cannot remember the reaction that he had at the time.    • Losartan Unknown - Low Severity        Past Surgical History:     Past Surgical History:   Procedure Laterality Date   • CARDIAC CATHETERIZATION     • COLONOSCOPY     • INGUINAL HERNIA REPAIR     • LAPAROSCOPIC CHOLECYSTECTOMY           Social History:     Social History     Socioeconomic History   • Marital status:    Tobacco Use   • Smoking status: Former Smoker     Years: 20.00     Types: Pipe, Cigars   • Smokeless tobacco: Never Used   Vaping Use   • Vaping Use: Never used   Substance and Sexual Activity   • Alcohol use: No   • Drug use: No   • Sexual activity: Defer       Family History:     Family History   Problem Relation Age of Onset   • Cancer Sister    • Diabetes Sister    • Cancer Brother    • Heart disease Brother    • Diabetes Maternal Grandmother    • Hypertension Neg Hx        Review of Systems:     Review of Systems   Constitutional: Negative.    HENT: Negative.    Eyes: Negative.    Respiratory: Negative.    Cardiovascular: Negative.    Gastrointestinal: Negative.    Endocrine: Negative.    Musculoskeletal: Negative.    Allergic/Immunologic: Negative.    Neurological: Negative.    Hematological: Negative.    Psychiatric/Behavioral: Negative.        Physical Exam:     Physical Exam  Vitals and nursing note reviewed.   Constitutional:       Appearance: He is well-developed.   HENT:      Head: Normocephalic and atraumatic.   Eyes:      Conjunctiva/sclera: Conjunctivae normal.      Pupils: Pupils are equal, round, and reactive to light.   Cardiovascular:      Rate and Rhythm: Normal rate and regular rhythm.      Heart sounds: Normal heart sounds.   Pulmonary:      Effort: Pulmonary effort is normal.      Breath  sounds: Normal breath sounds.   Abdominal:      General: Bowel sounds are normal.      Palpations: Abdomen is soft.   Musculoskeletal:         General: Normal range of motion.      Cervical back: Normal range of motion.   Skin:     General: Skin is warm and dry.   Neurological:      Mental Status: He is alert and oriented to person, place, and time.      Deep Tendon Reflexes: Reflexes are normal and symmetric.   Psychiatric:         Behavior: Behavior normal.         Thought Content: Thought content normal.         Judgment: Judgment normal.         I have reviewed the following portions of the patient's history: Allergies, current medications, past family history, past medical history, past social history, past surgical history, problem list, and ROS and confirm it is accurate.      Procedure:       Assessment/Plan:   Prostatitis-we discussed the differential diagnosis of prostatitis including the National Institutes of Health classification system I through IV.  I discussed that type I prostatitis is acute bacterial prostatitis and associated with high fevers, typically hematuria, and severe pain with voiding.  We discussed the fact that it necessitates 6 weeks of chronic antibiotics to be sure it doesn't turn into a chronic bacterial prostatitis that can have lifelong ramifications.  We discussed National Institutes of Health type II chronic bacterial prostatitis.  We discussed the fact that this a chronic bacterial infection of the prostate that often requires suppressive antibiotics.  We discussed type III being related more to nonspecific urethritis, as well as tight for being related to finding inflammation and a biopsy in the absence of symptomatology.  I discussed the diagnostic strategies including the VB 1 through 4 urine culture and discussed empiric therapy.  I emphasized that with the use of chronic antibiotics, I strongly recommended the concomitant use of probiotics.  Additionally, we discussed the  various antibiotics used and the risks and benefits associated with each, particularly the use of long-term ciprofloxacin and the effect on joints and collagen in human beings.  His symptoms have completely resolved we will see him back on an as-needed basis  PSA testing-I am recommending a PSA blood test that stands for prostate specific antigen.  I discussed the pathophysiology of PSA testing indicating its use in the diagnosis and management of prostate cancer.  I discussed the normal range being 0 to 4, but more appropriately being much closer to 0 to 2 in a normal male.  I discussed the fact that after a certain age we don't recommend PSA testing especially in view of numerous comorbidities, that this will not be a useful test.  I discussed many of the things that can artificially raise PSA including a recent infection, urinary tract infection, and recent sexual intercourse, or even the type of movement such as manipulation of the prostate from riding a bicycle.  After all this is taken into account when the test is reviewed, the most important use of PSA is the velocity measurement.  In other words, the change of PSA with time is a very important factor in the use and that we look for greater than 20% rise over a year to help us make the prediction of prostate cancer.  I also discussed that the use with prostate cancer indicating that after a radical prostatectomy, the PSA should be 0 and any rise indicates an early biochemical recurrence.  His PSA is 1.770 is doing great we will see him back on an as needed basis              This document has been electronically signed by JULIÁN CRUZ MD November 19, 2021 15:11 EST

## 2021-11-22 PROBLEM — N41.1 CHRONIC PROSTATITIS: Status: ACTIVE | Noted: 2021-11-22

## 2022-01-25 ENCOUNTER — OFFICE VISIT (OUTPATIENT)
Dept: CARDIOLOGY | Facility: CLINIC | Age: 82
End: 2022-01-25

## 2022-01-25 VITALS
BODY MASS INDEX: 29.16 KG/M2 | WEIGHT: 175 LBS | OXYGEN SATURATION: 98 % | HEIGHT: 65 IN | DIASTOLIC BLOOD PRESSURE: 85 MMHG | HEART RATE: 108 BPM | SYSTOLIC BLOOD PRESSURE: 127 MMHG

## 2022-01-25 DIAGNOSIS — I48.11 LONGSTANDING PERSISTENT ATRIAL FIBRILLATION: Primary | Chronic | ICD-10-CM

## 2022-01-25 DIAGNOSIS — E78.5 DYSLIPIDEMIA: Chronic | ICD-10-CM

## 2022-01-25 DIAGNOSIS — G47.00 INSOMNIA, UNSPECIFIED TYPE: ICD-10-CM

## 2022-01-25 DIAGNOSIS — I10 ESSENTIAL HYPERTENSION: Chronic | ICD-10-CM

## 2022-01-25 PROBLEM — I48.91 ATRIAL FIBRILLATION: Chronic | Status: ACTIVE | Noted: 2017-05-03

## 2022-01-25 PROCEDURE — 99213 OFFICE O/P EST LOW 20 MIN: CPT | Performed by: NURSE PRACTITIONER

## 2022-01-25 PROCEDURE — 93000 ELECTROCARDIOGRAM COMPLETE: CPT | Performed by: NURSE PRACTITIONER

## 2022-01-25 RX ORDER — ERGOCALCIFEROL 1.25 MG/1
CAPSULE ORAL
COMMUNITY
Start: 2021-11-22

## 2022-01-25 NOTE — PROGRESS NOTES
"Chief Complaint  Follow-up (patient states he hasn't been sleeping good at night, ), Excessive Sweating (patiemt states he has been sweating alot at night, to where he has to change his shirt.), and Fatigue    Subjective          Aramis Carson presents to [facility] for follow up.    History of Present Illness    Aramis was last seen in clinic on 7/27/2021.  At that visit he did report increased lower extremity swelling and was given a prescription for Lasix.  Since that time he has called the office with report that his swelling has gone down and that he has had some problems with dizziness.    At today's visit are he states that he has not had any problems with recurrent lower extremity swelling.  He denies any chest pain or shortness of breath.  He denies dyspnea on exertion.  He denies any dizziness or lightheadedness.      He does report that he is fatigued and states that he has not been sleeping well.  He denies any stressors which are interfering with his sleep.  He denies use of caffeine in the evenings.    Objective     Vital Signs:   /85   Pulse 108   Ht 165.1 cm (65\")   Wt 79.4 kg (175 lb)   SpO2 98%   BMI 29.12 kg/m²       Physical Exam  Constitutional:       Appearance: Normal appearance. He is well-developed.   Cardiovascular:      Rate and Rhythm: Normal rate. Rhythm irregular.      Heart sounds: No murmur heard.  No friction rub. No gallop.    Pulmonary:      Effort: Pulmonary effort is normal. No respiratory distress.      Breath sounds: Normal breath sounds. No wheezing or rales.   Skin:     General: Skin is warm and dry.   Neurological:      Mental Status: He is alert and oriented to person, place, and time.   Psychiatric:         Mood and Affect: Mood normal.         Behavior: Behavior normal.          Result Review :            ECG 12 Lead    Date/Time: 1/25/2022 10:20 AM  Performed by: Jaimie Nuno APRN  Authorized by: Jaimie Nuno APRN "   Comparison: compared with previous ECG from 7/27/2021  Similar to previous ECG  Rhythm: atrial fibrillation  Q waves: V3, V4, II, aVF and III    Comments: Q waves noted in the anterior and inferior leads are also noted on or before 2/23/2018             Current Outpatient Medications   Medication Sig Dispense Refill   • fexofenadine (ALLEGRA) 180 MG tablet      • fluticasone (FLONASE) 50 MCG/ACT nasal spray USE 2 SPRAYS IN EACH NOSTRIL DAILY FOR ALLERGIES AND CONGESTION     • glipiZIDE XL 2.5 MG 24 hr tablet      • hydrALAZINE (APRESOLINE) 10 MG tablet Take 1 tablet by mouth 3 (Three) Times a Day. 270 tablet 3   • HYDROcodone-acetaminophen (NORCO) 7.5-325 MG per tablet Take 1 tablet by mouth Every 6 (Six) Hours As Needed for moderate pain (4-6).     • metoprolol succinate XL (TOPROL-XL) 25 MG 24 hr tablet Take 1 tablet by mouth Daily. 90 tablet 2   • nitroglycerin (NITROSTAT) 0.4 MG SL tablet 1 under the tongue as needed for angina, may repeat q5mins for up three doses 100 tablet 2   • PrilOSEC 10 MG pack      • simvastatin (ZOCOR) 20 MG tablet Take 1 tablet by mouth Every Night. 90 tablet 2   • vitamin B-12 (CYANOCOBALAMIN) 1000 MCG tablet Take 1,000 mcg by mouth Daily.     • vitamin D (ERGOCALCIFEROL) 1.25 MG (00561 UT) capsule capsule      • warfarin (COUMADIN) 5 MG tablet Take 3 mg by mouth Daily.       No current facility-administered medications for this visit.            Assessment and Plan    Problem List Items Addressed This Visit        Cardiac and Vasculature    Essential hypertension (Chronic)    Atrial fibrillation (HCC) - Primary (Chronic)    Overview     · [id number] is 4 for hypertension, age, diabetes.  · Chronically anticoagulated on Coumadin.         Dyslipidemia (Chronic)    Overview     · 10/5/2020 total cholesterol 142, triglycerides 113, HDL 40, and LDL 79.           Other Visit Diagnoses     Insomnia, unspecified type                  Follow Up     Medications were reviewed with the  patient.    Atrial fibrillation is stable.  Patient is chronically anticoagulated on Coumadin and follows with a provider in [place].    Hypertension is controlled.    With regard to dyslipidemia, will request copy of his most recent labs from his PCP office.    With regard to patient's report of insomnia, I reviewed his medications with him and I do not believe that any of his current medications would be causing him to have any issues.  I suggested he start over-the-counter melatonin in a low dose and see if that helped.  Otherwise, recommended he speak with his primary care provider.    Return in about 6 months (around 7/25/2022).    Patient was given instructions and counseling regarding his condition or for health maintenance advice. Please see specific information pulled into the AVS if appropriate.

## 2022-03-23 ENCOUNTER — OFFICE VISIT (OUTPATIENT)
Dept: SURGERY | Facility: CLINIC | Age: 82
End: 2022-03-23

## 2022-03-23 VITALS — WEIGHT: 175 LBS | BODY MASS INDEX: 29.16 KG/M2 | HEIGHT: 65 IN

## 2022-03-23 DIAGNOSIS — R10.31 RIGHT GROIN PAIN: Primary | ICD-10-CM

## 2022-03-23 PROCEDURE — 99213 OFFICE O/P EST LOW 20 MIN: CPT | Performed by: SURGERY

## 2022-03-24 PROBLEM — R10.31 RIGHT GROIN PAIN: Status: ACTIVE | Noted: 2022-03-24

## 2022-03-24 NOTE — PROGRESS NOTES
Subjective   Aramis Carson is a 82 y.o. male is being seen for consultation today at the request of Kieran Blanca APRN    Aramis Carson is a 82 y.o. male With history of open right inguinal hernia repair in 2016.  The patient is complaining of some new right-sided pain.  On examination there is no evidence of hernia recurrence or complication.  The patient pain is only when he is sitting in his car.  When he sits in his truck there is no pain and no other activities cause discomfort.      Past Medical History:   Diagnosis Date   • Atrial fibrillation (HCC)    • Coronary artery disease    • Hyperlipidemia    • Pre-diabetes        Family History   Problem Relation Age of Onset   • Cancer Sister    • Diabetes Sister    • Cancer Brother    • Heart disease Brother    • Diabetes Maternal Grandmother    • Hypertension Neg Hx        Social History     Socioeconomic History   • Marital status:    Tobacco Use   • Smoking status: Former Smoker     Years: 20.00     Types: Pipe, Cigars   • Smokeless tobacco: Never Used   Vaping Use   • Vaping Use: Never used   Substance and Sexual Activity   • Alcohol use: No   • Drug use: No   • Sexual activity: Defer       Past Surgical History:   Procedure Laterality Date   • CARDIAC CATHETERIZATION     • COLONOSCOPY     • INGUINAL HERNIA REPAIR     • LAPAROSCOPIC CHOLECYSTECTOMY         Review of Systems   Constitutional: Negative for activity change, appetite change, chills and fever.   HENT: Negative for sore throat and trouble swallowing.    Eyes: Negative for visual disturbance.   Respiratory: Negative for cough and shortness of breath.    Cardiovascular: Negative for chest pain and palpitations.   Gastrointestinal: Positive for abdominal pain. Negative for abdominal distention, blood in stool, constipation, diarrhea, nausea and vomiting.   Endocrine: Negative for cold intolerance and heat intolerance.   Genitourinary: Negative for dysuria.   Musculoskeletal: Negative  "for joint swelling.   Skin: Negative for color change, rash and wound.   Allergic/Immunologic: Negative for immunocompromised state.   Neurological: Negative for dizziness, seizures, weakness and headaches.   Hematological: Negative for adenopathy. Does not bruise/bleed easily.   Psychiatric/Behavioral: Negative for agitation and confusion.         Ht 165.1 cm (65\")   Wt 79.4 kg (175 lb)   BMI 29.12 kg/m²   Objective   Physical Exam  Constitutional:       Appearance: He is well-developed.   HENT:      Head: Normocephalic and atraumatic.   Eyes:      Conjunctiva/sclera: Conjunctivae normal.      Pupils: Pupils are equal, round, and reactive to light.   Neck:      Thyroid: No thyromegaly.      Vascular: No JVD.      Trachea: No tracheal deviation.   Cardiovascular:      Rate and Rhythm: Normal rate and regular rhythm.      Heart sounds: No murmur heard.    No friction rub. No gallop.   Pulmonary:      Effort: Pulmonary effort is normal.      Breath sounds: Normal breath sounds.   Abdominal:      General: There is no distension.      Palpations: Abdomen is soft. There is no hepatomegaly or splenomegaly.      Tenderness: There is no abdominal tenderness.      Hernia: No hernia is present.   Musculoskeletal:         General: No deformity. Normal range of motion.      Cervical back: Neck supple.   Skin:     General: Skin is warm and dry.   Neurological:      Mental Status: He is alert and oriented to person, place, and time.               Assessment   Diagnoses and all orders for this visit:    1. Right groin pain (Primary)      Aramis Carson is a 82 y.o. male with remote history of open right inguinal hernia repair 7 years ago.  I performed this repair and the patient is doing well but developed some right-sided pain that is only noted when sitting in his car.  On examination he has no evidence of recurrence and I have encouraged him to exercise and avoid positions that cause discomfort and follow-up as " needed.    Patient's Body mass index is 29.12 kg/m². indicating that he is overweight (BMI 25-29.9). Patient's (Body mass index is 29.12 kg/m².) indicates that they are overweight with health conditions that include hypertension and coronary heart disease . Weight is unchanged. BMI is is above average; BMI management plan is completed. We discussed portion control and increasing exercise. .

## 2022-04-04 ENCOUNTER — TELEPHONE (OUTPATIENT)
Dept: CARDIOLOGY | Facility: CLINIC | Age: 82
End: 2022-04-04

## 2022-04-04 NOTE — TELEPHONE ENCOUNTER
PATIENT CAME IN THE OFFICE REQUESTING A CLEARANCE SIGNED BY A PHYSICIAN TO GET TEETH EXTRACTED. THE PATIENT CLAIMS HE IS TAKING COUMADIN AND NEEDS TO KNOW HOW SOON HE CAN STOP TAKING IT BEFORE HIS DENTAL APPT.?

## 2022-04-06 NOTE — TELEPHONE ENCOUNTER
Patient returned my call.  I did explain to him that generally whoever manages the Coumadin would be responsible for telling him when he can stop the Coumadin before a procedure.  ISAIAS Olvera, at [facility] manages his INR.    I will check with Dr. Garrido to see what he thinks about the length of time and whether or not the patient would need a bridge.    I did speak to the patient about possibly switching from Coumadin to Xarelto or Eliquis.  He has been on Coumadin for about 15 years and he has not had any problems with it thus far.  We will plan to discuss this further at his next office visit.    I will call the patient back tomorrow with further details.

## 2022-04-07 NOTE — TELEPHONE ENCOUNTER
I did speak with Dr. Garrido about Mr. Carson.  He stated that he should stop his Coumadin 7 days before procedure.  On day 6, have his INR checked.  On day 7 he can have the tooth extractions if INR is less than 2.  Resume Coumadin when hemodynamically stable.  He does not need a bridge.    I contacted Mr. Carson and discussed this with him.  Will send his dentist a release and also a copy to Mr. Carson.

## 2022-04-24 ENCOUNTER — HOSPITAL ENCOUNTER (EMERGENCY)
Facility: HOSPITAL | Age: 82
Discharge: HOME | End: 2022-04-24
Attending: STUDENT IN AN ORGANIZED HEALTH CARE EDUCATION/TRAINING PROGRAM | Admitting: STUDENT IN AN ORGANIZED HEALTH CARE EDUCATION/TRAINING PROGRAM

## 2022-04-24 VITALS
OXYGEN SATURATION: 95 % | TEMPERATURE: 97.1 F | DIASTOLIC BLOOD PRESSURE: 67 MMHG | HEIGHT: 66 IN | BODY MASS INDEX: 29.41 KG/M2 | HEART RATE: 70 BPM | WEIGHT: 183 LBS | SYSTOLIC BLOOD PRESSURE: 141 MMHG | RESPIRATION RATE: 18 BRPM

## 2022-04-24 DIAGNOSIS — T16.1XXA FOREIGN BODY OF RIGHT EAR, INITIAL ENCOUNTER: Primary | ICD-10-CM

## 2022-04-24 PROCEDURE — 99283 EMERGENCY DEPT VISIT LOW MDM: CPT

## 2022-04-24 PROCEDURE — 99282 EMERGENCY DEPT VISIT SF MDM: CPT

## 2022-05-16 ENCOUNTER — TRANSCRIBE ORDERS (OUTPATIENT)
Dept: ADMINISTRATIVE | Facility: HOSPITAL | Age: 82
End: 2022-05-16

## 2022-05-16 DIAGNOSIS — R51.9 FACIAL PAIN: Primary | ICD-10-CM

## 2022-05-27 ENCOUNTER — HOSPITAL ENCOUNTER (OUTPATIENT)
Dept: MRI IMAGING | Facility: HOSPITAL | Age: 82
Discharge: HOME | End: 2022-05-27
Admitting: NURSE PRACTITIONER

## 2022-05-27 DIAGNOSIS — R51.9 FACIAL PAIN: ICD-10-CM

## 2022-05-27 PROCEDURE — 70551 MRI BRAIN STEM W/O DYE: CPT

## 2022-05-27 PROCEDURE — 70551 MRI BRAIN STEM W/O DYE: CPT | Performed by: RADIOLOGY

## 2022-07-19 ENCOUNTER — TRANSCRIBE ORDERS (OUTPATIENT)
Dept: ADMINISTRATIVE | Facility: HOSPITAL | Age: 82
End: 2022-07-19

## 2022-07-19 ENCOUNTER — HOSPITAL ENCOUNTER (OUTPATIENT)
Dept: GENERAL RADIOLOGY | Facility: HOSPITAL | Age: 82
Discharge: HOME | End: 2022-07-19
Admitting: NURSE PRACTITIONER

## 2022-07-19 DIAGNOSIS — M54.50 LOW BACK PAIN, UNSPECIFIED BACK PAIN LATERALITY, UNSPECIFIED CHRONICITY, UNSPECIFIED WHETHER SCIATICA PRESENT: Primary | ICD-10-CM

## 2022-07-19 DIAGNOSIS — M54.50 LOW BACK PAIN, UNSPECIFIED BACK PAIN LATERALITY, UNSPECIFIED CHRONICITY, UNSPECIFIED WHETHER SCIATICA PRESENT: ICD-10-CM

## 2022-07-19 PROCEDURE — 72072 X-RAY EXAM THORAC SPINE 3VWS: CPT

## 2022-07-19 PROCEDURE — 72072 X-RAY EXAM THORAC SPINE 3VWS: CPT | Performed by: RADIOLOGY

## 2022-07-19 PROCEDURE — 72110 X-RAY EXAM L-2 SPINE 4/>VWS: CPT

## 2022-07-19 PROCEDURE — 72110 X-RAY EXAM L-2 SPINE 4/>VWS: CPT | Performed by: RADIOLOGY

## 2022-07-29 ENCOUNTER — OFFICE VISIT (OUTPATIENT)
Dept: CARDIOLOGY | Facility: CLINIC | Age: 82
End: 2022-07-29

## 2022-07-29 VITALS
BODY MASS INDEX: 29.09 KG/M2 | OXYGEN SATURATION: 98 % | HEIGHT: 66 IN | WEIGHT: 181 LBS | DIASTOLIC BLOOD PRESSURE: 84 MMHG | SYSTOLIC BLOOD PRESSURE: 128 MMHG | HEART RATE: 67 BPM

## 2022-07-29 DIAGNOSIS — E78.5 DYSLIPIDEMIA: Primary | Chronic | ICD-10-CM

## 2022-07-29 DIAGNOSIS — I48.11 LONGSTANDING PERSISTENT ATRIAL FIBRILLATION: Chronic | ICD-10-CM

## 2022-07-29 DIAGNOSIS — I10 ESSENTIAL HYPERTENSION: Chronic | ICD-10-CM

## 2022-07-29 PROCEDURE — 99214 OFFICE O/P EST MOD 30 MIN: CPT | Performed by: NURSE PRACTITIONER

## 2022-07-29 NOTE — PROGRESS NOTES
"Chief Complaint  Atrial Fibrillation (Patient states irregular heart beat )    Subjective          Aramis Carson presents to [facility] CARDIOLOG[facility] for follow up.    History of Present Illness    Patient was last seen in clinic on 1/25/2022.  Patient was doing well at that time and his only complaint was that he was not sleeping well.    Patient denies any chest pain.  He denies shortness of breath or dyspnea on exertion.  He denies PND and orthopnea.  He reports that since he stopped taking amlodipine he is lower extremity swelling has resolved.  He does report occasional sensation of irregular heartbeat.    The patient does admit that he often misses his mid afternoon dose of hydralazine.  Nonetheless, he has brought a blood pressure diary with him today and most of his blood pressures are in the 1 teens to 120s systolically.  His highest is in the 140s and occurs infrequently.  Objective     Vital Signs:   /84 (BP Location: Left arm, Patient Position: Sitting, Cuff Size: Adult)   Pulse 67   Ht 167.6 cm (66\")   Wt 82.1 kg (181 lb)   SpO2 98%   BMI 29.21 kg/m²       Physical Exam  Vitals reviewed.   Constitutional:       Appearance: Normal appearance. He is well-developed.   Cardiovascular:      Rate and Rhythm: Normal rate. Rhythm irregular.      Heart sounds: No murmur heard.    No friction rub. No gallop.   Pulmonary:      Effort: Pulmonary effort is normal. No respiratory distress.      Breath sounds: Normal breath sounds. No wheezing or rales.   Skin:     General: Skin is warm and dry.   Neurological:      Mental Status: He is alert and oriented to person, place, and time.   Psychiatric:         Mood and Affect: Mood normal.         Behavior: Behavior normal.          Result Review :                Current Outpatient Medications   Medication Sig Dispense Refill   • glipiZIDE XL 2.5 MG 24 hr tablet      • hydrALAZINE (APRESOLINE) 10 MG tablet Take 1 tablet by mouth 3 (Three) Times a " Day. 270 tablet 3   • HYDROcodone-acetaminophen (NORCO) 7.5-325 MG per tablet Take 1 tablet by mouth Every 6 (Six) Hours As Needed for moderate pain (4-6).     • metoprolol succinate XL (TOPROL-XL) 25 MG 24 hr tablet Take 1 tablet by mouth Daily. 90 tablet 2   • PrilOSEC 10 MG pack      • simvastatin (ZOCOR) 20 MG tablet Take 1 tablet by mouth Every Night. 90 tablet 2   • vitamin B-12 (CYANOCOBALAMIN) 1000 MCG tablet Take 1,000 mcg by mouth Daily.     • warfarin (COUMADIN) 5 MG tablet Take 3 mg by mouth Daily.     • nitroglycerin (NITROSTAT) 0.4 MG SL tablet 1 under the tongue as needed for angina, may repeat q5mins for up three doses 100 tablet 2   • vitamin D (ERGOCALCIFEROL) 1.25 MG (78657 UT) capsule capsule        No current facility-administered medications for this visit.            Assessment and Plan    Problem List Items Addressed This Visit        Cardiac and Vasculature    Essential hypertension (Chronic)    Atrial fibrillation (HCC) (Chronic)    Overview     · [id number] is 4 for hypertension, age, diabetes.  · Chronically anticoagulated on Coumadin.         Dyslipidemia - Primary (Chronic)    Overview     · 10/5/2020 total cholesterol 142, triglycerides 113, HDL 40, and LDL 79.                   Follow Up     Medications were reviewed with the patient.  Continue Coumadin.  Continue metoprolol for rate control.    Hypertension is stable.  Continue metoprolol and hydralazine.    With regard to dyslipidemia, labs have been requested from patient's primary care provider.  Continue simvastatin.    Atrial fibrillation is stable.    Return in about 6 months (around 1/29/2023).    Patient was given instructions and counseling regarding his condition or for health maintenance advice. Please see specific information pulled into the AVS if appropriate.

## 2022-09-19 DIAGNOSIS — I48.20 CHRONIC ATRIAL FIBRILLATION: Chronic | ICD-10-CM

## 2022-09-19 DIAGNOSIS — I10 ESSENTIAL HYPERTENSION: Chronic | ICD-10-CM

## 2022-09-19 RX ORDER — METOPROLOL SUCCINATE 25 MG/1
25 TABLET, EXTENDED RELEASE ORAL DAILY
Qty: 60 TABLET | Refills: 2 | Status: SHIPPED | OUTPATIENT
Start: 2022-09-19 | End: 2023-01-03 | Stop reason: SDUPTHER

## 2022-09-19 RX ORDER — METOPROLOL SUCCINATE 25 MG/1
25 TABLET, EXTENDED RELEASE ORAL DAILY
Qty: 30 TABLET | Refills: 0 | Status: SHIPPED | OUTPATIENT
Start: 2022-09-19 | End: 2022-09-19 | Stop reason: SDUPTHER

## 2022-09-26 ENCOUNTER — TELEPHONE (OUTPATIENT)
Dept: UROLOGY | Facility: CLINIC | Age: 82
End: 2022-09-26

## 2022-10-09 ENCOUNTER — APPOINTMENT (OUTPATIENT)
Dept: GENERAL RADIOLOGY | Facility: HOSPITAL | Age: 82
End: 2022-10-09

## 2022-10-09 ENCOUNTER — HOSPITAL ENCOUNTER (EMERGENCY)
Facility: HOSPITAL | Age: 82
Discharge: HOME | End: 2022-10-09
Attending: FAMILY MEDICINE | Admitting: FAMILY MEDICINE

## 2022-10-09 VITALS
BODY MASS INDEX: 29.25 KG/M2 | TEMPERATURE: 96.8 F | SYSTOLIC BLOOD PRESSURE: 155 MMHG | DIASTOLIC BLOOD PRESSURE: 73 MMHG | RESPIRATION RATE: 16 BRPM | HEIGHT: 66 IN | OXYGEN SATURATION: 100 % | HEART RATE: 70 BPM | WEIGHT: 182 LBS

## 2022-10-09 DIAGNOSIS — S39.012A STRAIN OF LUMBAR REGION, INITIAL ENCOUNTER: Primary | ICD-10-CM

## 2022-10-09 PROCEDURE — 25010000002 KETOROLAC TROMETHAMINE PER 15 MG: Performed by: PHYSICIAN ASSISTANT

## 2022-10-09 PROCEDURE — 25010000002 ORPHENADRINE CITRATE PER 60 MG: Performed by: PHYSICIAN ASSISTANT

## 2022-10-09 PROCEDURE — 96372 THER/PROPH/DIAG INJ SC/IM: CPT

## 2022-10-09 PROCEDURE — 72110 X-RAY EXAM L-2 SPINE 4/>VWS: CPT

## 2022-10-09 PROCEDURE — 25010000002 DEXAMETHASONE SODIUM PHOSPHATE 10 MG/ML SOLUTION: Performed by: PHYSICIAN ASSISTANT

## 2022-10-09 PROCEDURE — 99283 EMERGENCY DEPT VISIT LOW MDM: CPT

## 2022-10-09 PROCEDURE — 73502 X-RAY EXAM HIP UNI 2-3 VIEWS: CPT

## 2022-10-09 RX ORDER — OXYCODONE AND ACETAMINOPHEN 10; 325 MG/1; MG/1
1 TABLET ORAL ONCE
Status: COMPLETED | OUTPATIENT
Start: 2022-10-09 | End: 2022-10-09

## 2022-10-09 RX ORDER — DEXAMETHASONE SODIUM PHOSPHATE 10 MG/ML
10 INJECTION, SOLUTION INTRAMUSCULAR; INTRAVENOUS ONCE
Status: COMPLETED | OUTPATIENT
Start: 2022-10-09 | End: 2022-10-09

## 2022-10-09 RX ORDER — CYCLOBENZAPRINE HCL 10 MG
10 TABLET ORAL 3 TIMES DAILY PRN
Qty: 15 TABLET | Refills: 0 | Status: SHIPPED | OUTPATIENT
Start: 2022-10-09 | End: 2022-10-14

## 2022-10-09 RX ORDER — ORPHENADRINE CITRATE 30 MG/ML
30 INJECTION INTRAMUSCULAR; INTRAVENOUS ONCE
Status: COMPLETED | OUTPATIENT
Start: 2022-10-09 | End: 2022-10-09

## 2022-10-09 RX ORDER — ORPHENADRINE CITRATE 30 MG/ML
30 INJECTION INTRAMUSCULAR; INTRAVENOUS ONCE
Status: DISCONTINUED | OUTPATIENT
Start: 2022-10-09 | End: 2022-10-09

## 2022-10-09 RX ORDER — KETOROLAC TROMETHAMINE 30 MG/ML
30 INJECTION, SOLUTION INTRAMUSCULAR; INTRAVENOUS ONCE
Status: COMPLETED | OUTPATIENT
Start: 2022-10-09 | End: 2022-10-09

## 2022-10-09 RX ORDER — METHYLPREDNISOLONE 4 MG/1
TABLET ORAL
Qty: 21 TABLET | Refills: 0 | Status: SHIPPED | OUTPATIENT
Start: 2022-10-09

## 2022-10-09 RX ADMIN — ORPHENADRINE CITRATE 30 MG: 30 INJECTION INTRAMUSCULAR; INTRAVENOUS at 11:26

## 2022-10-09 RX ADMIN — DEXAMETHASONE SODIUM PHOSPHATE 10 MG: 10 INJECTION INTRAMUSCULAR; INTRAVENOUS at 11:22

## 2022-10-09 RX ADMIN — OXYCODONE HYDROCHLORIDE AND ACETAMINOPHEN 1 TABLET: 10; 325 TABLET ORAL at 13:44

## 2022-10-09 RX ADMIN — KETOROLAC TROMETHAMINE 30 MG: 30 INJECTION, SOLUTION INTRAMUSCULAR at 11:19

## 2022-10-09 NOTE — ED PROVIDER NOTES
Subjective   History of Present Illness  This is an 82 year old male patient who presents to the ER with chief complaint of right leg pain. PMH significant for A fibb on warfarin, HTN, DM not requiring insulin, HLD. 1 week ago, without precipitating known injury or accident, the patient began having right hip pain radiating into the posterior aspect of the right knee and into the right foot. No numbness/tingling. Pain is aching and moderate. It is not alleviated by neurontin or tylenol. Aggravated by movement. No bowel/bladder dysfunction.         Review of Systems   Constitutional: Negative.  Negative for fever.   HENT: Negative.    Respiratory: Negative.    Cardiovascular: Negative.  Negative for chest pain.   Gastrointestinal: Negative.  Negative for abdominal pain.   Endocrine: Negative.    Genitourinary: Negative.  Negative for dysuria.   Musculoskeletal: Positive for back pain. Negative for arthralgias, gait problem, joint swelling, myalgias, neck pain and neck stiffness.   Skin: Negative.    Neurological: Negative.    Psychiatric/Behavioral: Negative.    All other systems reviewed and are negative.      Past Medical History:   Diagnosis Date   • Atrial fibrillation (HCC)    • Coronary artery disease    • Hyperlipidemia    • Pre-diabetes        Allergies   Allergen Reactions   • Lisinopril Unknown (See Comments)     Pt. States that he is allergic however, it has been so long since he took it that he cannot remember the reaction that he had at the time.    • Losartan Unknown - Low Severity       Past Surgical History:   Procedure Laterality Date   • CARDIAC CATHETERIZATION     • COLONOSCOPY     • INGUINAL HERNIA REPAIR     • LAPAROSCOPIC CHOLECYSTECTOMY         Family History   Problem Relation Age of Onset   • Cancer Sister    • Diabetes Sister    • Cancer Brother    • Heart disease Brother    • Diabetes Maternal Grandmother    • Hypertension Neg Hx        Social History     Socioeconomic History   • Marital  status:    Tobacco Use   • Smoking status: Former     Types: Pipe, Cigars   • Smokeless tobacco: Never   Vaping Use   • Vaping Use: Never used   Substance and Sexual Activity   • Alcohol use: No   • Drug use: No   • Sexual activity: Defer           Objective   Physical Exam  Vitals and nursing note reviewed.   Constitutional:       General: He is not in acute distress.     Appearance: He is well-developed. He is not diaphoretic.   HENT:      Head: Normocephalic and atraumatic.      Right Ear: External ear normal.      Left Ear: External ear normal.      Nose: Nose normal.   Eyes:      Conjunctiva/sclera: Conjunctivae normal.      Pupils: Pupils are equal, round, and reactive to light.   Neck:      Vascular: No JVD.      Trachea: No tracheal deviation.   Cardiovascular:      Rate and Rhythm: Normal rate and regular rhythm.      Heart sounds: Normal heart sounds. No murmur heard.  Pulmonary:      Effort: Pulmonary effort is normal. No respiratory distress.      Breath sounds: Normal breath sounds. No wheezing.   Abdominal:      General: Bowel sounds are normal.      Palpations: Abdomen is soft.      Tenderness: There is no abdominal tenderness.   Musculoskeletal:         General: Tenderness present. No swelling, deformity or signs of injury. Normal range of motion.      Cervical back: Normal range of motion and neck supple.      Right lower leg: No edema.      Left lower leg: No edema.   Skin:     General: Skin is warm and dry.      Coloration: Skin is not pale.      Findings: No erythema or rash.   Neurological:      Mental Status: He is alert and oriented to person, place, and time.      Cranial Nerves: No cranial nerve deficit.      Sensory: No sensory deficit.      Motor: No weakness.      Coordination: Coordination normal.      Gait: Gait normal.      Deep Tendon Reflexes: Reflexes normal.   Psychiatric:         Behavior: Behavior normal.         Thought Content: Thought content normal.          Procedures           ED Course  ED Course as of 10/09/22 1335   Sun Oct 09, 2022   1319 XR Hip With or Without Pelvis 2 - 3 View Right  IMPRESSION:  Mild degenerative arthropathy. Right hip series is otherwise negative.     Signer Name: Delroy Rojas MD   Signed: 10/9/2022 1:10 PM   Workstation Name: [email or web address]    [facility] [MM]   1328 XR Spine Lumbar Complete 4+VW  IMPRESSION:  1. No acute osseous abnormality.  2. Degenerative changes as noted.     Signer Name: Delroy Rjoas MD   Signed: 10/9/2022 1:20 PM   [email or web address]    [facility] [MM]   1333 Patient feeling better. Will be d/c home with rx for flexeril and medrol dose packet. He already has norco at home. Will f/u with PCP in 2 days or return to ER if symptoms worsen.  [MM]      ED Course User Index  [MM] Jeanne Garcia PA                                           MDM  Number of Diagnoses or Management Options     Amount and/or Complexity of Data Reviewed  Tests in the radiology section of CPT®: ordered and reviewed  Discuss the patient with other providers: yes        Final diagnoses:   Strain of lumbar region, initial encounter       ED Disposition  ED Disposition     ED Disposition   Discharge    Condition   Stable    Comment   --             Kieran Blanca, APRJASON  [address] [address]  [phone]    In 2 days           Medication List      New Prescriptions    cyclobenzaprine 10 MG tablet  Commonly known as: FLEXERIL  Take 1 tablet by mouth 3 (Three) Times a Day As Needed for Muscle Spasms for up to 5 days.     methylPREDNISolone 4 MG dose pack  Commonly known as: MEDROL  Take as directed on package instructions.           Where to Get Your Medications      These medications were sent to [facility] #[id number] - [address] [facility] [address] - [phone]  - [phone] [address]  [address], [facility] [address]    Phone: [phone]   · cyclobenzaprine 10 MG tablet  · methylPREDNISolone 4 MG dose pack          Jeanne Garcia PA  10/09/22 9369

## 2022-10-09 NOTE — DISCHARGE INSTRUCTIONS
Please utilize your medications and rest. Please follow up with your PCP in 2 days or return to ER if symptoms worsen.

## 2022-11-12 ENCOUNTER — HOSPITAL ENCOUNTER (EMERGENCY)
Facility: HOSPITAL | Age: 82
Discharge: HOME | End: 2022-11-12
Attending: FAMILY MEDICINE | Admitting: FAMILY MEDICINE

## 2022-11-12 ENCOUNTER — APPOINTMENT (OUTPATIENT)
Dept: GENERAL RADIOLOGY | Facility: HOSPITAL | Age: 82
End: 2022-11-12

## 2022-11-12 VITALS
SYSTOLIC BLOOD PRESSURE: 167 MMHG | HEIGHT: 66 IN | BODY MASS INDEX: 29.33 KG/M2 | DIASTOLIC BLOOD PRESSURE: 86 MMHG | OXYGEN SATURATION: 95 % | RESPIRATION RATE: 17 BRPM | HEART RATE: 79 BPM | TEMPERATURE: 98.6 F | WEIGHT: 182.5 LBS

## 2022-11-12 DIAGNOSIS — M25.511 ACUTE PAIN OF RIGHT SHOULDER: Primary | ICD-10-CM

## 2022-11-12 PROCEDURE — 73030 X-RAY EXAM OF SHOULDER: CPT

## 2022-11-12 PROCEDURE — 96372 THER/PROPH/DIAG INJ SC/IM: CPT

## 2022-11-12 PROCEDURE — 72050 X-RAY EXAM NECK SPINE 4/5VWS: CPT

## 2022-11-12 PROCEDURE — 99282 EMERGENCY DEPT VISIT SF MDM: CPT

## 2022-11-12 PROCEDURE — 25010000002 METHYLPREDNISOLONE PER 125 MG: Performed by: PHYSICIAN ASSISTANT

## 2022-11-12 RX ORDER — METHYLPREDNISOLONE SODIUM SUCCINATE 125 MG/2ML
80 INJECTION, POWDER, LYOPHILIZED, FOR SOLUTION INTRAMUSCULAR; INTRAVENOUS ONCE
Status: COMPLETED | OUTPATIENT
Start: 2022-11-12 | End: 2022-11-12

## 2022-11-12 RX ADMIN — METHYLPREDNISOLONE SODIUM SUCCINATE 80 MG: 125 INJECTION, POWDER, FOR SOLUTION INTRAMUSCULAR; INTRAVENOUS at 20:53

## 2022-11-13 NOTE — ED PROVIDER NOTES
Subjective   History of Present Illness  Patient is an 82-year-old male with hyperlipidemia, atrial fibrillation, and coronary artery disease that presents to the ED with complaints of right shoulder pain.  He states his shoulder pain started about 2 weeks ago with no significant injury.  He states that he was driving his car when he felt like he pulled a muscle.  He states since then the pain just been getting progressively worse.  States this pain starts in her shoulder and radiates down his arm and up into his neck.  He states it does seem to hurt worse at the extremes of range of motion especially overhead and reaching behind him.  He states that his PCP told him it was a muscle strain and prescribed him muscle relaxers.  He states he has not had any significant relief with this medication.  He denies chest pain, shortness of breath, fever, chills, nausea, vomiting, headache, dizziness, abdominal pain, dysuria, diarrhea, or constipation.    History provided by:  Patient   used: No        Review of Systems   Constitutional: Negative.  Negative for chills, diaphoresis, fatigue and fever.   HENT: Negative.  Negative for congestion, dental problem, drooling, ear discharge, ear pain, facial swelling, nosebleeds, postnasal drip, rhinorrhea, sinus pressure, sinus pain, sneezing, sore throat, tinnitus and trouble swallowing.    Eyes: Negative.  Negative for photophobia, pain, discharge, redness and itching.   Respiratory: Negative.  Negative for cough, shortness of breath and wheezing.    Cardiovascular: Negative.  Negative for chest pain.   Gastrointestinal: Negative.  Negative for abdominal distention, abdominal pain, constipation, diarrhea, nausea and vomiting.   Endocrine: Negative.    Genitourinary: Negative.  Negative for difficulty urinating, dysuria, flank pain and frequency.   Musculoskeletal: Positive for arthralgias and neck pain. Negative for back pain, gait problem, joint swelling,  myalgias and neck stiffness.   Skin: Negative.    Neurological: Negative.  Negative for dizziness, tremors, seizures, syncope, facial asymmetry, speech difficulty, weakness, light-headedness, numbness and headaches.   Psychiatric/Behavioral: Negative.  Negative for confusion.   All other systems reviewed and are negative.      Past Medical History:   Diagnosis Date   • Atrial fibrillation (HCC)    • Coronary artery disease    • Hyperlipidemia    • Pre-diabetes        Allergies   Allergen Reactions   • Lisinopril Unknown (See Comments)     Pt. States that he is allergic however, it has been so long since he took it that he cannot remember the reaction that he had at the time.    • Losartan Unknown - Low Severity       Past Surgical History:   Procedure Laterality Date   • CARDIAC CATHETERIZATION     • COLONOSCOPY     • INGUINAL HERNIA REPAIR     • LAPAROSCOPIC CHOLECYSTECTOMY         Family History   Problem Relation Age of Onset   • Cancer Sister    • Diabetes Sister    • Cancer Brother    • Heart disease Brother    • Diabetes Maternal Grandmother    • Hypertension Neg Hx        Social History     Socioeconomic History   • Marital status:    Tobacco Use   • Smoking status: Former     Types: Pipe, Cigars   • Smokeless tobacco: Never   Vaping Use   • Vaping Use: Never used   Substance and Sexual Activity   • Alcohol use: No   • Drug use: No   • Sexual activity: Defer           Objective   Physical Exam  Vitals and nursing note reviewed.   Constitutional:       General: He is not in acute distress.     Appearance: He is well-developed. He is not diaphoretic.   HENT:      Head: Normocephalic and atraumatic.      Right Ear: External ear normal.      Left Ear: External ear normal.      Nose: Nose normal.      Mouth/Throat:      Mouth: Mucous membranes are moist.      Pharynx: Oropharynx is clear.   Eyes:      Extraocular Movements: Extraocular movements intact.      Conjunctiva/sclera: Conjunctivae normal.       Pupils: Pupils are equal, round, and reactive to light.   Neck:      Vascular: No JVD.      Trachea: No tracheal deviation.   Cardiovascular:      Rate and Rhythm: Normal rate and regular rhythm.      Pulses: Normal pulses.      Heart sounds: Normal heart sounds. No murmur heard.  Pulmonary:      Effort: Pulmonary effort is normal. No respiratory distress.      Breath sounds: Normal breath sounds. No wheezing.   Abdominal:      General: Bowel sounds are normal. There is no distension.      Palpations: Abdomen is soft.      Tenderness: There is no abdominal tenderness. There is no guarding or rebound.   Musculoskeletal:         General: No deformity. Normal range of motion.      Right upper arm: Tenderness present. No swelling, edema, deformity, lacerations or bony tenderness.      Cervical back: Normal range of motion and neck supple. Spasms and tenderness present. No swelling, edema, deformity, erythema, signs of trauma, lacerations, rigidity, torticollis, bony tenderness or crepitus. No pain with movement. Normal range of motion.      Thoracic back: Normal.      Lumbar back: Normal.      Comments: Examination of the right shoulder reveals mild tenderness along the lateral acromion.  He does have full range of motion but does have pain with full flexion as well as internal rotation.  He has good strength with Jobes maneuver and external rotation.  His neurovascular status is intact.   Skin:     General: Skin is warm and dry.      Coloration: Skin is not pale.      Findings: No erythema or rash.   Neurological:      Mental Status: He is alert and oriented to person, place, and time.      Cranial Nerves: No cranial nerve deficit.      Sensory: No sensory deficit.      Motor: No weakness.      Coordination: Coordination normal.      Gait: Gait normal.      Deep Tendon Reflexes: Reflexes normal.   Psychiatric:         Mood and Affect: Mood normal.         Behavior: Behavior normal.         Thought Content: Thought  content normal.         Procedures           ED Course  ED Course as of 11/12/22 2124   Sat Nov 12, 2022 2041 XR Shoulder 2+ View Right  IMPRESSION:  Negative right shoulder.     Signer Name: Clive Hinojosa MD   Signed: 11/12/2022 8:35 PM []   2041 XR Spine Cervical Complete 4 or 5 View  IMPRESSION:  Mild degenerative changes otherwise normal     Signer Name: Clive Hinojosa MD   Signed: 11/12/2022 8:35 PM []   2122 Discussed plan of care with patient.  Advised if symptoms worsen return to ED.  Advise follow-up with Dr. Howard in 1 to 2 days. []      ED Course User Index  [] Alice Benavides PA-C                                           [facility]      Final diagnoses:   Acute pain of right shoulder       ED Disposition  ED Disposition     ED Disposition   Discharge    Condition   Stable    Comment   --             Spencer Howard MD  [address]  [facility] [address]  [phone]    Schedule an appointment as soon as possible for a visit in 1 day           Medication List      No changes were made to your prescriptions during this visit.          Alice Benavides PA-C  11/12/22 2124

## 2023-01-03 DIAGNOSIS — I48.20 CHRONIC ATRIAL FIBRILLATION: Chronic | ICD-10-CM

## 2023-01-03 DIAGNOSIS — I10 ESSENTIAL HYPERTENSION: Chronic | ICD-10-CM

## 2023-01-03 RX ORDER — METOPROLOL SUCCINATE 25 MG/1
25 TABLET, EXTENDED RELEASE ORAL DAILY
Qty: 90 TABLET | Refills: 3 | Status: SHIPPED | OUTPATIENT
Start: 2023-01-03 | End: 2023-03-20

## 2023-01-26 ENCOUNTER — OFFICE VISIT (OUTPATIENT)
Dept: CARDIOLOGY | Facility: CLINIC | Age: 83
End: 2023-01-26
Payer: MEDICARE

## 2023-01-26 ENCOUNTER — TELEPHONE (OUTPATIENT)
Dept: CARDIOLOGY | Facility: CLINIC | Age: 83
End: 2023-01-26

## 2023-01-26 VITALS
WEIGHT: 175.5 LBS | BODY MASS INDEX: 28.21 KG/M2 | OXYGEN SATURATION: 94 % | HEIGHT: 66 IN | SYSTOLIC BLOOD PRESSURE: 149 MMHG | DIASTOLIC BLOOD PRESSURE: 69 MMHG | HEART RATE: 54 BPM

## 2023-01-26 DIAGNOSIS — I48.11 LONGSTANDING PERSISTENT ATRIAL FIBRILLATION: Chronic | ICD-10-CM

## 2023-01-26 DIAGNOSIS — I10 ESSENTIAL HYPERTENSION: Primary | Chronic | ICD-10-CM

## 2023-01-26 PROCEDURE — 99214 OFFICE O/P EST MOD 30 MIN: CPT | Performed by: NURSE PRACTITIONER

## 2023-01-26 RX ORDER — HYDRALAZINE HYDROCHLORIDE 25 MG/1
25 TABLET, FILM COATED ORAL 2 TIMES DAILY
Qty: 180 TABLET | Refills: 3 | Status: SHIPPED | OUTPATIENT
Start: 2023-01-26 | End: 2023-01-30

## 2023-01-26 RX ORDER — HYDRALAZINE HYDROCHLORIDE 25 MG/1
25 TABLET, FILM COATED ORAL 3 TIMES DAILY
Qty: 180 TABLET | Refills: 3 | Status: SHIPPED | OUTPATIENT
Start: 2023-01-26 | End: 2023-01-26

## 2023-01-26 RX ORDER — MECLIZINE HYDROCHLORIDE 25 MG/1
25 TABLET ORAL 3 TIMES DAILY
COMMUNITY
Start: 2023-01-24

## 2023-01-26 NOTE — PROGRESS NOTES
"Chief Complaint  Follow-up (6 mo follow up), Dizziness (Started 2 weeks ago, meclizine 3x daily was prescribed by pcp 1/25/23. ), and Med Management (Reviewed medications verbally with patient. Patient is tolerating medications well. )    Subjective          Aramis Carson presents to [facility] for follow up.    History of Present Illness    Aramis was last seen in clinic on 7/29/2022.  Lower extremity swelling had improved since he had stopped taking amlodipine.  He brought a blood pressure diary with him which showed good control of his blood pressure.  No changes were made to his medications.    At today's visit Aramis denies any chest pain.  He denies palpitations.  He does reports that he is being treated for vertigo.  He is very concerned about this and is going to physical therapy for a vestibular adjustment.  He is also going to PT for shoulder and neck injury that he sustained.    He does tell me that at night when he lays down he finds it difficult to breathe.  He does have sleep apnea and wears his CPAP at night.    Objective     Vital Signs:   /69 (BP Location: Right arm, Patient Position: Sitting, Cuff Size: Large Adult)   Pulse 54   Ht 167.6 cm (66\")   Wt 79.6 kg (175 lb 8 oz)   SpO2 94%   BMI 28.33 kg/m²       Physical Exam  Vitals reviewed.   Constitutional:       Appearance: Normal appearance. He is well-developed.   Cardiovascular:      Rate and Rhythm: Normal rate. Rhythm irregular.      Heart sounds: No murmur heard.    No friction rub. No gallop.   Pulmonary:      Effort: Pulmonary effort is normal. No respiratory distress.      Breath sounds: Normal breath sounds. No wheezing or rales.   Skin:     General: Skin is warm and dry.   Neurological:      Mental Status: He is alert and oriented to person, place, and time.   Psychiatric:         Mood and Affect: Mood normal.         Behavior: Behavior normal.          Result Review :                Current " Outpatient Medications   Medication Sig Dispense Refill   • glipiZIDE XL 2.5 MG 24 hr tablet      • HYDROcodone-acetaminophen (NORCO) 7.5-325 MG per tablet Take 1 tablet by mouth Every 6 (Six) Hours As Needed for moderate pain (4-6).     • meclizine (ANTIVERT) 25 MG tablet Take 25 mg by mouth 3 (Three) Times a Day.     • methylPREDNISolone (MEDROL) 4 MG dose pack Take as directed on package instructions. 21 tablet 0   • metoprolol succinate XL (TOPROL-XL) 25 MG 24 hr tablet Take 1 tablet by mouth Daily. 90 tablet 3   • PrilOSEC 10 MG pack      • vitamin B-12 (CYANOCOBALAMIN) 1000 MCG tablet Take 1,000 mcg by mouth Daily.     • vitamin D (ERGOCALCIFEROL) 1.25 MG (41236 UT) capsule capsule      • warfarin (COUMADIN) 5 MG tablet Take 3 mg by mouth Daily.     • hydrALAZINE (APRESOLINE) 25 MG tablet Take 1 tablet by mouth 2 (Two) Times a Day. 180 tablet 3   • simvastatin (ZOCOR) 20 MG tablet Take 1 tablet by mouth Every Night. 90 tablet 2     No current facility-administered medications for this visit.            Assessment and Plan    Problem List Items Addressed This Visit        Cardiac and Vasculature    Essential hypertension - Primary (Chronic)    Relevant Medications    hydrALAZINE (APRESOLINE) 25 MG tablet    Atrial fibrillation (HCC) (Chronic)    Overview     · [id number] is 4 for hypertension, age, diabetes.  · Chronically anticoagulated on Coumadin.         Relevant Orders    Adult Transthoracic Echo Complete W/ Cont if Necessary Per Protocol           Follow Up     Medications were reviewed with the patient.    Atrial fibrillation is stable.  Patient continues on Coumadin, monitored by his PCP.  His numbers are always good and he has not had to adjust his medication for some time.  I did offer him a NOAC, however he declined.    Hypertension is not well controlled today.  He states his blood pressure has been up a little bit at home.  He also reports that he often misses his mid day dose of hydralazine.  I  have increased the hydralazine to 25 mg twice daily.    Return in about 6 months (around 7/26/2023).    Patient was given instructions and counseling regarding his condition or for health maintenance advice. Please see specific information pulled into the AVS if appropriate.

## 2023-01-30 DIAGNOSIS — I10 ESSENTIAL HYPERTENSION: Primary | Chronic | ICD-10-CM

## 2023-01-30 RX ORDER — HYDRALAZINE HYDROCHLORIDE 25 MG/1
25 TABLET, FILM COATED ORAL 2 TIMES DAILY
Qty: 180 TABLET | Refills: 3 | Status: SHIPPED | OUTPATIENT
Start: 2023-01-30 | End: 2023-03-15 | Stop reason: DRUGHIGH

## 2023-02-03 ENCOUNTER — HOSPITAL ENCOUNTER (OUTPATIENT)
Dept: CARDIOLOGY | Facility: HOSPITAL | Age: 83
Discharge: HOME | End: 2023-02-03
Admitting: NURSE PRACTITIONER
Payer: MEDICARE

## 2023-02-03 DIAGNOSIS — I48.11 LONGSTANDING PERSISTENT ATRIAL FIBRILLATION: Chronic | ICD-10-CM

## 2023-02-03 LAB
BH CV ECHO MEAS - AO MAX PG: 6.9 MMHG
BH CV ECHO MEAS - AO MEAN PG: 3 MMHG
BH CV ECHO MEAS - AO ROOT DIAM: 2.8 CM
BH CV ECHO MEAS - AO V2 MAX: 131 CM/SEC
BH CV ECHO MEAS - AO V2 VTI: 22.5 CM
BH CV ECHO MEAS - EDV(CUBED): 59.3 ML
BH CV ECHO MEAS - EDV(MOD-SP4): 29.1 ML
BH CV ECHO MEAS - EF(MOD-BP): 49 %
BH CV ECHO MEAS - EF(MOD-SP4): 49.8 %
BH CV ECHO MEAS - ESV(CUBED): 35.9 ML
BH CV ECHO MEAS - ESV(MOD-SP4): 14.6 ML
BH CV ECHO MEAS - FS: 15.4 %
BH CV ECHO MEAS - IVS/LVPW: 1.23 CM
BH CV ECHO MEAS - IVSD: 1.6 CM
BH CV ECHO MEAS - LA DIMENSION: 4.2 CM
BH CV ECHO MEAS - LAT PEAK E' VEL: 11.5 CM/SEC
BH CV ECHO MEAS - LV DIASTOLIC VOL/BSA (35-75): 15.4 CM2
BH CV ECHO MEAS - LV MASS(C)D: 212.9 GRAMS
BH CV ECHO MEAS - LV SYSTOLIC VOL/BSA (12-30): 7.7 CM2
BH CV ECHO MEAS - LVIDD: 3.9 CM
BH CV ECHO MEAS - LVIDS: 3.3 CM
BH CV ECHO MEAS - LVOT AREA: 2.8 CM2
BH CV ECHO MEAS - LVOT DIAM: 1.9 CM
BH CV ECHO MEAS - LVPWD: 1.3 CM
BH CV ECHO MEAS - MED PEAK E' VEL: 9.9 CM/SEC
BH CV ECHO MEAS - MV A MAX VEL: 48.7 CM/SEC
BH CV ECHO MEAS - MV E MAX VEL: 125 CM/SEC
BH CV ECHO MEAS - MV E/A: 2.6
BH CV ECHO MEAS - PA ACC TIME: 0.07 SEC
BH CV ECHO MEAS - PA PR(ACCEL): 47.5 MMHG
BH CV ECHO MEAS - RAP SYSTOLE: 10 MMHG
BH CV ECHO MEAS - RVSP: 40.9 MMHG
BH CV ECHO MEAS - SI(MOD-SP4): 7.7 ML/M2
BH CV ECHO MEAS - SV(MOD-SP4): 14.5 ML
BH CV ECHO MEAS - TAPSE (>1.6): 1.72 CM
BH CV ECHO MEAS - TR MAX PG: 30.9 MMHG
BH CV ECHO MEAS - TR MAX VEL: 275.5 CM/SEC
BH CV ECHO MEASUREMENTS AVERAGE E/E' RATIO: 11.68
LEFT ATRIUM VOLUME INDEX: 34.8 ML/M2
MAXIMAL PREDICTED HEART RATE: 138 BPM
STRESS TARGET HR: 117 BPM

## 2023-02-03 PROCEDURE — 93306 TTE W/DOPPLER COMPLETE: CPT

## 2023-02-03 PROCEDURE — 93306 TTE W/DOPPLER COMPLETE: CPT | Performed by: INTERNAL MEDICINE

## 2023-02-10 ENCOUNTER — TELEPHONE (OUTPATIENT)
Dept: CARDIOLOGY | Facility: CLINIC | Age: 83
End: 2023-02-10
Payer: MEDICARE

## 2023-02-10 NOTE — TELEPHONE ENCOUNTER
----- Message from ISAIAS Samano sent at 2/10/2023  8:56 AM EST -----  Please call patient about their echocardiogram.    Echocardiogram shows the pumping function of your heart is on the low side of normal.  However, all parts are marcia appropriately.  There was no indication of any hemodynamically significant valvular abnormalities.    Please also check with him about his blood pressure.  I adjusted some of his medications at his last visit and would like to know if he is doing okay with everything.    Thanks, Jaimie

## 2023-02-10 NOTE — PROGRESS NOTES
I informed the patient his Echocardiogram shows the pumping function of your heart is on the low side of normal. However, all parts are marcia appropriately. There was no indication of any hemodynamically significant valvular abnormalities    I ask the patient if he was monitoring BP at home since last visit and he hasn't been. I informed the patient to keep a log, monitor 3 times a day and give me a call 02/13. Patient stated he has felt fine.

## 2023-02-14 ENCOUNTER — TELEPHONE (OUTPATIENT)
Dept: CARDIOLOGY | Facility: CLINIC | Age: 83
End: 2023-02-14
Payer: MEDICARE

## 2023-02-14 DIAGNOSIS — R06.00 PND (PAROXYSMAL NOCTURNAL DYSPNEA): Primary | ICD-10-CM

## 2023-02-14 NOTE — TELEPHONE ENCOUNTER
Called patient to discuss echo results.      He also requested that I send referral to GI because he has difficulty swallowing.  He tried to get an appointment there but they told him he must have a referral.     He also reports shortness of breath at night and I suggested he see pulmonolog for that.  Referral made.

## 2023-02-14 NOTE — TELEPHONE ENCOUNTER
PATIENT CALLED IN FOR HIS ECHO GAVE HIM THE RESULTS, PER JASMIN. AND THE PATIENT STATES HIS BP IS WITH IN NORMAL RANGES, SINCE MED CHANGES. BUT HE WOULD LIKE TO SPEAK WITH YOU REGARDING HIS ECHO.

## 2023-02-15 DIAGNOSIS — K21.9 GASTROESOPHAGEAL REFLUX DISEASE, UNSPECIFIED WHETHER ESOPHAGITIS PRESENT: Primary | ICD-10-CM

## 2023-03-03 ENCOUNTER — PATIENT ROUNDING (BHMG ONLY) (OUTPATIENT)
Dept: PULMONOLOGY | Facility: CLINIC | Age: 83
End: 2023-03-03
Payer: MEDICARE

## 2023-03-03 ENCOUNTER — HOSPITAL ENCOUNTER (OUTPATIENT)
Dept: GENERAL RADIOLOGY | Facility: HOSPITAL | Age: 83
Discharge: HOME | End: 2023-03-03
Admitting: PHYSICIAN ASSISTANT
Payer: MEDICARE

## 2023-03-03 ENCOUNTER — OFFICE VISIT (OUTPATIENT)
Dept: PULMONOLOGY | Facility: CLINIC | Age: 83
End: 2023-03-03
Payer: MEDICARE

## 2023-03-03 VITALS
HEIGHT: 66 IN | HEART RATE: 69 BPM | SYSTOLIC BLOOD PRESSURE: 122 MMHG | OXYGEN SATURATION: 98 % | DIASTOLIC BLOOD PRESSURE: 62 MMHG | BODY MASS INDEX: 29.89 KG/M2 | WEIGHT: 186 LBS | TEMPERATURE: 97.7 F

## 2023-03-03 DIAGNOSIS — Z87.891 FORMER SMOKER: ICD-10-CM

## 2023-03-03 DIAGNOSIS — G47.33 OSA (OBSTRUCTIVE SLEEP APNEA): ICD-10-CM

## 2023-03-03 DIAGNOSIS — R06.02 SHORTNESS OF BREATH: ICD-10-CM

## 2023-03-03 DIAGNOSIS — R09.81 NASAL CONGESTION: Primary | ICD-10-CM

## 2023-03-03 PROCEDURE — 71046 X-RAY EXAM CHEST 2 VIEWS: CPT

## 2023-03-03 PROCEDURE — 99204 OFFICE O/P NEW MOD 45 MIN: CPT | Performed by: PHYSICIAN ASSISTANT

## 2023-03-03 PROCEDURE — 71046 X-RAY EXAM CHEST 2 VIEWS: CPT | Performed by: RADIOLOGY

## 2023-03-03 RX ORDER — FAMOTIDINE 20 MG/1
20 TABLET, FILM COATED ORAL 2 TIMES DAILY
COMMUNITY

## 2023-03-03 RX ORDER — BACLOFEN 10 MG/1
10 TABLET ORAL 3 TIMES DAILY
COMMUNITY

## 2023-03-03 RX ORDER — SIMVASTATIN 20 MG
20 TABLET ORAL NIGHTLY
COMMUNITY

## 2023-03-03 RX ORDER — HYDROCHLOROTHIAZIDE 12.5 MG/1
TABLET ORAL
COMMUNITY

## 2023-03-03 NOTE — PROGRESS NOTES
Subjective      Chief Complaint  Nasal Congestion and Shortness of Breath    Subjective      History of Present Illness  Aramis Carson is a 83 y.o. male who presents today to [facility] for nasal congestion and shortness of breath. This visit is a initial evaluation appointment.     Nasal Congestion and Shortness of Breath:  Patient reports that he has nasal congestion and has difficulty breathing through his nose at times. He does not notice that his symptoms worsen with season change or when being outside for longer durations. He states that he takes Zyrtec 10 mg nightly along with a nasal spray that helps to somewhat relieve his symptoms. He denies any previous history of nasal polyps. He reports that he did take allergy injections several years ago but lost follow-up with his allergist.     He also states that he has some shortness of breath. He reports that he feels that he has difficulty taking a full deep breathe. He denies any pain when he breaths. He does have an occasional nonproductive cough. He denies any wheezing. He is not currently on any inhalers. He has never been diagnosed with asthma or COPD in the past. He does have GARRISON and is currently on a BiPAP in which he is compliant with his machine.    He does admit to a 25 year history of smoking cigars and pipes and achieved cessation in 1987.     He also states that he has difficulty swallowing his food. He does have a referral to Dr. Newton office for further evaluation.       Current Outpatient Medications:   •  baclofen (LIORESAL) 10 MG tablet, Take 1 tablet by mouth 3 (Three) Times a Day., Disp: , Rfl:   •  famotidine (PEPCID) 20 MG tablet, Take 1 tablet by mouth 2 (Two) Times a Day., Disp: , Rfl:   •  glipiZIDE XL 2.5 MG 24 hr tablet, , Disp: , Rfl:   •  hydrALAZINE (APRESOLINE) 25 MG tablet, Take 1 tablet by mouth 2 (Two) Times a Day., Disp: 180 tablet, Rfl: 3  •  hydroCHLOROthiazide  "(HYDRODIURIL) 12.5 MG tablet, hydrochlorothiazide 12.5 mg tablet, Disp: , Rfl:   •  HYDROcodone-acetaminophen (NORCO) 7.5-325 MG per tablet, Take 1 tablet by mouth Every 6 (Six) Hours As Needed for Moderate Pain., Disp: , Rfl:   •  meclizine (ANTIVERT) 25 MG tablet, Take 1 tablet by mouth 3 (Three) Times a Day., Disp: , Rfl:   •  metoprolol succinate XL (TOPROL-XL) 25 MG 24 hr tablet, Take 1 tablet by mouth Daily., Disp: 90 tablet, Rfl: 3  •  PrilOSEC 10 MG pack, , Disp: , Rfl:   •  simvastatin (Zocor) 20 MG tablet, Take 1 tablet by mouth Every Night., Disp: , Rfl:   •  vitamin B-12 (CYANOCOBALAMIN) 1000 MCG tablet, Take 1 tablet by mouth Daily., Disp: , Rfl:   •  vitamin D (ERGOCALCIFEROL) 1.25 MG (60604 UT) capsule capsule, , Disp: , Rfl:   •  warfarin (COUMADIN) 5 MG tablet, Take 3 mg by mouth Daily., Disp: , Rfl:   •  methylPREDNISolone (MEDROL) 4 MG dose pack, Take as directed on package instructions., Disp: 21 tablet, Rfl: 0  •  simvastatin (ZOCOR) 20 MG tablet, Take 1 tablet by mouth Every Night., Disp: 90 tablet, Rfl: 2      Allergies   Allergen Reactions   • Lisinopril Unknown (See Comments)     Pt. States that he is allergic however, it has been so long since he took it that he cannot remember the reaction that he had at the time.    • Losartan Unknown - Low Severity       Objective     Objective   Vital Signs:  /62   Pulse 69   Temp 97.7 °F (36.5 °C) (Temporal)   Ht 167.6 cm (66\")   Wt 84.4 kg (186 lb)   SpO2 98%   BMI 30.02 kg/m²   Estimated body mass index is 30.02 kg/m² as calculated from the following:    Height as of this encounter: 167.6 cm (66\").    Weight as of this encounter: 84.4 kg (186 lb).      Past Medical History:   Diagnosis Date   • Atrial fibrillation (HCC)    • Coronary artery disease    • Hyperlipidemia    • Pre-diabetes      Past Surgical History:   Procedure Laterality Date   • CARDIAC CATHETERIZATION     • COLONOSCOPY     • INGUINAL HERNIA REPAIR     • LAPAROSCOPIC " CHOLECYSTECTOMY       Social History     Socioeconomic History   • Marital status:    Tobacco Use   • Smoking status: Former     Types: Pipe, Cigars   • Smokeless tobacco: Never   Vaping Use   • Vaping Use: Never used   Substance and Sexual Activity   • Alcohol use: No   • Drug use: No   • Sexual activity: Defer        Physical Exam  Constitutional:       Appearance: Normal appearance. He is obese.   HENT:      Head: Normocephalic and atraumatic.      Nose: Congestion present.      Comments: No nasal polyps visualized on exam.      Mouth/Throat:      Mouth: Mucous membranes are moist.      Pharynx: Oropharynx is clear.   Cardiovascular:      Rate and Rhythm: Normal rate. Rhythm irregular.      Pulses: Normal pulses.      Heart sounds: Normal heart sounds. No murmur heard.    No friction rub. No gallop.   Pulmonary:      Effort: Pulmonary effort is normal. No respiratory distress.      Breath sounds: Normal breath sounds. No wheezing, rhonchi or rales.   Musculoskeletal:         General: Normal range of motion.   Skin:     General: Skin is warm and dry.   Neurological:      General: No focal deficit present.      Mental Status: He is alert and oriented to person, place, and time. Mental status is at baseline.   Psychiatric:         Mood and Affect: Mood normal.         Behavior: Behavior normal.            Result Review :  The following labs and radiology results have been reviewed.    Lab Review:   No results found for: FEV1, FVC, MXO1IIM, TLC, DLCO  Hospital Outpatient Visit on 02/03/2023   Component Date Value Ref Range Status   • Target HR (85%) 02/03/2023 117  bpm Final   • Max. Pred. HR (100%) 02/03/2023 138  bpm Final   • LVIDd 02/03/2023 3.9  cm Final   • LVIDs 02/03/2023 3.3  cm Final   • IVSd 02/03/2023 1.60  cm Final   • LVPWd 02/03/2023 1.30  cm Final   • FS 02/03/2023 15.4  % Final   • IVS/LVPW 02/03/2023 1.23  cm Final   • ESV(cubed) 02/03/2023 35.9  ml Final   • LV Sys Vol (BSA corrected)  02/03/2023 7.7  cm2 Final   • EDV(cubed) 02/03/2023 59.3  ml Final   • LV Muhammad Vol (BSA corrected) 02/03/2023 15.4  cm2 Final   • LVOT area 02/03/2023 2.8  cm2 Final   • LV mass(C)d 02/03/2023 212.9  grams Final   • LVOT diam 02/03/2023 1.90  cm Final   • EDV(MOD-sp4) 02/03/2023 29.1  ml Final   • ESV(MOD-sp4) 02/03/2023 14.6  ml Final   • SV(MOD-sp4) 02/03/2023 14.5  ml Final   • SI(MOD-sp4) 02/03/2023 7.7  ml/m2 Final   • EF(MOD-sp4) 02/03/2023 49.8  % Final   • MV E max jasen 02/03/2023 125.0  cm/sec Final   • MV A max jasen 02/03/2023 48.7  cm/sec Final   • MV E/A 02/03/2023 2.6   Final   • LA ESV Index (BP) 02/03/2023 34.8  ml/m2 Final   • Med Peak E' Jasen 02/03/2023 9.9  cm/sec Final   • Lat Peak E' Jasen 02/03/2023 11.5  cm/sec Final   • Avg E/e' ratio 02/03/2023 11.68   Final   • TAPSE (>1.6) 02/03/2023 1.72  cm Final   • LA dimension (2D)  02/03/2023 4.2  cm Final   • Ao pk jasen 02/03/2023 131.0  cm/sec Final   • Ao max PG 02/03/2023 6.9  mmHg Final   • Ao mean PG 02/03/2023 3.0  mmHg Final   • Ao V2 VTI 02/03/2023 22.5  cm Final   • TR max jasen 02/03/2023 275.5  cm/sec Final   • TR max PG 02/03/2023 30.9  mmHg Final   • RVSP(TR) 02/03/2023 40.9  mmHg Final   • RAP systole 02/03/2023 10.0  mmHg Final   • PA acc time 02/03/2023 0.07  sec Final   • PA pr(Accel) 02/03/2023 47.5  mmHg Final   • Ao root diam 02/03/2023 2.8  cm Final   • EF(MOD-bp) 02/03/2023 49.0  % Final      Reviewed chest XR from today (03/03/23)    Assessment / Plan         Assessment   Diagnoses and all orders for this visit:    1. Nasal congestion (Primary)    2. Shortness of breath  -     Pulmonary Function Test; Future  -     XR Chest 2 View; Future    3. Former smoker    4. GARRISON (obstructive sleep apnea)      1. Nasal congestion  · Reports history of allergies in the past and required allergy injections but lost follow-up with provider.   · Currently takes Zyrtec 10 mg and nasal spray which provides minimal symptom relief.   · Advised to keep  appointment with allergy specialist on 03/27/23 for further recommendations.     2. Shortness of breath  · No previous history of COPD or asthma.   · Reviewed chest XR from today and no acute abnormalities present  Contacted patient to notify him of results and did not answer so left a voicemail.   · Obtain PFT to evaluate for underlying asthma, COPD, or restrictive lung disease.     3. Former tobacco abuse  · Reports history of smoking pipe and cigar for approximately 25 years, achieved cessation in 1987.   · Does not qualify for LDCT imaging with age and > 15 years since cessation.     4. GARRISON  · Reports compliance with BiPAP nightly.     Management obstructive sleep apnea also includes lifestyle modifications including weight loss, avoidance of alcohol, sedated, caffeine especially before bedtime, allowing adequate sleep time, and body position during sleep such as side versus back. 10% weight loss can result in a 50% improvement of the apnea-hypopnea index. Untreated sleep apnea can lead to cardiovascular/metabolic disorder, neurocognitive deficit, daytime sleepiness, motor vehicle accidents, depression, mood disorders and reduced quality of life.  Patient understands the risk of untreated obstructive sleep apnea and benefit benefits of the treatment. Recommended at least 4 hours of use per night for 70% of every month (approximately 21 out of 30 days) per CMS guidelines.     Aramis Carson  reports that he has quit smoking. His smoking use included pipe and cigars. He has never used smokeless tobacco.. I have educated him on the risk of diseases from using tobacco products such as cancer, COPD and heart disease.     Follow Up   Return in about 3 months (around 6/3/2023), or if symptoms worsen or fail to improve, for Next scheduled follow up.    Patient was given instructions and counseling regarding his condition or for health maintenance advice. Please see specific information pulled into the AVS if  appropriate.       This document has been electronically signed by Henrietta Ray PA-C   March 3, 2023 11:33 EST    Dictated Utilizing Dragon Dictation: Part of this note may be an electronic transcription/translation of spoken language to printed text using the Dragon Dictation System.

## 2023-03-03 NOTE — PROGRESS NOTES
March 3, 2023    Hello, may I speak with Aramis Carson?    My name is Shadia Govea      I am  with [facility]  [address]  [place] [address] [phone].    Before we get started may I verify your date of birth? 1940    I am calling to officially welcome you to our practice and ask about your recent visit. Is this a good time to talk? yes    Tell me about your visit with us. What things went well?  Very nice visit, pleased with care today       We're always looking for ways to make our patients' experiences even better. Do you have recommendations on ways we may improve?  no    Overall were you satisfied with your first visit to our practice? yes       I appreciate you taking the time to speak with me today. Is there anything else I can do for you? no      Thank you, and have a great day.

## 2023-03-08 ENCOUNTER — TRANSCRIBE ORDERS (OUTPATIENT)
Dept: ADMINISTRATIVE | Facility: HOSPITAL | Age: 83
End: 2023-03-08
Payer: MEDICARE

## 2023-03-08 DIAGNOSIS — R13.10 DYSPHAGIA, IDIOPATHIC: Primary | ICD-10-CM

## 2023-03-14 ENCOUNTER — HOSPITAL ENCOUNTER (OUTPATIENT)
Dept: GENERAL RADIOLOGY | Facility: HOSPITAL | Age: 83
Discharge: HOME | End: 2023-03-14
Admitting: NURSE PRACTITIONER
Payer: MEDICARE

## 2023-03-14 DIAGNOSIS — R13.10 DYSPHAGIA, IDIOPATHIC: ICD-10-CM

## 2023-03-14 PROCEDURE — 74220 X-RAY XM ESOPHAGUS 1CNTRST: CPT

## 2023-03-14 PROCEDURE — 74220 X-RAY XM ESOPHAGUS 1CNTRST: CPT | Performed by: RADIOLOGY

## 2023-03-15 ENCOUNTER — TELEPHONE (OUTPATIENT)
Dept: CARDIOLOGY | Facility: CLINIC | Age: 83
End: 2023-03-15
Payer: MEDICARE

## 2023-03-15 RX ORDER — HYDRALAZINE HYDROCHLORIDE 50 MG/1
50 TABLET, FILM COATED ORAL 3 TIMES DAILY
Qty: 180 TABLET | Refills: 3 | Status: SHIPPED | OUTPATIENT
Start: 2023-03-15

## 2023-03-15 NOTE — TELEPHONE ENCOUNTER
Patient came by the office today with a blood pressure log.  His blood pressure are sometimes as high as 176 systolic with diastolic readings from 70-90.  He does have occasional systolic readings in 120s.  Heart rate is usually in the 50s and 60s.    I reviewed his medications.    I called the patient and instructed him to increase his hydralazine to 50 mg twice daily.  He stated that he would and I have sent a new prescription to his pharmacy.

## 2023-03-20 DIAGNOSIS — I10 ESSENTIAL HYPERTENSION: Chronic | ICD-10-CM

## 2023-03-20 DIAGNOSIS — I48.20 CHRONIC ATRIAL FIBRILLATION: Chronic | ICD-10-CM

## 2023-03-20 RX ORDER — METOPROLOL SUCCINATE 25 MG/1
TABLET, EXTENDED RELEASE ORAL
Qty: 60 TABLET | Refills: 5 | Status: SHIPPED | OUTPATIENT
Start: 2023-03-20

## 2023-03-24 ENCOUNTER — TELEPHONE (OUTPATIENT)
Dept: CARDIOLOGY | Facility: CLINIC | Age: 83
End: 2023-03-24

## 2023-03-24 NOTE — TELEPHONE ENCOUNTER
Spoke to Jaimie regarding patient's blood pressure readings. Jaimie states to confirm he is taking his hydralazine 50 mg bid and if so increase this to 75 mg bid. Patient was given these instructions and will contact  us in 10/12 days to let us know how he is doing.

## 2023-03-24 NOTE — TELEPHONE ENCOUNTER
Caller: Aramis Carson    Relationship to patient: Self    Best call back number: [phone]    Patient is needing: PT CALLED IN TO STATE THE NEW RX IS NOT WORKING, BP IS STILL HIGH.    131/84 75  151/73 56  148/77 72  162/83 58  153/81 66  149/77 72  158/73 63  141/72 75  158/75 58  151/79 53  156/77 77  10 DAYS ON MEDICATION, THESE READINGS WERE FROM THE LAST FEW DAYS.

## 2023-03-30 ENCOUNTER — HOSPITAL ENCOUNTER (OUTPATIENT)
Dept: RESPIRATORY THERAPY | Facility: HOSPITAL | Age: 83
Discharge: HOME | End: 2023-03-30
Admitting: PHYSICIAN ASSISTANT
Payer: MEDICARE

## 2023-03-30 ENCOUNTER — TELEPHONE (OUTPATIENT)
Dept: CARDIOLOGY | Facility: CLINIC | Age: 83
End: 2023-03-30

## 2023-03-30 ENCOUNTER — OFFICE VISIT (OUTPATIENT)
Dept: CARDIOLOGY | Facility: CLINIC | Age: 83
End: 2023-03-30
Payer: MEDICARE

## 2023-03-30 VITALS
OXYGEN SATURATION: 98 % | HEART RATE: 87 BPM | HEIGHT: 66 IN | WEIGHT: 187 LBS | BODY MASS INDEX: 30.05 KG/M2 | DIASTOLIC BLOOD PRESSURE: 73 MMHG | SYSTOLIC BLOOD PRESSURE: 140 MMHG

## 2023-03-30 DIAGNOSIS — R06.02 SHORTNESS OF BREATH: ICD-10-CM

## 2023-03-30 DIAGNOSIS — I48.11 LONGSTANDING PERSISTENT ATRIAL FIBRILLATION: Primary | Chronic | ICD-10-CM

## 2023-03-30 DIAGNOSIS — I10 ESSENTIAL HYPERTENSION: Chronic | ICD-10-CM

## 2023-03-30 PROCEDURE — 94726 PLETHYSMOGRAPHY LUNG VOLUMES: CPT

## 2023-03-30 PROCEDURE — 3078F DIAST BP <80 MM HG: CPT | Performed by: NURSE PRACTITIONER

## 2023-03-30 PROCEDURE — 99213 OFFICE O/P EST LOW 20 MIN: CPT | Performed by: NURSE PRACTITIONER

## 2023-03-30 PROCEDURE — 3077F SYST BP >= 140 MM HG: CPT | Performed by: NURSE PRACTITIONER

## 2023-03-30 PROCEDURE — 94640 AIRWAY INHALATION TREATMENT: CPT

## 2023-03-30 PROCEDURE — 94060 EVALUATION OF WHEEZING: CPT

## 2023-03-30 RX ORDER — ALBUTEROL SULFATE 2.5 MG/3ML
2.5 SOLUTION RESPIRATORY (INHALATION) ONCE
Status: COMPLETED | OUTPATIENT
Start: 2023-03-30 | End: 2023-03-30

## 2023-03-30 RX ADMIN — ALBUTEROL SULFATE 2.5 MG: 2.5 SOLUTION RESPIRATORY (INHALATION) at 12:01

## 2023-03-30 NOTE — PROGRESS NOTES
"Chief Complaint  Increased blood pressure    Subjective          Aramis Carson presents to [facility] for follow up.    History of Present Illness    Aramis came in today because his blood pressures remain elevated despite increase to hydralazine.  He does not have any associated symptoms of chest pain, HA.      He brings a list of blood pressures with him today.  Most of the systolic readings are in the 130- 140 range with other readings as high as 160s systolically.      Objective     Vital Signs:   /73 (BP Location: Left arm, Patient Position: Sitting, Cuff Size: Large Adult)   Pulse 87   Ht 167.6 cm (66\")   Wt 84.8 kg (187 lb)   SpO2 98%   BMI 30.18 kg/m²       Physical Exam  Constitutional:       Appearance: Normal appearance. He is well-developed.   Cardiovascular:      Rate and Rhythm: Normal rate and regular rhythm.      Heart sounds: No murmur heard.    No friction rub. No gallop.   Pulmonary:      Effort: Pulmonary effort is normal. No respiratory distress.      Breath sounds: Normal breath sounds. No wheezing or rales.   Skin:     General: Skin is warm and dry.   Neurological:      Mental Status: He is alert and oriented to person, place, and time.   Psychiatric:         Mood and Affect: Mood normal.         Behavior: Behavior normal.          Result Review :                Current Outpatient Medications   Medication Sig Dispense Refill   • baclofen (LIORESAL) 10 MG tablet Take 1 tablet by mouth 3 (Three) Times a Day.     • famotidine (PEPCID) 20 MG tablet Take 1 tablet by mouth 2 (Two) Times a Day.     • glipiZIDE XL 2.5 MG 24 hr tablet      • hydrALAZINE (APRESOLINE) 50 MG tablet Take 1 tablet by mouth 3 (Three) Times a Day. 180 tablet 3   • hydroCHLOROthiazide (HYDRODIURIL) 12.5 MG tablet hydrochlorothiazide 12.5 mg tablet     • HYDROcodone-acetaminophen (NORCO) 7.5-325 MG per tablet Take 1 tablet by mouth Every 6 (Six) Hours As Needed for Moderate Pain.     • " meclizine (ANTIVERT) 25 MG tablet Take 1 tablet by mouth 3 (Three) Times a Day.     • methylPREDNISolone (MEDROL) 4 MG dose pack Take as directed on package instructions. 21 tablet 0   • metoprolol succinate XL (TOPROL-XL) 25 MG 24 hr tablet TAKE 1 TABLET DAILY 60 tablet 5   • PrilOSEC 10 MG pack      • simvastatin (ZOCOR) 20 MG tablet Take 1 tablet by mouth Every Night.     • vitamin B-12 (CYANOCOBALAMIN) 1000 MCG tablet Take 1 tablet by mouth Daily.     • vitamin D (ERGOCALCIFEROL) 1.25 MG (12178 UT) capsule capsule      • warfarin (COUMADIN) 5 MG tablet Take 3 mg by mouth Daily.     • albuterol sulfate  (90 Base) MCG/ACT inhaler Inhale 2 puffs Every 4 (Four) Hours As Needed for Wheezing. 18 g 11   • cefdinir (OMNICEF) 300 MG capsule Take 1 capsule by mouth 2 (Two) Times a Day for 10 days. 20 capsule 0   • doxycycline (MONODOX) 100 MG capsule Take 1 capsule by mouth 2 (Two) Times a Day for 10 days. 20 capsule 0   • sacubitril-valsartan (Entresto) 24-26 MG tablet Take 1 tablet by mouth 2 (Two) Times a Day. 180 tablet 3   • simvastatin (ZOCOR) 20 MG tablet Take 1 tablet by mouth Every Night. 90 tablet 2     No current facility-administered medications for this visit.            Assessment and Plan    Problem List Items Addressed This Visit        Cardiac and Vasculature    Essential hypertension (Chronic)    Atrial fibrillation (HCC) - Primary (Chronic)    Overview     · [id number] is 4 for hypertension, age, diabetes.  · Chronically anticoagulated on Coumadin.                Follow Up     Medications were reviewed with the patient.      Reassured patient.  Continue current meds for hypertension.  Continue to monitor blood pressure however to self only 1 to 2 hours after taking medications.    Atrial fibrillation is stable.  Continue Coumadin.    Return in about 5 weeks (around 5/4/2023).    Patient was given instructions and counseling regarding his condition or for health maintenance advice. Please see  specific information pulled into the AVS if appropriate.

## 2023-03-31 PROCEDURE — 94726 PLETHYSMOGRAPHY LUNG VOLUMES: CPT | Performed by: INTERNAL MEDICINE

## 2023-03-31 PROCEDURE — 94060 EVALUATION OF WHEEZING: CPT | Performed by: INTERNAL MEDICINE

## 2023-04-03 ENCOUNTER — TELEPHONE (OUTPATIENT)
Dept: PULMONOLOGY | Facility: CLINIC | Age: 83
End: 2023-04-03
Payer: MEDICARE

## 2023-04-03 DIAGNOSIS — J98.4 RESTRICTIVE LUNG DISEASE: Primary | ICD-10-CM

## 2023-04-03 RX ORDER — ALBUTEROL SULFATE 90 UG/1
2 AEROSOL, METERED RESPIRATORY (INHALATION) EVERY 4 HOURS PRN
Qty: 18 G | Refills: 11 | Status: SHIPPED | OUTPATIENT
Start: 2023-04-03

## 2023-04-03 NOTE — TELEPHONE ENCOUNTER
Contacted patient to discuss PFT results which showed mild restriction.     Will sent albuterol inhaler to use as needed.     Will order HRCT scan to evaluate lung parenchyma.

## 2023-04-11 ENCOUNTER — TELEPHONE (OUTPATIENT)
Dept: CARDIOLOGY | Facility: CLINIC | Age: 83
End: 2023-04-11

## 2023-04-11 NOTE — TELEPHONE ENCOUNTER
Caller: Aramis Carson    Relationship: Self    Best call back number: [phone]    What is the best time to reach you: ANY    Who are you requesting to speak with (clinical staff, provider,  specific staff member): ANY      What was the call regarding: PT CALLED IN STATING THE ENTRESTO SAMPLES DID HELP HIM AND WOULD LIKE TO CONTINUE TAKING THEM IF THEY CAN START BEING PRESCRIBED TO HIM. PT HAS 3 BILLS LEFT FROM THE SAMPLES.     Do you require a callback: YES.

## 2023-04-12 RX ORDER — SACUBITRIL AND VALSARTAN 24; 26 MG/1; MG/1
1 TABLET, FILM COATED ORAL 2 TIMES DAILY
Qty: 60 TABLET | Refills: 0 | Status: SHIPPED | OUTPATIENT
Start: 2023-04-12 | End: 2023-04-19 | Stop reason: SDUPTHER

## 2023-04-14 ENCOUNTER — HOSPITAL ENCOUNTER (OUTPATIENT)
Dept: CT IMAGING | Facility: HOSPITAL | Age: 83
Discharge: HOME | End: 2023-04-14
Admitting: PHYSICIAN ASSISTANT
Payer: MEDICARE

## 2023-04-14 DIAGNOSIS — J98.4 RESTRICTIVE LUNG DISEASE: ICD-10-CM

## 2023-04-14 PROCEDURE — 71250 CT THORAX DX C-: CPT | Performed by: RADIOLOGY

## 2023-04-14 PROCEDURE — 71250 CT THORAX DX C-: CPT

## 2023-04-17 ENCOUNTER — TELEPHONE (OUTPATIENT)
Dept: PULMONOLOGY | Facility: CLINIC | Age: 83
End: 2023-04-17
Payer: MEDICARE

## 2023-04-17 DIAGNOSIS — E04.1 THYROID NODULE GREATER THAN OR EQUAL TO 1 CM IN DIAMETER INCIDENTALLY NOTED ON IMAGING STUDY: Primary | ICD-10-CM

## 2023-04-17 NOTE — TELEPHONE ENCOUNTER
Attempted to contact patient to discuss HRCT results but was not able to reach so left a voicemail requesting call back.     HRCT results noted below:   · No areas of fibrosis/ILD present.   · Substernal thyroid nodule measuring 2 cm.     Will discuss ordering thyroid US to further evaluate.

## 2023-04-18 NOTE — TELEPHONE ENCOUNTER
Patient returned call. Discussed findings of HRCT scan. Will order thyroid US to further evaluate nodule.

## 2023-04-19 RX ORDER — SACUBITRIL AND VALSARTAN 24; 26 MG/1; MG/1
1 TABLET, FILM COATED ORAL 2 TIMES DAILY
Qty: 180 TABLET | Refills: 3 | Status: SHIPPED | OUTPATIENT
Start: 2023-04-19

## 2023-04-20 ENCOUNTER — APPOINTMENT (OUTPATIENT)
Dept: CT IMAGING | Facility: HOSPITAL | Age: 83
End: 2023-04-20
Payer: MEDICARE

## 2023-04-20 ENCOUNTER — HOSPITAL ENCOUNTER (EMERGENCY)
Facility: HOSPITAL | Age: 83
Discharge: HOME | End: 2023-04-20
Attending: EMERGENCY MEDICINE | Admitting: EMERGENCY MEDICINE
Payer: MEDICARE

## 2023-04-20 ENCOUNTER — APPOINTMENT (OUTPATIENT)
Dept: GENERAL RADIOLOGY | Facility: HOSPITAL | Age: 83
End: 2023-04-20
Payer: MEDICARE

## 2023-04-20 VITALS
BODY MASS INDEX: 30.05 KG/M2 | OXYGEN SATURATION: 96 % | DIASTOLIC BLOOD PRESSURE: 65 MMHG | HEIGHT: 66 IN | WEIGHT: 187 LBS | TEMPERATURE: 97.8 F | SYSTOLIC BLOOD PRESSURE: 116 MMHG | HEART RATE: 81 BPM | RESPIRATION RATE: 16 BRPM

## 2023-04-20 DIAGNOSIS — J18.9 COMMUNITY ACQUIRED BILATERAL LOWER LOBE PNEUMONIA: Primary | ICD-10-CM

## 2023-04-20 DIAGNOSIS — R07.89 CHEST WALL PAIN: ICD-10-CM

## 2023-04-20 LAB
ALBUMIN SERPL-MCNC: 4.2 G/DL (ref 3.5–5.2)
ALBUMIN/GLOB SERPL: 1.3 G/DL
ALP SERPL-CCNC: 65 U/L (ref 39–117)
ALT SERPL W P-5'-P-CCNC: 19 U/L (ref 1–41)
ANION GAP SERPL CALCULATED.3IONS-SCNC: 13.3 MMOL/L (ref 5–15)
AST SERPL-CCNC: 20 U/L (ref 1–40)
BASOPHILS # BLD AUTO: 0.05 10*3/MM3 (ref 0–0.2)
BASOPHILS NFR BLD AUTO: 0.3 % (ref 0–1.5)
BILIRUB SERPL-MCNC: 0.8 MG/DL (ref 0–1.2)
BUN SERPL-MCNC: 20 MG/DL (ref 8–23)
BUN/CREAT SERPL: 16 (ref 7–25)
CALCIUM SPEC-SCNC: 9.4 MG/DL (ref 8.6–10.5)
CHLORIDE SERPL-SCNC: 102 MMOL/L (ref 98–107)
CO2 SERPL-SCNC: 23.7 MMOL/L (ref 22–29)
CREAT SERPL-MCNC: 1.25 MG/DL (ref 0.76–1.27)
D-LACTATE SERPL-SCNC: 2.5 MMOL/L (ref 0.5–2)
DEPRECATED RDW RBC AUTO: 44.4 FL (ref 37–54)
EGFRCR SERPLBLD CKD-EPI 2021: 57.1 ML/MIN/1.73
EOSINOPHIL # BLD AUTO: 0.01 10*3/MM3 (ref 0–0.4)
EOSINOPHIL NFR BLD AUTO: 0.1 % (ref 0.3–6.2)
ERYTHROCYTE [DISTWIDTH] IN BLOOD BY AUTOMATED COUNT: 14.2 % (ref 12.3–15.4)
GEN 5 2HR TROPONIN T REFLEX: 13 NG/L
GLOBULIN UR ELPH-MCNC: 3.2 GM/DL
GLUCOSE SERPL-MCNC: 127 MG/DL (ref 65–99)
HCT VFR BLD AUTO: 49 % (ref 37.5–51)
HGB BLD-MCNC: 15.8 G/DL (ref 13–17.7)
HOLD SPECIMEN: NORMAL
HOLD SPECIMEN: NORMAL
IMM GRANULOCYTES # BLD AUTO: 0.07 10*3/MM3 (ref 0–0.05)
IMM GRANULOCYTES NFR BLD AUTO: 0.4 % (ref 0–0.5)
INR PPP: 1.89 (ref 0.9–1.1)
LYMPHOCYTES # BLD AUTO: 0.93 10*3/MM3 (ref 0.7–3.1)
LYMPHOCYTES NFR BLD AUTO: 4.9 % (ref 19.6–45.3)
MCH RBC QN AUTO: 27.7 PG (ref 26.6–33)
MCHC RBC AUTO-ENTMCNC: 32.2 G/DL (ref 31.5–35.7)
MCV RBC AUTO: 86 FL (ref 79–97)
MONOCYTES # BLD AUTO: 1.25 10*3/MM3 (ref 0.1–0.9)
MONOCYTES NFR BLD AUTO: 6.6 % (ref 5–12)
NEUTROPHILS NFR BLD AUTO: 16.66 10*3/MM3 (ref 1.7–7)
NEUTROPHILS NFR BLD AUTO: 87.7 % (ref 42.7–76)
NRBC BLD AUTO-RTO: 0 /100 WBC (ref 0–0.2)
PLATELET # BLD AUTO: 254 10*3/MM3 (ref 140–450)
PMV BLD AUTO: 10.3 FL (ref 6–12)
POTASSIUM SERPL-SCNC: 3.8 MMOL/L (ref 3.5–5.2)
PROT SERPL-MCNC: 7.4 G/DL (ref 6–8.5)
PROTHROMBIN TIME: 22.4 SECONDS (ref 12.1–14.7)
RBC # BLD AUTO: 5.7 10*6/MM3 (ref 4.14–5.8)
SODIUM SERPL-SCNC: 139 MMOL/L (ref 136–145)
TROPONIN T DELTA: -1 NG/L
TROPONIN T SERPL HS-MCNC: 14 NG/L
WBC NRBC COR # BLD: 18.97 10*3/MM3 (ref 3.4–10.8)
WHOLE BLOOD HOLD COAG: NORMAL
WHOLE BLOOD HOLD SPECIMEN: NORMAL

## 2023-04-20 PROCEDURE — 71260 CT THORAX DX C+: CPT | Performed by: RADIOLOGY

## 2023-04-20 PROCEDURE — 83605 ASSAY OF LACTIC ACID: CPT | Performed by: PHYSICIAN ASSISTANT

## 2023-04-20 PROCEDURE — 96365 THER/PROPH/DIAG IV INF INIT: CPT

## 2023-04-20 PROCEDURE — 85025 COMPLETE CBC W/AUTO DIFF WBC: CPT | Performed by: PHYSICIAN ASSISTANT

## 2023-04-20 PROCEDURE — 74177 CT ABD & PELVIS W/CONTRAST: CPT | Performed by: RADIOLOGY

## 2023-04-20 PROCEDURE — 36415 COLL VENOUS BLD VENIPUNCTURE: CPT

## 2023-04-20 PROCEDURE — 25010000002 CEFTRIAXONE PER 250 MG: Performed by: PHYSICIAN ASSISTANT

## 2023-04-20 PROCEDURE — 80053 COMPREHEN METABOLIC PANEL: CPT | Performed by: PHYSICIAN ASSISTANT

## 2023-04-20 PROCEDURE — 93005 ELECTROCARDIOGRAM TRACING: CPT

## 2023-04-20 PROCEDURE — 71045 X-RAY EXAM CHEST 1 VIEW: CPT

## 2023-04-20 PROCEDURE — 25510000001 IOPAMIDOL 61 % SOLUTION: Performed by: EMERGENCY MEDICINE

## 2023-04-20 PROCEDURE — 84484 ASSAY OF TROPONIN QUANT: CPT | Performed by: PHYSICIAN ASSISTANT

## 2023-04-20 PROCEDURE — 74177 CT ABD & PELVIS W/CONTRAST: CPT

## 2023-04-20 PROCEDURE — 87040 BLOOD CULTURE FOR BACTERIA: CPT | Performed by: PHYSICIAN ASSISTANT

## 2023-04-20 PROCEDURE — 71260 CT THORAX DX C+: CPT

## 2023-04-20 PROCEDURE — 71045 X-RAY EXAM CHEST 1 VIEW: CPT | Performed by: RADIOLOGY

## 2023-04-20 PROCEDURE — 99284 EMERGENCY DEPT VISIT MOD MDM: CPT

## 2023-04-20 PROCEDURE — 85610 PROTHROMBIN TIME: CPT | Performed by: PHYSICIAN ASSISTANT

## 2023-04-20 RX ORDER — CEFDINIR 300 MG/1
300 CAPSULE ORAL 2 TIMES DAILY
Qty: 20 CAPSULE | Refills: 0 | Status: SHIPPED | OUTPATIENT
Start: 2023-04-20 | End: 2023-04-30

## 2023-04-20 RX ORDER — DOXYCYCLINE 100 MG/1
100 CAPSULE ORAL 2 TIMES DAILY
Qty: 20 CAPSULE | Refills: 0 | Status: SHIPPED | OUTPATIENT
Start: 2023-04-20 | End: 2023-04-30

## 2023-04-20 RX ORDER — ACETAMINOPHEN 500 MG
1000 TABLET ORAL ONCE
Status: COMPLETED | OUTPATIENT
Start: 2023-04-20 | End: 2023-04-20

## 2023-04-20 RX ORDER — SODIUM CHLORIDE 0.9 % (FLUSH) 0.9 %
10 SYRINGE (ML) INJECTION AS NEEDED
Status: DISCONTINUED | OUTPATIENT
Start: 2023-04-20 | End: 2023-04-20 | Stop reason: HOSPADM

## 2023-04-20 RX ORDER — ASPIRIN 81 MG/1
324 TABLET, CHEWABLE ORAL ONCE
Status: DISCONTINUED | OUTPATIENT
Start: 2023-04-20 | End: 2023-04-20

## 2023-04-20 RX ADMIN — IOPAMIDOL 100 ML: 612 INJECTION, SOLUTION INTRAVENOUS at 12:56

## 2023-04-20 RX ADMIN — ACETAMINOPHEN 1000 MG: 500 TABLET ORAL at 15:13

## 2023-04-20 RX ADMIN — CEFTRIAXONE 2 G: 2 INJECTION, POWDER, FOR SOLUTION INTRAMUSCULAR; INTRAVENOUS at 13:37
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
## 2023-04-21 LAB
QT INTERVAL: 378 MS
QTC INTERVAL: 441 MS

## 2023-04-21 PROCEDURE — 93005 ELECTROCARDIOGRAM TRACING: CPT | Performed by: EMERGENCY MEDICINE

## 2023-04-25 LAB
BACTERIA SPEC AEROBE CULT: NORMAL
BACTERIA SPEC AEROBE CULT: NORMAL

## 2023-04-30 ENCOUNTER — APPOINTMENT (OUTPATIENT)
Dept: CT IMAGING | Facility: HOSPITAL | Age: 83
End: 2023-04-30
Payer: MEDICARE

## 2023-04-30 ENCOUNTER — APPOINTMENT (OUTPATIENT)
Dept: GENERAL RADIOLOGY | Facility: HOSPITAL | Age: 83
End: 2023-04-30
Payer: MEDICARE

## 2023-04-30 ENCOUNTER — HOSPITAL ENCOUNTER (EMERGENCY)
Facility: HOSPITAL | Age: 83
Discharge: HOME | End: 2023-04-30
Attending: STUDENT IN AN ORGANIZED HEALTH CARE EDUCATION/TRAINING PROGRAM | Admitting: STUDENT IN AN ORGANIZED HEALTH CARE EDUCATION/TRAINING PROGRAM
Payer: MEDICARE

## 2023-04-30 VITALS
HEART RATE: 67 BPM | OXYGEN SATURATION: 98 % | RESPIRATION RATE: 18 BRPM | WEIGHT: 187 LBS | DIASTOLIC BLOOD PRESSURE: 53 MMHG | BODY MASS INDEX: 30.05 KG/M2 | SYSTOLIC BLOOD PRESSURE: 102 MMHG | TEMPERATURE: 97.5 F | HEIGHT: 66 IN

## 2023-04-30 DIAGNOSIS — E04.1 THYROID NODULE: ICD-10-CM

## 2023-04-30 DIAGNOSIS — R07.89 CHEST WALL PAIN: Primary | ICD-10-CM

## 2023-04-30 LAB
ALBUMIN SERPL-MCNC: 3.6 G/DL (ref 3.5–5.2)
ALBUMIN/GLOB SERPL: 1.2 G/DL
ALP SERPL-CCNC: 58 U/L (ref 39–117)
ALT SERPL W P-5'-P-CCNC: 26 U/L (ref 1–41)
ANION GAP SERPL CALCULATED.3IONS-SCNC: 11.6 MMOL/L (ref 5–15)
AST SERPL-CCNC: 29 U/L (ref 1–40)
BASOPHILS # BLD AUTO: 0.06 10*3/MM3 (ref 0–0.2)
BASOPHILS NFR BLD AUTO: 0.6 % (ref 0–1.5)
BILIRUB SERPL-MCNC: 0.4 MG/DL (ref 0–1.2)
BUN SERPL-MCNC: 24 MG/DL (ref 8–23)
BUN/CREAT SERPL: 22.9 (ref 7–25)
CALCIUM SPEC-SCNC: 9.1 MG/DL (ref 8.6–10.5)
CHLORIDE SERPL-SCNC: 110 MMOL/L (ref 98–107)
CO2 SERPL-SCNC: 21.4 MMOL/L (ref 22–29)
CREAT SERPL-MCNC: 1.05 MG/DL (ref 0.76–1.27)
CRP SERPL-MCNC: 0.56 MG/DL (ref 0–0.5)
D-LACTATE SERPL-SCNC: 1.9 MMOL/L (ref 0.5–2)
DEPRECATED RDW RBC AUTO: 44.9 FL (ref 37–54)
EGFRCR SERPLBLD CKD-EPI 2021: 70.4 ML/MIN/1.73
EOSINOPHIL # BLD AUTO: 0.09 10*3/MM3 (ref 0–0.4)
EOSINOPHIL NFR BLD AUTO: 0.9 % (ref 0.3–6.2)
ERYTHROCYTE [DISTWIDTH] IN BLOOD BY AUTOMATED COUNT: 14.3 % (ref 12.3–15.4)
ERYTHROCYTE [SEDIMENTATION RATE] IN BLOOD: 29 MM/HR (ref 0–20)
FLUAV RNA RESP QL NAA+PROBE: NOT DETECTED
FLUBV RNA RESP QL NAA+PROBE: NOT DETECTED
GEN 5 2HR TROPONIN T REFLEX: 18 NG/L
GLOBULIN UR ELPH-MCNC: 3 GM/DL
GLUCOSE SERPL-MCNC: 118 MG/DL (ref 65–99)
HCT VFR BLD AUTO: 42.7 % (ref 37.5–51)
HGB BLD-MCNC: 13.8 G/DL (ref 13–17.7)
HOLD SPECIMEN: NORMAL
HOLD SPECIMEN: NORMAL
IMM GRANULOCYTES # BLD AUTO: 0.06 10*3/MM3 (ref 0–0.05)
IMM GRANULOCYTES NFR BLD AUTO: 0.6 % (ref 0–0.5)
INR PPP: 1.88 (ref 0.9–1.1)
LIPASE SERPL-CCNC: 38 U/L (ref 13–60)
LYMPHOCYTES # BLD AUTO: 1.69 10*3/MM3 (ref 0.7–3.1)
LYMPHOCYTES NFR BLD AUTO: 16.6 % (ref 19.6–45.3)
MCH RBC QN AUTO: 27.7 PG (ref 26.6–33)
MCHC RBC AUTO-ENTMCNC: 32.3 G/DL (ref 31.5–35.7)
MCV RBC AUTO: 85.6 FL (ref 79–97)
MONOCYTES # BLD AUTO: 0.64 10*3/MM3 (ref 0.1–0.9)
MONOCYTES NFR BLD AUTO: 6.3 % (ref 5–12)
NEUTROPHILS NFR BLD AUTO: 7.63 10*3/MM3 (ref 1.7–7)
NEUTROPHILS NFR BLD AUTO: 75 % (ref 42.7–76)
NRBC BLD AUTO-RTO: 0 /100 WBC (ref 0–0.2)
PLATELET # BLD AUTO: 292 10*3/MM3 (ref 140–450)
PMV BLD AUTO: 10 FL (ref 6–12)
POTASSIUM SERPL-SCNC: 3.8 MMOL/L (ref 3.5–5.2)
PROT SERPL-MCNC: 6.6 G/DL (ref 6–8.5)
PROTHROMBIN TIME: 22.3 SECONDS (ref 12.1–14.7)
QT INTERVAL: 454 MS
QTC INTERVAL: 475 MS
RBC # BLD AUTO: 4.99 10*6/MM3 (ref 4.14–5.8)
SARS-COV-2 RNA RESP QL NAA+PROBE: NOT DETECTED
SODIUM SERPL-SCNC: 143 MMOL/L (ref 136–145)
TROPONIN T DELTA: -1 NG/L
TROPONIN T SERPL HS-MCNC: 19 NG/L
WBC NRBC COR # BLD: 10.17 10*3/MM3 (ref 3.4–10.8)
WHOLE BLOOD HOLD COAG: NORMAL
WHOLE BLOOD HOLD SPECIMEN: NORMAL

## 2023-04-30 PROCEDURE — 86140 C-REACTIVE PROTEIN: CPT | Performed by: PHYSICIAN ASSISTANT

## 2023-04-30 PROCEDURE — 83605 ASSAY OF LACTIC ACID: CPT | Performed by: PHYSICIAN ASSISTANT

## 2023-04-30 PROCEDURE — 85610 PROTHROMBIN TIME: CPT | Performed by: PHYSICIAN ASSISTANT

## 2023-04-30 PROCEDURE — 71260 CT THORAX DX C+: CPT | Performed by: RADIOLOGY

## 2023-04-30 PROCEDURE — 36415 COLL VENOUS BLD VENIPUNCTURE: CPT

## 2023-04-30 PROCEDURE — 84484 ASSAY OF TROPONIN QUANT: CPT | Performed by: PHYSICIAN ASSISTANT

## 2023-04-30 PROCEDURE — 71260 CT THORAX DX C+: CPT

## 2023-04-30 PROCEDURE — 85025 COMPLETE CBC W/AUTO DIFF WBC: CPT | Performed by: PHYSICIAN ASSISTANT

## 2023-04-30 PROCEDURE — 83690 ASSAY OF LIPASE: CPT | Performed by: PHYSICIAN ASSISTANT

## 2023-04-30 PROCEDURE — 80053 COMPREHEN METABOLIC PANEL: CPT | Performed by: PHYSICIAN ASSISTANT

## 2023-04-30 PROCEDURE — 71046 X-RAY EXAM CHEST 2 VIEWS: CPT | Performed by: RADIOLOGY

## 2023-04-30 PROCEDURE — 25510000001 IOPAMIDOL 61 % SOLUTION: Performed by: STUDENT IN AN ORGANIZED HEALTH CARE EDUCATION/TRAINING PROGRAM

## 2023-04-30 PROCEDURE — 85652 RBC SED RATE AUTOMATED: CPT | Performed by: PHYSICIAN ASSISTANT

## 2023-04-30 PROCEDURE — 87040 BLOOD CULTURE FOR BACTERIA: CPT | Performed by: PHYSICIAN ASSISTANT

## 2023-04-30 PROCEDURE — 99283 EMERGENCY DEPT VISIT LOW MDM: CPT

## 2023-04-30 PROCEDURE — 93010 ELECTROCARDIOGRAM REPORT: CPT | Performed by: INTERNAL MEDICINE

## 2023-04-30 PROCEDURE — 93005 ELECTROCARDIOGRAM TRACING: CPT | Performed by: PHYSICIAN ASSISTANT

## 2023-04-30 PROCEDURE — 87636 SARSCOV2 & INF A&B AMP PRB: CPT | Performed by: PHYSICIAN ASSISTANT

## 2023-04-30 PROCEDURE — 71046 X-RAY EXAM CHEST 2 VIEWS: CPT

## 2023-04-30 RX ORDER — HYDROCODONE BITARTRATE AND ACETAMINOPHEN 5; 325 MG/1; MG/1
1 TABLET ORAL EVERY 6 HOURS PRN
Qty: 10 TABLET | Refills: 0 | Status: SHIPPED | OUTPATIENT
Start: 2023-04-30

## 2023-04-30 RX ORDER — SODIUM CHLORIDE 0.9 % (FLUSH) 0.9 %
10 SYRINGE (ML) INJECTION AS NEEDED
Status: DISCONTINUED | OUTPATIENT
Start: 2023-04-30 | End: 2023-04-30 | Stop reason: HOSPADM

## 2023-04-30 RX ADMIN — IOPAMIDOL 100 ML: 612 INJECTION, SOLUTION INTRAVENOUS at 12:28
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
## 2023-05-05 LAB
BACTERIA SPEC AEROBE CULT: NORMAL
BACTERIA SPEC AEROBE CULT: NORMAL

## 2023-05-08 ENCOUNTER — HOSPITAL ENCOUNTER (OUTPATIENT)
Dept: ULTRASOUND IMAGING | Facility: HOSPITAL | Age: 83
Discharge: HOME | End: 2023-05-08
Admitting: PHYSICIAN ASSISTANT
Payer: MEDICARE

## 2023-05-08 DIAGNOSIS — E04.1 THYROID NODULE GREATER THAN OR EQUAL TO 1 CM IN DIAMETER INCIDENTALLY NOTED ON IMAGING STUDY: ICD-10-CM

## 2023-05-08 PROCEDURE — 76536 US EXAM OF HEAD AND NECK: CPT | Performed by: RADIOLOGY

## 2023-05-08 PROCEDURE — 76536 US EXAM OF HEAD AND NECK: CPT

## 2023-05-09 ENCOUNTER — OFFICE VISIT (OUTPATIENT)
Dept: CARDIOLOGY | Facility: CLINIC | Age: 83
End: 2023-05-09
Payer: MEDICARE

## 2023-05-09 VITALS
HEIGHT: 66 IN | DIASTOLIC BLOOD PRESSURE: 58 MMHG | OXYGEN SATURATION: 97 % | SYSTOLIC BLOOD PRESSURE: 122 MMHG | WEIGHT: 187.8 LBS | BODY MASS INDEX: 30.18 KG/M2 | HEART RATE: 67 BPM

## 2023-05-09 DIAGNOSIS — E78.5 DYSLIPIDEMIA: Chronic | ICD-10-CM

## 2023-05-09 DIAGNOSIS — I10 ESSENTIAL HYPERTENSION: Chronic | ICD-10-CM

## 2023-05-09 DIAGNOSIS — I48.11 LONGSTANDING PERSISTENT ATRIAL FIBRILLATION: Primary | Chronic | ICD-10-CM

## 2023-05-09 PROCEDURE — 3074F SYST BP LT 130 MM HG: CPT | Performed by: NURSE PRACTITIONER

## 2023-05-09 PROCEDURE — 99214 OFFICE O/P EST MOD 30 MIN: CPT | Performed by: NURSE PRACTITIONER

## 2023-05-09 PROCEDURE — 1160F RVW MEDS BY RX/DR IN RCRD: CPT | Performed by: NURSE PRACTITIONER

## 2023-05-09 PROCEDURE — 1159F MED LIST DOCD IN RCRD: CPT | Performed by: NURSE PRACTITIONER

## 2023-05-09 PROCEDURE — 3078F DIAST BP <80 MM HG: CPT | Performed by: NURSE PRACTITIONER

## 2023-05-09 NOTE — PROGRESS NOTES
"Chief Complaint  Atrial Fibrillation (F/U), Hypertension, DYSLIPIDEMIA (F/U), Follow-up (Pt denies CP, no other complaints), and Med Review (Tolerating medications well.)    Subjective          Aramis Carson presents to [facility] CARDIOLOGY for follow up.    History of Present Illness    Aramis was last seen in clinic on 3/30/2023.  At that visit he was having difficulty with elevated blood pressures.  His medications were adjusted.  On 4/20/2023 he presented to the ED with complaint of chest wall pain radiating to his back.  He had been doing something sitting approximately 2 weeks previously when the wind blew a piece of the fencing into his chest causing him to fall backwards.  He reported that since that time he has had epigastric abdominal pain that radiates up to his chest.  He was noted to have community-acquired bilateral lobe pneumonia per chest x-ray.  He was given prescription for cefdinir and doxycycline.  He subsequently presented again to the ED on 4/30/2023 with ongoing complaint of chest wall pain.  He reported pain increased with movement.  He was diagnosed with chest wall pain and given prescription for pain medication.    At today's visit Aramis states that he is doing very well.  He denies any chest pain.  He states that the pain he was having previously and required him to go to the ED twice has resolved completely.  He denies any shortness of breath or dyspnea on exertion.  He also states that since being placed on Entresto his blood pressure has remained stable and well controlled.    Objective     Vital Signs:   /58   Pulse 67   Ht 167.6 cm (66\")   Wt 85.2 kg (187 lb 12.8 oz)   SpO2 97%   BMI 30.31 kg/m²       Physical Exam  Vitals reviewed.   Constitutional:       Appearance: Normal appearance. He is well-developed.   Cardiovascular:      Rate and Rhythm: Normal rate and regular rhythm.      Heart sounds: No murmur heard.    No friction rub. No gallop. "   Pulmonary:      Effort: Pulmonary effort is normal. No respiratory distress.      Breath sounds: Normal breath sounds. No wheezing or rales.   Skin:     General: Skin is warm and dry.   Neurological:      Mental Status: He is alert and oriented to person, place, and time.   Psychiatric:         Mood and Affect: Mood normal.         Behavior: Behavior normal.          Result Review :                Current Outpatient Medications   Medication Sig Dispense Refill   • baclofen (LIORESAL) 10 MG tablet Take 1 tablet by mouth 3 (Three) Times a Day.     • famotidine (PEPCID) 20 MG tablet Take 1 tablet by mouth 2 (Two) Times a Day.     • glipiZIDE XL 2.5 MG 24 hr tablet      • hydrALAZINE (APRESOLINE) 50 MG tablet Take 1 tablet by mouth 3 (Three) Times a Day. (Patient taking differently: Take 1 tablet by mouth 2 (Two) Times a Day.) 180 tablet 3   • hydroCHLOROthiazide (HYDRODIURIL) 12.5 MG tablet hydrochlorothiazide 12.5 mg tablet     • HYDROcodone-acetaminophen (NORCO) 5-325 MG per tablet Take 1 tablet by mouth Every 6 (Six) Hours As Needed for Severe Pain. 10 tablet 0   • HYDROcodone-acetaminophen (NORCO) 7.5-325 MG per tablet Take 1 tablet by mouth Every 6 (Six) Hours As Needed for Moderate Pain.     • metoprolol succinate XL (TOPROL-XL) 25 MG 24 hr tablet TAKE 1 TABLET DAILY 60 tablet 5   • PrilOSEC 10 MG pack      • sacubitril-valsartan (Entresto) 24-26 MG tablet Take 1 tablet by mouth 2 (Two) Times a Day. 180 tablet 3   • simvastatin (ZOCOR) 20 MG tablet Take 1 tablet by mouth Every Night. 90 tablet 2   • simvastatin (ZOCOR) 20 MG tablet Take 1 tablet by mouth Every Night.     • vitamin B-12 (CYANOCOBALAMIN) 1000 MCG tablet Take 1 tablet by mouth Daily.     • vitamin D (ERGOCALCIFEROL) 1.25 MG (93061 UT) capsule capsule      • warfarin (COUMADIN) 5 MG tablet Take 3 mg by mouth Daily. 4.5mg mon,wed, &Friday. 3mg Tue ,Thur,Sat & Sunday.       No current facility-administered medications for this visit.             Assessment and Plan    Problem List Items Addressed This Visit        Cardiac and Vasculature    Essential hypertension (Chronic)    Atrial fibrillation (HCC) - Primary (Chronic)    Overview     · [id number] is 4 for hypertension, age, diabetes.  · Chronically anticoagulated on Coumadin.         Dyslipidemia (Chronic)    Overview     · 10/5/2020 total cholesterol 142, triglycerides 113, HDL 40, and LDL 79.                Follow Up     Medications were reviewed with the patient.    Continue current medications for hypertension which is now stabilized.    A-fib is stable.  Patient is to continue Coumadin.[id number] Score: 5 (5/9/2023  2:30 PM)    Continue simvastatin for dyslipidemia.  We will request labs from patient's primary care provider.    Return in about 6 months (around 11/9/2023).    Patient was given instructions and counseling regarding his condition or for health maintenance advice. Please see specific information pulled into the AVS if appropriate.

## 2023-05-11 ENCOUNTER — TELEPHONE (OUTPATIENT)
Dept: PULMONOLOGY | Facility: CLINIC | Age: 83
End: 2023-05-11
Payer: MEDICARE

## 2023-05-11 DIAGNOSIS — E04.1 THYROID NODULE: Primary | ICD-10-CM

## 2023-05-11 NOTE — TELEPHONE ENCOUNTER
Attempted to contact patient to discuss results from thyroid US. Left a voicemail requesting call back.

## 2023-05-12 NOTE — TELEPHONE ENCOUNTER
Patient returned call. Discussed thyroid US results with him as noted below. Will refer to general surgery for consideration of FNA of thyroid nodule.

## 2023-05-25 ENCOUNTER — OFFICE VISIT (OUTPATIENT)
Dept: SURGERY | Facility: CLINIC | Age: 83
End: 2023-05-25
Payer: MEDICARE

## 2023-05-25 VITALS
WEIGHT: 184 LBS | BODY MASS INDEX: 29.57 KG/M2 | SYSTOLIC BLOOD PRESSURE: 117 MMHG | HEIGHT: 66 IN | DIASTOLIC BLOOD PRESSURE: 76 MMHG

## 2023-05-25 DIAGNOSIS — E04.1 THYROID NODULE: Primary | ICD-10-CM

## 2023-05-25 NOTE — PROGRESS NOTES
Subjective   Aramis Carson is a 83 y.o. male.     Chief Complaint: thyroid nodule    History of Present Illness He is a 84 yo who was recently in the hospital with pneumonia and a CT showed a thyroid nodule. He had a US showing a 3 cm nodule in the left thyroid and a 7 mm one on the right. He has no neck symptoms. He did have something in his thyroid biopsied years ago but cannot remember the details. He is on coumadin.    The following portions of the patient's history were reviewed and updated as appropriate: current medications, past family history, past medical history, past social history, past surgical history and problem list.    Review of Systems    Objective   Physical Exam no easily defined mass in the left thyroid or right side. No tenderness or adenopathy    Past Medical History:   Diagnosis Date   • Atrial fibrillation    • Coronary artery disease    • Hyperlipidemia    • Pre-diabetes        Family History   Problem Relation Age of Onset   • Cancer Sister    • Diabetes Sister    • Cancer Brother    • Heart disease Brother    • Diabetes Maternal Grandmother    • Hypertension Neg Hx        Social History     Tobacco Use   • Smoking status: Former     Types: Pipe, Cigars   • Smokeless tobacco: Never   Vaping Use   • Vaping Use: Never used   Substance Use Topics   • Alcohol use: No   • Drug use: No       Past Surgical History:   Procedure Laterality Date   • CARDIAC CATHETERIZATION     • COLONOSCOPY     • CYSTOSCOPY TRANSURETHRAL RESECTION OF PROSTATE     • INGUINAL HERNIA REPAIR     • LAPAROSCOPIC CHOLECYSTECTOMY         Current Outpatient Medications   Medication Instructions   • baclofen (LIORESAL) 10 mg, Oral, 3 Times Daily   • famotidine (PEPCID) 20 mg, Oral, 2 Times Daily   • glipiZIDE XL 2.5 MG 24 hr tablet No dose, route, or frequency recorded.   • hydrALAZINE (APRESOLINE) 50 mg, Oral, 3 Times Daily   • hydroCHLOROthiazide (HYDRODIURIL) 12.5 MG tablet hydrochlorothiazide 12.5 mg tablet   •  HYDROcodone-acetaminophen (NORCO) 5-325 MG per tablet 1 tablet, Oral, Every 6 Hours PRN   • HYDROcodone-acetaminophen (NORCO) 7.5-325 MG per tablet 1 tablet, Every 6 Hours PRN   • metoprolol succinate XL (TOPROL-XL) 25 MG 24 hr tablet TAKE 1 TABLET DAILY   • PrilOSEC 10 MG pack No dose, route, or frequency recorded.   • sacubitril-valsartan (Entresto) 24-26 MG tablet 1 tablet, Oral, 2 Times Daily   • simvastatin (ZOCOR) 20 mg, Oral, Nightly   • simvastatin (ZOCOR) 20 mg, Oral, Nightly   • vitamin B-12 (CYANOCOBALAMIN) 1,000 mcg, Oral, Daily   • vitamin D (ERGOCALCIFEROL) 1.25 MG (33722 UT) capsule capsule No dose, route, or frequency recorded.   • warfarin (COUMADIN) 3 mg, Oral, Daily Warfarin, 4.5mg mon,wed, &Friday. 3mg Tue ,Thur,Sat & Sunday.         Assessment & Plan   Diagnoses and all orders for this visit:    1. Thyroid nodule (Primary)    US guided FNA of left thyroid nodule

## 2023-05-26 DIAGNOSIS — E04.1 THYROID NODULE: Primary | ICD-10-CM

## 2023-06-13 ENCOUNTER — HOSPITAL ENCOUNTER (OUTPATIENT)
Dept: ULTRASOUND IMAGING | Facility: HOSPITAL | Age: 83
Discharge: HOME | End: 2023-06-13
Admitting: SURGERY
Payer: MEDICARE

## 2023-06-13 VITALS
HEART RATE: 69 BPM | DIASTOLIC BLOOD PRESSURE: 75 MMHG | RESPIRATION RATE: 16 BRPM | OXYGEN SATURATION: 98 % | SYSTOLIC BLOOD PRESSURE: 134 MMHG

## 2023-06-13 DIAGNOSIS — E04.1 THYROID NODULE: ICD-10-CM

## 2023-06-13 PROCEDURE — 10005 FNA BX W/US GDN 1ST LES: CPT | Performed by: RADIOLOGY

## 2023-06-13 PROCEDURE — 76942 ECHO GUIDE FOR BIOPSY: CPT

## 2023-06-14 LAB — REF LAB TEST METHOD: NORMAL

## 2023-06-20 PROBLEM — Z87.891 FORMER SMOKER: Status: ACTIVE | Noted: 2023-06-20

## 2023-06-20 PROBLEM — R91.1 PULMONARY NODULE: Status: ACTIVE | Noted: 2023-06-20

## 2023-08-07 ENCOUNTER — TRANSCRIBE ORDERS (OUTPATIENT)
Dept: ADMINISTRATIVE | Facility: HOSPITAL | Age: 83
End: 2023-08-07
Payer: MEDICARE

## 2023-08-07 ENCOUNTER — LAB (OUTPATIENT)
Dept: LAB | Facility: HOSPITAL | Age: 83
End: 2023-08-07
Payer: MEDICARE

## 2023-08-07 ENCOUNTER — TELEPHONE (OUTPATIENT)
Dept: CARDIOLOGY | Facility: CLINIC | Age: 83
End: 2023-08-07
Payer: MEDICARE

## 2023-08-07 DIAGNOSIS — E53.9 VITAMIN B DEFICIENCY: ICD-10-CM

## 2023-08-07 DIAGNOSIS — E78.5 HYPERLIPIDEMIA, UNSPECIFIED HYPERLIPIDEMIA TYPE: ICD-10-CM

## 2023-08-07 DIAGNOSIS — E55.9 VITAMIN D DEFICIENCY: ICD-10-CM

## 2023-08-07 DIAGNOSIS — R73.9 ELEVATED BLOOD SUGAR: ICD-10-CM

## 2023-08-07 DIAGNOSIS — E03.9 HYPOTHYROIDISM, UNSPECIFIED TYPE: ICD-10-CM

## 2023-08-07 DIAGNOSIS — I10 ESSENTIAL HYPERTENSION, MALIGNANT: Primary | ICD-10-CM

## 2023-08-07 DIAGNOSIS — I10 ESSENTIAL HYPERTENSION, MALIGNANT: ICD-10-CM

## 2023-08-07 PROCEDURE — 82306 VITAMIN D 25 HYDROXY: CPT

## 2023-08-07 PROCEDURE — 84443 ASSAY THYROID STIM HORMONE: CPT

## 2023-08-07 PROCEDURE — 84481 FREE ASSAY (FT-3): CPT

## 2023-08-07 PROCEDURE — 80053 COMPREHEN METABOLIC PANEL: CPT

## 2023-08-07 PROCEDURE — 85025 COMPLETE CBC W/AUTO DIFF WBC: CPT

## 2023-08-07 PROCEDURE — 84439 ASSAY OF FREE THYROXINE: CPT

## 2023-08-07 PROCEDURE — 83036 HEMOGLOBIN GLYCOSYLATED A1C: CPT

## 2023-08-07 PROCEDURE — 36415 COLL VENOUS BLD VENIPUNCTURE: CPT

## 2023-08-07 PROCEDURE — 82607 VITAMIN B-12: CPT

## 2023-08-07 PROCEDURE — 80061 LIPID PANEL: CPT

## 2023-08-07 RX ORDER — HYDRALAZINE HYDROCHLORIDE 25 MG/1
25 TABLET, FILM COATED ORAL 2 TIMES DAILY
Qty: 60 TABLET | Refills: 1 | Status: SHIPPED | OUTPATIENT
Start: 2023-08-07 | End: 2023-08-09

## 2023-08-07 RX ORDER — HYDRALAZINE HYDROCHLORIDE 10 MG/1
10 TABLET, FILM COATED ORAL 2 TIMES DAILY
Qty: 60 TABLET | Refills: 1 | Status: SHIPPED | OUTPATIENT
Start: 2023-08-07 | End: 2023-08-09

## 2023-08-08 LAB
25(OH)D3 SERPL-MCNC: 46.7 NG/ML (ref 30–100)
ALBUMIN SERPL-MCNC: 4.3 G/DL (ref 3.5–5.2)
ALBUMIN/GLOB SERPL: 1.7 G/DL
ALP SERPL-CCNC: 66 U/L (ref 39–117)
ALT SERPL W P-5'-P-CCNC: 18 U/L (ref 1–41)
ANION GAP SERPL CALCULATED.3IONS-SCNC: 8 MMOL/L (ref 5–15)
AST SERPL-CCNC: 21 U/L (ref 1–40)
BASOPHILS # BLD AUTO: 0.05 10*3/MM3 (ref 0–0.2)
BASOPHILS NFR BLD AUTO: 0.6 % (ref 0–1.5)
BILIRUB SERPL-MCNC: 0.3 MG/DL (ref 0–1.2)
BUN SERPL-MCNC: 21 MG/DL (ref 8–23)
BUN/CREAT SERPL: 20.4 (ref 7–25)
CALCIUM SPEC-SCNC: 9.9 MG/DL (ref 8.6–10.5)
CHLORIDE SERPL-SCNC: 106 MMOL/L (ref 98–107)
CHOLEST SERPL-MCNC: 188 MG/DL (ref 0–200)
CO2 SERPL-SCNC: 27 MMOL/L (ref 22–29)
CREAT SERPL-MCNC: 1.03 MG/DL (ref 0.76–1.27)
DEPRECATED RDW RBC AUTO: 40.8 FL (ref 37–54)
EGFRCR SERPLBLD CKD-EPI 2021: 72.1 ML/MIN/1.73
EOSINOPHIL # BLD AUTO: 0.05 10*3/MM3 (ref 0–0.4)
EOSINOPHIL NFR BLD AUTO: 0.6 % (ref 0.3–6.2)
ERYTHROCYTE [DISTWIDTH] IN BLOOD BY AUTOMATED COUNT: 13.7 % (ref 12.3–15.4)
GLOBULIN UR ELPH-MCNC: 2.5 GM/DL
GLUCOSE SERPL-MCNC: 71 MG/DL (ref 65–99)
HBA1C MFR BLD: 6.1 % (ref 4.8–5.6)
HCT VFR BLD AUTO: 44 % (ref 37.5–51)
HDLC SERPL-MCNC: 33 MG/DL (ref 40–60)
HGB BLD-MCNC: 14.1 G/DL (ref 13–17.7)
IMM GRANULOCYTES # BLD AUTO: 0.03 10*3/MM3 (ref 0–0.05)
IMM GRANULOCYTES NFR BLD AUTO: 0.3 % (ref 0–0.5)
LDLC SERPL CALC-MCNC: 111 MG/DL (ref 0–100)
LDLC/HDLC SERPL: 3.15 {RATIO}
LYMPHOCYTES # BLD AUTO: 2.05 10*3/MM3 (ref 0.7–3.1)
LYMPHOCYTES NFR BLD AUTO: 23.3 % (ref 19.6–45.3)
MCH RBC QN AUTO: 26.4 PG (ref 26.6–33)
MCHC RBC AUTO-ENTMCNC: 32 G/DL (ref 31.5–35.7)
MCV RBC AUTO: 82.4 FL (ref 79–97)
MONOCYTES # BLD AUTO: 0.71 10*3/MM3 (ref 0.1–0.9)
MONOCYTES NFR BLD AUTO: 8.1 % (ref 5–12)
NEUTROPHILS NFR BLD AUTO: 5.9 10*3/MM3 (ref 1.7–7)
NEUTROPHILS NFR BLD AUTO: 67.1 % (ref 42.7–76)
NRBC BLD AUTO-RTO: 0 /100 WBC (ref 0–0.2)
PLATELET # BLD AUTO: 273 10*3/MM3 (ref 140–450)
PMV BLD AUTO: 10.7 FL (ref 6–12)
POTASSIUM SERPL-SCNC: 4.8 MMOL/L (ref 3.5–5.2)
PROT SERPL-MCNC: 6.8 G/DL (ref 6–8.5)
RBC # BLD AUTO: 5.34 10*6/MM3 (ref 4.14–5.8)
SODIUM SERPL-SCNC: 141 MMOL/L (ref 136–145)
T3FREE SERPL-MCNC: 2.92 PG/ML (ref 2–4.4)
T4 FREE SERPL-MCNC: 1.19 NG/DL (ref 0.93–1.7)
TRIGL SERPL-MCNC: 255 MG/DL (ref 0–150)
TSH SERPL DL<=0.05 MIU/L-ACNC: 2.8 UIU/ML (ref 0.27–4.2)
VIT B12 BLD-MCNC: 926 PG/ML (ref 211–946)
VLDLC SERPL-MCNC: 44 MG/DL (ref 5–40)
WBC NRBC COR # BLD: 8.79 10*3/MM3 (ref 3.4–10.8)

## 2023-08-09 RX ORDER — HYDRALAZINE HYDROCHLORIDE 10 MG/1
TABLET, FILM COATED ORAL
Qty: 180 TABLET | Refills: 3 | Status: SHIPPED | OUTPATIENT
Start: 2023-08-09

## 2023-08-09 RX ORDER — HYDRALAZINE HYDROCHLORIDE 25 MG/1
TABLET, FILM COATED ORAL
Qty: 180 TABLET | Refills: 3 | Status: SHIPPED | OUTPATIENT
Start: 2023-08-09

## 2023-08-23 ENCOUNTER — OFFICE VISIT (OUTPATIENT)
Dept: PULMONOLOGY | Facility: CLINIC | Age: 83
End: 2023-08-23
Payer: MEDICARE

## 2023-08-23 VITALS
DIASTOLIC BLOOD PRESSURE: 58 MMHG | OXYGEN SATURATION: 97 % | BODY MASS INDEX: 28.93 KG/M2 | SYSTOLIC BLOOD PRESSURE: 106 MMHG | TEMPERATURE: 97.8 F | WEIGHT: 180 LBS | HEIGHT: 66 IN | HEART RATE: 84 BPM

## 2023-08-23 DIAGNOSIS — R93.89 ABNORMAL CT OF THE CHEST: ICD-10-CM

## 2023-08-23 DIAGNOSIS — G47.33 OSA (OBSTRUCTIVE SLEEP APNEA): Primary | ICD-10-CM

## 2023-08-23 DIAGNOSIS — R09.81 NASAL CONGESTION: ICD-10-CM

## 2023-08-23 DIAGNOSIS — Z87.891 FORMER SMOKER: ICD-10-CM

## 2023-08-23 DIAGNOSIS — R91.1 PULMONARY NODULE: ICD-10-CM

## 2023-08-23 PROCEDURE — 1159F MED LIST DOCD IN RCRD: CPT | Performed by: PHYSICIAN ASSISTANT

## 2023-08-23 PROCEDURE — 1160F RVW MEDS BY RX/DR IN RCRD: CPT | Performed by: PHYSICIAN ASSISTANT

## 2023-08-23 PROCEDURE — 3074F SYST BP LT 130 MM HG: CPT | Performed by: PHYSICIAN ASSISTANT

## 2023-08-23 PROCEDURE — 3078F DIAST BP <80 MM HG: CPT | Performed by: PHYSICIAN ASSISTANT

## 2023-08-23 PROCEDURE — 99214 OFFICE O/P EST MOD 30 MIN: CPT | Performed by: PHYSICIAN ASSISTANT

## 2023-08-23 RX ORDER — LORATADINE 10 MG/1
10 TABLET ORAL DAILY PRN
Qty: 30 TABLET | Refills: 6 | Status: SHIPPED | OUTPATIENT
Start: 2023-08-23

## 2023-08-23 RX ORDER — OMEPRAZOLE 40 MG/1
CAPSULE, DELAYED RELEASE ORAL
COMMUNITY

## 2023-08-23 RX ORDER — WARFARIN SODIUM 3 MG/1
TABLET ORAL
COMMUNITY

## 2023-08-23 NOTE — PROGRESS NOTES
"Chief Complaint  Sleep Apnea and URI    Subjective        Aramis Carson presents to [facility] PULMONARY & CRITICAL CARE MEDICINE  History of Present Illness    Mr. Carson presents today for follow up of sleep apnea and pulmonary nodule with previous pneumonia. Also notable for former smoking history.   Tolerating his autopap device well at this time without acute complaints.   He is having some persistent nasal congestion. States that his allergist prescribed him a nasal spray (not sure of the name but believed that it was not flonase or azelastine, possibly started with an \"f\"), but states that this was not effective. No benefit from alterative option of flonase as well. No rhinitis or post nasal drip. No chest congestion. Currently taking an off brand allergy tablet (he believes this is an antihistamine), and denies drowsiness with use. Does not believe that the current medication is claritin (or generic) and interested in trying this.   States that his allergist prescribed another medication that had a side effect of possible suicidal ideations, and decided to not take this medicaiton (likely Singulair). States that he is notable for some environmental allergies, but cannot take allergy shots due to conflicts with cardiac medications.   No significant shortness of breath at this time, thus not problematic.         Objective   Vital Signs:  /58 (BP Location: Left arm, Patient Position: Sitting)   Pulse 84   Temp 97.8 øF (36.6 øC)   Ht 167.6 cm (66\")   Wt 81.6 kg (180 lb)   SpO2 97%   BMI 29.05 kg/mý   Estimated body mass index is 29.05 kg/mý as calculated from the following:    Height as of this encounter: 167.6 cm (66\").    Weight as of this encounter: 81.6 kg (180 lb).         Physical Exam  Vitals reviewed.   Constitutional:       General: He is not in acute distress.     Appearance: He is well-developed. He is not diaphoretic.   HENT:      Head: Normocephalic and atraumatic.      " Nose: Congestion present.   Cardiovascular:      Rate and Rhythm: Normal rate and regular rhythm.   Pulmonary:      Effort: Pulmonary effort is normal.      Breath sounds: No wheezing, rhonchi or rales.   Neurological:      Mental Status: He is alert and oriented to person, place, and time.   Psychiatric:         Behavior: Behavior normal.      Result Review :  The following data was reviewed by: Alison Combs PA-C on 08/23/2023:    Reviewed CT chest imaging/report from April 2023.   Notable for left greater than right irregular opacities.   Also notable for 3.6 mm nodule on image 24.         Assessment and Plan   Diagnoses and all orders for this visit:    1. GARRISON (obstructive sleep apnea) (Primary)    2. Former smoker    3. Nasal congestion  -     loratadine (Claritin) 10 MG tablet; Take 1 tablet by mouth Daily As Needed for Allergies.  Dispense: 30 tablet; Refill: 6    4. Pulmonary nodule    5. Abnormal CT of the chest      Obstructive sleep apnea:   Compliant with autopap device use.         Pulmonary nodule, Abnormal CT chest, Former smoker:   Tiny pulmonary nodule noted on the right lung periphery.   Also notable for irregular opacity of the LLL (minimal opacities of the RLL), suspected pneumonia, in the spring 2023. Recommended follow up imaging to assess for/rule out other causes.   Ordered follow up CT chest without contrast  Does not qualify for LDCT screening.   Reports no significant shortness of breath, no urgent need for PFT.       Nasal congestion:   Ordered Claritin 10 mg PO daily PRN.   Also recommended Afrin for PRN use with congestion   Advised to use 2-3 days, then pause use for 2-3 days to avoid rebound congestion.   Previously followed with allergy specialist, not an appropriate candidate for injections.   Preferred to avoid (likely Singulair) due to possible side effects (stated list said possible depression,suicidal ideation).         Follow Up   Return in about 6 months (around 2/23/2024),  or if symptoms worsen or fail to improve, for Next scheduled follow up.  Patient was given instructions and counseling regarding his condition or for health maintenance advice. Please see specific information pulled into the AVS if appropriate.

## 2023-08-24 RX ORDER — HYDRALAZINE HYDROCHLORIDE 50 MG/1
TABLET, FILM COATED ORAL
Qty: 180 TABLET | Refills: 5 | Status: SHIPPED | OUTPATIENT
Start: 2023-08-24

## 2023-08-30 ENCOUNTER — HOSPITAL ENCOUNTER (OUTPATIENT)
Dept: ULTRASOUND IMAGING | Facility: HOSPITAL | Age: 83
Discharge: HOME | End: 2023-08-30
Admitting: SURGERY
Payer: MEDICARE

## 2023-08-30 DIAGNOSIS — E04.1 THYROID NODULE: ICD-10-CM

## 2023-08-30 PROCEDURE — 76536 US EXAM OF HEAD AND NECK: CPT

## 2023-09-26 ENCOUNTER — OFFICE VISIT (OUTPATIENT)
Dept: SURGERY | Facility: CLINIC | Age: 83
End: 2023-09-26
Payer: MEDICARE

## 2023-09-26 VITALS — WEIGHT: 180 LBS | BODY MASS INDEX: 28.93 KG/M2 | HEIGHT: 66 IN

## 2023-09-26 DIAGNOSIS — E04.1 THYROID NODULE: Primary | ICD-10-CM

## 2023-09-26 PROCEDURE — 1159F MED LIST DOCD IN RCRD: CPT | Performed by: SURGERY

## 2023-09-26 PROCEDURE — 1160F RVW MEDS BY RX/DR IN RCRD: CPT | Performed by: SURGERY

## 2023-09-26 PROCEDURE — 99213 OFFICE O/P EST LOW 20 MIN: CPT | Performed by: SURGERY

## 2023-09-26 NOTE — PROGRESS NOTES
Subjective   Aramis Carson is a 83 y.o. male.     Chief Complaint: left thyroid nodule    History of Present Illness He is a 82 yo who had a left thyroid nodule seen on a CT when being evaluated for something else a few months ago. He had a US guided FNA that was not diagnostic 3 months ago. His follow up US done last month showed no changes. He has had no symptoms.     The following portions of the patient's history were reviewed and updated as appropriate: current medications, past family history, past medical history, past social history, past surgical history and problem list.    Review of Systems    Objective   Physical Exam He has a mildly enlarge thyroid on palpation, but no tenderness and the nodule is not distinctly palpable.     Past Medical History:   Diagnosis Date    Atrial fibrillation     Coronary artery disease     Hyperlipidemia     Pre-diabetes        Family History   Problem Relation Age of Onset    Cancer Sister     Diabetes Sister     Cancer Brother     Heart disease Brother     Diabetes Maternal Grandmother     Hypertension Neg Hx        Social History     Tobacco Use    Smoking status: Former     Types: Pipe, Cigars     Quit date:      Years since quittin.7     Passive exposure: Past    Smokeless tobacco: Never    Tobacco comments:     Smoked for 25 years   Vaping Use    Vaping Use: Never used   Substance Use Topics    Alcohol use: No    Drug use: No       Past Surgical History:   Procedure Laterality Date    CARDIAC CATHETERIZATION      COLONOSCOPY      CYSTOSCOPY TRANSURETHRAL RESECTION OF PROSTATE      INGUINAL HERNIA REPAIR      LAPAROSCOPIC CHOLECYSTECTOMY         Current Outpatient Medications   Medication Instructions    azelastine (ASTELIN) 0.1 % nasal spray 1 spray, Daily    baclofen (LIORESAL) 10 mg, Oral, 3 Times Daily    fluticasone (FLONASE) 50 MCG/ACT nasal spray 1 spray, 2 Times Daily, shake liquid    glipiZIDE XL 2.5 MG 24 hr tablet No dose, route, or frequency  recorded.    hydrALAZINE (APRESOLINE) 25 MG tablet TAKE 1 TABLET BY MOUTH 2 TIMES DAILY    hydrALAZINE (APRESOLINE) 50 MG tablet TAKE 1 TABLET THREE TIMES A DAY    hydroCHLOROthiazide (HYDRODIURIL) 12.5 MG tablet hydrochlorothiazide 12.5 mg tablet    HYDROcodone-acetaminophen (NORCO) 5-325 MG per tablet 1 tablet, Oral, Every 6 Hours PRN    loratadine (CLARITIN) 10 mg, Oral, Daily PRN    meclizine (ANTIVERT) 25 mg, Oral    metoprolol succinate XL (TOPROL-XL) 25 MG 24 hr tablet TAKE 1 TABLET DAILY    olopatadine (PATANOL) 0.1 % ophthalmic solution     omeprazole (priLOSEC) 40 MG capsule TAKE ONE CAPSULE VIA NASOGASTRIC TUBE EVERY DAY 30 MINUTES BEFORE MORNING MEAL    sacubitril-valsartan (Entresto) 24-26 MG tablet 1 tablet, Oral, 2 Times Daily    simvastatin (ZOCOR) 20 mg, Oral, Nightly    vitamin B-12 (CYANOCOBALAMIN) 1,000 mcg, Oral, Daily    vitamin D (ERGOCALCIFEROL) 1.25 MG (82148 UT) capsule capsule No dose, route, or frequency recorded.    warfarin (COUMADIN) 3 MG tablet Take 1 tablet every day by oral route.    warfarin (COUMADIN) 3 mg, Oral, Daily Warfarin, 4.5mg mon,wed, &Friday. 3mg Tue ,Thur,Sat & Sunday.         Assessment & Plan   Diagnoses and all orders for this visit:    1. Thyroid nodule (Primary)    Repeat the US in 6 months

## 2023-09-29 ENCOUNTER — HOSPITAL ENCOUNTER (OUTPATIENT)
Dept: CT IMAGING | Facility: HOSPITAL | Age: 83
Discharge: HOME | End: 2023-09-29
Admitting: PHYSICIAN ASSISTANT
Payer: MEDICARE

## 2023-09-29 DIAGNOSIS — R91.1 PULMONARY NODULE: ICD-10-CM

## 2023-09-29 DIAGNOSIS — Z87.891 FORMER SMOKER: ICD-10-CM

## 2023-09-29 DIAGNOSIS — R93.89 ABNORMAL CT OF THE CHEST: ICD-10-CM

## 2023-09-29 PROCEDURE — 71250 CT THORAX DX C-: CPT

## 2023-10-13 ENCOUNTER — TELEPHONE (OUTPATIENT)
Dept: PULMONOLOGY | Facility: CLINIC | Age: 83
End: 2023-10-13
Payer: MEDICARE

## 2023-10-13 NOTE — TELEPHONE ENCOUNTER
Attempted to call with CT results.   Left message requesting callback.  We will discuss these findings during the next telephone encounter.    Completed resolution of the lower opacities of the right and left lower lungs, likely atypical pneumonia.  Stable small nodules of the right lung listed on the report - stable since April 2023 high-resolution CT imaging.  These can be followed up in 1 year.   Overall, can consider repeat CT scan in 1 year if he wishes to do so.    Also mentioned other incidental findings of the thyroid goiter/nodules.  He has been following up for thyroid nodules and has an repeat ultrasound scheduled for neck  spring.  I will forward this report to Dr. Jackson as well

## 2023-10-27 RX ORDER — HYDRALAZINE HYDROCHLORIDE 10 MG/1
10 TABLET, FILM COATED ORAL 3 TIMES DAILY
Qty: 270 TABLET | Refills: 3 | Status: SHIPPED | OUTPATIENT
Start: 2023-10-27

## 2023-10-27 RX ORDER — HYDRALAZINE HYDROCHLORIDE 50 MG/1
50 TABLET, FILM COATED ORAL 3 TIMES DAILY
Qty: 270 TABLET | Refills: 3 | Status: SHIPPED | OUTPATIENT
Start: 2023-10-27

## 2023-11-16 ENCOUNTER — OFFICE VISIT (OUTPATIENT)
Dept: CARDIOLOGY | Facility: CLINIC | Age: 83
End: 2023-11-16
Payer: MEDICARE

## 2023-11-16 VITALS
OXYGEN SATURATION: 99 % | BODY MASS INDEX: 28.61 KG/M2 | DIASTOLIC BLOOD PRESSURE: 64 MMHG | WEIGHT: 178 LBS | HEART RATE: 91 BPM | SYSTOLIC BLOOD PRESSURE: 115 MMHG | HEIGHT: 66 IN

## 2023-11-16 DIAGNOSIS — E78.5 DYSLIPIDEMIA: Chronic | ICD-10-CM

## 2023-11-16 DIAGNOSIS — I48.11 LONGSTANDING PERSISTENT ATRIAL FIBRILLATION: Primary | Chronic | ICD-10-CM

## 2023-11-16 DIAGNOSIS — I10 ESSENTIAL HYPERTENSION: Chronic | ICD-10-CM

## 2023-11-16 PROCEDURE — 3078F DIAST BP <80 MM HG: CPT | Performed by: NURSE PRACTITIONER

## 2023-11-16 PROCEDURE — 1160F RVW MEDS BY RX/DR IN RCRD: CPT | Performed by: NURSE PRACTITIONER

## 2023-11-16 PROCEDURE — 3074F SYST BP LT 130 MM HG: CPT | Performed by: NURSE PRACTITIONER

## 2023-11-16 PROCEDURE — 1159F MED LIST DOCD IN RCRD: CPT | Performed by: NURSE PRACTITIONER

## 2023-11-16 PROCEDURE — 99214 OFFICE O/P EST MOD 30 MIN: CPT | Performed by: NURSE PRACTITIONER

## 2023-11-16 PROCEDURE — 93000 ELECTROCARDIOGRAM COMPLETE: CPT | Performed by: NURSE PRACTITIONER

## 2023-11-16 RX ORDER — LIDOCAINE 50 MG/G
PATCH TOPICAL
COMMUNITY
Start: 2023-11-08

## 2023-11-16 NOTE — PROGRESS NOTES
"Chief Complaint  Follow-up (Pt denies CP, no other cardiac symptoms.) and Med Review (Tolerating all current medications.)    Subjective          Aramis Carson presents to [facility] CARDIOLOGY for follow up.    History of Present Illness    Aramis was last seen in clinic on 5/9/2023.  He was stable at that time and no changes were made to his medications.    At today's visit offers that he had several pages of his blood pressure diary.  He is mostly pressures show several blood pressures in the 90s with the bulk of his systolic pressures being in the 100s to low 110s.    He denies any signs of chest pain, palpitations, shortness of breath or dyspnea on exertion.  He denies any associated dizziness with his low blood pressures.  He reports medication compliance.    Objective     Vital Signs:   /64   Pulse 91   Ht 167.6 cm (66\")   Wt 80.7 kg (178 lb)   SpO2 99%   BMI 28.73 kg/m²       Physical Exam  Constitutional:       Appearance: Normal appearance. He is well-developed.   Cardiovascular:      Rate and Rhythm: Normal rate. Rhythm irregular.      Heart sounds: No murmur heard.     No friction rub. No gallop.   Pulmonary:      Effort: Pulmonary effort is normal. No respiratory distress.      Breath sounds: Normal breath sounds. No wheezing or rales.   Skin:     General: Skin is warm and dry.   Neurological:      Mental Status: He is alert and oriented to person, place, and time.   Psychiatric:         Mood and Affect: Mood normal.         Behavior: Behavior normal.          Result Review :            ECG 12 Lead    Date/Time: 11/16/2023 10:05 AM  Performed by: Jaimie Nuno APRN    Authorized by: Jaimie Nuno APRN  Comparison: compared with previous ECG from 4/30/2023  Similar to previous ECG  Rhythm: atrial fibrillation  Rate: normal  BPM: 64  Q waves: II, III, aVF and V1-V6    Comments: Q waves noted in leads II, III, aVF, and V1 through V6 were present on or before " 2/23/2018          Most recent Stress Test  Results for orders placed during the hospital encounter of 01/08/21    Stress Test With Myocardial Perfusion One Day    Interpretation Summary  · Left ventricular ejection fraction is hyperdynamic (Calculated EF > 70%). .  · Myocardial perfusion imaging indicates a normal myocardial perfusion study with no evidence of ischemia.  · Impressions are consistent with a low risk study.  · Findings consistent with a normal ECG stress test.  · No change since prev study of 5/16/19       Most recent Cardiac Cath      Most recent Echocardiogram  Results for orders placed during the hospital encounter of 02/03/23    Adult Transthoracic Echo Complete W/ Cont if Necessary Per Protocol    Interpretation Summary    Left ventricular systolic function is mildly decreased. Left ventricular ejection fraction appears to be 46 - 50%.    Left ventricular diastolic function was normal.    Estimated right ventricular systolic pressure from tricuspid regurgitation is mildly elevated (35-45 mmHg).        Current Outpatient Medications   Medication Sig Dispense Refill    azelastine (ASTELIN) 0.1 % nasal spray 1 spray Daily.      baclofen (LIORESAL) 10 MG tablet Take 1 tablet by mouth Every Night.      fluticasone (FLONASE) 50 MCG/ACT nasal spray 1 spray 2 (Two) Times a Day. shake liquid      glipiZIDE XL 2.5 MG 24 hr tablet       hydrALAZINE (APRESOLINE) 50 MG tablet Take 1 tablet by mouth 3 (Three) Times a Day. Total 60 mg three times daily (Patient taking differently: Take 1 tablet by mouth 2 (Two) Times a Day. Total 60 mg three times daily) 270 tablet 3    hydroCHLOROthiazide (HYDRODIURIL) 12.5 MG tablet hydrochlorothiazide 12.5 mg tablet      HYDROcodone-acetaminophen (NORCO) 5-325 MG per tablet Take 1 tablet by mouth Every 6 (Six) Hours As Needed for Severe Pain. 10 tablet 0    lidocaine (LIDODERM) 5 %       loratadine (Claritin) 10 MG tablet Take 1 tablet by mouth Daily As Needed for  Allergies. 30 tablet 6    meclizine (ANTIVERT) 25 MG tablet Take 1 tablet by mouth Daily As Needed.      metoprolol succinate XL (TOPROL-XL) 25 MG 24 hr tablet TAKE 1 TABLET DAILY 60 tablet 5    olopatadine (PATANOL) 0.1 % ophthalmic solution       omeprazole (priLOSEC) 40 MG capsule 2 (Two) Times a Day.      sacubitril-valsartan (Entresto) 24-26 MG tablet Take 1 tablet by mouth 2 (Two) Times a Day. 180 tablet 3    simvastatin (ZOCOR) 20 MG tablet Take 1 tablet by mouth Every Night.      warfarin (COUMADIN) 3 MG tablet Tues, Thurs, Sat, and Sunday      warfarin (COUMADIN) 6 MG tablet Take 0.5 tablets by mouth Daily. 4.5mg mon,wed, &Friday. 3mg Tue ,Thur,Sat & Sunday.       No current facility-administered medications for this visit.            Assessment and Plan    Problem List Items Addressed This Visit          Cardiac and Vasculature    Essential hypertension (Chronic)    Atrial fibrillation - Primary (Chronic)    Overview     [id number] is 4 for hypertension, age, diabetes.  Chronically anticoagulated on Coumadin.         Dyslipidemia (Chronic)    Overview     10/5/2020 total cholesterol 142, triglycerides 113, HDL 40, and LDL 79.                Follow Up     Medications were reviewed with the patient.    Continue Coumadin for stroke prophylaxis in the presence of persistent atrial fibrillation.  [id number] is at least 4.  I did discuss changing patient to NOAC such as Eliquis or Xarelto.  In the past he has been reluctant to change.    Hypertension is perhaps a little overcontrolled.  I did ask him to decrease his hydralazine to just 50 mg twice daily.  I asked him to continue to monitor his blood pressures and notify the office if he was having any difficulties.    Continue simvastatin for dyslipidemia.  Labs requested from PCP office.      Return in about 6 months (around 5/16/2024).    Patient was given instructions and counseling regarding his condition or for health maintenance advice. Please see  specific information pulled into the AVS if appropriate.

## 2023-12-03 ENCOUNTER — APPOINTMENT (OUTPATIENT)
Dept: GENERAL RADIOLOGY | Facility: HOSPITAL | Age: 83
DRG: 286 | End: 2023-12-03
Payer: MEDICARE

## 2023-12-03 ENCOUNTER — HOSPITAL ENCOUNTER (INPATIENT)
Facility: HOSPITAL | Age: 83
LOS: 3 days | Discharge: HOME | DRG: 286 | End: 2023-12-10
Attending: EMERGENCY MEDICINE | Admitting: INTERNAL MEDICINE
Payer: MEDICARE

## 2023-12-03 DIAGNOSIS — I20.0 UNSTABLE ANGINA PECTORIS: ICD-10-CM

## 2023-12-03 DIAGNOSIS — R07.9 CHEST PAIN, UNSPECIFIED TYPE: Primary | ICD-10-CM

## 2023-12-03 LAB
ALBUMIN SERPL-MCNC: 3.9 G/DL (ref 3.5–5.2)
ALBUMIN/GLOB SERPL: 1.6 G/DL
ALP SERPL-CCNC: 59 U/L (ref 39–117)
ALT SERPL W P-5'-P-CCNC: 13 U/L (ref 1–41)
ANION GAP SERPL CALCULATED.3IONS-SCNC: 12 MMOL/L (ref 5–15)
AST SERPL-CCNC: 15 U/L (ref 1–40)
BASOPHILS # BLD AUTO: 0.02 10*3/MM3 (ref 0–0.2)
BASOPHILS NFR BLD AUTO: 0.2 % (ref 0–1.5)
BILIRUB SERPL-MCNC: 0.6 MG/DL (ref 0–1.2)
BUN SERPL-MCNC: 23 MG/DL (ref 8–23)
BUN/CREAT SERPL: 19.8 (ref 7–25)
CALCIUM SPEC-SCNC: 9.1 MG/DL (ref 8.6–10.5)
CHLORIDE SERPL-SCNC: 104 MMOL/L (ref 98–107)
CO2 SERPL-SCNC: 25 MMOL/L (ref 22–29)
CREAT SERPL-MCNC: 1.16 MG/DL (ref 0.76–1.27)
DEPRECATED RDW RBC AUTO: 44.1 FL (ref 37–54)
EGFRCR SERPLBLD CKD-EPI 2021: 62.5 ML/MIN/1.73
EOSINOPHIL # BLD AUTO: 0.07 10*3/MM3 (ref 0–0.4)
EOSINOPHIL NFR BLD AUTO: 0.7 % (ref 0.3–6.2)
ERYTHROCYTE [DISTWIDTH] IN BLOOD BY AUTOMATED COUNT: 14 % (ref 12.3–15.4)
GEN 5 2HR TROPONIN T REFLEX: 19 NG/L
GLOBULIN UR ELPH-MCNC: 2.5 GM/DL
GLUCOSE SERPL-MCNC: 113 MG/DL (ref 65–99)
HCT VFR BLD AUTO: 45.8 % (ref 37.5–51)
HGB BLD-MCNC: 14.4 G/DL (ref 13–17.7)
HOLD SPECIMEN: NORMAL
HOLD SPECIMEN: NORMAL
IMM GRANULOCYTES # BLD AUTO: 0.04 10*3/MM3 (ref 0–0.05)
IMM GRANULOCYTES NFR BLD AUTO: 0.4 % (ref 0–0.5)
INR PPP: 2.91 (ref 0.9–1.1)
LYMPHOCYTES # BLD AUTO: 1.78 10*3/MM3 (ref 0.7–3.1)
LYMPHOCYTES NFR BLD AUTO: 18.6 % (ref 19.6–45.3)
MCH RBC QN AUTO: 27.1 PG (ref 26.6–33)
MCHC RBC AUTO-ENTMCNC: 31.4 G/DL (ref 31.5–35.7)
MCV RBC AUTO: 86.1 FL (ref 79–97)
MONOCYTES # BLD AUTO: 0.68 10*3/MM3 (ref 0.1–0.9)
MONOCYTES NFR BLD AUTO: 7.1 % (ref 5–12)
NEUTROPHILS NFR BLD AUTO: 6.98 10*3/MM3 (ref 1.7–7)
NEUTROPHILS NFR BLD AUTO: 73 % (ref 42.7–76)
NRBC BLD AUTO-RTO: 0 /100 WBC (ref 0–0.2)
PLATELET # BLD AUTO: 225 10*3/MM3 (ref 140–450)
PMV BLD AUTO: 10.3 FL (ref 6–12)
POTASSIUM SERPL-SCNC: 4.1 MMOL/L (ref 3.5–5.2)
PROT SERPL-MCNC: 6.4 G/DL (ref 6–8.5)
PROTHROMBIN TIME: 31.1 SECONDS (ref 12.1–14.7)
QT INTERVAL: 436 MS
QTC INTERVAL: 480 MS
RBC # BLD AUTO: 5.32 10*6/MM3 (ref 4.14–5.8)
SODIUM SERPL-SCNC: 141 MMOL/L (ref 136–145)
TROPONIN T DELTA: 3 NG/L
TROPONIN T SERPL HS-MCNC: 16 NG/L
WBC NRBC COR # BLD AUTO: 9.57 10*3/MM3 (ref 3.4–10.8)
WHOLE BLOOD HOLD COAG: NORMAL
WHOLE BLOOD HOLD SPECIMEN: NORMAL

## 2023-12-03 PROCEDURE — 94799 UNLISTED PULMONARY SVC/PX: CPT

## 2023-12-03 PROCEDURE — 80053 COMPREHEN METABOLIC PANEL: CPT

## 2023-12-03 PROCEDURE — 36415 COLL VENOUS BLD VENIPUNCTURE: CPT

## 2023-12-03 PROCEDURE — G0378 HOSPITAL OBSERVATION PER HR: HCPCS

## 2023-12-03 PROCEDURE — 25010000002 ENOXAPARIN PER 10 MG: Performed by: INTERNAL MEDICINE

## 2023-12-03 PROCEDURE — 99223 1ST HOSP IP/OBS HIGH 75: CPT | Performed by: INTERNAL MEDICINE

## 2023-12-03 PROCEDURE — 84484 ASSAY OF TROPONIN QUANT: CPT

## 2023-12-03 PROCEDURE — 71045 X-RAY EXAM CHEST 1 VIEW: CPT

## 2023-12-03 PROCEDURE — 94660 CPAP INITIATION&MGMT: CPT

## 2023-12-03 PROCEDURE — 85610 PROTHROMBIN TIME: CPT

## 2023-12-03 PROCEDURE — 71045 X-RAY EXAM CHEST 1 VIEW: CPT | Performed by: RADIOLOGY

## 2023-12-03 PROCEDURE — 99285 EMERGENCY DEPT VISIT HI MDM: CPT

## 2023-12-03 PROCEDURE — 85025 COMPLETE CBC W/AUTO DIFF WBC: CPT

## 2023-12-03 PROCEDURE — 93005 ELECTROCARDIOGRAM TRACING: CPT | Performed by: EMERGENCY MEDICINE

## 2023-12-03 RX ORDER — HYDRALAZINE HYDROCHLORIDE 50 MG/1
50 TABLET, FILM COATED ORAL 2 TIMES DAILY
Status: CANCELLED | OUTPATIENT
Start: 2023-12-03

## 2023-12-03 RX ORDER — PANTOPRAZOLE SODIUM 40 MG/1
40 TABLET, DELAYED RELEASE ORAL
Status: CANCELLED | OUTPATIENT
Start: 2023-12-04

## 2023-12-03 RX ORDER — HYDROCODONE BITARTRATE AND ACETAMINOPHEN 10; 325 MG/1; MG/1
1 TABLET ORAL EVERY 6 HOURS PRN
COMMUNITY

## 2023-12-03 RX ORDER — POLYETHYLENE GLYCOL 3350 17 G/17G
17 POWDER, FOR SOLUTION ORAL DAILY PRN
Status: DISCONTINUED | OUTPATIENT
Start: 2023-12-03 | End: 2023-12-10 | Stop reason: HOSPADM

## 2023-12-03 RX ORDER — SODIUM CHLORIDE 0.9 % (FLUSH) 0.9 %
10 SYRINGE (ML) INJECTION AS NEEDED
Status: DISCONTINUED | OUTPATIENT
Start: 2023-12-03 | End: 2023-12-10 | Stop reason: HOSPADM

## 2023-12-03 RX ORDER — HYDRALAZINE HYDROCHLORIDE 50 MG/1
50 TABLET, FILM COATED ORAL 2 TIMES DAILY
COMMUNITY
End: 2023-12-10 | Stop reason: HOSPADM

## 2023-12-03 RX ORDER — SODIUM CHLORIDE 9 MG/ML
40 INJECTION, SOLUTION INTRAVENOUS AS NEEDED
Status: DISCONTINUED | OUTPATIENT
Start: 2023-12-03 | End: 2023-12-10 | Stop reason: HOSPADM

## 2023-12-03 RX ORDER — AMOXICILLIN 250 MG
2 CAPSULE ORAL 2 TIMES DAILY
Status: DISCONTINUED | OUTPATIENT
Start: 2023-12-03 | End: 2023-12-10 | Stop reason: HOSPADM

## 2023-12-03 RX ORDER — GLIPIZIDE 5 MG/1
1.25 TABLET ORAL
Status: CANCELLED | OUTPATIENT
Start: 2023-12-04

## 2023-12-03 RX ORDER — FAMOTIDINE 20 MG/1
20 TABLET, FILM COATED ORAL NIGHTLY
COMMUNITY

## 2023-12-03 RX ORDER — HYDROCODONE BITARTRATE AND ACETAMINOPHEN 10; 325 MG/1; MG/1
1 TABLET ORAL EVERY 6 HOURS PRN
Status: CANCELLED | OUTPATIENT
Start: 2023-12-03

## 2023-12-03 RX ORDER — OMEPRAZOLE 20 MG/1
20 CAPSULE, DELAYED RELEASE ORAL 2 TIMES DAILY
COMMUNITY

## 2023-12-03 RX ORDER — BACLOFEN 10 MG/1
10 TABLET ORAL NIGHTLY
COMMUNITY

## 2023-12-03 RX ORDER — WARFARIN SODIUM 3 MG/1
3 TABLET ORAL
Status: CANCELLED | OUTPATIENT
Start: 2023-12-05

## 2023-12-03 RX ORDER — ATORVASTATIN CALCIUM 10 MG/1
10 TABLET, FILM COATED ORAL NIGHTLY
Status: CANCELLED | OUTPATIENT
Start: 2023-12-03

## 2023-12-03 RX ORDER — WARFARIN SODIUM 3 MG/1
3 TABLET ORAL
COMMUNITY
End: 2023-12-10 | Stop reason: HOSPADM

## 2023-12-03 RX ORDER — METOPROLOL SUCCINATE 25 MG/1
25 TABLET, EXTENDED RELEASE ORAL DAILY
Status: CANCELLED | OUTPATIENT
Start: 2023-12-04

## 2023-12-03 RX ORDER — WARFARIN SODIUM 3 MG/1
6 TABLET ORAL
Status: CANCELLED | OUTPATIENT
Start: 2023-12-04

## 2023-12-03 RX ORDER — SODIUM CHLORIDE 0.9 % (FLUSH) 0.9 %
10 SYRINGE (ML) INJECTION EVERY 12 HOURS SCHEDULED
Status: DISCONTINUED | OUTPATIENT
Start: 2023-12-03 | End: 2023-12-10 | Stop reason: HOSPADM

## 2023-12-03 RX ORDER — WARFARIN SODIUM 3 MG/1
6 TABLET ORAL 3 TIMES WEEKLY
COMMUNITY
End: 2023-12-10 | Stop reason: HOSPADM

## 2023-12-03 RX ORDER — ATORVASTATIN CALCIUM 10 MG/1
10 TABLET, FILM COATED ORAL DAILY
Status: CANCELLED | OUTPATIENT
Start: 2023-12-04

## 2023-12-03 RX ORDER — FAMOTIDINE 20 MG/1
20 TABLET, FILM COATED ORAL NIGHTLY
Status: CANCELLED | OUTPATIENT
Start: 2023-12-03

## 2023-12-03 RX ORDER — CETIRIZINE HYDROCHLORIDE 10 MG/1
10 TABLET ORAL DAILY PRN
Status: CANCELLED | OUTPATIENT
Start: 2023-12-03

## 2023-12-03 RX ORDER — ENOXAPARIN SODIUM 100 MG/ML
40 INJECTION SUBCUTANEOUS DAILY
Status: DISCONTINUED | OUTPATIENT
Start: 2023-12-03 | End: 2023-12-04

## 2023-12-03 RX ORDER — ASPIRIN 81 MG/1
324 TABLET, CHEWABLE ORAL ONCE
Status: COMPLETED | OUTPATIENT
Start: 2023-12-03 | End: 2023-12-03

## 2023-12-03 RX ORDER — BACLOFEN 10 MG/1
10 TABLET ORAL NIGHTLY
Status: CANCELLED | OUTPATIENT
Start: 2023-12-03

## 2023-12-03 RX ORDER — HYDROCHLOROTHIAZIDE 25 MG/1
12.5 TABLET ORAL DAILY
Status: CANCELLED | OUTPATIENT
Start: 2023-12-04

## 2023-12-03 RX ORDER — BISACODYL 10 MG
10 SUPPOSITORY, RECTAL RECTAL DAILY PRN
Status: DISCONTINUED | OUTPATIENT
Start: 2023-12-03 | End: 2023-12-10 | Stop reason: HOSPADM

## 2023-12-03 RX ORDER — BISACODYL 5 MG/1
5 TABLET, DELAYED RELEASE ORAL DAILY PRN
Status: DISCONTINUED | OUTPATIENT
Start: 2023-12-03 | End: 2023-12-10 | Stop reason: HOSPADM

## 2023-12-03 RX ORDER — LORATADINE 10 MG/1
10 TABLET ORAL DAILY PRN
COMMUNITY

## 2023-12-03 RX ADMIN — ENOXAPARIN SODIUM 40 MG: 40 INJECTION SUBCUTANEOUS at 18:24

## 2023-12-03 RX ADMIN — Medication 10 ML: at 21:18

## 2023-12-03 RX ADMIN — NITROGLYCERIN 1 INCH: 20 OINTMENT TOPICAL at 10:18

## 2023-12-03 RX ADMIN — ASPIRIN 324 MG: 81 TABLET, CHEWABLE ORAL at 10:10

## 2023-12-03 NOTE — PLAN OF CARE
Goal Outcome Evaluation:      Patient admitted this shift. Patient is resting in bed. Patient has had no complaints of chest pain this shift. No s/s of acute distress noted at this time. Will continue to follow plan of care.

## 2023-12-03 NOTE — Clinical Note
Hemostasis started on the right femoral artery. StarClose SE was used in achieving hemostasis. Closure device deployed in the vessel. Hemostasis achieved successfully. Closure device additional comment: Starclose deployed

## 2023-12-03 NOTE — CASE MANAGEMENT/SOCIAL WORK
Discharge Planning Assessment  [facility]     Patient Name: Aramis Carson  MRN: [id number]  Today's Date: 12/3/2023    Admit Date: 12/3/2023    Plan: Pt lives at home with his spouse Kelley who is at bedside and plans to return home at discharge family to provide transportation. Pcp is Kieran Blanac, he uses [facility] and has Medicare and . Pt uses a cpap from [facility] and a cane prn. Pt does not use home health or home o2.   Discharge Needs Assessment       Row Name 12/03/23 1359       Living Environment    People in Home spouse    Current Living Arrangements home    Potentially Unsafe Housing Conditions none    Primary Care Provided by self    Family Caregiver if Needed spouse    Quality of Family Relationships helpful;involved;supportive    Able to Return to Prior Arrangements yes       Resource/Environmental Concerns    Resource/Environmental Concerns none       Transition Planning    Patient/Family Anticipates Transition to home with family    Patient/Family Anticipated Services at Transition none    Transportation Anticipated family or friend will provide       Discharge Needs Assessment    Readmission Within the Last 30 Days no previous admission in last 30 days    Equipment Currently Used at Home cpap;cane, straight    Concerns to be Addressed no discharge needs identified;denies needs/concerns at this time    Anticipated Changes Related to Illness none    Equipment Needed After Discharge none                   Discharge Plan       Row Name 12/03/23 1400       Plan    Plan Pt lives at home with his spouse Kelley who is at bedside and plans to return home at discharge family to provide transportation. Pcp is Kieran Blanca, he uses [facility] and has Medicare and . Pt uses a cpap from [facility] and a cane prn. Pt does not use home health or home o2.    Patient/Family in Agreement with Plan yes                  Continued Care and Services - Admitted Since 12/3/2023    Coordination has not been  started for this encounter.       Expected Discharge Date and Time       Expected Discharge Date Expected Discharge Time    Dec 5, 2023            Demographic Summary       Row Name 12/03/23 1359       General Information    Admission Type observation    Arrived From home    Referral Source emergency department    Reason for Consult discharge planning                    Krystal Guzman RN

## 2023-12-03 NOTE — H&P
[facility] HOSPITALIST HISTORY AND PHYSICAL    Patient Identification:  Name:  Aramis Carson  Age:  83 y.o.  Sex:  male  :  1940  MRN:  [id number]   Admit Date: 12/3/2023   Visit Number:  [id number]  Primary Care Physician:  Kieran Blanca APRN       Chief complaint: Chest pain    History of presenting illness: Mr. Carson is an 83 y.o. male who presented to [facility] Emergency Department on 12/3/2023 with a chief complaint of chest pain.  Patient has a past medical history remarkable for chronic A-fib, hyperlipidemia and prediabetes.  Patient states he was in his usual state of health up until the day prior to evaluation in the emergency department.  He reports taking a shower at approximately midnight and felt sudden onset left-sided chest pain.  He reported the pain at a 7 on a 1-10 pain scale and stated it was sharp in nature.  He denied any radiation of pain and denied any nausea, vomiting, shortness of breath or diaphoresis.  Patient states he went to bed and when he woke up on the morning of 12/3/2023 he continued having chest pain.  Secondary to persistent chest pain, patient did present to the emergency department for further treatment and evaluation.  Initial evaluation in the emergency department did consist of basic laboratory work as well as physical exam and vital signs.  Initial vital signs found patient's blood pressure 114/68, respirations 18, heart rate 85, temperature 97.3 °F and oxygen saturation 97% on room air.  Initial lab work did include CBC and CMP.  CMP was within normal limits.  CBC was within normal limits.  High-sensitivity troponin x 2 was within normal limits.  EKG was obtained which demonstrated atrial fibrillation with heart rate in the 70s with no acute ST elevation, depression or T wave inversions.  Secondary to complaint of chest pain, ER provider requested admission for further treatment and evaluation of chest  pain.  -------------------------------------------------------------------------------------------------------------------  Past Medical History:   Diagnosis Date    Atrial fibrillation     Coronary artery disease     Hyperlipidemia     Pre-diabetes      Past Surgical History:   Procedure Laterality Date    CARDIAC CATHETERIZATION      COLONOSCOPY      CYSTOSCOPY TRANSURETHRAL RESECTION OF PROSTATE      INGUINAL HERNIA REPAIR      LAPAROSCOPIC CHOLECYSTECTOMY       Family History   Problem Relation Age of Onset    Cancer Sister     Diabetes Sister     Cancer Brother     Heart disease Brother     Diabetes Maternal Grandmother     Hypertension Neg Hx      Social History     Socioeconomic History    Marital status:    Tobacco Use    Smoking status: Former     Types: Pipe, Cigars     Quit date:      Years since quittin.9     Passive exposure: Past    Smokeless tobacco: Never    Tobacco comments:     Smoked for 25 years   Vaping Use    Vaping Use: Never used   Substance and Sexual Activity    Alcohol use: No    Drug use: No    Sexual activity: Defer     ---------------------------------------------------------------------------------------------------------------------   Allergies:  Flomax [tamsulosin], Lisinopril, and Losartan  ---------------------------------------------------------------------------------------------------------------------   Prior to Admission Medications       Prescriptions Last Dose Informant Patient Reported? Taking?    azelastine (ASTELIN) 0.1 % nasal spray   Yes No    1 spray Daily.    baclofen (LIORESAL) 10 MG tablet   Yes No    Take 1 tablet by mouth Every Night.    fluticasone (FLONASE) 50 MCG/ACT nasal spray   Yes No    1 spray 2 (Two) Times a Day. shake liquid    glipiZIDE XL 2.5 MG 24 hr tablet   Yes No    hydrALAZINE (APRESOLINE) 50 MG tablet   No No    Take 1 tablet by mouth 3 (Three) Times a Day. Total 60 mg three times daily    Patient taking differently:  Take 1  tablet by mouth 2 (Two) Times a Day. Total 60 mg three times daily    hydroCHLOROthiazide (HYDRODIURIL) 12.5 MG tablet   Yes No    hydrochlorothiazide 12.5 mg tablet    HYDROcodone-acetaminophen (NORCO) 5-325 MG per tablet   No No    Take 1 tablet by mouth Every 6 (Six) Hours As Needed for Severe Pain.    lidocaine (LIDODERM) 5 %   Yes No    loratadine (Claritin) 10 MG tablet   No No    Take 1 tablet by mouth Daily As Needed for Allergies.    meclizine (ANTIVERT) 25 MG tablet   Yes No    Take 1 tablet by mouth Daily As Needed.    metoprolol succinate XL (TOPROL-XL) 25 MG 24 hr tablet   No No    TAKE 1 TABLET DAILY    olopatadine (PATANOL) 0.1 % ophthalmic solution   Yes No    omeprazole (priLOSEC) 40 MG capsule   Yes No    2 (Two) Times a Day.    sacubitril-valsartan (Entresto) 24-26 MG tablet   No No    Take 1 tablet by mouth 2 (Two) Times a Day.    simvastatin (ZOCOR) 20 MG tablet   Yes No    Take 1 tablet by mouth Every Night.    warfarin (COUMADIN) 3 MG tablet   Yes No    Tues, Thurs, Sat, and Sunday    warfarin (COUMADIN) 6 MG tablet   Yes No    Take 0.5 tablets by mouth Daily. 4.5mg mon,wed, &Friday. 3mg Tue ,Thur,Sat & Sunday.          Hospital Scheduled Meds:  enoxaparin, 40 mg, Subcutaneous, Daily  senna-docusate sodium, 2 tablet, Oral, BID  sodium chloride, 10 mL, Intravenous, Q12H         ---------------------------------------------------------------------------------------------------------------------   Review of Systems   On review of systems the patient denies the following unless noted above:     Constitutional:  Fevers, chills, weight change, fatigue     Eyes: Vision changes, headache, double vision, loss of vision     ENT: Runny nose, nose bleeds, ringing in ears, pain with swallowing, sore throat     Cardiovascular: Chest pains, palpitations, PND, orthopnea     Respiratory: Cough, wheezing, SOA, hemoptysis     GI:  Abdominal pain, diarrhea, constipation, change in stool caliber,    Rectal  bleeding, vomiting or nausea     : Difficulty voiding, dysuria, hematuria     Musculoskeletal: Changes of any chronic joint pain, swelling     Skin: Rash, itching, easy bruisability     Neurological: Unilateral weakness, new onset numbness, speech difficulties     Psychiatric: Sadness, tearfulness, feelings of hopelessness, racing thoughts     Endocrine:  Heat or cold intolerance, mood swings, polydipsia, polyphagia,    recent hypoglycemia  --------------------------------------------------------------------------------------------------------------------  Vital Signs:  Temp:  [97.3 °F (36.3 °C)-97.5 °F (36.4 °C)] 97.5 °F (36.4 °C)  Heart Rate:  [55-88] 67  Resp:  [18-20] 20  BP: ()/(51-69) 108/51      12/03/23  0858 12/03/23  1533   Weight: 80.3 kg (177 lb) 81.1 kg (178 lb 11.2 oz)     Body mass index is 28.84 kg/m².  ---------------------------------------------------------------------------------------------------------------------   Physical exam:  Constitutional: Well-nourished  male in no apparent distress.     HENT:  Head:  Normocephalic and atraumatic.  Mouth:  Moist mucous membranes.    Eyes:  Conjunctivae and EOM are normal.  Pupils are equal, round, and reactive to light.  No scleral icterus.    Neck:  Neck supple. No thyromegaly.  No JVD present.    Cardiovascular:  Regular rate and rhythm with no murmurs, rubs, clicks or gallops appreciated.  Pulmonary/Chest:  Clear to auscultation bilaterally with no crackles, wheezes or rhonchi appreciated.  Abdominal:  Soft. Nontender. Nondistended  Bowel sounds are normal in all four quadrants. No organomegally appreciated.   Musculoskeletal:  No edema, no tenderness, and no deformity.  No red or swollen joints anywhere.    Neurological:  Alert, Cranial nerves II-XII intact with no focal defecits.  No facial droop.  No slurred speech.   Skin:  Warm and dry to palpation with no rashes or lesions appreciated.  Peripheral vascular:  2+ radial and pedal  "pulses in bilateral upper and lower extremities.  Psychiatric:  Alert and oriented x3, demonstrates appropriate judgement and insight.  ------------------------------------------------------------------------------------------------------------  ---------------------------------------------------------------------------------------------------------------------   Results from last 7 days   Lab Units 12/03/23  0916   WBC 10*3/mm3 9.57   HEMOGLOBIN g/dL 14.4   HEMATOCRIT % 45.8   MCV fL 86.1   MCHC g/dL 31.4*   PLATELETS 10*3/mm3 225   INR  2.91*         Results from last 7 days   Lab Units 12/03/23  0916   SODIUM mmol/L 141   POTASSIUM mmol/L 4.1   CHLORIDE mmol/L 104   CO2 mmol/L 25.0   BUN mg/dL 23   CREATININE mg/dL 1.16   CALCIUM mg/dL 9.1   GLUCOSE mg/dL 113*   ALBUMIN g/dL 3.9   BILIRUBIN mg/dL 0.6   ALK PHOS U/L 59   AST (SGOT) U/L 15   ALT (SGPT) U/L 13   Estimated Creatinine Clearance: 48.3 mL/min (by C-G formula based on SCr of 1.16 mg/dL).  No results found for: \"AMMONIA\"  Results from last 7 days   Lab Units 12/03/23  1135 12/03/23  0916   HSTROP T ng/L 19 16         Lab Results   Component Value Date    HGBA1C 6.10 (H) 08/07/2023     Lab Results   Component Value Date    TSH 2.800 08/07/2023    FREET4 1.19 08/07/2023     No results found for: \"PREGTESTUR\", \"PREGSERUM\", \"HCG\", \"HCGQUANT\"  Pain Management Panel          Latest Ref Rng & Units 2/23/2018   Pain Management Panel   Amphetamine, Urine Qual Negative Negative    Barbiturates Screen, Urine Negative Negative    Benzodiazepine Screen, Urine Negative Negative    Buprenorphine, Screen, Urine Negative Negative    Cocaine Screen, Urine Negative Negative    Methadone Screen , Urine Negative Negative      No results found for: \"BLOODCX\"  No results found for: \"URINECX\"  No results found for: \"WOUNDCX\"  No results found for: \"STOOLCX\"    "   ---------------------------------------------------------------------------------------------------------------------  Imaging Results (Last 7 Days)       Procedure Component Value Units Date/Time    XR Chest 1 View [[id number]] Collected: 12/03/23 1036     Updated: 12/03/23 1038    Narrative:      Procedure: Frontal view of chest obtained.     Comparison: None available     History: Chest Pain Protocol     Findings:     No pneumonia or acute process seen in the chest.  Normal heart size and mediastinal contours.  Trachea is in midline position.  No edema or effusion is seen.  There is no evidence of pneumothorax.       Impression:         No evidence of acute disease in the chest.     This report was finalized on 12/3/2023 10:36 AM by Robel Enriquez MD.               I have personally reviewed the radiology images and read the final radiology report.  ---------------------------------------------------------------------------------------------------------------------  Assessment and Plan:    Chest pain -will admit patient to telemetry unit and obtain stress test and transthoracic echocardiogram.  Per ER provider, this case was discussed with on-call cardiology services who requested admission.  As such, will consult cardiology services for further recommendations.    2.  Hyperlipidemia -statin    3.  Prediabetes -last hemoglobin A1c appears well-controlled at 6.1.  Will monitor blood sugar closely and consider starting sliding scale insulin if necessary.    Jose Horne,   12/03/23  18:44 EST

## 2023-12-03 NOTE — ED PROVIDER NOTES
Subjective   History of Present Illness  Aramis is a 89-year-old male who presents with complaints of left-sided chest pain.  He has a past medical history significant for atrial fibrillation, dyslipidemia, hypertension.  He reports that he did have a myocardial infarction in the 70s.  He did not receive any intervention at that time, states that he did need an go to the doctor.  He reports that he has had a history of A-fib for the past 43 years.  Currently anticoagulated chronically on warfarin.  Reports that he had left-sided chest pain began approximately at midnight.  Reports that it has been consistent and steady since.  Denies any ration from his left chest.  Denies any shortness of breath.  Reports that it is a sharp pain.  He did recently have an office visit with cardiology several weeks ago.  At that time they continued warfarin, decrease his hydralazine and continued his statin.  Patient currently complains of pain is 7 out of 10.      Review of Systems   Constitutional:  Negative for activity change, chills and fever.   HENT:  Negative for congestion.    Eyes:  Negative for visual disturbance.   Respiratory:  Negative for shortness of breath.    Cardiovascular:  Positive for chest pain.   Gastrointestinal:  Negative for abdominal pain.   Endocrine: Negative for cold intolerance.   Genitourinary:  Negative for flank pain.   Musculoskeletal:  Negative for arthralgias.   Skin:  Negative for wound.   Allergic/Immunologic: Negative for environmental allergies.   Neurological:  Negative for dizziness.   Hematological:  Negative for adenopathy.   Psychiatric/Behavioral:  Negative for agitation.        Past Medical History:   Diagnosis Date    Atrial fibrillation     Coronary artery disease     Hyperlipidemia     Pre-diabetes        Allergies   Allergen Reactions    Flomax [Tamsulosin] Other (See Comments)     Heart rate to drop and pass out    Lisinopril Unknown (See Comments) and Cough     Pt. States that he  is allergic however, it has been so long since he took it that he cannot remember the reaction that he had at the time.    Losartan Unknown - Low Severity       Past Surgical History:   Procedure Laterality Date    CARDIAC CATHETERIZATION      COLONOSCOPY      CYSTOSCOPY TRANSURETHRAL RESECTION OF PROSTATE      INGUINAL HERNIA REPAIR      LAPAROSCOPIC CHOLECYSTECTOMY         Family History   Problem Relation Age of Onset    Cancer Sister     Diabetes Sister     Cancer Brother     Heart disease Brother     Diabetes Maternal Grandmother     Hypertension Neg Hx        Social History     Socioeconomic History    Marital status:    Tobacco Use    Smoking status: Former     Types: Pipe, Cigars     Quit date:      Years since quittin.9     Passive exposure: Past    Smokeless tobacco: Never    Tobacco comments:     Smoked for 25 years   Vaping Use    Vaping Use: Never used   Substance and Sexual Activity    Alcohol use: No    Drug use: No    Sexual activity: Defer           Objective   Physical Exam  Constitutional:       Appearance: Normal appearance.   HENT:      Head: Normocephalic and atraumatic.      Right Ear: External ear normal.      Left Ear: External ear normal.   Eyes:      Conjunctiva/sclera: Conjunctivae normal.      Pupils: Pupils are equal, round, and reactive to light.   Cardiovascular:      Rate and Rhythm: Normal rate. Rhythm irregular.      Pulses: Normal pulses.      Heart sounds: Normal heart sounds.   Pulmonary:      Effort: Pulmonary effort is normal.      Breath sounds: Normal breath sounds.   Abdominal:      General: Abdomen is flat. Bowel sounds are normal. There is no distension.      Palpations: Abdomen is soft.      Tenderness: There is no abdominal tenderness.   Musculoskeletal:         General: Normal range of motion.      Cervical back: Normal range of motion and neck supple.   Skin:     General: Skin is warm and dry.      Capillary Refill: Capillary refill takes less than 2  seconds.   Neurological:      General: No focal deficit present.      Mental Status: He is alert and oriented to person, place, and time.   Psychiatric:         Mood and Affect: Mood normal.         Behavior: Behavior normal.         Procedures           ED Course  ED Course as of 12/03/23 1415   Sun Dec 03, 2023   0918 ECG 12 Lead Chest Pain  Vent. Rate :  73 BPM     Atrial Rate :  96 BPM     P-R Int :   * ms          QRS Dur :  98 ms      QT Int : 436 ms       P-R-T Axes :   * -74   8 degrees     QTc Int : 480 ms     Atrial fibrillation with a competing junctional pacemaker  Left axis deviation  Low voltage QRS  Inferior infarct (cited on or before 23-FEB-2018)  Cannot rule out Anterior infarct (cited on or before 23-FEB-2018)  Abnormal ECG  When compared with ECG of 30-APR-2023 11:17,  T wave inversion now evident in Inferior leads   [ES]   1038 XR Chest 1 View    Result Date: 12/3/2023   No evidence of acute disease in the chest.  This report was finalized on 12/3/2023 10:36 AM by Robel Enriquez MD.       [CE]   1122 Awaiting second troponin [CE]   1138 Second troponin was just collected, patient reports that his chest pain is much improved with half inch of Nitropaste.  Denies any needs or concerns.  Vital signs stable [CE]   1233 Discussed with Dr. Hull.  Page hospitalist for admission. [CE]   1309 Discussed with Dr. Hall.  Agrees with clinical findings and plan. [CE]   1343 Spoke with Dr. Horne.  Patient will be admitted to the hospital. [CE]      ED Course User Index  [CE] Delfino Pappas APRN  [ES] Lowell Hall MD                HEART Score: 5                              Medical Decision Making  Aramis is a 89-year-old male who presents with complaints of left-sided chest pain.  He has a past medical history significant for atrial fibrillation, dyslipidemia, hypertension.  He reports that he did have a myocardial infarction in the 70s.  He did not receive any intervention at  that time, states that he did need an go to the doctor.  He reports that he has had a history of A-fib for the past 43 years.  Currently anticoagulated chronically on warfarin.  Reports that he had left-sided chest pain began approximately at midnight.  Reports that it has been consistent and steady since.  Denies any ration from his left chest.  Denies any shortness of breath.  Reports that it is a sharp pain.  He did recently have an office visit with cardiology several weeks ago.  At that time they continued warfarin, decrease his hydralazine and continued his statin.  Patient currently complains of pain is 7 out of 10.    Amount and/or Complexity of Data Reviewed  Labs: ordered.  Radiology: ordered.  ECG/medicine tests: ordered. Decision-making details documented in ED Course.    Risk  OTC drugs.  Prescription drug management.  Risk Details: Patient heart score 5, I did discuss this case with Dr. Hull.  Patient will be admitted for chest pain.        Final diagnoses:   Chest pain, unspecified type       ED Disposition  ED Disposition       ED Disposition   Decision to Admit    Condition   --    Comment   Level of Care: Telemetry [5]   Diagnosis: Chest pain [036215]                 No follow-up provider specified.       Medication List      No changes were made to your prescriptions during this visit.            Delfino Pappas, APRN  12/03/23 9983

## 2023-12-04 ENCOUNTER — APPOINTMENT (OUTPATIENT)
Dept: NUCLEAR MEDICINE | Facility: HOSPITAL | Age: 83
DRG: 286 | End: 2023-12-04
Payer: MEDICARE

## 2023-12-04 ENCOUNTER — APPOINTMENT (OUTPATIENT)
Dept: CARDIOLOGY | Facility: HOSPITAL | Age: 83
DRG: 286 | End: 2023-12-04
Payer: MEDICARE

## 2023-12-04 LAB
ANION GAP SERPL CALCULATED.3IONS-SCNC: 10.4 MMOL/L (ref 5–15)
APTT PPP: 44.9 SECONDS (ref 26.5–34.5)
BH CV ECHO MEAS - ACS: 1.6 CM
BH CV ECHO MEAS - AO MAX PG: 6.9 MMHG
BH CV ECHO MEAS - AO MEAN PG: 4 MMHG
BH CV ECHO MEAS - AO ROOT DIAM: 3.1 CM
BH CV ECHO MEAS - AO V2 MAX: 131 CM/SEC
BH CV ECHO MEAS - AO V2 VTI: 24.9 CM
BH CV ECHO MEAS - EDV(CUBED): 68.9 ML
BH CV ECHO MEAS - EDV(MOD-SP4): 41.4 ML
BH CV ECHO MEAS - EF(MOD-BP): 67 %
BH CV ECHO MEAS - EF(MOD-SP4): 67.1 %
BH CV ECHO MEAS - ESV(CUBED): 13.8 ML
BH CV ECHO MEAS - ESV(MOD-SP4): 13.6 ML
BH CV ECHO MEAS - FS: 41.5 %
BH CV ECHO MEAS - IVS/LVPW: 0.91 CM
BH CV ECHO MEAS - IVSD: 1 CM
BH CV ECHO MEAS - LA DIMENSION: 4.7 CM
BH CV ECHO MEAS - LAT PEAK E' VEL: 11 CM/SEC
BH CV ECHO MEAS - LV DIASTOLIC VOL/BSA (35-75): 21.8 CM2
BH CV ECHO MEAS - LV MASS(C)D: 141.5 GRAMS
BH CV ECHO MEAS - LV SYSTOLIC VOL/BSA (12-30): 7.2 CM2
BH CV ECHO MEAS - LVIDD: 4.1 CM
BH CV ECHO MEAS - LVIDS: 2.4 CM
BH CV ECHO MEAS - LVOT AREA: 3.5 CM2
BH CV ECHO MEAS - LVOT DIAM: 2.1 CM
BH CV ECHO MEAS - LVPWD: 1.1 CM
BH CV ECHO MEAS - MED PEAK E' VEL: 10.1 CM/SEC
BH CV ECHO MEAS - MV DEC SLOPE: 507 CM/SEC2
BH CV ECHO MEAS - MV DEC TIME: 0.2 SEC
BH CV ECHO MEAS - MV E MAX VEL: 103 CM/SEC
BH CV ECHO MEAS - PA ACC TIME: 0.11 SEC
BH CV ECHO MEAS - RAP SYSTOLE: 10 MMHG
BH CV ECHO MEAS - RVSP: 41.6 MMHG
BH CV ECHO MEAS - SI(MOD-SP4): 14.6 ML/M2
BH CV ECHO MEAS - SV(MOD-SP4): 27.8 ML
BH CV ECHO MEAS - TAPSE (>1.6): 1.74 CM
BH CV ECHO MEAS - TR MAX PG: 31.6 MMHG
BH CV ECHO MEAS - TR MAX VEL: 281 CM/SEC
BH CV ECHO MEASUREMENTS AVERAGE E/E' RATIO: 9.76
BH CV NUCLEAR PRIOR STUDY: 3
BH CV REST NUCLEAR ISOTOPE DOSE: 10.7 MCI
BH CV STRESS BP STAGE 1: NORMAL
BH CV STRESS COMMENTS STAGE 1: NORMAL
BH CV STRESS DOSE REGADENOSON STAGE 1: 0.4
BH CV STRESS DURATION MIN STAGE 1: 0
BH CV STRESS DURATION SEC STAGE 1: 10
BH CV STRESS HR STAGE 1: 94
BH CV STRESS NUCLEAR ISOTOPE DOSE: 30.2 MCI
BH CV STRESS PROTOCOL 1: NORMAL
BH CV STRESS RECOVERY BP: NORMAL MMHG
BH CV STRESS RECOVERY HR: 88 BPM
BH CV STRESS STAGE 1: 1
BUN SERPL-MCNC: 23 MG/DL (ref 8–23)
BUN/CREAT SERPL: 16.9 (ref 7–25)
CALCIUM SPEC-SCNC: 8.7 MG/DL (ref 8.6–10.5)
CHLORIDE SERPL-SCNC: 106 MMOL/L (ref 98–107)
CO2 SERPL-SCNC: 24.6 MMOL/L (ref 22–29)
CREAT SERPL-MCNC: 1.36 MG/DL (ref 0.76–1.27)
DEPRECATED RDW RBC AUTO: 43.7 FL (ref 37–54)
EGFRCR SERPLBLD CKD-EPI 2021: 51.6 ML/MIN/1.73
ERYTHROCYTE [DISTWIDTH] IN BLOOD BY AUTOMATED COUNT: 14 % (ref 12.3–15.4)
GLUCOSE SERPL-MCNC: 108 MG/DL (ref 65–99)
HCT VFR BLD AUTO: 45.1 % (ref 37.5–51)
HGB BLD-MCNC: 14.1 G/DL (ref 13–17.7)
INR PPP: 2.19 (ref 0.9–1.1)
LEFT ATRIUM VOLUME INDEX: 35.1 ML/M2
LV EF NUC BP: 69 %
MAXIMAL PREDICTED HEART RATE: 137 BPM
MCH RBC QN AUTO: 27 PG (ref 26.6–33)
MCHC RBC AUTO-ENTMCNC: 31.3 G/DL (ref 31.5–35.7)
MCV RBC AUTO: 86.2 FL (ref 79–97)
PERCENT MAX PREDICTED HR: 68.61 %
PLATELET # BLD AUTO: 223 10*3/MM3 (ref 140–450)
PMV BLD AUTO: 10.6 FL (ref 6–12)
POTASSIUM SERPL-SCNC: 4.4 MMOL/L (ref 3.5–5.2)
PROTHROMBIN TIME: 25 SECONDS (ref 12.1–14.7)
RBC # BLD AUTO: 5.23 10*6/MM3 (ref 4.14–5.8)
SODIUM SERPL-SCNC: 141 MMOL/L (ref 136–145)
STRESS BASELINE BP: NORMAL MMHG
STRESS BASELINE HR: 65 BPM
STRESS PERCENT HR: 81 %
STRESS POST PEAK BP: NORMAL MMHG
STRESS POST PEAK HR: 94 BPM
STRESS TARGET HR: 116 BPM
UFH PPP CHRO-ACNC: 0.28 IU/ML (ref 0.3–0.7)
WBC NRBC COR # BLD AUTO: 9.99 10*3/MM3 (ref 3.4–10.8)

## 2023-12-04 PROCEDURE — 94761 N-INVAS EAR/PLS OXIMETRY MLT: CPT

## 2023-12-04 PROCEDURE — A9500 TC99M SESTAMIBI: HCPCS | Performed by: INTERNAL MEDICINE

## 2023-12-04 PROCEDURE — 93306 TTE W/DOPPLER COMPLETE: CPT

## 2023-12-04 PROCEDURE — 80048 BASIC METABOLIC PNL TOTAL CA: CPT | Performed by: INTERNAL MEDICINE

## 2023-12-04 PROCEDURE — 99222 1ST HOSP IP/OBS MODERATE 55: CPT | Performed by: INTERNAL MEDICINE

## 2023-12-04 PROCEDURE — G0378 HOSPITAL OBSERVATION PER HR: HCPCS

## 2023-12-04 PROCEDURE — 94799 UNLISTED PULMONARY SVC/PX: CPT

## 2023-12-04 PROCEDURE — 78452 HT MUSCLE IMAGE SPECT MULT: CPT

## 2023-12-04 PROCEDURE — 99233 SBSQ HOSP IP/OBS HIGH 50: CPT | Performed by: PHYSICIAN ASSISTANT

## 2023-12-04 PROCEDURE — 85610 PROTHROMBIN TIME: CPT | Performed by: PHYSICIAN ASSISTANT

## 2023-12-04 PROCEDURE — 25010000002 HEPARIN (PORCINE) 25000-0.45 UT/250ML-% SOLUTION: Performed by: PHYSICIAN ASSISTANT

## 2023-12-04 PROCEDURE — 0 TECHNETIUM SESTAMIBI: Performed by: INTERNAL MEDICINE

## 2023-12-04 PROCEDURE — 78452 HT MUSCLE IMAGE SPECT MULT: CPT | Performed by: INTERNAL MEDICINE

## 2023-12-04 PROCEDURE — 93017 CV STRESS TEST TRACING ONLY: CPT

## 2023-12-04 PROCEDURE — 85730 THROMBOPLASTIN TIME PARTIAL: CPT | Performed by: PHYSICIAN ASSISTANT

## 2023-12-04 PROCEDURE — 85520 HEPARIN ASSAY: CPT | Performed by: PHYSICIAN ASSISTANT

## 2023-12-04 PROCEDURE — 93018 CV STRESS TEST I&R ONLY: CPT | Performed by: INTERNAL MEDICINE

## 2023-12-04 PROCEDURE — 25010000002 ENOXAPARIN PER 10 MG: Performed by: INTERNAL MEDICINE

## 2023-12-04 PROCEDURE — 25010000002 REGADENOSON 0.4 MG/5ML SOLUTION: Performed by: INTERNAL MEDICINE

## 2023-12-04 PROCEDURE — 94660 CPAP INITIATION&MGMT: CPT

## 2023-12-04 PROCEDURE — 85027 COMPLETE CBC AUTOMATED: CPT | Performed by: INTERNAL MEDICINE

## 2023-12-04 PROCEDURE — 93306 TTE W/DOPPLER COMPLETE: CPT | Performed by: INTERNAL MEDICINE

## 2023-12-04 RX ORDER — ATORVASTATIN CALCIUM 20 MG/1
20 TABLET, FILM COATED ORAL DAILY
Status: DISCONTINUED | OUTPATIENT
Start: 2023-12-04 | End: 2023-12-04

## 2023-12-04 RX ORDER — HEPARIN SODIUM 5000 [USP'U]/ML
25 INJECTION, SOLUTION INTRAVENOUS; SUBCUTANEOUS AS NEEDED
Status: DISCONTINUED | OUTPATIENT
Start: 2023-12-04 | End: 2023-12-05

## 2023-12-04 RX ORDER — HYDROCODONE BITARTRATE AND ACETAMINOPHEN 10; 325 MG/1; MG/1
1 TABLET ORAL EVERY 6 HOURS PRN
Status: DISCONTINUED | OUTPATIENT
Start: 2023-12-04 | End: 2023-12-10 | Stop reason: HOSPADM

## 2023-12-04 RX ORDER — PANTOPRAZOLE SODIUM 40 MG/1
40 TABLET, DELAYED RELEASE ORAL
Status: DISCONTINUED | OUTPATIENT
Start: 2023-12-05 | End: 2023-12-10 | Stop reason: HOSPADM

## 2023-12-04 RX ORDER — CETIRIZINE HYDROCHLORIDE 10 MG/1
10 TABLET ORAL DAILY
Status: DISCONTINUED | OUTPATIENT
Start: 2023-12-04 | End: 2023-12-10 | Stop reason: HOSPADM

## 2023-12-04 RX ORDER — ACETAMINOPHEN 325 MG/1
650 TABLET ORAL EVERY 6 HOURS PRN
Status: DISCONTINUED | OUTPATIENT
Start: 2023-12-04 | End: 2023-12-06

## 2023-12-04 RX ORDER — METOPROLOL SUCCINATE 25 MG/1
25 TABLET, EXTENDED RELEASE ORAL DAILY
Status: DISCONTINUED | OUTPATIENT
Start: 2023-12-04 | End: 2023-12-05

## 2023-12-04 RX ORDER — HEPARIN SODIUM 5000 [USP'U]/ML
4000 INJECTION, SOLUTION INTRAVENOUS; SUBCUTANEOUS AS NEEDED
Status: DISCONTINUED | OUTPATIENT
Start: 2023-12-04 | End: 2023-12-05

## 2023-12-04 RX ORDER — HYDRALAZINE HYDROCHLORIDE 50 MG/1
50 TABLET, FILM COATED ORAL 2 TIMES DAILY
Status: DISCONTINUED | OUTPATIENT
Start: 2023-12-04 | End: 2023-12-09

## 2023-12-04 RX ORDER — FAMOTIDINE 20 MG/1
20 TABLET, FILM COATED ORAL NIGHTLY
Status: DISCONTINUED | OUTPATIENT
Start: 2023-12-04 | End: 2023-12-10 | Stop reason: HOSPADM

## 2023-12-04 RX ORDER — ATORVASTATIN CALCIUM 20 MG/1
20 TABLET, FILM COATED ORAL NIGHTLY
Status: DISCONTINUED | OUTPATIENT
Start: 2023-12-04 | End: 2023-12-10 | Stop reason: HOSPADM

## 2023-12-04 RX ORDER — HEPARIN SODIUM 10000 [USP'U]/100ML
13 INJECTION, SOLUTION INTRAVENOUS
Status: DISCONTINUED | OUTPATIENT
Start: 2023-12-04 | End: 2023-12-06

## 2023-12-04 RX ORDER — BACLOFEN 10 MG/1
10 TABLET ORAL NIGHTLY PRN
Status: DISCONTINUED | OUTPATIENT
Start: 2023-12-04 | End: 2023-12-10 | Stop reason: HOSPADM

## 2023-12-04 RX ORDER — REGADENOSON 0.08 MG/ML
0.4 INJECTION, SOLUTION INTRAVENOUS
Status: COMPLETED | OUTPATIENT
Start: 2023-12-04 | End: 2023-12-04

## 2023-12-04 RX ADMIN — HEPARIN SODIUM 12 UNITS/KG/HR: 10000 INJECTION, SOLUTION INTRAVENOUS at 19:30

## 2023-12-04 RX ADMIN — ATORVASTATIN CALCIUM 20 MG: 20 TABLET, FILM COATED ORAL at 21:45

## 2023-12-04 RX ADMIN — CETIRIZINE HYDROCHLORIDE 10 MG: 10 TABLET, FILM COATED ORAL at 17:18

## 2023-12-04 RX ADMIN — HYDRALAZINE HYDROCHLORIDE 50 MG: 50 TABLET, FILM COATED ORAL at 21:44

## 2023-12-04 RX ADMIN — METOPROLOL SUCCINATE 25 MG: 25 TABLET, FILM COATED, EXTENDED RELEASE ORAL at 17:18

## 2023-12-04 RX ADMIN — ENOXAPARIN SODIUM 40 MG: 40 INJECTION SUBCUTANEOUS at 13:43

## 2023-12-04 RX ADMIN — TECHNETIUM TC 99M SESTAMIBI 1 DOSE: 1 INJECTION INTRAVENOUS at 10:50

## 2023-12-04 RX ADMIN — Medication 10 ML: at 08:28

## 2023-12-04 RX ADMIN — Medication 10 ML: at 21:45

## 2023-12-04 RX ADMIN — FAMOTIDINE 20 MG: 20 TABLET, FILM COATED ORAL at 21:44

## 2023-12-04 RX ADMIN — TECHNETIUM TC 99M SESTAMIBI 1 DOSE: 1 INJECTION INTRAVENOUS at 07:58

## 2023-12-04 RX ADMIN — DOCUSATE SODIUM 50 MG AND SENNOSIDES 8.6 MG 2 TABLET: 8.6; 5 TABLET, FILM COATED ORAL at 21:45

## 2023-12-04 RX ADMIN — REGADENOSON 0.4 MG: 0.08 INJECTION, SOLUTION INTRAVENOUS at 10:49

## 2023-12-04 RX ADMIN — SACUBITRIL AND VALSARTAN 1 TABLET: 24; 26 TABLET, FILM COATED ORAL at 21:44

## 2023-12-04 NOTE — PLAN OF CARE
Goal Outcome Evaluation:  Plan of Care Reviewed With: patient  Progress: no change     Patient resting in bed. No acute signs or symptoms of distress. No complaints at this time. Patient has remained NPO since midnight for stress test this AM. Earlier in shift, patient had runs of V.Tach with heart rate into the 170s. Sherice ESPARZA made aware. No new orders received. Patient refused bath this shift. Plan for Cardiology to see this AM and evaluate next steps post stress test. Will continue to follow with plan of care.

## 2023-12-04 NOTE — CASE MANAGEMENT/SOCIAL WORK
Discharge Planning Assessment  [facility]     Patient Name: Aramis Carson  MRN: [id number]  Today's Date: 12/4/2023    Admit Date: 12/3/2023    Plan: SS received consult per Case Management for discharge planning.  SS spoke with pt at bedside.  Pt resides at home with spouse, Kelely, at [address] [place] [address] and plans to return home at discharge.  Pt currently does not utilize home health services.  Pt has cpap via [facility] and cane prn.  Pt utilizes [facility].  Pt's PCP is Kieran Blanca.  Pt transports via private auto per family.  SS will follow.       Discharge Plan       Row Name 12/04/23 1451       Plan    Plan SS received consult per Case Management for discharge planning.  SS spoke with pt at bedside.  Pt resides at home with spouse, Kelley, at [address] [id number] and plans to return home at discharge.  Pt currently does not utilize home health services.  Pt has cpap via [facility] and cane prn.  Pt utilizes [facility].  Pt's PCP is Kieran Blanca.  Pt transports via private auto per family.  SS will follow.                  Continued Care and Services - Admitted Since 12/3/2023    Coordination has not been started for this encounter.       Expected Discharge Date and Time       Expected Discharge Date Expected Discharge Time    Dec 5, 2023           JERMAINE Ayala

## 2023-12-04 NOTE — PLAN OF CARE
Goal Outcome Evaluation:         Pt has had a non eventful day. Family at bedside. He is scheduled for a stress test and echo. He has been NPO since MN. No complaints of pain. No s/s of distress noted. Will continue to follow plan of care.        Update:1845  Pt has had a non eventful day. No CP. He will be NPO after MN for a heart cath with consent signed and in the chart. He will be started on a heparin gtt but at this time it has not been released for the RN to start. I will pass along to night shift. Continue plan of care.

## 2023-12-04 NOTE — PROGRESS NOTES
[facility] Medicine Services  PROGRESS NOTE     Patient Identification:  Name:  Aramis Carson  Age:  83 y.o.  Sex:  male  :  1940  MRN:  [id number]  Visit Number:  [id number]  Primary Care Provider:  Kieran Blanca APRN    Length of stay:  0    ----------------------------------------------------------------------------------------------------------------------  Subjective     Chief Complaint:  Follow up for chest pain, abnormal stress test     Subjective:  Today, the patient was lying in bed per my evaluation. No acute distress noted. No overnight events/issues reported per nursing staff. Patient denies any further chest pain. No dyspnea. Denies any fevers or chills. No complaints at this time. Stress test abnormal, pending cardiology recommendations.     Present during exam: Patient's wife   ----------------------------------------------------------------------------------------------------------------------  Objective     Consults:  Cardiology      Procedures:  2023: Transthoracic echocardiogram     Left ventricular systolic function is normal. Calculated left ventricular EF = 67% Left ventricular ejection fraction appears to be 66 - 70%.    Left ventricular diastolic function is consistent with (grade I) impaired relaxation.    The left atrial cavity is moderately dilated.    Estimated right ventricular systolic pressure from tricuspid regurgitation is mildly elevated (35-45 mmHg).    Mild pulmonary hypertension is present.  2023: Stress test with myocardial perfusion imaging     A pharmacological stress test was performed using regadenoson without low-level exercise.    Findings consistent with an indeterminate ECG stress test.    Raw images reviewed with the following abnormalities noted: Has 3 bright scintigraphic spots noted in the right lung field.  Clinical correlation required to rule out any pulmonary pathology.    Myocardial  perfusion imaging indicates a moderate-sized, mild degree of ischemia located in the inferior wall and lateral wall.    TID:1.19    Normal LV cavity size. Normal LV wall motion noted.    Left ventricular ejection fraction is normal (Calculated EF = 69%).    Impressions are consistent with an intermediate risk study.    Current Hospital Meds:  atorvastatin, 20 mg, Oral, Daily  enoxaparin, 40 mg, Subcutaneous, Daily  senna-docusate sodium, 2 tablet, Oral, BID  sodium chloride, 10 mL, Intravenous, Q12H         ----------------------------------------------------------------------------------------------------------------------  Vital Signs:  Temp:  [97.1 °F (36.2 °C)-98.6 °F (37 °C)] 97.1 °F (36.2 °C)  Heart Rate:  [67-77] 77  Resp:  [20] 20  BP: (103-141)/(51-62) 141/62  Mean Arterial Pressure (Non-Invasive) for the past 24 hrs (Last 3 readings):   Noninvasive MAP (mmHg)   12/04/23 0708 85   12/04/23 0325 86   12/03/23 2329 84     SpO2 Percentage    12/04/23 0649 12/04/23 0708 12/04/23 1202   SpO2: 95% 98% 97%     SpO2:  [94 %-98 %] 97 %  on   ;   Device (Oxygen Therapy): room air    Body mass index is 28.68 kg/m².  Wt Readings from Last 3 Encounters:   12/04/23 80.6 kg (177 lb 11.2 oz)   11/16/23 80.7 kg (178 lb)   09/26/23 81.6 kg (180 lb)        Intake/Output Summary (Last 24 hours) at 12/4/2023 1430  Last data filed at 12/4/2023 1250  Gross per 24 hour   Intake 480 ml   Output --   Net 480 ml     Diet: Cardiac Diets; Healthy Heart (2-3 Na+); Texture: Regular Texture (IDDSI 7); Fluid Consistency: Thin (IDDSI 0)  NPO Diet NPO Type: Strict NPO  ----------------------------------------------------------------------------------------------------------------------  Physical exam:   Physical Exam  Nursing note reviewed.   Constitutional:       General: He is awake. He is not in acute distress.     Appearance: He is well-developed. He is not toxic-appearing.      Comments: Pleasant. Sitting up in bed. No acute distress  noted.   HENT:      Head: Normocephalic and atraumatic.      Mouth/Throat:      Mouth: Mucous membranes are moist.   Eyes:      Conjunctiva/sclera: Conjunctivae normal.      Pupils: Pupils are equal, round, and reactive to light.   Cardiovascular:      Rate and Rhythm: Normal rate and regular rhythm. Rhythm irregularly irregular.      Pulses:           Dorsalis pedis pulses are 2+ on the right side and 2+ on the left side.      Heart sounds: Normal heart sounds. No murmur heard.     No friction rub. No gallop.   Pulmonary:      Effort: Pulmonary effort is normal.      Breath sounds: Normal breath sounds and air entry. No wheezing, rhonchi or rales.      Comments: On room air. Speaks in full sentences without dyspnea.  Abdominal:      General: Bowel sounds are normal. There is no distension.      Palpations: Abdomen is soft.      Tenderness: There is no abdominal tenderness.   Musculoskeletal:      Cervical back: Neck supple.      Right lower leg: No edema.      Left lower leg: No edema.   Skin:     General: Skin is warm and dry.      Capillary Refill: Capillary refill takes less than 2 seconds.   Neurological:      General: No focal deficit present.      Mental Status: He is alert and oriented to person, place, and time.      Sensory: Sensation is intact.      Motor: Motor function is intact.      Comments: Awake and alert. Follows commands. Answers questions appropriately. Moves all extremities equally. Strength and sensation intact. No focal neuro deficit on exam.   Psychiatric:         Mood and Affect: Mood and affect normal.         Speech: Speech normal.         Behavior: Behavior is cooperative.        ----------------------------------------------------------------------------------------------------------------------  Tele:    Atrial fibrillation 70s    I have personally reviewed the EKG/Telemetry strips    ----------------------------------------------------------------------------------------------------------------------  Results from last 7 days   Lab Units 12/03/23  1135 12/03/23 0916   HSTROP T ng/L 19 16           Results from last 7 days   Lab Units 12/04/23 0213 12/03/23 0916   WBC 10*3/mm3 9.99 9.57   HEMOGLOBIN g/dL 14.1 14.4   HEMATOCRIT % 45.1 45.8   MCV fL 86.2 86.1   MCHC g/dL 31.3* 31.4*   PLATELETS 10*3/mm3 223 225   INR   --  2.91*     Results from last 7 days   Lab Units 12/04/23 0213 12/03/23 0916   SODIUM mmol/L 141 141   POTASSIUM mmol/L 4.4 4.1   CHLORIDE mmol/L 106 104   CO2 mmol/L 24.6 25.0   BUN mg/dL 23 23   CREATININE mg/dL 1.36* 1.16   CALCIUM mg/dL 8.7 9.1   GLUCOSE mg/dL 108* 113*   ALBUMIN g/dL  --  3.9   BILIRUBIN mg/dL  --  0.6   ALK PHOS U/L  --  59   AST (SGOT) U/L  --  15   ALT (SGPT) U/L  --  13   Estimated Creatinine Clearance: 41 mL/min (A) (by C-G formula based on SCr of 1.36 mg/dL (H)).  Lab Results   Component Value Date    HGBA1C 6.10 (H) 08/07/2023     Lab Results   Component Value Date    TSH 2.800 08/07/2023    FREET4 1.19 08/07/2023     I have personally reviewed the above laboratory results.   ----------------------------------------------------------------------------------------------------------------------  Imaging Results (Last 24 Hours)       ** No results found for the last 24 hours. **          I have personally reviewed the above radiology results.   ----------------------------------------------------------------------------------------------------------------------  Assessment/Plan     ACUTE HOSPITAL PROBLEMS    -Chest pain, mixed features  HS trop not significantly elevated x 2   EKG without acute ischemic changes   Cardiology on board, input/assistance is much appreciated   TTE obtained with preserved EF, grade I diastolic dysfunction noted   Stress test performed resulted with moderate sized, mild degree of ischemic located in the inferior wall and  lateral wall   Continue aspirin and statin   Will make patient NPO after midnight pending cardiology recommendations for possible LHC   Continue to hold home Warfarin, INR for today pending. Heparin gtt initiated with protocol in place, discussed with attending   Closely monitor on telemetry   Repeat labs in AM     CHRONIC MEDICAL PROBLEMS    -Essential hypertension  BP appears well controlled   Continue home antihypertensive regimen   Closely monitor BP per hospital protocol, titrate medications as necessary  -Hyperlipidemia: Continue statin   -Paroxysmal atrial fibrillation, chronically anticoagulated with Warfarin: Rate controlled. Continue home Metoprolol for rate control. Continue to hold Warfarin (held on admission), INR yesterday 2.9. INR pending for today. As patient may need LHC, will start heparin gtt in the interim. Discussed with attending. Closely monitor on telemetry.   -HFpEF/Grade I diastolic dysfunction, mild pulmonary hypertension: TTE reviewed. Grossly compensated on exam. Monitor volume status closely.   -GERD: PPI   -Obstructive sleep apnea   -Former smoker   --------------------------------------------------  DVT Prophylaxis: Heparin gtt   FEN: No IV fluids. Replace electrolytes per protocol as necessary. NPO after midnight.   Activity: Up with assistance as tolerated   --------------------------------------------------  Disposition:  Pending clinical course and cardiology recommendations   --------------------------------------------------    I have discussed the patient's assessment and plan with the patient, wife at bedside, nursing staff, and attending physician David López MD Allyson O'Kuma, PA-C  Hospitalist Service -- [facility]   Pager: [phone]    12/04/23  14:30 EST    Attending Physician: David Jaquez MD    ----------------------------------------------------------------------------------------------------------------------

## 2023-12-04 NOTE — CONSULTS
Consults  Date of Admit: 12/3/2023  Date of Consult: 12/04/23  Provider, No Known  Aramis Carson  1940    Consulting Physician: Shwetha Navarrete MD    Cardiology consultation    Reason for consultation: Chest pain      Chest Pain   Pertinent negatives include no palpitations, shortness of breath or weakness.       Subjective     Chief Complaint   Patient presents with    Chest Pain       Aramis Carson is a 83 y.o. male with chronic atrial fibrillation-anticoagulated with warfarin, hyperlipidemia, and prediabetes.  Is followed in the outpatient setting by ISAIAS Adames.  His last visit to our clinic was 11/16/2023 at which time his hydralazine dose was decreased due to borderline hypotension.    He reports to me that over the past year or so he has had episodes of chest pain approximately every month they last about 10 to 15 minutes in duration and are not associated with any other symptoms.  December 3, 2023 while showering he began to have left-sided chest pain that he described as sharp and unrelenting.  There was no radiation, shortness of breath, nausea, or diaphoresis.  When his symptoms lasted more than 7 hours he decided to come to the emergency room for evaluation.  After Nitropaste was applied, he reports that his pain abated.  Overnight he had a brief-seconds long-episode of sharp chest pain in the same place.    Admission labs and studies significant for high-sensitivity troponin 16 and subsequently 19.  Other labs and chest x-ray unremarkable.  EKG revealed atrial fibrillation at 73 bpm, left axis deviation, and Q waves that have been noted since 2018 in II, III, aVF, V3 and V4.    Yesterday he had been having frequent pauses up to 2.5 seconds in duration.  At approximately 20-25 last night he had what appeared to be ventricular tachycardia, and has not had any pauses since.    Cardiac risk factors:hypercholesterolemia and hypertension    Last Echo:  Results for orders placed during the  hospital encounter of 23    Adult Transthoracic Echo Complete W/ Cont if Necessary Per Protocol    Interpretation Summary    Left ventricular systolic function is mildly decreased. Left ventricular ejection fraction appears to be 46 - 50%.    Left ventricular diastolic function was normal.    Estimated right ventricular systolic pressure from tricuspid regurgitation is mildly elevated (35-45 mmHg).      Last Stress:   Results for orders placed during the hospital encounter of 21    Stress Test With Myocardial Perfusion One Day    Interpretation Summary  · Left ventricular ejection fraction is hyperdynamic (Calculated EF > 70%). .  · Myocardial perfusion imaging indicates a normal myocardial perfusion study with no evidence of ischemia.  · Impressions are consistent with a low risk study.  · Findings consistent with a normal ECG stress test.  · No change since prev study of 19      Last Cath:      Past Medical History:   Diagnosis Date    Atrial fibrillation     Coronary artery disease     Hyperlipidemia     Pre-diabetes      Past Surgical History:   Procedure Laterality Date    CARDIAC CATHETERIZATION      COLONOSCOPY      CYSTOSCOPY TRANSURETHRAL RESECTION OF PROSTATE      INGUINAL HERNIA REPAIR      LAPAROSCOPIC CHOLECYSTECTOMY       Family History   Problem Relation Age of Onset    Cancer Sister     Diabetes Sister     Cancer Brother     Heart disease Brother     Diabetes Maternal Grandmother     Hypertension Neg Hx      Social History     Tobacco Use    Smoking status: Former     Types: Pipe, Cigars     Quit date:      Years since quittin.9     Passive exposure: Past    Smokeless tobacco: Never    Tobacco comments:     Smoked for 25 years   Vaping Use    Vaping Use: Never used   Substance Use Topics    Alcohol use: No    Drug use: No     Medications Prior to Admission   Medication Sig Dispense Refill Last Dose    baclofen (LIORESAL) 10 MG tablet Take 1 tablet by mouth Every Night.    12/2/2023    famotidine (PEPCID) 20 MG tablet Take 1 tablet by mouth Every Night.   12/2/2023    glipiZIDE XL 2.5 MG 24 hr tablet Take 1 tablet by mouth Daily.   12/3/2023    hydrALAZINE (APRESOLINE) 50 MG tablet Take 1 tablet by mouth 2 (Two) Times a Day.   12/3/2023 at AM    hydroCHLOROthiazide (HYDRODIURIL) 12.5 MG tablet Take 1 tablet by mouth Daily.   12/3/2023    metoprolol succinate XL (TOPROL-XL) 25 MG 24 hr tablet TAKE 1 TABLET DAILY 60 tablet 5 12/3/2023    omeprazole (priLOSEC) 20 MG capsule Take 1 capsule by mouth 2 (Two) Times a Day.   12/3/2023 at AM    sacubitril-valsartan (Entresto) 24-26 MG tablet Take 1 tablet by mouth 2 (Two) Times a Day. 180 tablet 3 12/3/2023 at AM    simvastatin (ZOCOR) 20 MG tablet Take 1 tablet by mouth Every Night.   12/2/2023    warfarin (COUMADIN) 3 MG tablet Take 1 tablet by mouth 4 (Four) Times a Week.   12/2/2023    HYDROcodone-acetaminophen (NORCO)  MG per tablet Take 1 tablet by mouth Every 6 (Six) Hours As Needed for Moderate Pain.   Unknown    loratadine (CLARITIN) 10 MG tablet Take 1 tablet by mouth Daily As Needed for Allergies.   Unknown    warfarin (COUMADIN) 3 MG tablet Take 2 tablets by mouth 3 (Three) Times a Week.   12/1/2023     Allergies:  Flomax [tamsulosin], Lisinopril, and Losartan    Review of Systems   Constitutional:  Negative for fatigue.   Respiratory:  Negative for shortness of breath.    Cardiovascular:  Positive for chest pain. Negative for palpitations and leg swelling.   Neurological:  Negative for weakness and light-headedness.          Objective      Vital Signs  Temp:  [97.5 °F (36.4 °C)-98.6 °F (37 °C)] 98.6 °F (37 °C)  Heart Rate:  [55-88] 74  Resp:  [20] 20  BP: ()/(51-69) 118/53  Body mass index is 28.68 kg/m².    Intake/Output Summary (Last 24 hours) at 12/4/2023 0923  Last data filed at 12/3/2023 1928  Gross per 24 hour   Intake 120 ml   Output --   Net 120 ml       Physical Exam  Vitals reviewed.   Constitutional:        General: He is not in acute distress.     Appearance: Normal appearance. He is obese.   HENT:      Head: Normocephalic and atraumatic.      Nose: Nose normal.   Eyes:      Conjunctiva/sclera: Conjunctivae normal.   Neck:      Vascular: No carotid bruit.   Cardiovascular:      Rate and Rhythm: Normal rate. Rhythm irregular.      Pulses:           Dorsalis pedis pulses are 2+ on the right side and 2+ on the left side.        Posterior tibial pulses are 2+ on the right side and 2+ on the left side.      Heart sounds: No murmur heard.  Pulmonary:      Effort: Pulmonary effort is normal.      Breath sounds: Normal breath sounds.   Musculoskeletal:         General: Normal range of motion.      Cervical back: Normal range of motion.      Right lower leg: No edema.      Left lower leg: No edema.   Skin:     General: Skin is warm and dry.   Neurological:      General: No focal deficit present.      Mental Status: He is alert.   Psychiatric:         Mood and Affect: Mood normal.         Behavior: Behavior normal.       Telemetry:  atrial fibrillation 70's            Results Review:   I reviewed the patient's new clinical results.  Results from last 7 days   Lab Units 12/03/23  1135 12/03/23  0916   HSTROP T ng/L 19 16     Results from last 7 days   Lab Units 12/04/23  0213 12/03/23  0916   WBC 10*3/mm3 9.99 9.57   HEMOGLOBIN g/dL 14.1 14.4   PLATELETS 10*3/mm3 223 225     Results from last 7 days   Lab Units 12/04/23  0213 12/03/23  0916   SODIUM mmol/L 141 141   POTASSIUM mmol/L 4.4 4.1   CHLORIDE mmol/L 106 104   CO2 mmol/L 24.6 25.0   BUN mg/dL 23 23   CREATININE mg/dL 1.36* 1.16   CALCIUM mg/dL 8.7 9.1   GLUCOSE mg/dL 108* 113*   ALT (SGPT) U/L  --  13   AST (SGOT) U/L  --  15     Lab Results   Component Value Date    INR 2.91 (H) 12/03/2023    INR 1.88 (H) 04/30/2023    INR 1.89 (H) 04/20/2023    INR 1.95 (H) 08/24/2019    INR 2.03 (H) 02/23/2018    INR 2.05 (H) 10/06/2016    INR 0.93 03/06/2015     No results found for:  "\"MG\"  Lab Results   Component Value Date    TSH 2.800 08/07/2023    PSA 1.770 10/05/2020    CHLPL 107 03/19/2016    TRIG 255 (H) 08/07/2023    HDL 33 (L) 08/07/2023     (H) 08/07/2023      Lab Results   Component Value Date    PROBNP 934.2 08/24/2019        EKG:     Imaging Results (Last 72 Hours)       Procedure Component Value Units Date/Time    XR Chest 1 View [[id number]] Collected: 12/03/23 1036     Updated: 12/03/23 1038    Narrative:      Procedure: Frontal view of chest obtained.     Comparison: None available     History: Chest Pain Protocol     Findings:     No pneumonia or acute process seen in the chest.  Normal heart size and mediastinal contours.  Trachea is in midline position.  No edema or effusion is seen.  There is no evidence of pneumothorax.       Impression:         No evidence of acute disease in the chest.     This report was finalized on 12/3/2023 10:36 AM by Robel Enriquez MD.                Assessment:  Chest pain  Atrial fibrillation-chronic  Hyperlipidemia        Plan:  1.  Planning for echo and stress test today further decision making based on those results.  2.  Rate controlled and anticoagulated on Lovenox while hospitalized  3.  Lipids not at goal on home statin dose.  Simvastatin is not on our formulary and so I will transition to atorvastatin 20 mg.    Thank you very much for asking us to be involved in this patient's care.  We will follow along with you.      Electronically signed by ISAIAS Asif, 12/04/23, 9:43 AM EST.      Addendum:   Stress test results positive for ischemia.  Warfarin is already on hold.  Plan for invasive coronary angiogram tomorrow if INR 1.6 or below.  Electronically signed by ISAIAS Asif, 12/04/23, 4:42 PM EST.  .Electronically signed by Shwetha Navarrete MD, 12/04/23, 5:08 PM EST.      "

## 2023-12-05 LAB
ALBUMIN SERPL-MCNC: 3.9 G/DL (ref 3.5–5.2)
ALBUMIN/GLOB SERPL: 1.6 G/DL
ALP SERPL-CCNC: 72 U/L (ref 39–117)
ALT SERPL W P-5'-P-CCNC: 16 U/L (ref 1–41)
ANION GAP SERPL CALCULATED.3IONS-SCNC: 10.2 MMOL/L (ref 5–15)
AST SERPL-CCNC: 23 U/L (ref 1–40)
BILIRUB SERPL-MCNC: 0.3 MG/DL (ref 0–1.2)
BUN SERPL-MCNC: 26 MG/DL (ref 8–23)
BUN/CREAT SERPL: 19.3 (ref 7–25)
CALCIUM SPEC-SCNC: 8.8 MG/DL (ref 8.6–10.5)
CHLORIDE SERPL-SCNC: 107 MMOL/L (ref 98–107)
CO2 SERPL-SCNC: 24.8 MMOL/L (ref 22–29)
CREAT SERPL-MCNC: 1.35 MG/DL (ref 0.76–1.27)
DEPRECATED RDW RBC AUTO: 43.6 FL (ref 37–54)
EGFRCR SERPLBLD CKD-EPI 2021: 52.1 ML/MIN/1.73
ERYTHROCYTE [DISTWIDTH] IN BLOOD BY AUTOMATED COUNT: 13.9 % (ref 12.3–15.4)
GEN 5 2HR TROPONIN T REFLEX: 12 NG/L
GLOBULIN UR ELPH-MCNC: 2.5 GM/DL
GLUCOSE SERPL-MCNC: 155 MG/DL (ref 65–99)
HCT VFR BLD AUTO: 44.7 % (ref 37.5–51)
HGB BLD-MCNC: 14.5 G/DL (ref 13–17.7)
INR PPP: 1.98 (ref 0.9–1.1)
MAGNESIUM SERPL-MCNC: 2.5 MG/DL (ref 1.6–2.4)
MCH RBC QN AUTO: 27.8 PG (ref 26.6–33)
MCHC RBC AUTO-ENTMCNC: 32.4 G/DL (ref 31.5–35.7)
MCV RBC AUTO: 85.8 FL (ref 79–97)
PLATELET # BLD AUTO: 236 10*3/MM3 (ref 140–450)
PMV BLD AUTO: 10.2 FL (ref 6–12)
POTASSIUM SERPL-SCNC: 4.2 MMOL/L (ref 3.5–5.2)
PROT SERPL-MCNC: 6.4 G/DL (ref 6–8.5)
PROTHROMBIN TIME: 23.2 SECONDS (ref 12.1–14.7)
RBC # BLD AUTO: 5.21 10*6/MM3 (ref 4.14–5.8)
SODIUM SERPL-SCNC: 142 MMOL/L (ref 136–145)
TROPONIN T DELTA: -6 NG/L
TROPONIN T SERPL HS-MCNC: 18 NG/L
UFH PPP CHRO-ACNC: 0.31 IU/ML (ref 0.3–0.7)
UFH PPP CHRO-ACNC: 0.4 IU/ML (ref 0.3–0.7)
WBC NRBC COR # BLD AUTO: 10.48 10*3/MM3 (ref 3.4–10.8)

## 2023-12-05 PROCEDURE — 25010000002 HEPARIN (PORCINE) 25000-0.45 UT/250ML-% SOLUTION: Performed by: PHYSICIAN ASSISTANT

## 2023-12-05 PROCEDURE — 99232 SBSQ HOSP IP/OBS MODERATE 35: CPT | Performed by: INTERNAL MEDICINE

## 2023-12-05 PROCEDURE — 85520 HEPARIN ASSAY: CPT | Performed by: INTERNAL MEDICINE

## 2023-12-05 PROCEDURE — 85027 COMPLETE CBC AUTOMATED: CPT | Performed by: PHYSICIAN ASSISTANT

## 2023-12-05 PROCEDURE — 84484 ASSAY OF TROPONIN QUANT: CPT | Performed by: NURSE PRACTITIONER

## 2023-12-05 PROCEDURE — 80053 COMPREHEN METABOLIC PANEL: CPT | Performed by: PHYSICIAN ASSISTANT

## 2023-12-05 PROCEDURE — G0378 HOSPITAL OBSERVATION PER HR: HCPCS

## 2023-12-05 PROCEDURE — 85610 PROTHROMBIN TIME: CPT | Performed by: PHYSICIAN ASSISTANT

## 2023-12-05 PROCEDURE — 83735 ASSAY OF MAGNESIUM: CPT | Performed by: PHYSICIAN ASSISTANT

## 2023-12-05 RX ADMIN — SACUBITRIL AND VALSARTAN 1 TABLET: 24; 26 TABLET, FILM COATED ORAL at 08:52

## 2023-12-05 RX ADMIN — Medication 10 ML: at 20:40

## 2023-12-05 RX ADMIN — HYDRALAZINE HYDROCHLORIDE 50 MG: 50 TABLET, FILM COATED ORAL at 08:51

## 2023-12-05 RX ADMIN — ATORVASTATIN CALCIUM 20 MG: 20 TABLET, FILM COATED ORAL at 20:48

## 2023-12-05 RX ADMIN — FAMOTIDINE 20 MG: 20 TABLET, FILM COATED ORAL at 20:49

## 2023-12-05 RX ADMIN — SACUBITRIL AND VALSARTAN 1 TABLET: 24; 26 TABLET, FILM COATED ORAL at 20:48

## 2023-12-05 RX ADMIN — HEPARIN SODIUM 12 UNITS/KG/HR: 10000 INJECTION, SOLUTION INTRAVENOUS at 20:38

## 2023-12-05 RX ADMIN — CETIRIZINE HYDROCHLORIDE 10 MG: 10 TABLET, FILM COATED ORAL at 08:51

## 2023-12-05 RX ADMIN — Medication 10 ML: at 08:50

## 2023-12-05 RX ADMIN — HYDRALAZINE HYDROCHLORIDE 50 MG: 50 TABLET, FILM COATED ORAL at 20:49

## 2023-12-05 NOTE — PROGRESS NOTES
H&P Update    Patient's History and Physical from February 13, 2020 was reviewed. Patient examined. There has been no change.     Electronically signed by Torsten Black DO on [birth year] at 8:19 AM [facility]  Aramis Carson is a  83 y.o. male receiving heparin infusion.     Therapy for (VTE/Cardiac):   cardiac  Patient Dosing Weight: 80.6Kg  Initial Bolus (Y/N):   NO  Any Bolus (Y/N):   YES        Signs or Symptoms of Bleeding: NO    Cardiac or Other (Not VTE)   Initial Bolus: 60 units/kg (Max 4,000 units)  Initial rate: 12 units/kg/hr (Max 1,000 units/hr)   Anti Xa Rebolus Infusion Hold time Change infusion Dose (Units/kg/hr) Next Anti Xa or aPTT Level Due   < 0.11 50 Units/kg  (4000 Units Max) None Increase by  3 Units/kg/hr 6 hours   0.11- 0.19 25 Units/kg  (2000 Units Max) None Increase by  2 Units/kg/hr 6 hours   0.2 - 0.29 0 None Increase by  1 Units/kg/hr 6 hours   0.3 - 0.5 0 None No Change 6 hours (after 2 consecutive levels in range check q24h @0700)   0.51 - 0.6 0 None Decrease by  1 Units/kg/hr 6 hours   0.61 - 0.8 0 30 Minutes Decrease by  2 Units/kg/hr 6 hours   0.81 - 1 0 60 Minutes Decrease by  3 Units/kg/hr 6 hours   >1 0 Hold  After Anti Xa less than 0.5 decrease previous rate by  4 Units/kg/hr  Every 2 hours until Anti Xa  less than 0.5 then when infusion restarts in 6 hours         Recommend anti-Xa every 6 hours.          Date   Time   Anti-Xa Current Rate (Unit/kg/hr) Bolus   (Units) Rate Change   (Unit/kg/hr) New Rate (Unit/kg/hr) Next   Anti-Xa Comments  Pump Check Daily   12/4 19:00 0.28 0 0 +12 12 0200   Start the rate at 12.instead of 12.4  Since patient already received Lovenox and warfarin .confirm start with Ksenia NICK     12/05 0233 0.4 12 - - 12 0900 Tx x 1, no changes, no s/s bleeding per Mccormack RN    12/05 0820 0.31 12 - - 12 12/6 @700 Therapeutic x2. Spoke with Marlene NICK. No s/s of bleeding                                                                                                                                                                                                           Pharmacy will continue to follow anti-Xa results and monitor for signs and  symptoms of bleeding or thrombosis.    Elo Purcell, PharmD

## 2023-12-05 NOTE — PLAN OF CARE
Goal Outcome Evaluation:      Pt has had a non eventful day. Wife at bedside. He is pleasant. Heparin gtt infusing and tolerating well. Ambulates in room to BR with no issues. Heart cath was canceled d/t INR. Metoprolol discontinued d/t having multiple pauses in the 7887-7736 hour with the highest 2.4 sec. Will continue to follow plan of care.           Update: 1243  Pt continue to have a non eventful day. He has rested in bed with no complaints. Wife at bedside. Heparin gtt continues to run. Last Ptt was therapeutic which made it therapeutic X's 2 so next Ptt will be in 24 hours. Will continue to follow plan of care.      Update: 1845  Pt has rested in bed for most of the day and sat up for a little while. He is pleasant. He is expected to have a heart cath tomorrow so he will be held NPO after midnight. Wife at bedside. Heparin currently running. No s/s of distress noted. He was moved from room 309 B to 308 A and all safety protocols were followed. Wife is aware.

## 2023-12-05 NOTE — PLAN OF CARE
Goal Outcome Evaluation:  Plan of Care Reviewed With: patient  Progress: no change     Patient resting in bed. No acute signs or symptoms of distress. No complaints at this time. Patient has remained NPO since midnight for heart cath this AM. Consent signed. Bath complete. Heparin running at 12 u/kg/hr per order. No acute events overnight. Will continue to follow with plan of care.

## 2023-12-05 NOTE — PROGRESS NOTES
[facility] HOSPITALIST PROGRESS NOTE     Patient Identification:  Name:  Aramis Carson  Age:  83 y.o.  Sex:  male  :  1940  MRN:  [id number]  Visit Number:  [id number]  ROOM: [address] [place] [address]     Primary Care Provider:  Kieran Blanca APRN    Length of stay in inpatient status:  0    Subjective     Chief Compliant:    Chief Complaint   Patient presents with    Chest Pain     History of Presenting Illness:    Patient remains ill but stable today, no acute events overnight, no new complaints, denies any fevers or chills, denies any chest pain, Cardiology plans for [facility] when INR is lower, on Heparin drip, let patient eat today, discussed with patient and wife at bedside, questions answered.   Objective     Current Hospital Meds:  atorvastatin, 20 mg, Oral, Nightly  cetirizine, 10 mg, Oral, Daily  famotidine, 20 mg, Oral, Nightly  hydrALAZINE, 50 mg, Oral, BID  pantoprazole, 40 mg, Oral, Q AM  sacubitril-valsartan, 1 tablet, Oral, BID  senna-docusate sodium, 2 tablet, Oral, BID  sodium chloride, 10 mL, Intravenous, Q12H      heparin, 12 Units/kg/hr, Last Rate: 12 Units/kg/hr (23 0849)  Pharmacy to Dose Heparin,       ----------------------------------------------------------------------------------------------------------------------  Vital Signs:  Temp:  [97.1 °F (36.2 °C)-98.6 °F (37 °C)] 98.4 °F (36.9 °C)  Heart Rate:  [66-83] 73  Resp:  [16-20] 16  BP: (106-141)/(54-65) 109/64  SpO2:  [95 %-99 %] 96 %  on   ;   Device (Oxygen Therapy): room air  Body mass index is 28.91 kg/m².      Intake/Output Summary (Last 24 hours) at 2023 1155  Last data filed at 2023 0800  Gross per 24 hour   Intake 1427.2 ml   Output --   Net 1427.2 ml      ----------------------------------------------------------------------------------------------------------------------  Physical exam:  Constitutional:  Well-developed and well-nourished.  No acute distress.      HENT:  Head:  Normocephalic and  "atraumatic.  Mouth:  Moist mucous membranes.    Eyes:  Conjunctivae and EOM are normal. No scleral icterus.    Neck:  Neck supple.  No JVD present.    Cardiovascular:  Normal rate, regular rhythm and normal heart sounds with no murmur.  Pulmonary/Chest:  No respiratory distress, no wheezes, no crackles, with normal breath sounds and good air movement.  Abdominal:  Soft. No distension and no tenderness.   Musculoskeletal:  No tenderness, and no deformity.  No red or swollen joints anywhere.    Neurological:  Alert and oriented to person, place, and time.  No cranial nerve deficit.    Skin:  Skin is warm and dry. No rash noted. No pallor.   Peripheral vascular:  No clubbing, no cyanosis, no edema.  Psychiatric: Appropriate mood and affect  Edited by: David Jaquez MD at 12/5/2023 1155  ----------------------------------------------------------------------------------------------------------------------  WBC/HGB/HCT/PLT   10.48/14.5/44.7/236 (12/05 0204)  BUN/CREAT/GLUC/ALT/AST/JONNA/LIP    26/1.35/155/16/23/--/-- (12/05 0204)  LYTES - Na/K/Cl/CO2: 142/4.2/107/24.8 (12/05 0204)  COAG - PT/INR/PTT: 23.2*/1.98*/-- (12/05 0204)     No results found for: \"URINECX\"  No results found for: \"BLOODCX\"    I have personally looked at the labs and they are summarized above.  ----------------------------------------------------------------------------------------------------------------------  Detailed radiology reports for the last 24 hours:  No radiology results for the last day  Assessment & Plan    ACUTE HOSPITAL PROBLEMS     -Chest pain, mixed features, now with abnormal stress testing  HS trop not significantly elevated x 2   EKG without acute ischemic changes   TTE obtained with preserved EF, grade I diastolic dysfunction noted   Stress test performed resulted with moderate sized, mild degree of ischemic located in the inferior wall and lateral wall   Cardiology consulted and following, plan for C, though want INR lower " where was previously on warfarin  Continue aspirin and statin  Continue Heparin drip for now, transition back to Warfarin when appropriate   Closely monitor on telemetry   Repeat labs in AM      CHRONIC MEDICAL PROBLEMS     -Essential hypertension  BP appears well controlled   Continue home antihypertensive regimen   Closely monitor BP per hospital protocol, titrate medications as necessary  -Hyperlipidemia: Continue statin   -Paroxysmal atrial fibrillation, chronically anticoagulated with Warfarin: Rate controlled. Continue home Metoprolol for rate control. Continue to hold Warfarin (held on admission), INR 1.9 today.   -HFpEF/Grade I diastolic dysfunction, mild pulmonary hypertension: TTE reviewed. Grossly compensated on exam. Monitor volume status closely.   -GERD: PPI   -Obstructive sleep apnea   -Former smoker     F: Oral  E: Monitor & Replace as needed   N: Diet: Cardiac Diets; Healthy Heart (2-3 Na+); Texture: Regular Texture (IDDSI 7); Fluid Consistency: Thin (IDDSI 0)  PPx: Heparin drip   Code Status (Patient has no pulse and is not breathing): CPR (Attempt to Resuscitate)  Medical Interventions (Patient has pulse or is breathing): Full Support     Dispo: Pending workup and clinical improvement    *This patient is considered high risk secondary to chest pain, abnormal stress testing, plan for LHC soon, anticoagulation management with Heparin drip.     Edited by: David Jaquez MD at 12/5/2023 9480  [facility]

## 2023-12-05 NOTE — PROGRESS NOTES
LOS: 0 days     Name: Aramis Carson  Age/Sex: 83 y.o. male  :  1940        PCP: Kieran Blanca APRN    Principal Problem:    Chest pain      Admission Information: Aramis Carson is a 83 y.o. male with a past medical history significant for chronic atrial fibrillation-anticoagulated with warfarin, hyperlipidemia, and prediabetes.     Chief Complaint: Chest pain    Interval history: Underwent stress test yesterday which revealed moderate-sized mild degree of ischemia on the inferior and lateral walls.  Echo revealed normal EF at 66 to 70%, grade 1 diastolic dysfunction, moderate dilation of the left atrium, tricuspid regurg with RVSP of 35 to 45 mmHg, mild pulmonary hypertension.  Has been having multiple pauses greater than 2 seconds but less than 3 seconds.    Subjective   Patient awake in bed with wife at bedside.  He denies further episodes of chest pain.  He denies any symptoms of dizziness or near syncope.  He has not been up and walking, but plans to do so today      Vital Signs  Vital Signs (last 72 hrs)          0700   0659  0659  0700   0659  0700   1107   Most Recent      Temp (°F)   97.3 -  97.9    97.1 -  98.6      98.3     98.3 (36.8)  07    Heart Rate   55 -  88    66 -  83      68     68  0700    Resp   18 -  20      20      18     18  07    BP   94/67 -  116/56    113/61 -  141/62      106/65     106/65  07    SpO2 (%)   94 -  100    95 -  98      99     99 700    Oxygen Concentration (%)     21         21  2243          Temp:  [97.1 °F (36.2 °C)-98.6 °F (37 °C)] 98.3 °F (36.8 °C)  Heart Rate:  [66-83] 68  Resp:  [18-20] 18  BP: (106-141)/(54-65) 106/65  Body mass index is 28.91 kg/m².      Intake/Output Summary (Last 24 hours) at 2023 1107  Last data filed at 2023 0800  Gross per 24 hour   Intake 1427.2 ml   Output --   Net 1427.2 ml       Constitutional:       Appearance: Not in  distress.   Pulmonary:      Effort: Pulmonary effort is normal.      Breath sounds: Normal breath sounds.   Cardiovascular:      Normal rate. Irregular rhythm.      Murmurs: There is no murmur.   Edema:     Peripheral edema absent.   Skin:     General: Skin is warm and dry.   Neurological:      Mental Status: Alert.         Telemetry: A-fib 50s         Results Review:     Results from last 7 days   Lab Units 12/05/23  0204 12/04/23  0213 12/03/23  0916   WBC 10*3/mm3 10.48 9.99 9.57   HEMOGLOBIN g/dL 14.5 14.1 14.4   PLATELETS 10*3/mm3 236 223 225     Results from last 7 days   Lab Units 12/05/23  0204 12/04/23  0213 12/03/23  0916   SODIUM mmol/L 142 141 141   POTASSIUM mmol/L 4.2 4.4 4.1   CHLORIDE mmol/L 107 106 104   CO2 mmol/L 24.8 24.6 25.0   BUN mg/dL 26* 23 23   CREATININE mg/dL 1.35* 1.36* 1.16   CALCIUM mg/dL 8.8 8.7 9.1   GLUCOSE mg/dL 155* 108* 113*     Results from last 7 days   Lab Units 12/05/23  0204 12/03/23  1135 12/03/23  0916   HSTROP T ng/L 18 19 16       Results from last 7 days   Lab Units 12/05/23  0204 12/04/23  1652 12/03/23  0916   INR  1.98* 2.19* 2.91*       I reviewed the patient's new clinical results.  I reviewed the patient's new imaging results and agree with the interpretation.  I personally viewed and interpreted the patient's EKG/Telemetry data      Medication Review:   atorvastatin, 20 mg, Oral, Nightly  cetirizine, 10 mg, Oral, Daily  famotidine, 20 mg, Oral, Nightly  hydrALAZINE, 50 mg, Oral, BID  pantoprazole, 40 mg, Oral, Q AM  sacubitril-valsartan, 1 tablet, Oral, BID  senna-docusate sodium, 2 tablet, Oral, BID  sodium chloride, 10 mL, Intravenous, Q12H      heparin, 12 Units/kg/hr, Last Rate: 12 Units/kg/hr (12/05/23 0849)  Pharmacy to Dose Heparin,         Assessment:  Chest pain in the setting of abnormal stress test, normal high-sensitivity troponin  Atrial fibrillation-chronic  New pauses on telemetry  Hyperlipidemia      Recommendations:  Still planning for invasive  coronary angiogram once INR is 1.6 or lower.  Discontinued beta-blocker.  Will continue telemetry to monitor rate and heparin for anticoagulation  Continue statin    I discussed the patients findings and my recommendations with patient and family.        Electronically signed by ISAIAS Asif, 12/05/23, 11:17 AM EST.    .Electronically signed by Shwetha Navarrete MD, 12/05/23, 6:48 PM EST.

## 2023-12-06 LAB
ALBUMIN SERPL-MCNC: 3.5 G/DL (ref 3.5–5.2)
ALBUMIN/GLOB SERPL: 1.4 G/DL
ALP SERPL-CCNC: 56 U/L (ref 39–117)
ALT SERPL W P-5'-P-CCNC: 16 U/L (ref 1–41)
ANION GAP SERPL CALCULATED.3IONS-SCNC: 10.3 MMOL/L (ref 5–15)
AST SERPL-CCNC: 21 U/L (ref 1–40)
BASOPHILS # BLD AUTO: 0.03 10*3/MM3 (ref 0–0.2)
BASOPHILS NFR BLD AUTO: 0.3 % (ref 0–1.5)
BILIRUB SERPL-MCNC: 0.6 MG/DL (ref 0–1.2)
BUN SERPL-MCNC: 22 MG/DL (ref 8–23)
BUN/CREAT SERPL: 20 (ref 7–25)
CALCIUM SPEC-SCNC: 8.7 MG/DL (ref 8.6–10.5)
CHLORIDE SERPL-SCNC: 108 MMOL/L (ref 98–107)
CO2 SERPL-SCNC: 20.7 MMOL/L (ref 22–29)
CREAT SERPL-MCNC: 1.1 MG/DL (ref 0.76–1.27)
DEPRECATED RDW RBC AUTO: 43.8 FL (ref 37–54)
EGFRCR SERPLBLD CKD-EPI 2021: 66.6 ML/MIN/1.73
EOSINOPHIL # BLD AUTO: 0.09 10*3/MM3 (ref 0–0.4)
EOSINOPHIL NFR BLD AUTO: 0.8 % (ref 0.3–6.2)
ERYTHROCYTE [DISTWIDTH] IN BLOOD BY AUTOMATED COUNT: 13.8 % (ref 12.3–15.4)
GLOBULIN UR ELPH-MCNC: 2.5 GM/DL
GLUCOSE SERPL-MCNC: 113 MG/DL (ref 65–99)
HCT VFR BLD AUTO: 44.2 % (ref 37.5–51)
HGB BLD-MCNC: 13.9 G/DL (ref 13–17.7)
IMM GRANULOCYTES # BLD AUTO: 0.05 10*3/MM3 (ref 0–0.05)
IMM GRANULOCYTES NFR BLD AUTO: 0.5 % (ref 0–0.5)
INR PPP: 1.37 (ref 0.9–1.1)
LYMPHOCYTES # BLD AUTO: 0.93 10*3/MM3 (ref 0.7–3.1)
LYMPHOCYTES NFR BLD AUTO: 8.7 % (ref 19.6–45.3)
MCH RBC QN AUTO: 27.3 PG (ref 26.6–33)
MCHC RBC AUTO-ENTMCNC: 31.4 G/DL (ref 31.5–35.7)
MCV RBC AUTO: 86.8 FL (ref 79–97)
MONOCYTES # BLD AUTO: 0.69 10*3/MM3 (ref 0.1–0.9)
MONOCYTES NFR BLD AUTO: 6.5 % (ref 5–12)
NEUTROPHILS NFR BLD AUTO: 8.87 10*3/MM3 (ref 1.7–7)
NEUTROPHILS NFR BLD AUTO: 83.2 % (ref 42.7–76)
NRBC BLD AUTO-RTO: 0 /100 WBC (ref 0–0.2)
PLATELET # BLD AUTO: 179 10*3/MM3 (ref 140–450)
PMV BLD AUTO: 10.9 FL (ref 6–12)
POTASSIUM SERPL-SCNC: 4.2 MMOL/L (ref 3.5–5.2)
PROT SERPL-MCNC: 6 G/DL (ref 6–8.5)
PROTHROMBIN TIME: 17.5 SECONDS (ref 12.1–14.7)
RBC # BLD AUTO: 5.09 10*6/MM3 (ref 4.14–5.8)
SODIUM SERPL-SCNC: 139 MMOL/L (ref 136–145)
UFH PPP CHRO-ACNC: 0.2 IU/ML (ref 0.3–0.7)
WBC NRBC COR # BLD AUTO: 10.66 10*3/MM3 (ref 3.4–10.8)

## 2023-12-06 PROCEDURE — 4A023N7 MEASUREMENT OF CARDIAC SAMPLING AND PRESSURE, LEFT HEART, PERCUTANEOUS APPROACH: ICD-10-PCS | Performed by: INTERNAL MEDICINE

## 2023-12-06 PROCEDURE — 93454 CORONARY ARTERY ANGIO S&I: CPT | Performed by: INTERNAL MEDICINE

## 2023-12-06 PROCEDURE — B2111ZZ FLUOROSCOPY OF MULTIPLE CORONARY ARTERIES USING LOW OSMOLAR CONTRAST: ICD-10-PCS | Performed by: INTERNAL MEDICINE

## 2023-12-06 PROCEDURE — 93799 UNLISTED CV SVC/PROCEDURE: CPT | Performed by: INTERNAL MEDICINE

## 2023-12-06 PROCEDURE — C1887 CATHETER, GUIDING: HCPCS | Performed by: INTERNAL MEDICINE

## 2023-12-06 PROCEDURE — 25010000002 MIDAZOLAM PER 1 MG: Performed by: INTERNAL MEDICINE

## 2023-12-06 PROCEDURE — C1769 GUIDE WIRE: HCPCS | Performed by: INTERNAL MEDICINE

## 2023-12-06 PROCEDURE — 25010000002 HEPARIN (PORCINE) PER 1000 UNITS: Performed by: INTERNAL MEDICINE

## 2023-12-06 PROCEDURE — 99152 MOD SED SAME PHYS/QHP 5/>YRS: CPT | Performed by: INTERNAL MEDICINE

## 2023-12-06 PROCEDURE — 85520 HEPARIN ASSAY: CPT

## 2023-12-06 PROCEDURE — 4A033BC MEASUREMENT OF ARTERIAL PRESSURE, CORONARY, PERCUTANEOUS APPROACH: ICD-10-PCS | Performed by: INTERNAL MEDICINE

## 2023-12-06 PROCEDURE — C1894 INTRO/SHEATH, NON-LASER: HCPCS | Performed by: INTERNAL MEDICINE

## 2023-12-06 PROCEDURE — C1760 CLOSURE DEV, VASC: HCPCS | Performed by: INTERNAL MEDICINE

## 2023-12-06 PROCEDURE — 25510000001 IOPAMIDOL PER 1 ML: Performed by: INTERNAL MEDICINE

## 2023-12-06 PROCEDURE — G0378 HOSPITAL OBSERVATION PER HR: HCPCS

## 2023-12-06 PROCEDURE — 25010000002 FENTANYL CITRATE (PF) 50 MCG/ML SOLUTION: Performed by: INTERNAL MEDICINE

## 2023-12-06 PROCEDURE — 99233 SBSQ HOSP IP/OBS HIGH 50: CPT | Performed by: PHYSICIAN ASSISTANT

## 2023-12-06 PROCEDURE — 85025 COMPLETE CBC W/AUTO DIFF WBC: CPT | Performed by: INTERNAL MEDICINE

## 2023-12-06 PROCEDURE — 25810000003 SODIUM CHLORIDE 0.9 % SOLUTION: Performed by: INTERNAL MEDICINE

## 2023-12-06 PROCEDURE — 93572 IV DOP VEL&/PRESS C FLO EA: CPT | Performed by: INTERNAL MEDICINE

## 2023-12-06 PROCEDURE — 99153 MOD SED SAME PHYS/QHP EA: CPT | Performed by: INTERNAL MEDICINE

## 2023-12-06 PROCEDURE — 85610 PROTHROMBIN TIME: CPT | Performed by: INTERNAL MEDICINE

## 2023-12-06 PROCEDURE — 80053 COMPREHEN METABOLIC PANEL: CPT | Performed by: INTERNAL MEDICINE

## 2023-12-06 PROCEDURE — 93571 IV DOP VEL&/PRESS C FLO 1ST: CPT | Performed by: INTERNAL MEDICINE

## 2023-12-06 RX ORDER — ACETAMINOPHEN 325 MG/1
650 TABLET ORAL EVERY 4 HOURS PRN
Status: DISCONTINUED | OUTPATIENT
Start: 2023-12-06 | End: 2023-12-10 | Stop reason: HOSPADM

## 2023-12-06 RX ORDER — MIDAZOLAM HYDROCHLORIDE 1 MG/ML
INJECTION INTRAMUSCULAR; INTRAVENOUS
Status: DISCONTINUED | OUTPATIENT
Start: 2023-12-06 | End: 2023-12-06 | Stop reason: HOSPADM

## 2023-12-06 RX ORDER — SODIUM CHLORIDE 9 MG/ML
INJECTION, SOLUTION INTRAVENOUS
Status: COMPLETED | OUTPATIENT
Start: 2023-12-06 | End: 2023-12-06

## 2023-12-06 RX ORDER — HEPARIN SODIUM 1000 [USP'U]/ML
INJECTION, SOLUTION INTRAVENOUS; SUBCUTANEOUS
Status: DISCONTINUED | OUTPATIENT
Start: 2023-12-06 | End: 2023-12-06 | Stop reason: HOSPADM

## 2023-12-06 RX ORDER — SODIUM CHLORIDE 9 MG/ML
1 INJECTION, SOLUTION INTRAVENOUS CONTINUOUS
Status: ACTIVE | OUTPATIENT
Start: 2023-12-06 | End: 2023-12-06

## 2023-12-06 RX ORDER — LIDOCAINE HYDROCHLORIDE 20 MG/ML
INJECTION, SOLUTION INFILTRATION; PERINEURAL
Status: DISCONTINUED | OUTPATIENT
Start: 2023-12-06 | End: 2023-12-06 | Stop reason: HOSPADM

## 2023-12-06 RX ORDER — FENTANYL CITRATE 50 UG/ML
INJECTION, SOLUTION INTRAMUSCULAR; INTRAVENOUS
Status: DISCONTINUED | OUTPATIENT
Start: 2023-12-06 | End: 2023-12-06 | Stop reason: HOSPADM

## 2023-12-06 RX ORDER — METOPROLOL TARTRATE 1 MG/ML
5 INJECTION, SOLUTION INTRAVENOUS ONCE
Status: COMPLETED | OUTPATIENT
Start: 2023-12-06 | End: 2023-12-06

## 2023-12-06 RX ORDER — NITROGLYCERIN 0.4 MG/1
0.4 TABLET SUBLINGUAL
Status: DISCONTINUED | OUTPATIENT
Start: 2023-12-06 | End: 2023-12-10 | Stop reason: HOSPADM

## 2023-12-06 RX ADMIN — Medication 10 ML: at 09:14

## 2023-12-06 RX ADMIN — ATORVASTATIN CALCIUM 20 MG: 20 TABLET, FILM COATED ORAL at 20:11

## 2023-12-06 RX ADMIN — CETIRIZINE HYDROCHLORIDE 10 MG: 10 TABLET, FILM COATED ORAL at 13:11

## 2023-12-06 RX ADMIN — HYDROCODONE BITARTRATE AND ACETAMINOPHEN 1 TABLET: 10; 325 TABLET ORAL at 13:10

## 2023-12-06 RX ADMIN — FAMOTIDINE 20 MG: 20 TABLET, FILM COATED ORAL at 20:12

## 2023-12-06 RX ADMIN — METOPROLOL TARTRATE 5 MG: 1 INJECTION, SOLUTION INTRAVENOUS at 22:38

## 2023-12-06 RX ADMIN — ACETAMINOPHEN 650 MG: 325 TABLET ORAL at 20:11

## 2023-12-06 RX ADMIN — HYDRALAZINE HYDROCHLORIDE 50 MG: 50 TABLET, FILM COATED ORAL at 13:11

## 2023-12-06 RX ADMIN — SODIUM CHLORIDE 1 ML/KG/HR: 9 INJECTION, SOLUTION INTRAVENOUS at 17:59

## 2023-12-06 RX ADMIN — SACUBITRIL AND VALSARTAN 1 TABLET: 24; 26 TABLET, FILM COATED ORAL at 13:11

## 2023-12-06 RX ADMIN — Medication 10 ML: at 20:13

## 2023-12-06 RX ADMIN — APIXABAN 5 MG: 5 TABLET, FILM COATED ORAL at 20:12

## 2023-12-06 RX ADMIN — SACUBITRIL AND VALSARTAN 1 TABLET: 24; 26 TABLET, FILM COATED ORAL at 20:12

## 2023-12-06 NOTE — PLAN OF CARE
Goal Outcome Evaluation:      Pt has been resting this shift. Pt remains NPO for possible heart cath this am. No signs and symptoms of acute distress noted. No complaints of chest pain or SOB reported.

## 2023-12-06 NOTE — PROGRESS NOTES
[facility] Medicine Services  PROGRESS NOTE     Patient Identification:  Name:  Aramis Carson  Age:  83 y.o.  Sex:  male  :  1940  MRN:  [id number]  Visit Number:  [id number]  Primary Care Provider:  Kieran Blanca APRN    Length of stay:  1    ----------------------------------------------------------------------------------------------------------------------  Subjective     Chief Complaint:  Follow up for chest pain, abnormal stress test      History of Presenting Illness/Hospital Course:  Patient is an 84 yo male with past medical history significant for essential hypertension, hyperlipidemia, paroxysmal atrial fibrillation chronically anticoagulated with Warfarin, HFpEF/Grade I diastolic dysfunction/mild pulmonary hypertension, GERD, GARRISON, and former tobacco abuse that presented to the [facility] emergency department for evaluation of chest pain. Please see admitting history and physical for further and complete details. HS troponin T not elevated. EKG without acute ischemic changes. Patient was admitted to the telemetry floor for further evaluation and treatment. Patient was continued on aspirin and statin. Cardiology was consulted and evaluated the patient. TTE was obtained resulting with preserved EF 66-70% and grade I diastolic dysfunction. Patient ultimately underwent stress test with myocardial perfusion imaging that resulted with heart size, mild degree of ischemia located in the inferior wall and lateral wall, intermediate risk study.  As such, patient's home Warfarin was held in anticipation for [facility]. Once INR reached below 1.6 with heparin gtt on board, patient planned for ischemic evaluation with heart catheterization.     Subjective:  Today, the patient was lying in bed per my evaluation. No acute distress noted. Wife at bedside. LHC this AM, non obstructive disease noted. Patient reports mild headache, no other complaints. No  chest pain or dyspnea.      Present during exam: Patient's wife   ----------------------------------------------------------------------------------------------------------------------  Objective     Consults:  Interventional cardiology      Procedures:  12/4/2023: Transthoracic echocardiogram     Left ventricular systolic function is normal. Calculated left ventricular EF = 67% Left ventricular ejection fraction appears to be 66 - 70%.    Left ventricular diastolic function is consistent with (grade I) impaired relaxation.    The left atrial cavity is moderately dilated.    Estimated right ventricular systolic pressure from tricuspid regurgitation is mildly elevated (35-45 mmHg).    Mild pulmonary hypertension is present.  12/4/2023: Stress test with myocardial perfusion imaging     A pharmacological stress test was performed using regadenoson without low-level exercise.    Findings consistent with an indeterminate ECG stress test.    Raw images reviewed with the following abnormalities noted: Has 3 bright scintigraphic spots noted in the right lung field.  Clinical correlation required to rule out any pulmonary pathology.    Myocardial perfusion imaging indicates a moderate-sized, mild degree of ischemia located in the inferior wall and lateral wall.    TID:1.19    Normal LV cavity size. Normal LV wall motion noted.    Left ventricular ejection fraction is normal (Calculated EF = 69%).    Impressions are consistent with an intermediate risk study.  12/6/2023: Left heart catheterization -- Dr. Navarrete, interventional cardiology     Mid LAD lesion is 30% stenosed.    Mid Cx lesion is 50% stenosed.  1.  Cardiac.  Mild nonobstructive epicardial coronary disease noted.  Medical management will be advised.  Eliquis for chronic A-fib will be advised.    Current Hospital Meds:  atorvastatin, 20 mg, Oral, Nightly  cetirizine, 10 mg, Oral, Daily  famotidine, 20 mg, Oral, Nightly  hydrALAZINE, 50 mg, Oral, BID  pantoprazole,  40 mg, Oral, Q AM  sacubitril-valsartan, 1 tablet, Oral, BID  senna-docusate sodium, 2 tablet, Oral, BID  sodium chloride, 10 mL, Intravenous, Q12H      Pharmacy Consult,       ----------------------------------------------------------------------------------------------------------------------  Vital Signs:  Temp:  [98.1 °F (36.7 °C)-99.5 °F (37.5 °C)] 99.5 °F (37.5 °C)  Heart Rate:  [74-84] 83  Resp:  [16-20] 18  BP: ()/(52-71) 114/62  Mean Arterial Pressure (Non-Invasive) for the past 24 hrs (Last 3 readings):   Noninvasive MAP (mmHg)   12/06/23 1215 76   12/06/23 1145 73   12/06/23 1130 69     SpO2 Percentage    12/05/23 2337 12/06/23 0354 12/06/23 0719   SpO2: 98% 98% 96%     SpO2:  [96 %-98 %] 96 %  on  Flow (L/min):  [2] 2;   Device (Oxygen Therapy): nasal cannula with ETCO2    Body mass index is 29.25 kg/m².  Wt Readings from Last 3 Encounters:   12/06/23 82.2 kg (181 lb 3.2 oz)   11/16/23 80.7 kg (178 lb)   09/26/23 81.6 kg (180 lb)        Intake/Output Summary (Last 24 hours) at 12/6/2023 1340  Last data filed at 12/6/2023 1200  Gross per 24 hour   Intake 720 ml   Output --   Net 720 ml     Diet: Cardiac Diets; Healthy Heart (2-3 Na+); Texture: Regular Texture (IDDSI 7); Fluid Consistency: Thin (IDDSI 0)  ----------------------------------------------------------------------------------------------------------------------  Physical exam:  Physical Exam  Nursing note reviewed.   Constitutional:       General: He is awake. He is not in acute distress.     Appearance: He is well-developed. He is not toxic-appearing.      Comments: Sitting up in bed. Wife at bedside.    HENT:      Head: Normocephalic and atraumatic.      Mouth/Throat:      Mouth: Mucous membranes are moist.   Eyes:      Conjunctiva/sclera: Conjunctivae normal.      Pupils: Pupils are equal, round, and reactive to light.   Cardiovascular:      Rate and Rhythm: Normal rate. Rhythm irregularly irregular.      Pulses:           Dorsalis  pedis pulses are 2+ on the right side and 2+ on the left side.      Heart sounds: Normal heart sounds. No murmur heard.     No friction rub. No gallop.   Pulmonary:      Effort: Pulmonary effort is normal.      Breath sounds: Normal breath sounds and air entry. No wheezing, rhonchi or rales.      Comments: Speaks in full sentences without dyspnea.  Abdominal:      General: Bowel sounds are normal. There is no distension.      Palpations: Abdomen is soft.      Tenderness: There is no abdominal tenderness.   Musculoskeletal:      Cervical back: Neck supple.      Right lower leg: No edema.      Left lower leg: No edema.   Skin:     General: Skin is warm and dry.      Capillary Refill: Capillary refill takes less than 2 seconds.   Neurological:      General: No focal deficit present.      Mental Status: He is alert and oriented to person, place, and time.      Sensory: Sensation is intact.      Motor: Motor function is intact.      Comments: Awake and alert. Follows commands. Answers questions appropriately. Moves all extremities equally. Strength and sensation intact. No focal neuro deficit on exam.   Psychiatric:         Mood and Affect: Mood and affect normal.         Speech: Speech normal.         Behavior: Behavior is cooperative.        ----------------------------------------------------------------------------------------------------------------------  Tele:    Atrial fibrillation 70s-80s  Per telemetry, patient with a  since 2.5 second pause with HR drop to 40s briefly this AM, no further episodes     I have personally reviewed the EKG/Telemetry strips   ----------------------------------------------------------------------------------------------------------------------  Results from last 7 days   Lab Units 12/05/23  1051 12/05/23  0204 12/03/23  1135   HSTROP T ng/L 12 18 19           Results from last 7 days   Lab Units 12/06/23  0808 12/05/23  0204 12/04/23  1652 12/04/23  0213 12/03/23  0916   WBC 10*3/mm3  10.66 10.48  --  9.99 9.57   HEMOGLOBIN g/dL 13.9 14.5  --  14.1 14.4   HEMATOCRIT % 44.2 44.7  --  45.1 45.8   MCV fL 86.8 85.8  --  86.2 86.1   MCHC g/dL 31.4* 32.4  --  31.3* 31.4*   PLATELETS 10*3/mm3 179 236  --  223 225   INR  1.37* 1.98* 2.19*  --  2.91*     Results from last 7 days   Lab Units 12/06/23  0808 12/05/23  0204 12/04/23  0213 12/03/23  0916   SODIUM mmol/L 139 142 141 141   POTASSIUM mmol/L 4.2 4.2 4.4 4.1   MAGNESIUM mg/dL  --  2.5*  --   --    CHLORIDE mmol/L 108* 107 106 104   CO2 mmol/L 20.7* 24.8 24.6 25.0   BUN mg/dL 22 26* 23 23   CREATININE mg/dL 1.10 1.35* 1.36* 1.16   CALCIUM mg/dL 8.7 8.8 8.7 9.1   GLUCOSE mg/dL 113* 155* 108* 113*   ALBUMIN g/dL 3.5 3.9  --  3.9   BILIRUBIN mg/dL 0.6 0.3  --  0.6   ALK PHOS U/L 56 72  --  59   AST (SGOT) U/L 21 23  --  15   ALT (SGPT) U/L 16 16  --  13   Estimated Creatinine Clearance: 51.2 mL/min (by C-G formula based on SCr of 1.1 mg/dL).    Lab Results   Component Value Date    HGBA1C 6.10 (H) 08/07/2023     Lab Results   Component Value Date    TSH 2.800 08/07/2023    FREET4 1.19 08/07/2023     I have personally reviewed the above laboratory results.   ----------------------------------------------------------------------------------------------------------------------  Imaging Results (Last 24 Hours)       ** No results found for the last 24 hours. **          I have personally reviewed the above radiology results.   ----------------------------------------------------------------------------------------------------------------------  Assessment/Plan     ACUTE HOSPITAL PROBLEMS    -Chest pain, mixed features  -Abnormal stress test   HS trop not significantly elevated x 2   EKG without acute ischemic changes   TTE obtained with preserved EF, grade I diastolic dysfunction noted   Stress test performed resulted with moderate sized, mild degree of ischemic located in the inferior wall and lateral wall   Patient underwent LHC today per interventional  cardiology, non obstructive disease noted. See report within this document.   Warfarin held previously pre cath with heparin gtt on board. Cardiology recommending Eliquis rather than warfarin.    Cardiology input/assistance is much appreciated.   Continue aspirin and statin   Closely monitor on telemetry   Repeat labs in AM      CHRONIC MEDICAL PROBLEMS     -Essential hypertension  BP appears well controlled   Continue home antihypertensive regimen   Closely monitor BP per hospital protocol, titrate medications as necessary  -Hyperlipidemia: Continue statin   -Paroxysmal atrial fibrillation, chronically anticoagulated with Warfarin: Patient currently rate controlled. Metoprolol previously discontinued per cardiology as patient noted to have pauses. Warfarin discontinued previously with heparin gtt in place prior to C. Post heart cath, cardiology recommending Eliquis. Consult RX for pricing of DOAC.   -HFpEF/Grade I diastolic dysfunction, mild pulmonary hypertension: TTE reviewed. Grossly compensated on exam. Monitor volume status closely.   -GERD: PPI   -Obstructive sleep apnea   -Former smoker   --------------------------------------------------  DVT Prophylaxis: Patient to begin Eliquis   GI Prophylaxis: PPI  FEN: No IV fluids. Replace electrolytes per protocol as necessary. Cardiac diet.  Activity: Up with assistance as tolerated   --------------------------------------------------  Disposition:  ?DC in AM   --------------------------------------------------  I have discussed the patient's assessment and plan with the patient, wife at bedside, nursing staff, and attending physician David López MD Allyson O'Kuma, PA-C  Hospitalist Service -- [facility]   Pager: [phone]    12/06/23  13:40 EST    Attending Physician: David Jaquez MD    ----------------------------------------------------------------------------------------------------------------------

## 2023-12-06 NOTE — CASE MANAGEMENT/SOCIAL WORK
Discharge Planning Assessment  [facility]     Patient Name: Aramis Carson  MRN: [id number]  Today's Date: 12/6/2023    Admit Date: 12/3/2023    Plan: Pt resides at home with spouse, Kelley, at [address] [place] [address] and plans to return home at discharge.  Pt currently does not utilize home health services.  Pt has cpap via [employer] and cane prn.  Pt utilizes [facility].  Pt's PCP is Kieran Blanca.  Pt transports via private auto per family. SS will follow.       Discharge Plan       Row Name 12/06/23 1648       Plan    Plan Pt resides at home with spouse, Kelley, at [address] [place] [address] and plans to return home at discharge.  Pt currently does not utilize home health services.  Pt has cpap via [employer] and cane prn.  Pt utilizes [facility].  Pt's PCP is Kieran Blanca.  Pt transports via private auto per family. SS will follow.                  Continued Care and Services - Admitted Since 12/3/2023    Coordination has not been started for this encounter.       Expected Discharge Date and Time       Expected Discharge Date Expected Discharge Time    Dec 8, 2023               Bianca Arreguin, MADDYW

## 2023-12-06 NOTE — PLAN OF CARE
Goal Outcome Evaluation:      Patient had heart cath this shift, see results. Right femoral site is clean, dry, and intact. No signs of bleeding or hematoma noted. Patient is lying in bed with no S&S of distress. No complaints at this time. Will continue to follow plan of care.

## 2023-12-06 NOTE — PHARMACY PATIENT ASSISTANCE
Pharmacy was consulted to evaluate the cost of Eliquis for patient. Patient has [facility] insurance and was previously on warfarin. [facility] to let them know patient may be started on Eliquis, but they said patient does not usually get his medications there and is not an established patient. I spoke with Mr. Carson and he normally uses [facility] in [place] for local fills and [facility] for maintenance medications. I have called in a free trial card for the patient to use at [facility] in case there are any issues with his insurance, and then his refills may be called into [facility] by his cardiologist at his follow-up appointment. I have informed the patient of the above.    Thank you,    Marj Mendez, PharmD  12/06/23  13:07 EST

## 2023-12-06 NOTE — PROGRESS NOTES
[facility]PAR[facility] INFUSION  Aramis Carson is a  83 y.o. male receiving heparin infusion.     Therapy for (VTE/Cardiac):   cardiac  Patient Dosing Weight: 80.6Kg  Initial Bolus (Y/N):   NO  Any Bolus (Y/N):   YES        Signs or Symptoms of Bleeding: NO    Cardiac or Other (Not VTE)   Initial Bolus: 60 units/kg (Max 4,000 units)  Initial rate: 12 units/kg/hr (Max 1,000 units/hr)   Anti Xa Rebolus Infusion Hold time Change infusion Dose (Units/kg/hr) Next Anti Xa or aPTT Level Due   < 0.11 50 Units/kg  (4000 Units Max) None Increase by  3 Units/kg/hr 6 hours   0.11- 0.19 25 Units/kg  (2000 Units Max) None Increase by  2 Units/kg/hr 6 hours   0.2 - 0.29 0 None Increase by  1 Units/kg/hr 6 hours   0.3 - 0.5 0 None No Change 6 hours (after 2 consecutive levels in range check q24h @0700)   0.51 - 0.6 0 None Decrease by  1 Units/kg/hr 6 hours   0.61 - 0.8 0 30 Minutes Decrease by  2 Units/kg/hr 6 hours   0.81 - 1 0 60 Minutes Decrease by  3 Units/kg/hr 6 hours   >1 0 Hold  After Anti Xa less than 0.5 decrease previous rate by  4 Units/kg/hr  Every 2 hours until Anti Xa  less than 0.5 then when infusion restarts in 6 hours         Recommend anti-Xa every 6 hours.          Date   Time   Anti-Xa Current Rate (Unit/kg/hr) Bolus   (Units) Rate Change   (Unit/kg/hr) New Rate (Unit/kg/hr) Next   Anti-Xa Comments  Pump Check Daily   12/4 19:00 0.28 0 0 +12 12 0200   Start the rate at 12.instead of 12.4  Since patient already received Lovenox and warfarin .confirm start with Ksenia NICK     12/05 0233 0.4 12 - - 12 0900 Tx x 1, no changes, no s/s bleeding per Mccormack RN    12/05 0820 0.31 12 - - 12 12/6 @700 Therapeutic x2. Spoke with Marlene NICK. No s/s of bleeding   12/05 1630  12     Pump check - wt and rate correct   12/06 0650 0.20 12 - +1 13 1400 Spoke with Zhen RN. No s/s of bleeding                                                                                                                                                                                      Pharmacy will continue to follow anti-Xa results and monitor for signs and symptoms of bleeding or thrombosis.    Sunny BustillosD

## 2023-12-07 ENCOUNTER — APPOINTMENT (OUTPATIENT)
Dept: GENERAL RADIOLOGY | Facility: HOSPITAL | Age: 83
DRG: 286 | End: 2023-12-07
Payer: MEDICARE

## 2023-12-07 LAB
ALBUMIN SERPL-MCNC: 3.5 G/DL (ref 3.5–5.2)
ALBUMIN/GLOB SERPL: 1.5 G/DL
ALP SERPL-CCNC: 53 U/L (ref 39–117)
ALT SERPL W P-5'-P-CCNC: 24 U/L (ref 1–41)
ANION GAP SERPL CALCULATED.3IONS-SCNC: 10.9 MMOL/L (ref 5–15)
AST SERPL-CCNC: 24 U/L (ref 1–40)
BILIRUB SERPL-MCNC: 0.5 MG/DL (ref 0–1.2)
BUN SERPL-MCNC: 20 MG/DL (ref 8–23)
BUN/CREAT SERPL: 17.1 (ref 7–25)
CALCIUM SPEC-SCNC: 8.8 MG/DL (ref 8.6–10.5)
CHLORIDE SERPL-SCNC: 106 MMOL/L (ref 98–107)
CO2 SERPL-SCNC: 21.1 MMOL/L (ref 22–29)
CREAT SERPL-MCNC: 1.17 MG/DL (ref 0.76–1.27)
CRP SERPL-MCNC: 2.06 MG/DL (ref 0–0.5)
DEPRECATED RDW RBC AUTO: 43.7 FL (ref 37–54)
EGFRCR SERPLBLD CKD-EPI 2021: 61.9 ML/MIN/1.73
ERYTHROCYTE [DISTWIDTH] IN BLOOD BY AUTOMATED COUNT: 14.1 % (ref 12.3–15.4)
FLUAV RNA RESP QL NAA+PROBE: NOT DETECTED
FLUBV RNA RESP QL NAA+PROBE: NOT DETECTED
GLOBULIN UR ELPH-MCNC: 2.4 GM/DL
GLUCOSE SERPL-MCNC: 140 MG/DL (ref 65–99)
HCT VFR BLD AUTO: 42.7 % (ref 37.5–51)
HGB BLD-MCNC: 14 G/DL (ref 13–17.7)
MAGNESIUM SERPL-MCNC: 2 MG/DL (ref 1.6–2.4)
MCH RBC QN AUTO: 27.8 PG (ref 26.6–33)
MCHC RBC AUTO-ENTMCNC: 32.8 G/DL (ref 31.5–35.7)
MCV RBC AUTO: 84.9 FL (ref 79–97)
PLATELET # BLD AUTO: 210 10*3/MM3 (ref 140–450)
PMV BLD AUTO: 10.6 FL (ref 6–12)
POTASSIUM SERPL-SCNC: 4.2 MMOL/L (ref 3.5–5.2)
PROCALCITONIN SERPL-MCNC: 0.14 NG/ML (ref 0–0.25)
PROT SERPL-MCNC: 5.9 G/DL (ref 6–8.5)
QT INTERVAL: 392 MS
QTC INTERVAL: 437 MS
RBC # BLD AUTO: 5.03 10*6/MM3 (ref 4.14–5.8)
SARS-COV-2 RNA RESP QL NAA+PROBE: DETECTED
SODIUM SERPL-SCNC: 138 MMOL/L (ref 136–145)
WBC NRBC COR # BLD AUTO: 11.07 10*3/MM3 (ref 3.4–10.8)

## 2023-12-07 PROCEDURE — 84145 PROCALCITONIN (PCT): CPT | Performed by: INTERNAL MEDICINE

## 2023-12-07 PROCEDURE — 86140 C-REACTIVE PROTEIN: CPT | Performed by: INTERNAL MEDICINE

## 2023-12-07 PROCEDURE — 93005 ELECTROCARDIOGRAM TRACING: CPT | Performed by: INTERNAL MEDICINE

## 2023-12-07 PROCEDURE — 87636 SARSCOV2 & INF A&B AMP PRB: CPT | Performed by: INTERNAL MEDICINE

## 2023-12-07 PROCEDURE — 83735 ASSAY OF MAGNESIUM: CPT | Performed by: PHYSICIAN ASSISTANT

## 2023-12-07 PROCEDURE — 71045 X-RAY EXAM CHEST 1 VIEW: CPT

## 2023-12-07 PROCEDURE — 93010 ELECTROCARDIOGRAM REPORT: CPT | Performed by: SPECIALIST

## 2023-12-07 PROCEDURE — 85027 COMPLETE CBC AUTOMATED: CPT | Performed by: INTERNAL MEDICINE

## 2023-12-07 PROCEDURE — 99232 SBSQ HOSP IP/OBS MODERATE 35: CPT | Performed by: INTERNAL MEDICINE

## 2023-12-07 PROCEDURE — 99232 SBSQ HOSP IP/OBS MODERATE 35: CPT | Performed by: PHYSICIAN ASSISTANT

## 2023-12-07 PROCEDURE — 71045 X-RAY EXAM CHEST 1 VIEW: CPT | Performed by: RADIOLOGY

## 2023-12-07 PROCEDURE — 80053 COMPREHEN METABOLIC PANEL: CPT | Performed by: INTERNAL MEDICINE

## 2023-12-07 RX ORDER — FLUTICASONE PROPIONATE 50 MCG
2 SPRAY, SUSPENSION (ML) NASAL DAILY
Status: DISCONTINUED | OUTPATIENT
Start: 2023-12-07 | End: 2023-12-10 | Stop reason: HOSPADM

## 2023-12-07 RX ORDER — GUAIFENESIN 600 MG/1
600 TABLET, EXTENDED RELEASE ORAL ONCE
Qty: 1 TABLET | Refills: 0 | Status: COMPLETED | OUTPATIENT
Start: 2023-12-07 | End: 2023-12-07

## 2023-12-07 RX ORDER — GUAIFENESIN/DEXTROMETHORPHAN 100-10MG/5
5 SYRUP ORAL EVERY 4 HOURS PRN
Status: DISCONTINUED | OUTPATIENT
Start: 2023-12-07 | End: 2023-12-10 | Stop reason: HOSPADM

## 2023-12-07 RX ADMIN — FLUTICASONE PROPIONATE 2 SPRAY: 50 SPRAY, METERED NASAL at 12:49

## 2023-12-07 RX ADMIN — ACETAMINOPHEN 650 MG: 325 TABLET ORAL at 22:07

## 2023-12-07 RX ADMIN — GUAIFENESIN AND DEXTROMETHORPHAN 5 ML: 100; 10 SYRUP ORAL at 12:49

## 2023-12-07 RX ADMIN — DOCUSATE SODIUM 50 MG AND SENNOSIDES 8.6 MG 2 TABLET: 8.6; 5 TABLET, FILM COATED ORAL at 09:36

## 2023-12-07 RX ADMIN — Medication 10 ML: at 09:39

## 2023-12-07 RX ADMIN — PANTOPRAZOLE SODIUM 40 MG: 40 TABLET, DELAYED RELEASE ORAL at 05:00

## 2023-12-07 RX ADMIN — HYDRALAZINE HYDROCHLORIDE 50 MG: 50 TABLET, FILM COATED ORAL at 09:36

## 2023-12-07 RX ADMIN — Medication 10 ML: at 21:53

## 2023-12-07 RX ADMIN — APIXABAN 5 MG: 5 TABLET, FILM COATED ORAL at 21:47

## 2023-12-07 RX ADMIN — HYDRALAZINE HYDROCHLORIDE 50 MG: 50 TABLET, FILM COATED ORAL at 22:11

## 2023-12-07 RX ADMIN — METOPROLOL TARTRATE 12.5 MG: 25 TABLET, FILM COATED ORAL at 21:47

## 2023-12-07 RX ADMIN — METOPROLOL TARTRATE 12.5 MG: 25 TABLET, FILM COATED ORAL at 12:45

## 2023-12-07 RX ADMIN — APIXABAN 5 MG: 5 TABLET, FILM COATED ORAL at 09:36

## 2023-12-07 RX ADMIN — GUAIFENESIN 600 MG: 600 TABLET, EXTENDED RELEASE ORAL at 04:58

## 2023-12-07 RX ADMIN — SACUBITRIL AND VALSARTAN 1 TABLET: 24; 26 TABLET, FILM COATED ORAL at 09:36

## 2023-12-07 RX ADMIN — BENZOCAINE 6 MG-MENTHOL 10 MG LOZENGES 1 LOZENGE: at 23:16

## 2023-12-07 RX ADMIN — GUAIFENESIN AND DEXTROMETHORPHAN 5 ML: 100; 10 SYRUP ORAL at 21:49

## 2023-12-07 RX ADMIN — FAMOTIDINE 20 MG: 20 TABLET, FILM COATED ORAL at 21:49

## 2023-12-07 RX ADMIN — ACETAMINOPHEN 650 MG: 325 TABLET ORAL at 12:26

## 2023-12-07 RX ADMIN — CETIRIZINE HYDROCHLORIDE 10 MG: 10 TABLET, FILM COATED ORAL at 09:36

## 2023-12-07 RX ADMIN — SACUBITRIL AND VALSARTAN 1 TABLET: 24; 26 TABLET, FILM COATED ORAL at 21:49

## 2023-12-07 RX ADMIN — ATORVASTATIN CALCIUM 20 MG: 20 TABLET, FILM COATED ORAL at 21:53

## 2023-12-07 NOTE — PROGRESS NOTES
LOS: 2 days     Name: Aramis Carson  Age/Sex: 83 y.o. male  :  1940        PCP: Kieran Blanca APRN    Principal Problem:    Chest pain      Admission Information: Aramis Carson is a 83 y.o. male with chronic atrial fibrillation-anticoagulated with warfarin, hyperlipidemia, and prediabetes     Chief Complaint: Chest pain    Interval history: Underwent invasive coronary angiogram yesterday without findings of obstructive coronary artery disease.  See full results detailed below.  No further pauses since holding beta-blocker.  Did get tachycardic overnight into the 150s and 160s.  He has been initiated on Eliquis for anticoagulation as opposed to the warfarin he had been on at home.    Subjective   Patient awake in bed and without complaints other than of thirst.  He denies chest pain or shortness of breath.        Vital Signs  Vital Signs (last 72 hrs)         0700  0659  1114   Most Recent      Temp (°F) 97.1 -  98.6    98.1 -  98.8    98.1 -  100.1      99.4     99.4 (37.4) 709    Heart Rate 66 -  83    68 -  84    68 -  115    74 -  75     74 936    Resp   20    16 -  18    16 -  20      18     18 709    /61 -  141/62    103/61 -  127/59    97/56 -  134/65    105/53 -  113/60     113/60 936    SpO2 (%) 95 -  98    96 -  99    95 -  96      95     95 709    Flow (L/min)       2       2 952          Temp:  [98.1 °F (36.7 °C)-100.1 °F (37.8 °C)] 99.4 °F (37.4 °C)  Heart Rate:  [] 74  Resp:  [16-18] 18  BP: ()/(53-72) 113/60  Body mass index is 29.26 kg/m².      Intake/Output Summary (Last 24 hours) at 2023 1114  Last data filed at 2023 0800  Gross per 24 hour   Intake 840 ml   Output --   Net 840 ml       Constitutional:       Appearance: Not in distress.   Pulmonary:      Effort: Pulmonary effort is normal.      Breath sounds: Normal breath sounds.    Cardiovascular:      Normal rate. Irregular rhythm.      Murmurs: There is no murmur.   Edema:     Peripheral edema absent.   Skin:     General: Skin is warm and dry.      Comments: Right femoral access site dressing clean, dry, and intact.  No evidence of ecchymosis or hematoma.   Neurological:      Mental Status: Alert.         Telemetry: A-fib 80s       Results Review:   Results for orders placed during the hospital encounter of 12/03/23    Cardiac Catheterization/Vascular Study    Narrative    Mid LAD lesion is 30% stenosed.    Mid Cx lesion is 50% stenosed.    1.  Cardiac.  Mild nonobstructive epicardial coronary disease noted.  Medical management will be advised.  Eliquis for chronic A-fib will be advised.   Results from last 7 days   Lab Units 12/07/23  0146 12/06/23  0808 12/05/23  0204 12/04/23  0213 12/03/23  0916   WBC 10*3/mm3 11.07* 10.66 10.48 9.99 9.57   HEMOGLOBIN g/dL 14.0 13.9 14.5 14.1 14.4   PLATELETS 10*3/mm3 210 179 236 223 225     Results from last 7 days   Lab Units 12/07/23  0146 12/06/23  0808 12/05/23  0204 12/04/23  0213 12/03/23  0916   SODIUM mmol/L 138 139 142 141 141   POTASSIUM mmol/L 4.2 4.2 4.2 4.4 4.1   CHLORIDE mmol/L 106 108* 107 106 104   CO2 mmol/L 21.1* 20.7* 24.8 24.6 25.0   BUN mg/dL 20 22 26* 23 23   CREATININE mg/dL 1.17 1.10 1.35* 1.36* 1.16   CALCIUM mg/dL 8.8 8.7 8.8 8.7 9.1   GLUCOSE mg/dL 140* 113* 155* 108* 113*     Results from last 7 days   Lab Units 12/05/23  1051 12/05/23  0204 12/03/23  1135 12/03/23  0916   HSTROP T ng/L 12 18 19 16       Results from last 7 days   Lab Units 12/06/23  0808 12/05/23  0204 12/04/23  1652 12/03/23  0916   INR  1.37* 1.98* 2.19* 2.91*       I reviewed the patient's new clinical results.  I reviewed the patient's new imaging results and agree with the interpretation.  I personally viewed and interpreted the patient's EKG/Telemetry data      Medication Review:   apixaban, 5 mg, Oral, Q12H  atorvastatin, 20 mg, Oral,  Nightly  cetirizine, 10 mg, Oral, Daily  famotidine, 20 mg, Oral, Nightly  hydrALAZINE, 50 mg, Oral, BID  pantoprazole, 40 mg, Oral, Q AM  sacubitril-valsartan, 1 tablet, Oral, BID  senna-docusate sodium, 2 tablet, Oral, BID  sodium chloride, 10 mL, Intravenous, Q12H      Pharmacy Consult,         Assessment:  Coronary artery disease-nonobstructive  Atrial fibrillation-chronic, numerous greater than 2-second pauses while on beta-blocker-episodes of RVR while off beta-blocker  Hyperlipidemia  HFrecEF      Recommendations:  As patient's CAD is not obstructive, will not add aspirin therapy in addition to his apixaban in the hopes of minimizing risk of bleed  Rate has been difficult to control-pauses while on beta-blocker/RVR while off.  Will reinitiate metoprolol at lower dose with close monitoring and advice to hold if heart rate less than 60.  Recommend that at discharge he come to our office and have 2-week event monitor placed.  Continue statin  Continue Entresto    Case discussed with Dr. Navarrete and plan of care reflects his recommendations.    I discussed the patients findings and my recommendations with patient.      Electronically signed by ISAIAS Asif, 12/07/23, 11:51 AM EST.    .Electronically signed by Shwetha Navarrete MD, 12/07/23, 12:05 PM EST.

## 2023-12-07 NOTE — NURSING NOTE
Cardiac rehab referral received on pt.  No qualifying dx noted at this time.  Qualifications for CR include coronary stenting, AMI (NSTEM or STEMI), stable angina, CABG, heart valve repair/replacment, heart transplant or stable CHF (with these specific qualifications, Left Ventricular Ejection Fraction of 35% or less, NYHA class II to IV symptoms despite optimal heart failure therapy for at least 6 weeks and has not had recent (?6 Weeks) or planned (? 6 months) major cardiovascular hospitalizations or procedures.  Please contact us at [phone] with questions.

## 2023-12-07 NOTE — PLAN OF CARE
Goal Outcome Evaluation:      Pt has been resting this shift. Pt's HR got up to 160s this shift, APRN aware see orders. No signs and symptoms of acute distress noted. No complaints of chest pain or SOB reported.

## 2023-12-07 NOTE — PROGRESS NOTES
[facility] Medicine Services  PROGRESS NOTE     Patient Identification:  Name:  Aramis Carson  Age:  83 y.o.  Sex:  male  :  1940  MRN:  [id number]  Visit Number:  [id number]  Primary Care Provider:  Kieran Blanca APRN    Length of stay:  2    ----------------------------------------------------------------------------------------------------------------------  Subjective     Chief Complaint:  Follow up for COVID-19 positive, cough    History of Presenting Illness/Hospital Course:  Patient is an 82 yo male with past medical history significant for essential hypertension, hyperlipidemia, paroxysmal atrial fibrillation chronically anticoagulated with Warfarin, HFpEF/Grade I diastolic dysfunction/mild pulmonary hypertension, GERD, GARRISON, and former tobacco abuse that presented to the [facility] emergency department for evaluation of chest pain. Please see admitting history and physical for further and complete details. HS troponin T not elevated. EKG without acute ischemic changes. Patient was admitted to the telemetry floor for further evaluation and treatment. Patient was continued on aspirin and statin. Cardiology was consulted and evaluated the patient. TTE was obtained resulting with preserved EF 66-70% and grade I diastolic dysfunction. Patient ultimately underwent stress test with myocardial perfusion imaging that resulted with heart size, mild degree of ischemia located in the inferior wall and lateral wall, intermediate risk study.  As such, patient's home Warfarin was held in anticipation for LHC. Once INR reached below 1.6 with heparin gtt on board, patient planned for ischemic evaluation with heart catheterization. Patient underwent LHC that resulted with non obstructive disease.     Subjective:  Today, the patient complained of mild non productive cough. No fever or chills.  CRP obtained was slightly elevated with negative  procalcitonin.  White count had went from 10.6-11.07 overnight.  Chest x-ray was with no evidence of acute cardiopulmonary disease.  COVID-19 screening did result as positive.  Patient denies any chest pain.  No other complaints at this time.  Remains on room air.  Elevated, cardiology has resumed metoprolol.  Dose of IV Lopressor was given overnight per night shift.    ----------------------------------------------------------------------------------------------------------------------  Objective     Consults:  Interventional cardiology      Procedures:  12/4/2023: Transthoracic echocardiogram     Left ventricular systolic function is normal. Calculated left ventricular EF = 67% Left ventricular ejection fraction appears to be 66 - 70%.    Left ventricular diastolic function is consistent with (grade I) impaired relaxation.    The left atrial cavity is moderately dilated.    Estimated right ventricular systolic pressure from tricuspid regurgitation is mildly elevated (35-45 mmHg).    Mild pulmonary hypertension is present.  12/4/2023: Stress test with myocardial perfusion imaging     A pharmacological stress test was performed using regadenoson without low-level exercise.    Findings consistent with an indeterminate ECG stress test.    Raw images reviewed with the following abnormalities noted: Has 3 bright scintigraphic spots noted in the right lung field.  Clinical correlation required to rule out any pulmonary pathology.    Myocardial perfusion imaging indicates a moderate-sized, mild degree of ischemia located in the inferior wall and lateral wall.    TID:1.19    Normal LV cavity size. Normal LV wall motion noted.    Left ventricular ejection fraction is normal (Calculated EF = 69%).    Impressions are consistent with an intermediate risk study.  12/6/2023: Left heart catheterization -- Dr. Navarrete, interventional cardiology     Mid LAD lesion is 30% stenosed.    Mid Cx lesion is 50% stenosed.  1.  Cardiac.  Mild  nonobstructive epicardial coronary disease noted.  Medical management will be advised.  Eliquis for chronic A-fib will be advised.    Current Hospital Meds:  apixaban, 5 mg, Oral, Q12H  atorvastatin, 20 mg, Oral, Nightly  cetirizine, 10 mg, Oral, Daily  famotidine, 20 mg, Oral, Nightly  fluticasone, 2 spray, Each Nare, Daily  hydrALAZINE, 50 mg, Oral, BID  metoprolol tartrate, 12.5 mg, Oral, Q12H  pantoprazole, 40 mg, Oral, Q AM  sacubitril-valsartan, 1 tablet, Oral, BID  senna-docusate sodium, 2 tablet, Oral, BID  sodium chloride, 10 mL, Intravenous, Q12H      Pharmacy Consult,       ----------------------------------------------------------------------------------------------------------------------  Vital Signs:  Temp:  [98.1 °F (36.7 °C)-100.1 °F (37.8 °C)] 98.8 °F (37.1 °C)  Heart Rate:  [] 69  Resp:  [16-18] 18  BP: (102-129)/(53-66) 116/63  Mean Arterial Pressure (Non-Invasive) for the past 24 hrs (Last 3 readings):   Noninvasive MAP (mmHg)   12/07/23 1629 87   12/07/23 1133 75   12/07/23 0709 64     SpO2 Percentage    12/07/23 0709 12/07/23 1133 12/07/23 1629   SpO2: 95% 96% 95%     SpO2:  [95 %-96 %] 95 %  on   ;   Device (Oxygen Therapy): room air    Body mass index is 29.26 kg/m².  Wt Readings from Last 3 Encounters:   12/07/23 82.2 kg (181 lb 4.8 oz)   11/16/23 80.7 kg (178 lb)   09/26/23 81.6 kg (180 lb)        Intake/Output Summary (Last 24 hours) at 12/7/2023 1646  Last data filed at 12/7/2023 1200  Gross per 24 hour   Intake 960 ml   Output --   Net 960 ml     Diet: Cardiac Diets; Healthy Heart (2-3 Na+); Texture: Regular Texture (IDDSI 7); Fluid Consistency: Thin (IDDSI 0)  ----------------------------------------------------------------------------------------------------------------------  Physical exam:  Physical Exam  Nursing note reviewed.   Constitutional:       General: He is awake. He is not in acute distress.     Appearance: He is well-developed. He is not toxic-appearing.       Comments: Sitting up in bed. Wife at bedside.    HENT:      Head: Normocephalic and atraumatic.      Mouth/Throat:      Mouth: Mucous membranes are moist.   Eyes:      Conjunctiva/sclera: Conjunctivae normal.      Pupils: Pupils are equal, round, and reactive to light.   Cardiovascular:      Rate and Rhythm: Normal rate. Rhythm irregularly irregular.      Pulses:           Dorsalis pedis pulses are 2+ on the right side and 2+ on the left side.      Heart sounds: Normal heart sounds. No murmur heard.     No friction rub. No gallop.   Pulmonary:      Effort: Pulmonary effort is normal.      Breath sounds: Normal breath sounds and air entry. No wheezing, rhonchi or rales.      Comments: Speaks in full sentences without dyspnea.  Abdominal:      General: Bowel sounds are normal. There is no distension.      Palpations: Abdomen is soft.      Tenderness: There is no abdominal tenderness.   Musculoskeletal:      Cervical back: Neck supple.      Right lower leg: No edema.      Left lower leg: No edema.   Skin:     General: Skin is warm and dry.      Capillary Refill: Capillary refill takes less than 2 seconds.   Neurological:      General: No focal deficit present.      Mental Status: He is alert and oriented to person, place, and time.      Sensory: Sensation is intact.      Motor: Motor function is intact.      Comments: Awake and alert. Follows commands. Answers questions appropriately. Moves all extremities equally. Strength and sensation intact. No focal neuro deficit on exam.   Psychiatric:         Mood and Affect: Mood and affect normal.         Speech: Speech normal.         Behavior: Behavior is cooperative.        ----------------------------------------------------------------------------------------------------------------------  Tele:    Atrial fibrillation 70s    I have personally reviewed the EKG/Telemetry strips    ----------------------------------------------------------------------------------------------------------------------  Results from last 7 days   Lab Units 12/05/23  1051 12/05/23  0204 12/03/23  1135   HSTROP T ng/L 12 18 19           Results from last 7 days   Lab Units 12/07/23  0146 12/06/23  0808 12/05/23  0204 12/04/23  1652 12/04/23  0213 12/03/23  0916   CRP mg/dL 2.06*  --   --   --   --   --    WBC 10*3/mm3 11.07* 10.66 10.48  --    < > 9.57   HEMOGLOBIN g/dL 14.0 13.9 14.5  --    < > 14.4   HEMATOCRIT % 42.7 44.2 44.7  --    < > 45.8   MCV fL 84.9 86.8 85.8  --    < > 86.1   MCHC g/dL 32.8 31.4* 32.4  --    < > 31.4*   PLATELETS 10*3/mm3 210 179 236  --    < > 225   INR   --  1.37* 1.98* 2.19*  --  2.91*    < > = values in this interval not displayed.     Results from last 7 days   Lab Units 12/07/23  0146 12/06/23  0808 12/05/23  0204   SODIUM mmol/L 138 139 142   POTASSIUM mmol/L 4.2 4.2 4.2   MAGNESIUM mg/dL 2.0  --  2.5*   CHLORIDE mmol/L 106 108* 107   CO2 mmol/L 21.1* 20.7* 24.8   BUN mg/dL 20 22 26*   CREATININE mg/dL 1.17 1.10 1.35*   CALCIUM mg/dL 8.8 8.7 8.8   GLUCOSE mg/dL 140* 113* 155*   ALBUMIN g/dL 3.5 3.5 3.9   BILIRUBIN mg/dL 0.5 0.6 0.3   ALK PHOS U/L 53 56 72   AST (SGOT) U/L 24 21 23   ALT (SGPT) U/L 24 16 16   Estimated Creatinine Clearance: 48.2 mL/min (by C-G formula based on SCr of 1.17 mg/dL).    Lab Results   Component Value Date    HGBA1C 6.10 (H) 08/07/2023     Lab Results   Component Value Date    TSH 2.800 08/07/2023    FREET4 1.19 08/07/2023     I have personally reviewed the above laboratory results.   ----------------------------------------------------------------------------------------------------------------------  Imaging Results (Last 24 Hours)       Procedure Component Value Units Date/Time    XR Chest 1 View [[id number]] Collected: 12/07/23 0949     Updated: 12/07/23 0951    Narrative:      XR CHEST 1 VW-     CLINICAL INDICATION: Dyspnea; R07.9-Chest pain,  unspecified;  I20.0-Unstable angina        COMPARISON: 12/3/2023     TECHNIQUE: Single frontal view of the chest.     FINDINGS:     LUNGS: Lungs are adequately aerated.      HEART AND MEDIASTINUM: Heart and mediastinal contours are unremarkable        SKELETON: Bony and soft tissue structures are unremarkable.             Impression:      No radiographic evidence of acute cardiac or pulmonary disease.           This report was finalized on 12/7/2023 9:49 AM by Dr. Jose Jaimes MD.             I have personally reviewed the above radiology results.   ----------------------------------------------------------------------------------------------------------------------  Assessment/Plan     ACUTE HOSPITAL PROBLEMS    -Chest pain, mixed features  -Abnormal stress test   HS trop not significantly elevated x 2   EKG without acute ischemic changes   TTE obtained with preserved EF, grade I diastolic dysfunction noted   Stress test performed resulted with moderate sized, mild degree of ischemic located in the inferior wall and lateral wall   Patient underwent LHC today per interventional cardiology, non obstructive disease noted. See report within this document.   Warfarin held previously pre cath with heparin gtt on board. Cardiology recommending Eliquis rather than warfarin.  Glucose previously initiated, tolerating  Home metoprolol was previously discontinued as patient was having ventricular pauses.  Patient with increased heart rate overnight, requiring IV Lopressor.  Cardiology has seen the patient today and resumed p.o. metoprolol.  Cardiology input/assistance is much appreciated.   Continue aspirin and statin   Closely monitor on telemetry   Repeat labs in AM      -COVID-19 positive  -Nonproductive cough  Patient complained of nonproductive cough this a.m.  Chest x-ray with no evidence of consolidation  Mild increase in WBC  Patient afebrile  COVID-19 screening positive  Patient is on room air and tolerating, he is  not hypoxic  Hold steroids at this time  Supportive care  Isolation per hospital protocol    CHRONIC MEDICAL PROBLEMS     -Essential hypertension  BP appears well controlled   Continue home antihypertensive regimen   Closely monitor BP per hospital protocol, titrate medications as necessary  -Hyperlipidemia: Continue statin   -Paroxysmal atrial fibrillation, chronically anticoagulated with Warfarin: Patient with increased heart rate overnight, Lopressor IV given per night shift.  Cardiology previously discontinued metoprolol due to ventricular pauses, however metoprolol resumed today due to elevated heart rate.  Patient previously on Coumadin prehospitalization for anticoagulation, this was transitioned to Eliquis per cardiology recommendations.  -HFpEF/Grade I diastolic dysfunction, mild pulmonary hypertension: TTE reviewed. Grossly compensated on exam. Monitor volume status closely.   -GERD: PPI   -Obstructive sleep apnea   -Former smoker   --------------------------------------------------  DVT Prophylaxis: Eliquis  GI Prophylaxis: PPI  FEN: No IV fluids. Replace electrolytes per protocol as necessary. Cardiac diet.  Activity: Up with assistance as tolerated   --------------------------------------------------  Disposition:  ?DC in AM   --------------------------------------------------  I have discussed the patient's assessment and plan with the patient, nursing staff, and attending physician David López MD Allyson O'Kuma, PA-C  Hospitalist Service -- [facility]   Pager: [phone]    12/07/23  16:46 EST    Attending Physician: David Jaquez MD    ----------------------------------------------------------------------------------------------------------------------

## 2023-12-07 NOTE — PLAN OF CARE
Goal Outcome Evaluation:  Plan of Care Reviewed With: spouse, patient\  Progress: no change   Pt resting in bed, watching television. Pt has tolerated all interventions. No complaints/concerns. No acute distress noted. Will continue to follow the plan of care.

## 2023-12-08 LAB
ALBUMIN SERPL-MCNC: 3.5 G/DL (ref 3.5–5.2)
ALBUMIN/GLOB SERPL: 1.3 G/DL
ALP SERPL-CCNC: 50 U/L (ref 39–117)
ALT SERPL W P-5'-P-CCNC: 35 U/L (ref 1–41)
ANION GAP SERPL CALCULATED.3IONS-SCNC: 9.8 MMOL/L (ref 5–15)
AST SERPL-CCNC: 33 U/L (ref 1–40)
BILIRUB SERPL-MCNC: 0.4 MG/DL (ref 0–1.2)
BUN SERPL-MCNC: 20 MG/DL (ref 8–23)
BUN/CREAT SERPL: 16.8 (ref 7–25)
CALCIUM SPEC-SCNC: 8.7 MG/DL (ref 8.6–10.5)
CHLORIDE SERPL-SCNC: 102 MMOL/L (ref 98–107)
CO2 SERPL-SCNC: 22.2 MMOL/L (ref 22–29)
CREAT SERPL-MCNC: 1.19 MG/DL (ref 0.76–1.27)
DEPRECATED RDW RBC AUTO: 44.6 FL (ref 37–54)
EGFRCR SERPLBLD CKD-EPI 2021: 60.6 ML/MIN/1.73
ERYTHROCYTE [DISTWIDTH] IN BLOOD BY AUTOMATED COUNT: 14.4 % (ref 12.3–15.4)
GLOBULIN UR ELPH-MCNC: 2.6 GM/DL
GLUCOSE BLDC GLUCOMTR-MCNC: 97 MG/DL (ref 70–130)
GLUCOSE SERPL-MCNC: 133 MG/DL (ref 65–99)
HCT VFR BLD AUTO: 42.3 % (ref 37.5–51)
HGB BLD-MCNC: 13.8 G/DL (ref 13–17.7)
MAGNESIUM SERPL-MCNC: 2 MG/DL (ref 1.6–2.4)
MCH RBC QN AUTO: 27.7 PG (ref 26.6–33)
MCHC RBC AUTO-ENTMCNC: 32.6 G/DL (ref 31.5–35.7)
MCV RBC AUTO: 84.8 FL (ref 79–97)
PLATELET # BLD AUTO: 172 10*3/MM3 (ref 140–450)
PMV BLD AUTO: 10.8 FL (ref 6–12)
POTASSIUM SERPL-SCNC: 4 MMOL/L (ref 3.5–5.2)
PROT SERPL-MCNC: 6.1 G/DL (ref 6–8.5)
RBC # BLD AUTO: 4.99 10*6/MM3 (ref 4.14–5.8)
SODIUM SERPL-SCNC: 134 MMOL/L (ref 136–145)
WBC NRBC COR # BLD AUTO: 10.26 10*3/MM3 (ref 3.4–10.8)

## 2023-12-08 PROCEDURE — 25810000003 SODIUM CHLORIDE 0.9 % SOLUTION: Performed by: INTERNAL MEDICINE

## 2023-12-08 PROCEDURE — 82948 REAGENT STRIP/BLOOD GLUCOSE: CPT

## 2023-12-08 PROCEDURE — 99232 SBSQ HOSP IP/OBS MODERATE 35: CPT | Performed by: INTERNAL MEDICINE

## 2023-12-08 PROCEDURE — 80053 COMPREHEN METABOLIC PANEL: CPT | Performed by: INTERNAL MEDICINE

## 2023-12-08 PROCEDURE — 25010000002 KETOROLAC TROMETHAMINE PER 15 MG

## 2023-12-08 PROCEDURE — 83735 ASSAY OF MAGNESIUM: CPT | Performed by: PHYSICIAN ASSISTANT

## 2023-12-08 PROCEDURE — 25010000002 REMDESIVIR 100 MG RECONSTITUTED SOLUTION: Performed by: INTERNAL MEDICINE

## 2023-12-08 PROCEDURE — XW033E5 INTRODUCTION OF REMDESIVIR ANTI-INFECTIVE INTO PERIPHERAL VEIN, PERCUTANEOUS APPROACH, NEW TECHNOLOGY GROUP 5: ICD-10-PCS | Performed by: INTERNAL MEDICINE

## 2023-12-08 PROCEDURE — 85027 COMPLETE CBC AUTOMATED: CPT | Performed by: INTERNAL MEDICINE

## 2023-12-08 RX ORDER — KETOROLAC TROMETHAMINE 30 MG/ML
15 INJECTION, SOLUTION INTRAMUSCULAR; INTRAVENOUS ONCE
Status: COMPLETED | OUTPATIENT
Start: 2023-12-08 | End: 2023-12-08

## 2023-12-08 RX ADMIN — FLUTICASONE PROPIONATE 2 SPRAY: 50 SPRAY, METERED NASAL at 09:35

## 2023-12-08 RX ADMIN — CETIRIZINE HYDROCHLORIDE 10 MG: 10 TABLET, FILM COATED ORAL at 09:33

## 2023-12-08 RX ADMIN — METOPROLOL TARTRATE 12.5 MG: 25 TABLET, FILM COATED ORAL at 21:55

## 2023-12-08 RX ADMIN — Medication 10 ML: at 21:57

## 2023-12-08 RX ADMIN — ACETAMINOPHEN 650 MG: 325 TABLET ORAL at 13:07

## 2023-12-08 RX ADMIN — METOPROLOL TARTRATE 12.5 MG: 25 TABLET, FILM COATED ORAL at 09:33

## 2023-12-08 RX ADMIN — PANTOPRAZOLE SODIUM 40 MG: 40 TABLET, DELAYED RELEASE ORAL at 05:49

## 2023-12-08 RX ADMIN — HYDRALAZINE HYDROCHLORIDE 50 MG: 50 TABLET, FILM COATED ORAL at 21:55

## 2023-12-08 RX ADMIN — APIXABAN 5 MG: 5 TABLET, FILM COATED ORAL at 09:33

## 2023-12-08 RX ADMIN — ATORVASTATIN CALCIUM 20 MG: 20 TABLET, FILM COATED ORAL at 21:57

## 2023-12-08 RX ADMIN — BENZOCAINE 6 MG-MENTHOL 10 MG LOZENGES 1 LOZENGE: at 13:07

## 2023-12-08 RX ADMIN — FAMOTIDINE 20 MG: 20 TABLET, FILM COATED ORAL at 21:55

## 2023-12-08 RX ADMIN — KETOROLAC TROMETHAMINE 15 MG: 30 INJECTION, SOLUTION INTRAMUSCULAR at 01:02

## 2023-12-08 RX ADMIN — SACUBITRIL AND VALSARTAN 1 TABLET: 24; 26 TABLET, FILM COATED ORAL at 21:55

## 2023-12-08 RX ADMIN — SACUBITRIL AND VALSARTAN 1 TABLET: 24; 26 TABLET, FILM COATED ORAL at 09:33

## 2023-12-08 RX ADMIN — DOCUSATE SODIUM 50 MG AND SENNOSIDES 8.6 MG 2 TABLET: 8.6; 5 TABLET, FILM COATED ORAL at 21:55

## 2023-12-08 RX ADMIN — GUAIFENESIN AND DEXTROMETHORPHAN 5 ML: 100; 10 SYRUP ORAL at 13:07

## 2023-12-08 RX ADMIN — HYDRALAZINE HYDROCHLORIDE 50 MG: 50 TABLET, FILM COATED ORAL at 09:33

## 2023-12-08 RX ADMIN — BENZOCAINE 6 MG-MENTHOL 10 MG LOZENGES 1 LOZENGE: at 20:00

## 2023-12-08 RX ADMIN — Medication 10 ML: at 09:35

## 2023-12-08 RX ADMIN — REMDESIVIR 200 MG: 100 INJECTION, POWDER, LYOPHILIZED, FOR SOLUTION INTRAVENOUS at 12:03

## 2023-12-08 RX ADMIN — ACETAMINOPHEN 650 MG: 325 TABLET ORAL at 20:00

## 2023-12-08 RX ADMIN — SODIUM CHLORIDE 250 ML: 9 INJECTION, SOLUTION INTRAVENOUS at 12:10

## 2023-12-08 RX ADMIN — APIXABAN 5 MG: 5 TABLET, FILM COATED ORAL at 21:55

## 2023-12-08 NOTE — PROGRESS NOTES
LOS: 3 days     Name: Aramis Carson  Age/Sex: 83 y.o. male  :  1940        PCP: Kieran Blanca APRN    Principal Problem:    Chest pain      Admission Information: Aramis Carson is a 83 y.o. male with chronic atrial fibrillation-anticoagulated with warfarin, hyperlipidemia, and prediabetes.      Chief Complaint: Chest pain    Interval history: Patient developed symptoms of and tested positive for COVID-19 yesterday.  Low-dose beta-blocker restarted due to RVR.  Rate controlled, pauses returned.  Longest being 2.5 seconds.    Subjective   Patient awake in bed with wife at the bedside.  He reports that he feels absolutely terrible.  He complains of bodyaches, chills, headache, and cough.  He denies shortness of breath or chest pain.      Vital Signs  Vital Signs (last 72 hrs)          1128   Most Recent      Temp (°F) 98.1 -  98.8    98.1 -  100.1    97.4 -  100.5      98.5     98.5 (36.9) 700    Heart Rate 68 -  84    68 -  115    69 -  97      67     67 700    Resp 16 -  18    16 -  20      18      15     15 700    /61 -  127/59    97/56 -  134/65    104/59 -  130/77      128/77     128/77 700    SpO2 (%) 96 -  99    95 -  96    94 -  96      98     98 700    Flow (L/min)     2         2  09          Temp:  [97.4 °F (36.3 °C)-100.5 °F (38.1 °C)] 98.5 °F (36.9 °C)  Heart Rate:  [67-97] 67  Resp:  [15-18] 15  BP: (104-130)/(59-77) 128/77  Body mass index is 30.01 kg/m².      Intake/Output Summary (Last 24 hours) at 2023 1128  Last data filed at 2023 0900  Gross per 24 hour   Intake 1180 ml   Output 0 ml   Net 1180 ml       Constitutional:       Appearance: Not in distress. Acutely ill-appearing.   Pulmonary:      Effort: Pulmonary effort is normal.      Breath sounds: Normal breath sounds.   Cardiovascular:      Normal rate. Irregular rhythm.      Murmurs:  There is no murmur.   Edema:     Peripheral edema absent.   Skin:     General: Skin is warm and dry.      Coloration: Skin is pale.      Comments: Right femoral access site soft to touch.  No evidence of ecchymosis or hematoma.   Neurological:      Mental Status: Alert.         Telemetry: Atrial fibrillation 70s to 80s           Results Review:     Results from last 7 days   Lab Units 12/08/23  0143 12/07/23  0146 12/06/23  0808 12/05/23  0204 12/04/23  0213 12/03/23  0916   WBC 10*3/mm3 10.26 11.07* 10.66 10.48 9.99 9.57   HEMOGLOBIN g/dL 13.8 14.0 13.9 14.5 14.1 14.4   PLATELETS 10*3/mm3 172 210 179 236 223 225     Results from last 7 days   Lab Units 12/08/23  0143 12/07/23  0146 12/06/23  0808 12/05/23  0204 12/04/23  0213 12/03/23  0916   SODIUM mmol/L 134* 138 139 142 141 141   POTASSIUM mmol/L 4.0 4.2 4.2 4.2 4.4 4.1   CHLORIDE mmol/L 102 106 108* 107 106 104   CO2 mmol/L 22.2 21.1* 20.7* 24.8 24.6 25.0   BUN mg/dL 20 20 22 26* 23 23   CREATININE mg/dL 1.19 1.17 1.10 1.35* 1.36* 1.16   CALCIUM mg/dL 8.7 8.8 8.7 8.8 8.7 9.1   GLUCOSE mg/dL 133* 140* 113* 155* 108* 113*     Results from last 7 days   Lab Units 12/05/23  1051 12/05/23  0204 12/03/23  1135 12/03/23  0916   HSTROP T ng/L 12 18 19 16       Results from last 7 days   Lab Units 12/06/23  0808 12/05/23  0204 12/04/23  1652 12/03/23  0916   INR  1.37* 1.98* 2.19* 2.91*       I reviewed the patient's new clinical results.  I reviewed the patient's new imaging results and agree with the interpretation.  I personally viewed and interpreted the patient's EKG/Telemetry data      Medication Review:   apixaban, 5 mg, Oral, Q12H  atorvastatin, 20 mg, Oral, Nightly  cetirizine, 10 mg, Oral, Daily  famotidine, 20 mg, Oral, Nightly  fluticasone, 2 spray, Each Nare, Daily  hydrALAZINE, 50 mg, Oral, BID  metoprolol tartrate, 12.5 mg, Oral, Q12H  pantoprazole, 40 mg, Oral, Q AM  remdesivir, 200 mg, Intravenous, Q24H   Followed by  [START ON 12/9/2023] remdesivir,  100 mg, Intravenous, Q24H  sacubitril-valsartan, 1 tablet, Oral, BID  senna-docusate sodium, 2 tablet, Oral, BID  sodium chloride, 10 mL, Intravenous, Q12H      Pharmacy Consult,         Assessment:  Coronary artery disease-nonobstructive  Atrial fibrillation-chronic, numerous greater than 2-second pauses while on beta-blocker-episodes of RVR while off beta-blocker  Hyperlipidemia  HFrecEF  COVID 19      Recommendations:  Continue beta-blocker and statin.  Not prescribing aspirin as he is already on Eliquis and of advanced age  Rate controlled on low-dose metoprolol.  Will tolerate pauses less than 3 seconds in order to facilitate rate control.  Continue anticoagulation on Eliquis  Continue statin  Continue Entresto    As patient's cardiac symptoms have stabilized.  We will sign off and follow in the outpatient setting.  Thank you for allowing us to participate in his care. Please let us know if we can be of further assistance.      Electronically signed by ISAIAS Asif, 12/08/23, 11:36 AM EST.    .Electronically signed by Shwetha Navarrete MD, 12/08/23, 11:59 AM EST.

## 2023-12-08 NOTE — PLAN OF CARE
Goal Outcome Evaluation:      Patient resting in bed comfortably at this time. Patient has been pleasant and cooperative with care this shift. Patient had one episode of hypotension this shift, and was corrected with NS bolus per Dr. Jaquez. No complaints or concerns being voiced at this time. No s/s of acute distress noted at this time. Will continue with plan of care.

## 2023-12-08 NOTE — PROGRESS NOTES
[facility] HOSPITALIST PROGRESS NOTE     Patient Identification:  Name:  Aramis Carson  Age:  83 y.o.  Sex:  male  :  1940  MRN:  [id number]  Visit Number:  [id number]  ROOM: [address] [place] [address]     Primary Care Provider:  Kieran Blanca APRN    Length of stay in inpatient status:  3    Subjective     Chief Compliant:    Chief Complaint   Patient presents with    Chest Pain     History of Presenting Illness:    Patient remains ill but stable today, no acute events overnight, does complain of hoarse voice and sore throat now, also has low grade temperature of 100.4-100.5, required Tylenol, Consulted Infectious Disease on if would meet any treatment requirements such as paxlovid but deferring Remdesivir and Steroids for now as 02 has been good. Patient's wife at bedside, he denies any new complaints but does have some mild chilling.   Objective     Current Hospital Meds:  apixaban, 5 mg, Oral, Q12H  atorvastatin, 20 mg, Oral, Nightly  cetirizine, 10 mg, Oral, Daily  famotidine, 20 mg, Oral, Nightly  fluticasone, 2 spray, Each Nare, Daily  hydrALAZINE, 50 mg, Oral, BID  metoprolol tartrate, 12.5 mg, Oral, Q12H  pantoprazole, 40 mg, Oral, Q AM  remdesivir, 200 mg, Intravenous, Q24H   Followed by  [START ON 2023] remdesivir, 100 mg, Intravenous, Q24H  sacubitril-valsartan, 1 tablet, Oral, BID  senna-docusate sodium, 2 tablet, Oral, BID  sodium chloride, 10 mL, Intravenous, Q12H      Pharmacy Consult,       ----------------------------------------------------------------------------------------------------------------------  Vital Signs:  Temp:  [97.4 °F (36.3 °C)-100.5 °F (38.1 °C)] 98.5 °F (36.9 °C)  Heart Rate:  [67-97] 67  Resp:  [15-18] 15  BP: (104-130)/(59-77) 128/77  SpO2:  [94 %-98 %] 98 %  on   ;   Device (Oxygen Therapy): simple face mask  Body mass index is 30.01 kg/m².      Intake/Output Summary (Last 24 hours) at 2023 1118  Last data filed at 2023 0900  Gross per 24 hour  "  Intake 1180 ml   Output 0 ml   Net 1180 ml      ----------------------------------------------------------------------------------------------------------------------  Physical exam:  Constitutional:  Well-developed and well-nourished.  Mild acute distress.      HENT:  Head:  Normocephalic and atraumatic.  Mouth:  Moist mucous membranes.    Eyes:  Conjunctivae and EOM are normal. No scleral icterus.    Neck:  Neck supple.  No JVD present.    Cardiovascular:  Normal rate, regular rhythm and normal heart sounds with no murmur.  Pulmonary/Chest:  No respiratory distress, no wheezes, no crackles, on room air  Abdominal:  Soft. No distension and no tenderness.   Musculoskeletal:  No tenderness, and no deformity.  No red or swollen joints anywhere.    Neurological:  Alert and oriented to person, place, and time.  No gross focal deficits    Skin:  Skin is warm and dry. No rash noted. No pallor.   Peripheral vascular:  No clubbing, no cyanosis, no edema.  Psychiatric: Appropriate mood and affect  Edited by: David Jaquez MD at 12/8/2023 1115  ----------------------------------------------------------------------------------------------------------------------  WBC/HGB/HCT/PLT   10.26/13.8/42.3/172 (12/08 0143)  BUN/CREAT/GLUC/ALT/AST/JONNA/LIP    20/1.19/133/35/33/--/-- (12/08 0143)  LYTES - Na/K/Cl/CO2: 134*/4.0/102/22.2 (12/08 0143)        No results found for: \"URINECX\"  No results found for: \"BLOODCX\"    I have personally looked at the labs and they are summarized above.  ----------------------------------------------------------------------------------------------------------------------  Detailed radiology reports for the last 24 hours:  XR Chest 1 View    Result Date: 12/7/2023  No radiographic evidence of acute cardiac or pulmonary disease.    This report was finalized on 12/7/2023 9:49 AM by Dr. Jose Jaimes MD.     Assessment & Plan    #Chest pain, mixed features with Abnormal stress test "   #Hypertension/Hyperlipidemia/Nonobstructive Coronary Artery Disease   #Paroxysmal Atrial Fibrillation, previously on warfarin  #Chronic HFpEF  #Mild Pulmonary Hypertension   - HS trop not significantly elevated x 2. EKG without acute ischemic changes. TTE obtained with preserved EF, grade I diastolic dysfunction noted. Stress test performed resulted with moderate sized, mild degree of ischemic located in the inferior wall and lateral wall. Patient underwent LHC per interventional cardiology, non obstructive disease noted, no PCI performed.   - Continue home beta blocker, plan for outpatient Cardiology follow up with event monitor placement  - Continue new Eliquis, Aspirin 81 deferred per Cardiology, continue statin  - Continue to monitor on telemetry, strict I/O's, trend heart rate and blood pressure      #COVID-19 positive, fevers, nonproductive cough  - Has tested positive during admission on 12/7, now having fevers, sore throat, hoarse voice but no hypoxia noted  - Infectious Disease consulted and appreciate recommendations on if would qualify for any treatments, possibly paxlovid if doesn't interfere with cardiac medications.  - Placed in isolation, monitor on telemetry, continuous pulse ox    #Gastroesophageal Reflux Disease  - Continue home PPI    #Obstructive Sleep Apnea   - Supportive Care    #Former Smoker  - Recommended cessation, nicotine replacement therapy as needed    #Obesity by BMI  - Body mass index is 30.01 kg/m².; complicates all aspects of care     F: Oral  E: Monitor & Replace as needed   N: Diet: Cardiac Diets; Healthy Heart (2-3 Na+); Texture: Regular Texture (IDDSI 7); Fluid Consistency: Thin (IDDSI 0)  PPx: Eliquis  Code Status (Patient has no pulse and is not breathing): CPR   Medical Interventions (Patient has pulse or is breathing): Full Support     Dispo: Pending workup and clinical improvement    *This patient is considered high risk secondary to chest pain, abnormal stress testing,  now Coronavirus positive    Edited by: David Jaquez MD at 12/8/2023 1117  [facility]

## 2023-12-08 NOTE — CONSULTS
INFECTIOUS DISEASE CONSULTATION REPORT        Patient Identification:  Name:  Aramis Carson  Age:  83 y.o.  Sex:  male  :  1940  MRN:  [id number]   Visit Number:  [id number]  Primary Care Physician:  Kieran Blanca APRN        Referring Provider: Dr. Jaquez    Reason for consult: COVID-19       LOS: 3 days        Subjective       Subjective     History of present illness:      Thank you Dr. Jaquez for allowing us to participate in the care of your patient.  As you well know, Mr. Aramis Carson is a 83 y.o. male with past medical history significant for chronic A-fib, hyperlipidemia, prediabetes, who presented to [facility] Emergency Department on 12/3/2023 for chest pain.  WBC 10.26.  CRP 2.06.  Procalcitonin normal at 0.14.  Chest x-ray from 2023 unremarkable.  COVID-19 and influenza PCR from  detected COVID-19.    Today patient resting comfortably in bed.  Reports nasal congestion, cough, low-grade fever with Tmax 100.4.  Denies diarrhea.  Lungs clear to auscultation bilaterally.  Abdomen soft, nontender.  Wife at bedside.      Infectious Disease consultation was requested for antimicrobial management.      ---------------------------------------------------------------------------------------------------------------------     Review Of Systems:    Constitutional: no fever, chills and night sweats. No appetite change or unexpected weight change. No fatigue.  Eyes: no eye drainage, itching or redness.  HEENT: no mouth sores, dysphagia or nose bleed.  Respiratory: no for shortness of breath, cough or production of sputum.  Cardiovascular: no chest pain, no palpitations, no orthopnea.  Gastrointestinal: no nausea, vomiting or diarrhea. No abdominal pain, hematemesis or rectal bleeding.  Genitourinary: no dysuria or polyuria.  Hematologic/lymphatic: no lymph node abnormalities, no easy bruising or easy bleeding.  Musculoskeletal: no muscle or joint pain.  Skin: No rash and  no itching.  Neurological: no loss of consciousness, no seizure, no headache.  Behavioral/Psych: no depression or suicidal ideation.  Endocrine: no hot flashes.  Immunologic: negative.    ---------------------------------------------------------------------------------------------------------------------     Past Medical History    Past Medical History:   Diagnosis Date    Atrial fibrillation     Coronary artery disease     Hyperlipidemia     Pre-diabetes        Past Surgical History    Past Surgical History:   Procedure Laterality Date    CARDIAC CATHETERIZATION      CARDIAC CATHETERIZATION N/A 2023    Procedure: Left Heart Cath;  Surgeon: Shwetha Navarrete MD;  Location: [facility] CATH INVASIVE LOCATION;  Service: Cardiology;  Laterality: N/A;    COLONOSCOPY      CYSTOSCOPY TRANSURETHRAL RESECTION OF PROSTATE      INGUINAL HERNIA REPAIR      LAPAROSCOPIC CHOLECYSTECTOMY         Family History    Family History   Problem Relation Age of Onset    Cancer Sister     Diabetes Sister     Cancer Brother     Heart disease Brother     Diabetes Maternal Grandmother     Hypertension Neg Hx        Social History    Social History     Tobacco Use    Smoking status: Former     Types: Pipe, Cigars     Quit date:      Years since quittin.9     Passive exposure: Past    Smokeless tobacco: Never    Tobacco comments:     Smoked for 25 years   Vaping Use    Vaping Use: Never used   Substance Use Topics    Alcohol use: No    Drug use: No       Allergies    Flomax [tamsulosin], Lisinopril, and Losartan  ---------------------------------------------------------------------------------------------------------------------     Home Medications:    Prior to Admission Medications       Prescriptions Last Dose Informant Patient Reported? Taking?    baclofen (LIORESAL) 10 MG tablet 2023 Self Yes Yes    Take 1 tablet by mouth Every Night.    famotidine (PEPCID) 20 MG tablet 2023 Self Yes Yes    Take 1 tablet by mouth Every  Night.    glipiZIDE XL 2.5 MG 24 hr tablet 12/3/2023 Self Yes Yes    Take 1 tablet by mouth Daily.    hydrALAZINE (APRESOLINE) 50 MG tablet 12/3/2023 Self Yes Yes    Take 1 tablet by mouth 2 (Two) Times a Day.    hydroCHLOROthiazide (HYDRODIURIL) 12.5 MG tablet 12/3/2023 Self Yes Yes    Take 1 tablet by mouth Daily.    metoprolol succinate XL (TOPROL-XL) 25 MG 24 hr tablet 12/3/2023 Self No Yes    TAKE 1 TABLET DAILY    omeprazole (priLOSEC) 20 MG capsule 12/3/2023 Self Yes Yes    Take 1 capsule by mouth 2 (Two) Times a Day.    sacubitril-valsartan (Entresto) 24-26 MG tablet 12/3/2023 Self No Yes    Take 1 tablet by mouth 2 (Two) Times a Day.    simvastatin (ZOCOR) 20 MG tablet 12/2/2023 Self Yes Yes    Take 1 tablet by mouth Every Night.    warfarin (COUMADIN) 3 MG tablet 12/2/2023 Self Yes Yes    Take 1 tablet by mouth 4 (Four) Times a Week.    HYDROcodone-acetaminophen (NORCO)  MG per tablet Unknown Self Yes No    Take 1 tablet by mouth Every 6 (Six) Hours As Needed for Moderate Pain.    loratadine (CLARITIN) 10 MG tablet Unknown Self Yes No    Take 1 tablet by mouth Daily As Needed for Allergies.    warfarin (COUMADIN) 3 MG tablet 12/1/2023 Self Yes No    Take 2 tablets by mouth 3 (Three) Times a Week.          ---------------------------------------------------------------------------------------------------------------------    Objective       Objective     Hospital Scheduled Meds:  apixaban, 5 mg, Oral, Q12H  atorvastatin, 20 mg, Oral, Nightly  cetirizine, 10 mg, Oral, Daily  famotidine, 20 mg, Oral, Nightly  fluticasone, 2 spray, Each Nare, Daily  hydrALAZINE, 50 mg, Oral, BID  metoprolol tartrate, 12.5 mg, Oral, Q12H  pantoprazole, 40 mg, Oral, Q AM  [START ON 12/9/2023] remdesivir, 100 mg, Intravenous, Q24H  sacubitril-valsartan, 1 tablet, Oral, BID  senna-docusate sodium, 2 tablet, Oral, BID  sodium chloride, 10 mL, Intravenous, Q12H      Pharmacy Consult,        ---------------------------------------------------------------------------------------------------------------------   Vital Signs:  Temp:  [97.4 °F (36.3 °C)-100.5 °F (38.1 °C)] 99.4 °F (37.4 °C)  Heart Rate:  [62-78] 62  Resp:  [15-18] 16  BP: ()/(42-78) 136/78  Mean Arterial Pressure (Non-Invasive) for the past 24 hrs (Last 3 readings):   Noninvasive MAP (mmHg)   12/08/23 0700 97   12/07/23 1629 87     SpO2 Percentage    12/08/23 0336 12/08/23 0700 12/08/23 1150   SpO2: 96% 98% 93%     SpO2:  [93 %-98 %] 93 %  on   ;   Device (Oxygen Therapy): simple face mask    Body mass index is 30.01 kg/m².  Wt Readings from Last 3 Encounters:   12/08/23 84.3 kg (185 lb 14.4 oz)   11/16/23 80.7 kg (178 lb)   09/26/23 81.6 kg (180 lb)     ---------------------------------------------------------------------------------------------------------------------     Physical Exam:    Constitutional:  Well-developed and well-nourished.  No respiratory distress. On room air. Wife at bedside.  Elderly  male.     HENT:  Head: Normocephalic and atraumatic.  Mouth:  Moist mucous membranes.    Eyes:  Conjunctivae and EOM are normal.  No scleral icterus.  Neck:  Neck supple.  No JVD present.    Cardiovascular:  Normal rate, regular rhythm and normal heart sounds with no murmur. No edema.  Pulmonary/Chest:  No respiratory distress, no wheezes, no crackles, with normal breath sounds and good air movement.  Abdominal:  Soft.  Bowel sounds are normal.  No distension and no tenderness.   Musculoskeletal:  No edema, no tenderness, and no deformity.  No swelling or redness of joints.  Neurological:  Alert and oriented to person, place, and time.  No facial droop.  No slurred speech.   Skin:  Skin is warm and dry.  No rash noted.  No pallor.   Psychiatric:  Normal mood and affect.  Behavior is  "normal.    ---------------------------------------------------------------------------------------------------------------------    Results from last 7 days   Lab Units 12/05/23  1051 12/05/23  0204 12/03/23  1135   HSTROP T ng/L 12 18 19           Results from last 7 days   Lab Units 12/08/23 0143 12/07/23  0146 12/06/23  0808 12/05/23  0204 12/04/23  1652 12/04/23  0213 12/03/23  0916   CRP mg/dL  --  2.06*  --   --   --   --   --    WBC 10*3/mm3 10.26 11.07* 10.66 10.48  --    < > 9.57   HEMOGLOBIN g/dL 13.8 14.0 13.9 14.5  --    < > 14.4   HEMATOCRIT % 42.3 42.7 44.2 44.7  --    < > 45.8   MCV fL 84.8 84.9 86.8 85.8  --    < > 86.1   MCHC g/dL 32.6 32.8 31.4* 32.4  --    < > 31.4*   PLATELETS 10*3/mm3 172 210 179 236  --    < > 225   INR   --   --  1.37* 1.98* 2.19*  --  2.91*    < > = values in this interval not displayed.     Results from last 7 days   Lab Units 12/08/23 0143 12/07/23  0146 12/06/23  0808 12/05/23  0204   SODIUM mmol/L 134* 138 139 142   POTASSIUM mmol/L 4.0 4.2 4.2 4.2   MAGNESIUM mg/dL 2.0 2.0  --  2.5*   CHLORIDE mmol/L 102 106 108* 107   CO2 mmol/L 22.2 21.1* 20.7* 24.8   BUN mg/dL 20 20 22 26*   CREATININE mg/dL 1.19 1.17 1.10 1.35*   CALCIUM mg/dL 8.7 8.8 8.7 8.8   GLUCOSE mg/dL 133* 140* 113* 155*   ALBUMIN g/dL 3.5 3.5 3.5 3.9   BILIRUBIN mg/dL 0.4 0.5 0.6 0.3   ALK PHOS U/L 50 53 56 72   AST (SGOT) U/L 33 24 21 23   ALT (SGPT) U/L 35 24 16 16   Estimated Creatinine Clearance: 47.9 mL/min (by C-G formula based on SCr of 1.19 mg/dL).  No results found for: \"AMMONIA\"    No results found for: \"HGBA1C\", \"POCGLU\"  Lab Results   Component Value Date    HGBA1C 6.10 (H) 08/07/2023     Lab Results   Component Value Date    TSH 2.800 08/07/2023    FREET4 1.19 08/07/2023       No results found for: \"BLOODCX\"  No results found for: \"URINECX\"  No results found for: \"WOUNDCX\"  No results found for: \"STOOLCX\"  No results found for: \"RESPCX\"  Pain Management Panel          Latest Ref Rng & Units " 2/23/2018   Pain Management Panel   Amphetamine, Urine Qual Negative Negative    Barbiturates Screen, Urine Negative Negative    Benzodiazepine Screen, Urine Negative Negative    Buprenorphine, Screen, Urine Negative Negative    Cocaine Screen, Urine Negative Negative    Methadone Screen , Urine Negative Negative      I have personally reviewed the above laboratory results.   ---------------------------------------------------------------------------------------------------------------------  Imaging Results (Last 7 Days)       Procedure Component Value Units Date/Time    XR Chest 1 View [[id number]] Collected: 12/07/23 0949     Updated: 12/07/23 0951    Narrative:      XR CHEST 1 VW-     CLINICAL INDICATION: Dyspnea; R07.9-Chest pain, unspecified;  I20.0-Unstable angina        COMPARISON: 12/3/2023     TECHNIQUE: Single frontal view of the chest.     FINDINGS:     LUNGS: Lungs are adequately aerated.      HEART AND MEDIASTINUM: Heart and mediastinal contours are unremarkable        SKELETON: Bony and soft tissue structures are unremarkable.             Impression:      No radiographic evidence of acute cardiac or pulmonary disease.           This report was finalized on 12/7/2023 9:49 AM by Dr. Jose Jaimes MD.       XR Chest 1 View [[id number]] Collected: 12/03/23 1036     Updated: 12/03/23 1038    Narrative:      Procedure: Frontal view of chest obtained.     Comparison: None available     History: Chest Pain Protocol     Findings:     No pneumonia or acute process seen in the chest.  Normal heart size and mediastinal contours.  Trachea is in midline position.  No edema or effusion is seen.  There is no evidence of pneumothorax.       Impression:         No evidence of acute disease in the chest.     This report was finalized on 12/3/2023 10:36 AM by Robel Enriquez MD.             I have personally reviewed the above radiology results.    ---------------------------------------------------------------------------------------------------------------------      Pertinent Infectious Disease Results          Assessment & Plan      Assessment        COVID-19      Plan      Patient presented to [facility] Emergency Department on 12/3/2023 for chest pain.  WBC 10.26.  CRP 2.06.  Procalcitonin normal at 0.14.  Chest x-ray from 12/7/2023 unremarkable.  COVID-19 and influenza PCR from 12/23 detected COVID-19.    Today patient resting comfortably in bed.  Reports nasal congestion, cough, low-grade fever with Tmax 100.4.  Denies diarrhea.  Lungs clear to auscultation bilaterally.  Abdomen soft, nontender.  Wife at bedside.    Unfortunately due to patient's medications Paxlovid would be contraindicated with the use of atorvastatin and Flonase.  Therefore remdesivir 3-day course was initiated as patient is at high risk for decompensation from COVID-19 infection.  As patient remains on room air recommend to hold off on any initiation of's Decadron therapy at this time.  At this time patient's presentation is typical of COVID-19 and will monitor off of antibiotic coverage for now.      ANTIMICROBIAL THERAPY    remdesivir - 100 mg/270 mL       Again, thank you Dr. Jaquez for allowing us to participate in the care of your patient and please feel free to call for any questions you may have.        Code Status:     Code Status and Medical Interventions:   Ordered at: 12/06/23 1559     Level Of Support Discussed With:    Patient     Code Status (Patient has no pulse and is not breathing):    CPR (Attempt to Resuscitate)     Medical Interventions (Patient has pulse or is breathing):    Full Support         ISAIAS Lyons  12/08/23  13:42 EST    Electronically signed by ISAIAS Lyons, 12/08/23, 1:47 PM EST.

## 2023-12-09 LAB
ALBUMIN SERPL-MCNC: 3.1 G/DL (ref 3.5–5.2)
ALBUMIN/GLOB SERPL: 1.2 G/DL
ALP SERPL-CCNC: 53 U/L (ref 39–117)
ALT SERPL W P-5'-P-CCNC: 24 U/L (ref 1–41)
ANION GAP SERPL CALCULATED.3IONS-SCNC: 12.9 MMOL/L (ref 5–15)
AST SERPL-CCNC: 20 U/L (ref 1–40)
BILIRUB SERPL-MCNC: 0.5 MG/DL (ref 0–1.2)
BUN SERPL-MCNC: 21 MG/DL (ref 8–23)
BUN/CREAT SERPL: 19.4 (ref 7–25)
CALCIUM SPEC-SCNC: 8.5 MG/DL (ref 8.6–10.5)
CHLORIDE SERPL-SCNC: 103 MMOL/L (ref 98–107)
CO2 SERPL-SCNC: 20.1 MMOL/L (ref 22–29)
CREAT SERPL-MCNC: 1.08 MG/DL (ref 0.76–1.27)
DEPRECATED RDW RBC AUTO: 44.8 FL (ref 37–54)
EGFRCR SERPLBLD CKD-EPI 2021: 68.1 ML/MIN/1.73
ERYTHROCYTE [DISTWIDTH] IN BLOOD BY AUTOMATED COUNT: 14.2 % (ref 12.3–15.4)
GLOBULIN UR ELPH-MCNC: 2.6 GM/DL
GLUCOSE SERPL-MCNC: 125 MG/DL (ref 65–99)
HCT VFR BLD AUTO: 41.6 % (ref 37.5–51)
HGB BLD-MCNC: 13.3 G/DL (ref 13–17.7)
MCH RBC QN AUTO: 27.5 PG (ref 26.6–33)
MCHC RBC AUTO-ENTMCNC: 32 G/DL (ref 31.5–35.7)
MCV RBC AUTO: 86 FL (ref 79–97)
PLATELET # BLD AUTO: 157 10*3/MM3 (ref 140–450)
PMV BLD AUTO: 11 FL (ref 6–12)
POTASSIUM SERPL-SCNC: 4 MMOL/L (ref 3.5–5.2)
PROT SERPL-MCNC: 5.7 G/DL (ref 6–8.5)
RBC # BLD AUTO: 4.84 10*6/MM3 (ref 4.14–5.8)
SODIUM SERPL-SCNC: 136 MMOL/L (ref 136–145)
WBC NRBC COR # BLD AUTO: 11.11 10*3/MM3 (ref 3.4–10.8)

## 2023-12-09 PROCEDURE — 85027 COMPLETE CBC AUTOMATED: CPT | Performed by: INTERNAL MEDICINE

## 2023-12-09 PROCEDURE — 99233 SBSQ HOSP IP/OBS HIGH 50: CPT | Performed by: INTERNAL MEDICINE

## 2023-12-09 PROCEDURE — 25010000002 REMDESIVIR 100 MG RECONSTITUTED SOLUTION: Performed by: INTERNAL MEDICINE

## 2023-12-09 PROCEDURE — 80053 COMPREHEN METABOLIC PANEL: CPT | Performed by: INTERNAL MEDICINE

## 2023-12-09 PROCEDURE — 25810000003 SODIUM CHLORIDE 0.9 % SOLUTION: Performed by: INTERNAL MEDICINE

## 2023-12-09 RX ADMIN — PANTOPRAZOLE SODIUM 40 MG: 40 TABLET, DELAYED RELEASE ORAL at 05:29

## 2023-12-09 RX ADMIN — APIXABAN 5 MG: 5 TABLET, FILM COATED ORAL at 21:21

## 2023-12-09 RX ADMIN — REMDESIVIR 100 MG: 100 INJECTION, POWDER, LYOPHILIZED, FOR SOLUTION INTRAVENOUS at 11:45

## 2023-12-09 RX ADMIN — ATORVASTATIN CALCIUM 20 MG: 20 TABLET, FILM COATED ORAL at 21:21

## 2023-12-09 RX ADMIN — FAMOTIDINE 20 MG: 20 TABLET, FILM COATED ORAL at 21:21

## 2023-12-09 RX ADMIN — FLUTICASONE PROPIONATE 2 SPRAY: 50 SPRAY, METERED NASAL at 08:28

## 2023-12-09 RX ADMIN — Medication 10 ML: at 08:28

## 2023-12-09 RX ADMIN — CETIRIZINE HYDROCHLORIDE 10 MG: 10 TABLET, FILM COATED ORAL at 08:27

## 2023-12-09 RX ADMIN — APIXABAN 5 MG: 5 TABLET, FILM COATED ORAL at 10:17

## 2023-12-09 RX ADMIN — METOPROLOL TARTRATE 12.5 MG: 25 TABLET, FILM COATED ORAL at 21:21

## 2023-12-09 RX ADMIN — Medication 10 ML: at 21:22

## 2023-12-09 RX ADMIN — SACUBITRIL AND VALSARTAN 1 TABLET: 24; 26 TABLET, FILM COATED ORAL at 08:27

## 2023-12-09 RX ADMIN — METOPROLOL TARTRATE 12.5 MG: 25 TABLET, FILM COATED ORAL at 08:27

## 2023-12-09 RX ADMIN — SACUBITRIL AND VALSARTAN 1 TABLET: 24; 26 TABLET, FILM COATED ORAL at 21:21

## 2023-12-09 NOTE — PLAN OF CARE
Problem: Adult Inpatient Plan of Care  Goal: Absence of Hospital-Acquired Illness or Injury  Intervention: Prevent Skin Injury  Recent Flowsheet Documentation  Taken 12/8/2023 1934 by Mar Anne, RN  Body Position: position changed independently  Skin Protection:   adhesive use limited   mittens applied to hands     Problem: Adult Inpatient Plan of Care  Goal: Absence of Hospital-Acquired Illness or Injury  Intervention: Prevent and Manage VTE (Venous Thromboembolism) Risk  Recent Flowsheet Documentation  Taken 12/8/2023 1934 by Mar Anne, RN  Activity Management: up ad orlin  VTE Prevention/Management: (See MAR) other (see comments)   Goal Outcome Evaluation:

## 2023-12-09 NOTE — PROGRESS NOTES
Pharmacy checked coverage of Entresto. Per patient's insurance and fill history, patient has been regularly filling Entresto 24-26 mg as a home medication. No issues with cost or coverage identified at this time.     Thank you,     Lily Caldera YARON  PGY-1 Community [facility] Resident  12/09/23  17:16 EST

## 2023-12-09 NOTE — PROGRESS NOTES
PROGRESS NOTE         Patient Identification:  Name:  Aramis Carson  Age:  83 y.o.  Sex:  male  :  1940  MRN:  [id number]  Visit Number:  [id number]  Primary Care Provider:  Kieran Blanca APRN         LOS: 4 days       ----------------------------------------------------------------------------------------------------------------------  Subjective       Chief Complaints:    Chest Pain        Interval History:      Patient up ambulating in room today.  Overall feels significantly better today.  Fever has resolved.  Currently on room air with no apparent distress.  Wife at bedside.  Lungs clear to auscultation bilaterally.  Abdomen soft, nontender.  WBC 11.11.    Review of Systems:    Constitutional: no fever, chills and night sweats.   Eyes: no eye drainage, itching or redness.  HEENT: no mouth sores, dysphagia or nose bleed.  Respiratory: no for shortness of breath, cough or production of sputum.  Cardiovascular: no chest pain, no palpitations, no orthopnea.  Gastrointestinal: no nausea, vomiting or diarrhea. No abdominal pain, hematemesis or rectal bleeding.  Genitourinary: no dysuria or polyuria.  Hematologic/lymphatic: no lymph node abnormalities, no easy bruising or easy bleeding.  Musculoskeletal: no muscle or joint pain.  Skin: No rash and no itching.  Neurological: no loss of consciousness, no seizure, no headache.  Behavioral/Psych: no depression or suicidal ideation.  Endocrine: no hot flashes.  Immunologic: negative.    ----------------------------------------------------------------------------------------------------------------------      Objective       Current [facility] Meds:  apixaban, 5 mg, Oral, Q12H  atorvastatin, 20 mg, Oral, Nightly  cetirizine, 10 mg, Oral, Daily  famotidine, 20 mg, Oral, Nightly  fluticasone, 2 spray, Each Nare, Daily  metoprolol tartrate, 12.5 mg, Oral, Q12H  pantoprazole, 40 mg, Oral, Q AM  remdesivir, 100 mg, Intravenous,  Q24H  sacubitril-valsartan, 1 tablet, Oral, BID  senna-docusate sodium, 2 tablet, Oral, BID  sodium chloride, 10 mL, Intravenous, Q12H      Pharmacy Consult,       ----------------------------------------------------------------------------------------------------------------------    Vital Signs:  Temp:  [97.9 °F (36.6 °C)-99.5 °F (37.5 °C)] 97.9 °F (36.6 °C)  Heart Rate:  [76-88] 76  Resp:  [15-18] 16  BP: (116-136)/(62-89) 116/62  Mean Arterial Pressure (Non-Invasive) for the past 24 hrs (Last 3 readings):   Noninvasive MAP (mmHg)   12/09/23 1148 77   12/09/23 0809 94     SpO2 Percentage    12/09/23 0300 12/09/23 0809 12/09/23 1148   SpO2: 98% 97% 96%     SpO2:  [94 %-98 %] 96 %  on   ;   Device (Oxygen Therapy): room air    Body mass index is 30.14 kg/m².  Wt Readings from Last 3 Encounters:   12/09/23 84.7 kg (186 lb 11.7 oz)   11/16/23 80.7 kg (178 lb)   09/26/23 81.6 kg (180 lb)        Intake/Output Summary (Last 24 hours) at 12/9/2023 1210  Last data filed at 12/9/2023 0800  Gross per 24 hour   Intake 1860 ml   Output --   Net 1860 ml     Diet: Cardiac Diets; Healthy Heart (2-3 Na+); Texture: Regular Texture (IDDSI 7); Fluid Consistency: Thin (IDDSI 0)  ----------------------------------------------------------------------------------------------------------------------      Physical Exam:    Constitutional:  Well-developed and well-nourished.  No respiratory distress.  On room air.  Ambulating around room.  Wife at bedside.     HENT:  Head: Normocephalic and atraumatic.  Mouth:  Moist mucous membranes.    Eyes:  Conjunctivae and EOM are normal.  No scleral icterus.  Neck:  Neck supple.  No JVD present.    Cardiovascular:  Normal rate, regular rhythm and normal heart sounds with no murmur. No edema.  Pulmonary/Chest:  No respiratory distress, no wheezes, no crackles, with normal breath sounds and good air movement.  Abdominal:  Soft.  Bowel sounds are normal.  No distension and no tenderness.    Musculoskeletal:  No edema, no tenderness, and no deformity.  No swelling or redness of joints.  Neurological:  Alert and oriented to person, place, and time.  No facial droop.  No slurred speech.   Skin:  Skin is warm and dry.  No rash noted.  No pallor.   Psychiatric:  Normal mood and affect.  Behavior is normal.        ----------------------------------------------------------------------------------------------------------------------  Results from last 7 days   Lab Units 12/05/23  1051 12/05/23  0204 12/03/23  1135   HSTROP T ng/L 12 18 19           Results from last 7 days   Lab Units 12/09/23  0426 12/08/23  0143 12/07/23  0146 12/06/23  0808 12/05/23  0204 12/04/23  1652 12/04/23  0213 12/03/23  0916   CRP mg/dL  --   --  2.06*  --   --   --   --   --    WBC 10*3/mm3 11.11* 10.26 11.07* 10.66 10.48  --    < > 9.57   HEMOGLOBIN g/dL 13.3 13.8 14.0 13.9 14.5  --    < > 14.4   HEMATOCRIT % 41.6 42.3 42.7 44.2 44.7  --    < > 45.8   MCV fL 86.0 84.8 84.9 86.8 85.8  --    < > 86.1   MCHC g/dL 32.0 32.6 32.8 31.4* 32.4  --    < > 31.4*   PLATELETS 10*3/mm3 157 172 210 179 236  --    < > 225   INR   --   --   --  1.37* 1.98* 2.19*  --  2.91*    < > = values in this interval not displayed.     Results from last 7 days   Lab Units 12/09/23 0426 12/08/23  0143 12/07/23  0146 12/06/23  0808 12/05/23  0204   SODIUM mmol/L 136 134* 138   < > 142   POTASSIUM mmol/L 4.0 4.0 4.2   < > 4.2   MAGNESIUM mg/dL  --  2.0 2.0  --  2.5*   CHLORIDE mmol/L 103 102 106   < > 107   CO2 mmol/L 20.1* 22.2 21.1*   < > 24.8   BUN mg/dL 21 20 20   < > 26*   CREATININE mg/dL 1.08 1.19 1.17   < > 1.35*   CALCIUM mg/dL 8.5* 8.7 8.8   < > 8.8   GLUCOSE mg/dL 125* 133* 140*   < > 155*   ALBUMIN g/dL 3.1* 3.5 3.5   < > 3.9   BILIRUBIN mg/dL 0.5 0.4 0.5   < > 0.3   ALK PHOS U/L 53 50 53   < > 72   AST (SGOT) U/L 20 33 24   < > 23   ALT (SGPT) U/L 24 35 24   < > 16    < > = values in this interval not displayed.   Estimated Creatinine Clearance:  "52.9 mL/min (by C-G formula based on SCr of 1.08 mg/dL).  No results found for: \"AMMONIA\"    Glucose   Date/Time Value Ref Range Status   12/08/2023 1703 97 70 - 130 mg/dL Final     Lab Results   Component Value Date    HGBA1C 6.10 (H) 08/07/2023     Lab Results   Component Value Date    TSH 2.800 08/07/2023    FREET4 1.19 08/07/2023       No results found for: \"BLOODCX\"  No results found for: \"URINECX\"  No results found for: \"WOUNDCX\"  No results found for: \"STOOLCX\"  No results found for: \"RESPCX\"  Pain Management Panel          Latest Ref Rng & Units 2/23/2018   Pain Management Panel   Amphetamine, Urine Qual Negative Negative    Barbiturates Screen, Urine Negative Negative    Benzodiazepine Screen, Urine Negative Negative    Buprenorphine, Screen, Urine Negative Negative    Cocaine Screen, Urine Negative Negative    Methadone Screen , Urine Negative Negative          ----------------------------------------------------------------------------------------------------------------------  Imaging Results (Last 24 Hours)       ** No results found for the last 24 hours. **            ----------------------------------------------------------------------------------------------------------------------    Pertinent Infectious Disease Results                Assessment/Plan       Assessment       COVID-19      Plan      Patient up ambulating in room today.  Overall feels significantly better today.  Fever has resolved.  Currently on room air with no apparent distress.  Wife at bedside.  Lungs clear to auscultation bilaterally.  Abdomen soft, nontender.  WBC 11.11.    Unfortunately due to patient's medications Paxlovid would be contraindicated with the use of atorvastatin and Flonase.  Therefore remdesivir 3-day course was initiated as patient is at high risk for decompensation from COVID-19 infection.  As patient remains on room air recommend to hold off on any initiation of's Decadron therapy at this time.  At this time " patient's presentation is typical of COVID-19 and will monitor off of antibiotic coverage for now.       ANTIMICROBIAL THERAPY    remdesivir - 100 mg/270 mL     Code Status:   Code Status and Medical Interventions:   Ordered at: 12/06/23 4018     Level Of Support Discussed With:    Patient     Code Status (Patient has no pulse and is not breathing):    CPR (Attempt to Resuscitate)     Medical Interventions (Patient has pulse or is breathing):    Full Support       ISAIAS Lyons  12/09/23  12:10 EST

## 2023-12-09 NOTE — PROGRESS NOTES
[facility] HOSPITALIST PROGRESS NOTE     Patient Identification:  Name:  Aramis Carson  Age:  83 y.o.  Sex:  male  :  1940  MRN:  [id number]  Visit Number:  [id number]  ROOM: [address] [place] [address]     Primary Care Provider:  Kieran Blanca APRN    Length of stay in inpatient status:  4    Subjective     Chief Compliant:    Chief Complaint   Patient presents with    Chest Pain     History of Presenting Illness:    Patient remains ill but stable today, no acute events overnight, no new complaints, denies any fevers or chills, patient reports his sore throat is improving, still a little hoarse, remains on room air, no further fevers, on Remdesivir per Infectious Disease x 3 days, had hypotensive episode yesterday and discontinued Hydralazine, discussed likely home tomorrow following 3rd dose of Remdesivir and with further clinical stability, wife and patient both amenable to plan, questions answered.   Objective     Current Hospital Meds:  apixaban, 5 mg, Oral, Q12H  atorvastatin, 20 mg, Oral, Nightly  cetirizine, 10 mg, Oral, Daily  famotidine, 20 mg, Oral, Nightly  fluticasone, 2 spray, Each Nare, Daily  metoprolol tartrate, 12.5 mg, Oral, Q12H  pantoprazole, 40 mg, Oral, Q AM  remdesivir, 100 mg, Intravenous, Q24H  sacubitril-valsartan, 1 tablet, Oral, BID  senna-docusate sodium, 2 tablet, Oral, BID  sodium chloride, 10 mL, Intravenous, Q12H    ----------------------------------------------------------------------------------------------------------------------  Vital Signs:  Temp:  [98 °F (36.7 °C)-99.5 °F (37.5 °C)] 98.5 °F (36.9 °C)  Heart Rate:  [62-88] 82  Resp:  [15-18] 18  BP: ()/(42-89) 116/63  SpO2:  [93 %-98 %] 97 %  on   ;   Device (Oxygen Therapy): room air  Body mass index is 30.14 kg/m².      Intake/Output Summary (Last 24 hours) at 2023 1059  Last data filed at 2023 1700  Gross per 24 hour   Intake 1500 ml   Output --   Net 1500 ml     "  ----------------------------------------------------------------------------------------------------------------------  Physical exam:  Constitutional:  Well-developed and well-nourished.  Improved acute distress.      HENT:  Head:  Normocephalic and atraumatic.  Mouth:  Moist mucous membranes.    Eyes:  Conjunctivae and EOM are normal. No scleral icterus.    Neck:  Neck supple.  No JVD present.    Cardiovascular:  Normal rate, regular rhythm and normal heart sounds with no murmur.  Pulmonary/Chest:  No respiratory distress, no wheezes, no crackles, on room air  Abdominal:  Soft. No distension and no tenderness.   Musculoskeletal:  No tenderness, and no deformity.  No red or swollen joints anywhere.    Neurological:  Alert and oriented to person, place, and time.  No gross focal deficits    Skin:  Skin is warm and dry. No rash noted. No pallor.   Peripheral vascular:  No clubbing, no cyanosis, no edema.  Psychiatric: Appropriate mood and affect  Edited by: David Jaquez MD at 12/9/2023 1059  ----------------------------------------------------------------------------------------------------------------------  WBC/HGB/HCT/PLT   11.11/13.3/41.6/157 (12/09 0426)  BUN/CREAT/GLUC/ALT/AST/JONNA/LIP    21/1.08/125/24/20/--/-- (12/09 0426)  LYTES - Na/K/Cl/CO2: 136/4.0/103/20.1* (12/09 0426)     No results found for: \"URINECX\"  No results found for: \"BLOODCX\"    I have personally looked at the labs and they are summarized above.  ----------------------------------------------------------------------------------------------------------------------  Detailed radiology reports for the last 24 hours:  No radiology results for the last day  Assessment & Plan    #Chest pain, mixed features with Abnormal stress test   #Hypertension/Hyperlipidemia/Nonobstructive Coronary Artery Disease   #Paroxysmal Atrial Fibrillation, previously on warfarin  #Chronic HFpEF  #Mild Pulmonary Hypertension   - HS trop not significantly elevated x 2. " EKG without acute ischemic changes. TTE obtained with preserved EF, grade I diastolic dysfunction noted. Stress test performed resulted with moderate sized, mild degree of ischemic located in the inferior wall and lateral wall. Patient underwent LHC per interventional cardiology, non obstructive disease noted, no PCI performed.   - Continue home beta blocker, new Entresto, discontinued Hydralazine with hypotensive episode yesterday  - Continue new Eliquis, Aspirin 81 deferred per Cardiology, continue statin  - Plan for outpatient Cardiology follow up with event monitor placement  - Continue to monitor on telemetry, strict I/O's, trend heart rate and blood pressure      #COVID-19 positive, fevers, nonproductive cough  - Has tested positive during admission on 12/7, now having fevers, sore throat, hoarse voice but no hypoxia noted  - Infectious Disease consulted, unable to do Paxlovid due to cardiac medications, have added Remdesivir x 3 days, symptoms improved today  - Continue isolation, monitor on telemetry, continuous pulse ox    #Gastroesophageal Reflux Disease  - Continue home PPI    #Obstructive Sleep Apnea   - Supportive Care    #Former Smoker  - Recommended cessation, nicotine replacement therapy as needed    #Obesity by BMI  - Body mass index is 30.01 kg/m².; complicates all aspects of care     F: Oral  E: Monitor & Replace as needed   N: Diet: Cardiac Diets; Healthy Heart (2-3 Na+); Texture: Regular Texture (IDDSI 7); Fluid Consistency: Thin (IDDSI 0)  PPx: Eliquis  Code Status (Patient has no pulse and is not breathing): CPR   Medical Interventions (Patient has pulse or is breathing): Full Support     Dispo: Pending clinical improvement, anticipate home tomorrow    *This patient is considered high risk secondary to chest pain, abnormal stress testing, now Coronavirus positive, monitoring for drug toxicities with Remdesivir.     Edited by: David Jaquez MD at 12/9/2023 2885  [facility]  Hospitalist       Yes Yes

## 2023-12-10 ENCOUNTER — READMISSION MANAGEMENT (OUTPATIENT)
Dept: CALL CENTER | Facility: HOSPITAL | Age: 83
End: 2023-12-10
Payer: MEDICARE

## 2023-12-10 VITALS
SYSTOLIC BLOOD PRESSURE: 107 MMHG | WEIGHT: 185.9 LBS | DIASTOLIC BLOOD PRESSURE: 56 MMHG | TEMPERATURE: 97.8 F | HEART RATE: 49 BPM | BODY MASS INDEX: 29.88 KG/M2 | RESPIRATION RATE: 18 BRPM | OXYGEN SATURATION: 97 % | HEIGHT: 66 IN

## 2023-12-10 PROBLEM — D89.831 CYTOKINE RELEASE SYNDROME, GRADE 1: Status: ACTIVE | Noted: 2023-12-10

## 2023-12-10 PROCEDURE — 25810000003 SODIUM CHLORIDE 0.9 % SOLUTION: Performed by: INTERNAL MEDICINE

## 2023-12-10 PROCEDURE — 99239 HOSP IP/OBS DSCHRG MGMT >30: CPT | Performed by: INTERNAL MEDICINE

## 2023-12-10 PROCEDURE — 25010000002 REMDESIVIR 100 MG RECONSTITUTED SOLUTION: Performed by: INTERNAL MEDICINE

## 2023-12-10 RX ORDER — ATORVASTATIN CALCIUM 20 MG/1
20 TABLET, FILM COATED ORAL NIGHTLY
Qty: 30 TABLET | Refills: 0 | Status: SHIPPED | OUTPATIENT
Start: 2023-12-10 | End: 2023-12-10 | Stop reason: SDUPTHER

## 2023-12-10 RX ORDER — ATORVASTATIN CALCIUM 20 MG/1
20 TABLET, FILM COATED ORAL NIGHTLY
Qty: 30 TABLET | Refills: 0 | Status: SHIPPED | OUTPATIENT
Start: 2023-12-10 | End: 2024-01-09

## 2023-12-10 RX ADMIN — REMDESIVIR 100 MG: 100 INJECTION, POWDER, LYOPHILIZED, FOR SOLUTION INTRAVENOUS at 11:32

## 2023-12-10 RX ADMIN — APIXABAN 5 MG: 5 TABLET, FILM COATED ORAL at 08:39

## 2023-12-10 RX ADMIN — CETIRIZINE HYDROCHLORIDE 10 MG: 10 TABLET, FILM COATED ORAL at 08:39

## 2023-12-10 RX ADMIN — FLUTICASONE PROPIONATE 2 SPRAY: 50 SPRAY, METERED NASAL at 08:41

## 2023-12-10 RX ADMIN — DOCUSATE SODIUM 50 MG AND SENNOSIDES 8.6 MG 2 TABLET: 8.6; 5 TABLET, FILM COATED ORAL at 08:41

## 2023-12-10 RX ADMIN — Medication 10 ML: at 08:41

## 2023-12-10 RX ADMIN — PANTOPRAZOLE SODIUM 40 MG: 40 TABLET, DELAYED RELEASE ORAL at 05:07

## 2023-12-10 RX ADMIN — SACUBITRIL AND VALSARTAN 1 TABLET: 24; 26 TABLET, FILM COATED ORAL at 08:39

## 2023-12-10 NOTE — DISCHARGE SUMMARY
[facility] HOSPITALISTS DISCHARGE SUMMARY    Patient Identification:  Name:  Aramis Carson  Age:  83 y.o.  Sex:  male  :  1940  MRN:  [id number]  Visit Number:  [id number]    Date of Admission: 12/3/2023  Date of Discharge:  12/10/2023     PCP: Kieran Blanca APRN    DISCHARGE DIAGNOSIS  Atypical Chest pain, mixed features with Abnormal stress test - Resolved/Improved   COVID-19 positive, fevers, nonproductive cough  Hypertension  Hyperlipidemia  Nonobstructive Coronary Artery Disease   Paroxysmal Atrial Fibrillation, previously on warfarin  Chronic HFpEF  Mild Pulmonary Hypertension   Gastroesophageal Reflux Disease  Obstructive Sleep Apnea   Former Smoker  Obesity by BMI    CONSULTS   Consults       Date and Time Order Name Status Description    2023  9:14 AM Inpatient Infectious Diseases Consult Completed     12/3/2023  3:23 PM Inpatient Cardiology Consult      12/3/2023  1:08 PM IP CONSULT TO CARDIOLOGY Sent           PROCEDURES PERFORMED  Procedure(s) (LRB):  Left Heart Cath (N/A)    HOSPITAL COURSE  Patient is a 83 y.o. male presented to [facility] complaining of chest pain.  Please see the admitting history and physical for further details.      #Chest pain, mixed features with Abnormal stress test   #Hypertension/Hyperlipidemia/Nonobstructive Coronary Artery Disease   #Paroxysmal Atrial Fibrillation, previously on warfarin  #Chronic HFpEF  #Mild Pulmonary Hypertension   Patient admitted for chest pain, HS troponins not significantly elevated x 2. EKG without acute ischemic changes. TTE obtained with preserved EF, grade I diastolic dysfunction noted. Stress test performed resulted with moderate sized, mild degree of ischemic located in the inferior wall and lateral wall. Patient underwent LHC per interventional cardiology, non obstructive disease noted, no PCI performed. Interventional Cardiology consulted and followed, had trialed on beta blocker but has been  stopped due to sinus bradycardia which is now resolved, continued on Entrosto which is actually home medicine, continue new Eluiquis in lieu of Warfarin per Cardiology, Aspirin 81 deferred per Cardiology, continue new statin.  Follow up PCP 1 week, follow up Cardiology outpatient for consideration of event monitor as previously discussed.  Is possible below noted Coronavirus infection was contributing to chest pain symptoms on admission but this is unclear given we do not screen all admissions at this facility at this time.      #COVID-19 positive, fevers, nonproductive cough  Tested positive during admission on 12/7, began having fevers, sore throat, hoarse voice but no hypoxia noted.  Infectious Disease consulted and recommended treating Remdesivir x 3 days, will complete today, on room air, steroids deferred, overall much improved today.     #Gastroesophageal Reflux Disease  Continued home PPI    #Obstructive Sleep Apnea   Supportive Care    #Former Smoker  Recommended cessation, nicotine replacement therapy as needed    #Obesity by BMI  Body mass index is 30.01 kg/m².; complicates all aspects of care     Edited by: David Jaquez MD at 12/10/2023 1113    VITAL SIGNS:  Temp:  [97.8 °F (36.6 °C)-98.3 °F (36.8 °C)] 97.8 °F (36.6 °C)  Heart Rate:  [48-80] 49  Resp:  [16-18] 18  BP: ()/(56-62) 107/56  SpO2:  [96 %-98 %] 97 %  on   ;   Device (Oxygen Therapy): room air    Body mass index is 30.01 kg/m².  Wt Readings from Last 3 Encounters:   12/10/23 84.3 kg (185 lb 14.4 oz)   11/16/23 80.7 kg (178 lb)   09/26/23 81.6 kg (180 lb)     PHYSICAL EXAM:  Constitutional:  Well-developed and well-nourished. No acute distress.      HENT:  Head:  Normocephalic and atraumatic.  Mouth:  Moist mucous membranes.    Eyes:  Conjunctivae and EOM are normal. No scleral icterus.    Neck:  Neck supple.  No JVD present.    Cardiovascular:  Normal rate, regular rhythm and normal heart sounds with no murmur.  Pulmonary/Chest:  No  respiratory distress, no wheezes, no crackles, on room air  Abdominal:  Soft. No distension and no tenderness.   Musculoskeletal:  No tenderness, and no deformity.  No red or swollen joints anywhere.    Neurological:  Alert and oriented to person, place, and time.  No gross focal deficits    Skin:  Skin is warm and dry. No rash noted. No pallor.   Peripheral vascular:  No clubbing, no cyanosis, no edema.  Psychiatric: Appropriate mood and affect  Edited by: David Jaquez MD at 12/10/2023 1113    DISCHARGE DISPOSITION   Stable    DISCHARGE MEDICATIONS:     Discharge Medications        New Medications        Instructions Start Date   apixaban 5 MG tablet tablet  Commonly known as: ELIQUIS   Take 1 tablet by mouth every 12 (twelve) hours.      atorvastatin 20 MG tablet  Commonly known as: LIPITOR   Take 1 tablet by mouth every night.             Continue These Medications        Instructions Start Date   baclofen 10 MG tablet  Commonly known as: LIORESAL   10 mg, Oral, Nightly      Entresto 24-26 MG tablet  Generic drug: sacubitril-valsartan   1 tablet, Oral, 2 Times Daily      famotidine 20 MG tablet  Commonly known as: PEPCID   20 mg, Oral, Nightly      glipiZIDE XL 2.5 MG 24 hr tablet  Generic drug: glipizide   2.5 mg, Oral, Daily      HYDROcodone-acetaminophen  MG per tablet  Commonly known as: NORCO   1 tablet, Oral, Every 6 Hours PRN      loratadine 10 MG tablet  Commonly known as: CLARITIN   10 mg, Oral, Daily PRN      omeprazole 20 MG capsule  Commonly known as: priLOSEC   20 mg, Oral, 2 Times Daily             Stop These Medications      hydrALAZINE 50 MG tablet  Commonly known as: APRESOLINE     hydroCHLOROthiazide 12.5 MG tablet  Commonly known as: HYDRODIURIL     metoprolol succinate XL 25 MG 24 hr tablet  Commonly known as: TOPROL-XL     simvastatin 20 MG tablet  Commonly known as: ZOCOR     warfarin 3 MG tablet  Commonly known as: COUMADIN            Diet Instructions       Diet: Regular/House  Diet; Regular Texture (IDDSI 7); Thin (IDDSI 0)      Discharge Diet: Regular/House Diet    Texture: Regular Texture (IDDSI 7)    Fluid Consistency: Thin (IDDSI 0)          Activity Instructions       Activity as Tolerated            Additional Instructions for the Follow-ups that You Need to Schedule       Discharge Follow-up with PCP   As directed       Currently Documented PCP:    Kieran Blanca APRN    PCP Phone Number:    [phone]     Follow Up Details: 1 week post hospital discharge        Discharge Follow-up with Specified Provider: Cardiology 1-2 weeks   As directed      To: Cardiology 1-2 weeks       Additional information on Labs and Follow-ups:    Will notify pt on 12/11/23 with follow up appointments.           Follow-up Information       Kieran Blanca APRN .    Specialty: Nurse Practitioner  Why: Will notify patient about follow-up appt. with Dr. Blanca 12/11/23.  Contact information:  [address]  [facility] [address]  [place] [address]  [phone]               Mariann Hull MD Follow up.    Specialty: Cardiology  Why: Will notify patient about follow-up appt. with Dr Hull 12/11/23.  Contact information:  [address] [facility]  [place] [address]  [phone]                            TEST  RESULTS PENDING AT DISCHARGE  None     CODE STATUS  Code Status and Medical Interventions:   Ordered at: 12/06/23 2173     Level Of Support Discussed With:    Patient     Code Status (Patient has no pulse and is not breathing):    CPR (Attempt to Resuscitate)     Medical Interventions (Patient has pulse or is breathing):    Full Support     David Jaquez MD  [facility]  12/10/23  11:13 EST    Please note that this discharge summary required more than 30 minutes to complete.

## 2023-12-10 NOTE — PLAN OF CARE
Problem: Adult Inpatient Plan of Care  Goal: Absence of Hospital-Acquired Illness or Injury  Intervention: Prevent Skin Injury  Recent Flowsheet Documentation  Taken 12/9/2023 1945 by Mar Anne RN  Body Position: position changed independently  Skin Protection: adhesive use limited     Problem: Adult Inpatient Plan of Care  Goal: Absence of Hospital-Acquired Illness or Injury  Intervention: Prevent and Manage VTE (Venous Thromboembolism) Risk  Recent Flowsheet Documentation  Taken 12/9/2023 1945 by Mar Anne, RN  Activity Management: up ad orlin  VTE Prevention/Management: (See MAR) other (see comments)   Goal Outcome Evaluation:

## 2023-12-10 NOTE — PLAN OF CARE
Goal Outcome Evaluation:      Patient resting in bed comfortably at this time. Patient has been pleasant and cooperative with care this shift. No complaints or concerns being voiced at this time. No s/s of acute distress noted at this time. Will continue with plan of care.

## 2023-12-10 NOTE — OUTREACH NOTE
Prep Survey      Flowsheet Row Responses   [facility] facility patient discharged from? [place]   Is LACE score < 7 ? No   Eligibility Readm Mgmt   Discharge diagnosis Chest pain   Does the patient have one of the following disease processes/diagnoses(primary or secondary)? Other   Does the patient have Home health ordered? No   Is there a DME ordered? No   Prep survey completed? Yes            JULIÁN TERRAZAS - Registered Nurse

## 2023-12-10 NOTE — PROGRESS NOTES
PROGRESS NOTE         Patient Identification:  Name:  Aramis Carson  Age:  83 y.o.  Sex:  male  :  1940  MRN:  [id number]  Visit Number:  [id number]  Primary Care Provider:  Kieran Blanca APRN         LOS: 5 days       ----------------------------------------------------------------------------------------------------------------------  Subjective       Chief Complaints:    Chest Pain        Interval History:      Patient resting comfortably in bed this morning.  Wife at bedside.  Continues on room air with no apparent distress.  Afebrile, denies diarrhea.  Lungs clear to auscultation bilaterally.  No issues reported overnight.  Patient to complete remdesivir course today.    Review of Systems:    Constitutional: no fever, chills and night sweats.   Eyes: no eye drainage, itching or redness.  HEENT: no mouth sores, dysphagia or nose bleed.  Respiratory: no for shortness of breath, cough or production of sputum.  Cardiovascular: no chest pain, no palpitations, no orthopnea.  Gastrointestinal: no nausea, vomiting or diarrhea. No abdominal pain, hematemesis or rectal bleeding.  Genitourinary: no dysuria or polyuria.  Hematologic/lymphatic: no lymph node abnormalities, no easy bruising or easy bleeding.  Musculoskeletal: no muscle or joint pain.  Skin: No rash and no itching.  Neurological: no loss of consciousness, no seizure, no headache.  Behavioral/Psych: no depression or suicidal ideation.  Endocrine: no hot flashes.  Immunologic: negative.    ----------------------------------------------------------------------------------------------------------------------      Objective       Current [facility] Meds:  apixaban, 5 mg, Oral, Q12H  atorvastatin, 20 mg, Oral, Nightly  cetirizine, 10 mg, Oral, Daily  famotidine, 20 mg, Oral, Nightly  fluticasone, 2 spray, Each Nare, Daily  pantoprazole, 40 mg, Oral, Q AM  remdesivir, 100 mg, Intravenous, Q24H  sacubitril-valsartan, 1 tablet, Oral,  BID  senna-docusate sodium, 2 tablet, Oral, BID  sodium chloride, 10 mL, Intravenous, Q12H      Pharmacy Consult,       ----------------------------------------------------------------------------------------------------------------------    Vital Signs:  Temp:  [97.8 °F (36.6 °C)-98.3 °F (36.8 °C)] 97.8 °F (36.6 °C)  Heart Rate:  [48-80] 49  Resp:  [16-18] 18  BP: ()/(56-62) 107/56  Mean Arterial Pressure (Non-Invasive) for the past 24 hrs (Last 3 readings):   Noninvasive MAP (mmHg)   12/10/23 0318 70   12/09/23 2325 74   12/09/23 1951 70     SpO2 Percentage    12/09/23 2325 12/10/23 0318 12/10/23 0730   SpO2: 98% 98% 97%     SpO2:  [96 %-98 %] 97 %  on   ;   Device (Oxygen Therapy): room air    Body mass index is 30.01 kg/m².  Wt Readings from Last 3 Encounters:   12/10/23 84.3 kg (185 lb 14.4 oz)   11/16/23 80.7 kg (178 lb)   09/26/23 81.6 kg (180 lb)        Intake/Output Summary (Last 24 hours) at 12/10/2023 1107  Last data filed at 12/10/2023 0900  Gross per 24 hour   Intake 1120 ml   Output --   Net 1120 ml     Diet: Cardiac Diets; Healthy Heart (2-3 Na+); Texture: Regular Texture (IDDSI 7); Fluid Consistency: Thin (IDDSI 0)  ----------------------------------------------------------------------------------------------------------------------      Physical Exam:    Constitutional:  Well-developed and well-nourished.  No respiratory distress.  On room air.  Resting in bed.  Wife at bedside.     HENT:  Head: Normocephalic and atraumatic.  Mouth:  Moist mucous membranes.    Eyes:  Conjunctivae and EOM are normal.  No scleral icterus.  Neck:  Neck supple.  No JVD present.    Cardiovascular:  Normal rate, regular rhythm and normal heart sounds with no murmur. No edema.  Pulmonary/Chest:  No respiratory distress, no wheezes, no crackles, with normal breath sounds and good air movement.  Abdominal:  Soft.  Bowel sounds are normal.  No distension and no tenderness.   Musculoskeletal:  No edema, no tenderness,  "and no deformity.  No swelling or redness of joints.  Neurological:  Alert and oriented to person, place, and time.  No facial droop.  No slurred speech.   Skin:  Skin is warm and dry.  No rash noted.  No pallor.   Psychiatric:  Normal mood and affect.  Behavior is normal.        ----------------------------------------------------------------------------------------------------------------------  Results from last 7 days   Lab Units 12/05/23  1051 12/05/23  0204 12/03/23  1135   HSTROP T ng/L 12 18 19           Results from last 7 days   Lab Units 12/09/23 0426 12/08/23 0143 12/07/23 0146 12/06/23 0808 12/05/23  0204 12/04/23  1652   CRP mg/dL  --   --  2.06*  --   --   --    WBC 10*3/mm3 11.11* 10.26 11.07* 10.66 10.48  --    HEMOGLOBIN g/dL 13.3 13.8 14.0 13.9 14.5  --    HEMATOCRIT % 41.6 42.3 42.7 44.2 44.7  --    MCV fL 86.0 84.8 84.9 86.8 85.8  --    MCHC g/dL 32.0 32.6 32.8 31.4* 32.4  --    PLATELETS 10*3/mm3 157 172 210 179 236  --    INR   --   --   --  1.37* 1.98* 2.19*     Results from last 7 days   Lab Units 12/09/23 0426 12/08/23 0143 12/07/23 0146 12/06/23  0808 12/05/23  0204   SODIUM mmol/L 136 134* 138   < > 142   POTASSIUM mmol/L 4.0 4.0 4.2   < > 4.2   MAGNESIUM mg/dL  --  2.0 2.0  --  2.5*   CHLORIDE mmol/L 103 102 106   < > 107   CO2 mmol/L 20.1* 22.2 21.1*   < > 24.8   BUN mg/dL 21 20 20   < > 26*   CREATININE mg/dL 1.08 1.19 1.17   < > 1.35*   CALCIUM mg/dL 8.5* 8.7 8.8   < > 8.8   GLUCOSE mg/dL 125* 133* 140*   < > 155*   ALBUMIN g/dL 3.1* 3.5 3.5   < > 3.9   BILIRUBIN mg/dL 0.5 0.4 0.5   < > 0.3   ALK PHOS U/L 53 50 53   < > 72   AST (SGOT) U/L 20 33 24   < > 23   ALT (SGPT) U/L 24 35 24   < > 16    < > = values in this interval not displayed.   Estimated Creatinine Clearance: 52.8 mL/min (by C-G formula based on SCr of 1.08 mg/dL).  No results found for: \"AMMONIA\"    Glucose   Date/Time Value Ref Range Status   12/08/2023 1703 97 70 - 130 mg/dL Final     Lab Results   Component " "Value Date    HGBA1C 6.10 (H) 08/07/2023     Lab Results   Component Value Date    TSH 2.800 08/07/2023    FREET4 1.19 08/07/2023       No results found for: \"BLOODCX\"  No results found for: \"URINECX\"  No results found for: \"WOUNDCX\"  No results found for: \"STOOLCX\"  No results found for: \"RESPCX\"  Pain Management Panel          Latest Ref Rng & Units 2/23/2018   Pain Management Panel   Amphetamine, Urine Qual Negative Negative    Barbiturates Screen, Urine Negative Negative    Benzodiazepine Screen, Urine Negative Negative    Buprenorphine, Screen, Urine Negative Negative    Cocaine Screen, Urine Negative Negative    Methadone Screen , Urine Negative Negative          ----------------------------------------------------------------------------------------------------------------------  Imaging Results (Last 24 Hours)       ** No results found for the last 24 hours. **            ----------------------------------------------------------------------------------------------------------------------    Pertinent Infectious Disease Results                Assessment/Plan       Assessment       COVID-19      Plan        Patient resting comfortably in bed this morning.  Wife at bedside.  Continues on room air with no apparent distress.  Afebrile, denies diarrhea.  Lungs clear to auscultation bilaterally.  No issues reported overnight.  Patient to complete remdesivir course today.    Unfortunately due to patient's medications Paxlovid would be contraindicated with the use of atorvastatin and Flonase.  Therefore remdesivir 3-day course was initiated as patient is at high risk for decompensation from COVID-19 infection.  As patient remains on room air recommend to hold off on any initiation of Decadron therapy.   At this time patient's presentation is typical of COVID-19 and will monitor off of antibiotic coverage for now.  Patient can be discharged per infectious disease standpoint upon completion of remdesivir course " today.      ANTIMICROBIAL THERAPY    remdesivir - 100 mg/270 mL     Code Status:   Code Status and Medical Interventions:   Ordered at: 12/06/23 1550     Level Of Support Discussed With:    Patient     Code Status (Patient has no pulse and is not breathing):    CPR (Attempt to Resuscitate)     Medical Interventions (Patient has pulse or is breathing):    Full Support       ISAIAS Lyons  12/10/23  11:07 EST

## 2023-12-11 ENCOUNTER — TELEPHONE (OUTPATIENT)
Dept: TELEMETRY | Facility: HOSPITAL | Age: 83
End: 2023-12-11
Payer: MEDICARE

## 2023-12-13 ENCOUNTER — READMISSION MANAGEMENT (OUTPATIENT)
Dept: CALL CENTER | Facility: HOSPITAL | Age: 83
End: 2023-12-13
Payer: MEDICARE

## 2023-12-13 NOTE — OUTREACH NOTE
Medical Week 1 Survey      Flowsheet Row Responses   [facility] patient discharged from? [facility]   Does the patient have one of the following disease processes/diagnoses(primary or secondary)? Other   Week 1 attempt successful? Yes   Call start time 1149   Call end time 1151   Discharge diagnosis Chest pain   Meds reviewed with patient/caregiver? Yes   Is the patient having any side effects they believe may be caused by any medication additions or changes? No   Does the patient have all medications ordered at discharge? Yes   Is the patient taking all medications as directed (includes completed medication regime)? Yes   Does the patient have a primary care provider?  Yes   Does the patient have an appointment with their PCP within 7 days of discharge? Greater than 7 days   What is preventing the patient from scheduling follow up appointments within 7 days of discharge? Earlier appointment not available   Nursing Interventions Verified appointment date/time/provider   Has the patient kept scheduled appointments due by today? N/A   Has home health visited the patient within 72 hours of discharge? N/A   Psychosocial issues? No   Did the patient receive a copy of their discharge instructions? Yes   Nursing interventions Reviewed instructions with patient   What is the patient's perception of their health status since discharge? Improving   Is the patient/caregiver able to teach back signs and symptoms related to disease process for when to call PCP? Yes   Is the patient/caregiver able to teach back signs and symptoms related to disease process for when to call 911? Yes   Is the patient/caregiver able to teach back the hierarchy of who to call/visit for symptoms/problems? PCP, Specialist, Home health nurse, Urgent Care, ED, 911 Yes   If the patient is a current smoker, are they able to teach back resources for cessation? Not a smoker   Additional teach back comments denies chest pain   Week 1 call completed? Yes    Call end time 1151            Maeve CALIXTO - Registered Nurse

## 2023-12-21 ENCOUNTER — TELEPHONE (OUTPATIENT)
Dept: CARDIOLOGY | Facility: CLINIC | Age: 83
End: 2023-12-21
Payer: MEDICARE

## 2023-12-27 RX ORDER — ATORVASTATIN CALCIUM 20 MG/1
20 TABLET, FILM COATED ORAL NIGHTLY
Qty: 90 TABLET | Refills: 3 | Status: SHIPPED | OUTPATIENT
Start: 2023-12-27 | End: 2024-12-21

## 2024-01-04 ENCOUNTER — OFFICE VISIT (OUTPATIENT)
Dept: CARDIOLOGY | Facility: CLINIC | Age: 84
End: 2024-01-04
Payer: MEDICARE

## 2024-01-04 VITALS
OXYGEN SATURATION: 98 % | DIASTOLIC BLOOD PRESSURE: 77 MMHG | BODY MASS INDEX: 28.37 KG/M2 | HEIGHT: 66 IN | SYSTOLIC BLOOD PRESSURE: 124 MMHG | HEART RATE: 103 BPM | WEIGHT: 176.5 LBS

## 2024-01-04 DIAGNOSIS — E11.9 CONTROLLED TYPE 2 DIABETES MELLITUS WITHOUT COMPLICATION, WITHOUT LONG-TERM CURRENT USE OF INSULIN: Primary | Chronic | ICD-10-CM

## 2024-01-04 DIAGNOSIS — I10 ESSENTIAL HYPERTENSION: Chronic | ICD-10-CM

## 2024-01-04 DIAGNOSIS — I25.10 CORONARY ARTERY DISEASE INVOLVING NATIVE CORONARY ARTERY OF NATIVE HEART WITHOUT ANGINA PECTORIS: ICD-10-CM

## 2024-01-04 NOTE — PROGRESS NOTES
Office Note    Subjective     Aramis Carson is a 83 y.o. male who presents to day for follow-up after recent cath on December 6, 2023      Active Problems:  Problem List Items Addressed This Visit          Cardiac and Vasculature    Essential hypertension (Chronic)       Endocrine and Metabolic    Controlled type 2 diabetes mellitus without complication - Primary (Chronic)     Other Visit Diagnoses       Coronary artery disease involving native coronary artery of native heart without angina pectoris                HPI  Patient is a pleasant 83-year-old gentleman with recently admitted to the hospital.  He had COVID disease at that time.  Had a left heart cath also which showed 30% LAD and 50% circumflex lesion.  Patient has had no angina symptoms.  Denies any dyspnea on exertion or proximal nocturnal dyspnea.  Denies any dizziness lightheadedness or blackouts    PRIOR MEDS  Current Outpatient Medications on File Prior to Visit   Medication Sig Dispense Refill    apixaban (ELIQUIS) 5 MG tablet tablet Take 1 tablet by mouth every 12 (twelve) hours. 180 tablet 3    atorvastatin (LIPITOR) 20 MG tablet Take 1 tablet by mouth every night. 90 tablet 3    baclofen (LIORESAL) 10 MG tablet Take 1 tablet by mouth Every Night.      famotidine (PEPCID) 20 MG tablet Take 1 tablet by mouth Every Night.      glipiZIDE XL 2.5 MG 24 hr tablet Take 1 tablet by mouth Daily.      HYDROcodone-acetaminophen (NORCO)  MG per tablet Take 1 tablet by mouth Every 6 (Six) Hours As Needed for Moderate Pain.      loratadine (CLARITIN) 10 MG tablet Take 1 tablet by mouth Daily As Needed for Allergies.      omeprazole (priLOSEC) 20 MG capsule Take 1 capsule by mouth 2 (Two) Times a Day.      sacubitril-valsartan (Entresto) 24-26 MG tablet Take 1 tablet by mouth 2 (Two) Times a Day. 180 tablet 3    apixaban (ELIQUIS) 5 MG tablet tablet Take 1 tablet by mouth 2 (Two) Times a Day. 60 tablet 2     No current facility-administered medications on  "file prior to visit.       ALLERGIES  Flomax [tamsulosin], Lisinopril, and Losartan    HISTORY  Past Medical History:   Diagnosis Date    Atrial fibrillation     Coronary artery disease     Hyperlipidemia     Pre-diabetes        Social History     Socioeconomic History    Marital status:    Tobacco Use    Smoking status: Former     Types: Pipe, Cigars     Quit date:      Years since quittin.0     Passive exposure: Past    Smokeless tobacco: Never    Tobacco comments:     Smoked for 25 years   Vaping Use    Vaping Use: Never used   Substance and Sexual Activity    Alcohol use: No    Drug use: No    Sexual activity: Defer       Family History   Problem Relation Age of Onset    Cancer Sister     Diabetes Sister     Cancer Brother     Heart disease Brother     Diabetes Maternal Grandmother     Hypertension Neg Hx        Review of Systems   Respiratory:  Negative for chest tightness, shortness of breath and wheezing.    Cardiovascular:  Negative for chest pain, palpitations and leg swelling.   Gastrointestinal:  Negative for abdominal pain.   Neurological:  Negative for dizziness, seizures and light-headedness.       Objective     VITALS: /77 (BP Location: Left arm, Patient Position: Sitting, Cuff Size: Adult)   Pulse 103   Ht 167.6 cm (66\")   Wt 80.1 kg (176 lb 8 oz)   SpO2 98%   BMI 28.49 kg/m²     LABS:   No results displayed because visit has over 200 results.            IMAGING:   XR Chest 1 View    Result Date: 2023  No radiographic evidence of acute cardiac or pulmonary disease.    This report was finalized on 2023 9:49 AM by Dr. Jose Jaimes MD.      XR Chest 1 View    Result Date: 12/3/2023   No evidence of acute disease in the chest.  This report was finalized on 12/3/2023 10:36 AM by Robel Enriquez MD.      CT Chest Without Contrast Diagnostic    Result Date: 2023  1.  Resolution of bibasilar opacities and nodules most consistent with resolved atypical pneumonia. 2.  " Very small 3 mm or less nodules in the right upper lobe are stable. 3.  No new pulmonary parenchymal nodules or masses identified. 4.  Thyroid goiter with substernal extension. 5.  Otherwise stable exam with other nonacute findings as above.   This report was finalized on 9/29/2023 11:08 AM by Dr. Anatoliy Myers MD.      Results for orders placed during the hospital encounter of 12/03/23    Stress Test With Myocardial Perfusion One Day    Interpretation Summary  Images from the original result were not included.      A pharmacological stress test was performed using regadenoson without low-level exercise.    Findings consistent with an indeterminate ECG stress test.    Raw images reviewed with the following abnormalities noted: Has 3 bright scintigraphic spots noted in the right lung field.  Clinical correlation required to rule out any pulmonary pathology.    Myocardial perfusion imaging indicates a moderate-sized, mild degree of ischemia located in the inferior wall and lateral wall.    TID:1.19    Normal LV cavity size. Normal LV wall motion noted.    Left ventricular ejection fraction is normal (Calculated EF = 69%).    Impressions are consistent with an intermediate risk study.     No results found for this or any previous visit.          EXAM:  Constitutional:       General: Awake.      Appearance: Healthy appearance. Not in distress.   Eyes:      Conjunctiva/sclera: Conjunctivae normal.   HENT:      Head: Normocephalic and atraumatic.      Nose: Nose normal.    Mouth/Throat:      Pharynx: Oropharynx is clear.   Neck:      Thyroid: Thyroid normal.      Vascular: No carotid bruit or JVD.   Pulmonary:      Effort: Pulmonary effort is normal.      Breath sounds: Normal breath sounds.   Chest:      Chest wall: Not tender to palpatation.   Cardiovascular:      PMI at left midclavicular line. Normal rate. Regular rhythm. Normal S1 with normal intensity. Normal S2 with normal intensity.       Murmurs: There is no  murmur.      No gallop.  No rub.   Pulses:     Intact distal pulses.   Edema:     Peripheral edema absent.   Abdominal:      General: Bowel sounds are normal. There is no distension.      Palpations: Abdomen is soft. There is no hepatomegaly.      Tenderness: There is no abdominal tenderness.   Musculoskeletal: Normal range of motion.      Cervical back: Normal range of motion. Skin:     General: Skin is warm and dry. There is no cyanosis.      Coloration: Skin is not jaundiced.   Neurological:      Mental Status: Alert and oriented to person, place and time.      Motor: Motor function is intact.      Gait: Gait is intact.   Psychiatric:         Attention and Perception: Attention and perception normal.         Speech: Speech normal.         Behavior: Behavior is cooperative.         Cognition and Memory: Cognition and memory normal.         Judgment: Judgment normal.          Procedure   Procedures       Assessment & Plan     Diagnoses and all orders for this visit:    1. Controlled type 2 diabetes mellitus without complication, without long-term current use of insulin (Primary)    2. Essential hypertension    3. Coronary artery disease involving native coronary artery of native heart without angina pectoris          PLAN  #1 cardiac.  Patient with multiple risk factors for heart disease.  Patient had recent admission for COVID.  Had a cath done which showed nonobstructive coronary artery disease with normal IFR to the LAD and circumflex.  Patient has no angina symptoms.  Will continue to follow closely and clinically  #2 hypertension.  Patient blood pressure stable on current medication will continue same  #3 hyperlipidemia.  Will continue current lipid-lowering medications.           MEDS ORDERED DURING VISIT:    There are no discontinued medications.     No orders of the defined types were placed in this encounter.        Follow Up:   Return in about 8 months (around 9/4/2024) for Recheck.    Patient was given  instructions and counseling regarding his condition or for health maintenance advice. Please see specific information pulled into the AVS if appropriate.   As always, Kieran Blanca APRN  I appreciate very much the opportunity to participate in the cardiovascular care of your patients. Please do not hesitate to call me with any questions with regards to Aramis Carson evaluation and management.         This document has been electronically signed by Shwetha Navarrete MD [facility], Interventional Cardiology  January 4, 2024 15:52 EST    Dictated Utilizing Dragon Dictation: Part of this note may be an electronic transcription/translation of spoken language to printed text using the Dragon Dictation System.

## 2024-02-22 PROBLEM — G47.30 HYPERSOMNIA WITH SLEEP APNEA: Status: ACTIVE | Noted: 2023-02-01

## 2024-02-22 PROBLEM — Z87.39 H/O ARTHRITIS: Status: ACTIVE | Noted: 2023-02-01

## 2024-02-22 PROBLEM — S84.10XA: Status: ACTIVE | Noted: 2023-02-01

## 2024-02-22 PROBLEM — I25.10 CAD (CORONARY ARTERY DISEASE): Status: ACTIVE | Noted: 2023-02-01

## 2024-02-22 PROBLEM — G47.10 HYPERSOMNIA WITH SLEEP APNEA: Status: ACTIVE | Noted: 2023-02-01

## 2024-02-22 PROBLEM — Z00.00 HEALTHCARE MAINTENANCE: Status: ACTIVE | Noted: 2022-11-17

## 2024-02-22 PROBLEM — Z86.39 H/O MIXED HYPERLIPIDEMIA: Status: ACTIVE | Noted: 2023-02-01

## 2024-02-23 ENCOUNTER — OFFICE VISIT (OUTPATIENT)
Dept: PULMONOLOGY | Facility: CLINIC | Age: 84
End: 2024-02-23
Payer: MEDICARE

## 2024-02-23 VITALS
WEIGHT: 175.4 LBS | HEIGHT: 66 IN | DIASTOLIC BLOOD PRESSURE: 74 MMHG | TEMPERATURE: 97.1 F | SYSTOLIC BLOOD PRESSURE: 108 MMHG | OXYGEN SATURATION: 100 % | HEART RATE: 85 BPM | BODY MASS INDEX: 28.19 KG/M2

## 2024-02-23 DIAGNOSIS — G47.33 OBSTRUCTIVE SLEEP APNEA SYNDROME: Primary | ICD-10-CM

## 2024-02-23 DIAGNOSIS — Z87.891 FORMER SMOKER: ICD-10-CM

## 2024-02-23 DIAGNOSIS — R91.8 PULMONARY NODULES: ICD-10-CM

## 2024-02-23 PROCEDURE — 1159F MED LIST DOCD IN RCRD: CPT | Performed by: PHYSICIAN ASSISTANT

## 2024-02-23 PROCEDURE — 1160F RVW MEDS BY RX/DR IN RCRD: CPT | Performed by: PHYSICIAN ASSISTANT

## 2024-02-23 PROCEDURE — 3074F SYST BP LT 130 MM HG: CPT | Performed by: PHYSICIAN ASSISTANT

## 2024-02-23 PROCEDURE — 99214 OFFICE O/P EST MOD 30 MIN: CPT | Performed by: PHYSICIAN ASSISTANT

## 2024-02-23 PROCEDURE — 3078F DIAST BP <80 MM HG: CPT | Performed by: PHYSICIAN ASSISTANT

## 2024-02-23 NOTE — PROGRESS NOTES
"Chief Complaint  Sleep Apnea    Subjective        Aramis Carson presents to [facility] PULMONARY & CRITICAL CARE MEDICINE  History of Present Illness    Mr. Carson presents today for follow up. Had some intermittent difficulty with his device in the past. Doing well with it as of recently. No acute complaints with use.   No other new concerns today. As we had previously informed him, he completed follow up for the goiter incidentally noted on imaging. No other acute concerns.       Objective   Vital Signs:  /74   Pulse 85   Temp 97.1 °F (36.2 °C)   Ht 167.6 cm (66\")   Wt 79.6 kg (175 lb 6.4 oz)   SpO2 100%   BMI 28.31 kg/m²   Estimated body mass index is 28.31 kg/m² as calculated from the following:    Height as of this encounter: 167.6 cm (66\").    Weight as of this encounter: 79.6 kg (175 lb 6.4 oz).           Physical Exam  Constitutional:       Appearance: Normal appearance.   Cardiovascular:      Rate and Rhythm: Normal rate and regular rhythm.   Pulmonary:      Effort: Pulmonary effort is normal.      Breath sounds: Normal breath sounds.   Neurological:      Mental Status: He is alert and oriented to person, place, and time.   Psychiatric:         Behavior: Behavior normal.        Result Review :    The following data was reviewed by: Alison Combs PA-C on 02/23/2024:  CT chest imaging/report from September 2023 -   Previous opacities of the right and left lower lungs have resolved  3.7 mm nodule on image 29 (stable since April 2023)  4.5 mm calcified nodule of the right lower lung on image 71 (stable since April 2023)        Assessment and Plan     There are no diagnoses linked to this encounter.    Obstructive sleep apnea:   Had some previous difficulties with use in the past but doing well at this time.   Has had interval weight loss since the previous visit.     Management obstructive sleep apnea also includes lifestyle modifications including weight loss, avoidance of " alcohol, sedated, caffeine especially before bedtime, allowing adequate sleep time, and body position during sleep such as side versus back. 10% weight loss can result in a 50% improvement of the apnea-hypopnea index.  Untreated sleep apnea can lead to cardiovascular/metabolic disorder, neurocognitive deficit, daytime sleepiness, motor vehicle accidents, depression, mood disorders and reduced quality of life.  . Recommended at least 4 hours of use per night for 70% of every month (approximately 21 out of 30 days) per CMS guidelines.       Pulmonary nodules, former smoker:   Remains a former smoker.   CT chest previously revealed(  Previous opacities of the right and left lower lungs have resolved  3.7 mm nodule on image 29 is stable since April 2023.   4.5 mm calcified nodule of the right lower lung on image 71 stable since April 2023.   Due to stablity, can consider the following plan:   Can consider repeat CT chest in 1 year (September 2024)  Does not qualify for LDCT sceening due to age.       Follow Up     Return in about 8 months (around 10/23/2024), or if symptoms worsen or fail to improve, for Next scheduled follow up.  Patient was given instructions and counseling regarding his condition or for health maintenance advice. Please see specific information pulled into the AVS if appropriate.

## 2024-03-08 PROBLEM — R91.8 PULMONARY NODULES: Status: ACTIVE | Noted: 2023-06-20

## 2024-03-21 ENCOUNTER — TRANSCRIBE ORDERS (OUTPATIENT)
Dept: ADMINISTRATIVE | Facility: HOSPITAL | Age: 84
End: 2024-03-21
Payer: MEDICARE

## 2024-03-21 DIAGNOSIS — I73.9 PERIPHERAL VASCULAR DISEASE, UNSPECIFIED: Primary | ICD-10-CM

## 2024-03-22 ENCOUNTER — HOSPITAL ENCOUNTER (OUTPATIENT)
Dept: CARDIOLOGY | Facility: HOSPITAL | Age: 84
Discharge: HOME | End: 2024-03-22
Payer: MEDICARE

## 2024-03-22 ENCOUNTER — HOSPITAL ENCOUNTER (OUTPATIENT)
Dept: GENERAL RADIOLOGY | Facility: HOSPITAL | Age: 84
Discharge: HOME | End: 2024-03-22
Payer: MEDICARE

## 2024-03-22 ENCOUNTER — TRANSCRIBE ORDERS (OUTPATIENT)
Dept: ADMINISTRATIVE | Facility: HOSPITAL | Age: 84
End: 2024-03-22
Payer: MEDICARE

## 2024-03-22 DIAGNOSIS — M25.551 BILATERAL HIP PAIN: ICD-10-CM

## 2024-03-22 DIAGNOSIS — M25.552 BILATERAL HIP PAIN: ICD-10-CM

## 2024-03-22 DIAGNOSIS — M25.551 BILATERAL HIP PAIN: Primary | ICD-10-CM

## 2024-03-22 DIAGNOSIS — M25.552 BILATERAL HIP PAIN: Primary | ICD-10-CM

## 2024-03-22 DIAGNOSIS — I73.9 PERIPHERAL VASCULAR DISEASE, UNSPECIFIED: ICD-10-CM

## 2024-03-22 PROCEDURE — 73523 X-RAY EXAM HIPS BI 5/> VIEWS: CPT

## 2024-03-22 PROCEDURE — 93926 LOWER EXTREMITY STUDY: CPT

## 2024-03-25 ENCOUNTER — HOSPITAL ENCOUNTER (OUTPATIENT)
Dept: ULTRASOUND IMAGING | Facility: HOSPITAL | Age: 84
Discharge: HOME | End: 2024-03-25
Admitting: SURGERY
Payer: MEDICARE

## 2024-03-25 DIAGNOSIS — E04.1 THYROID NODULE: ICD-10-CM

## 2024-03-25 PROCEDURE — 76536 US EXAM OF HEAD AND NECK: CPT | Performed by: RADIOLOGY

## 2024-03-25 PROCEDURE — 76536 US EXAM OF HEAD AND NECK: CPT

## 2024-04-25 ENCOUNTER — OFFICE VISIT (OUTPATIENT)
Dept: SURGERY | Facility: CLINIC | Age: 84
End: 2024-04-25
Payer: MEDICARE

## 2024-04-25 VITALS — HEIGHT: 66 IN | WEIGHT: 176 LBS | BODY MASS INDEX: 28.28 KG/M2

## 2024-04-25 DIAGNOSIS — E04.1 THYROID NODULE: Primary | ICD-10-CM

## 2024-04-25 PROCEDURE — 1160F RVW MEDS BY RX/DR IN RCRD: CPT | Performed by: SURGERY

## 2024-04-25 PROCEDURE — 1159F MED LIST DOCD IN RCRD: CPT | Performed by: SURGERY

## 2024-04-25 PROCEDURE — 99213 OFFICE O/P EST LOW 20 MIN: CPT | Performed by: SURGERY

## 2024-04-25 NOTE — PROGRESS NOTES
Subjective   Aramis Carson is a 84 y.o. male.     Chief Complaint: thyroid nodule    History of Present Illness  He is a 82 yo who had a left thyroid nodule seen on a CT when being evaluated for something else a few months ago. He had a US guided FNA that was not diagnostic 3 months ago. His follow up US done last month showed no changes. He has had no symptoms. He will need a follow up US guided FNA.    The following portions of the patient's history were reviewed and updated as appropriate: current medications, past family history, past medical history, past social history, past surgical history and problem list.    Review of Systems   Constitutional:  Negative for activity change, appetite change, chills, fever and unexpected weight change.   HENT:  Negative for congestion, facial swelling and sore throat.    Eyes:  Negative for photophobia and visual disturbance.   Respiratory:  Negative for chest tightness, shortness of breath and wheezing.    Cardiovascular:  Negative for chest pain, palpitations and leg swelling.   Gastrointestinal:  Negative for abdominal distention, abdominal pain, anal bleeding, blood in stool, constipation, diarrhea, nausea, rectal pain and vomiting.   Endocrine: Negative for cold intolerance, heat intolerance, polydipsia and polyuria.   Genitourinary:  Negative for difficulty urinating, dysuria, flank pain and urgency.   Musculoskeletal:  Negative for back pain and myalgias.   Skin:  Negative for rash and wound.   Allergic/Immunologic: Negative for immunocompromised state.   Neurological:  Negative for dizziness, seizures, syncope, light-headedness, numbness and headaches.   Hematological:  Negative for adenopathy. Does not bruise/bleed easily.   Psychiatric/Behavioral:  Negative for behavioral problems and confusion. The patient is not nervous/anxious.        Objective   Physical Exam  Vitals reviewed.   Constitutional:       General: He is not in acute distress.     Appearance: He is  well-developed. He is not ill-appearing.      Comments: I cannot feel the nodule in the thyroid distinctly.    HENT:      Head: Normocephalic. No laceration. Hair is normal.      Right Ear: Hearing and ear canal normal.      Left Ear: Hearing and ear canal normal.      Nose: Nose normal.      Right Sinus: No maxillary sinus tenderness or frontal sinus tenderness.      Left Sinus: No maxillary sinus tenderness or frontal sinus tenderness.   Eyes:      General: Lids are normal.      Conjunctiva/sclera: Conjunctivae normal.      Pupils: Pupils are equal, round, and reactive to light.   Neck:      Thyroid: No thyroid mass or thyromegaly.      Vascular: No JVD.      Trachea: No tracheal tenderness or tracheal deviation.   Cardiovascular:      Rate and Rhythm: Normal rate and regular rhythm.      Heart sounds: No murmur heard.     No gallop.   Pulmonary:      Effort: Pulmonary effort is normal.      Breath sounds: Normal breath sounds. No stridor. No wheezing.   Chest:      Chest wall: No tenderness.   Abdominal:      General: Bowel sounds are normal. There is no distension.      Palpations: Abdomen is soft. There is no mass.      Tenderness: There is no abdominal tenderness. There is no guarding or rebound.      Hernia: No hernia is present.   Musculoskeletal:         General: No deformity.      Cervical back: Normal range of motion.   Lymphadenopathy:      Cervical: No cervical adenopathy.      Upper Body:      Right upper body: No supraclavicular adenopathy.      Left upper body: No supraclavicular adenopathy.   Skin:     General: Skin is warm and dry.      Coloration: Skin is not pale.      Findings: No erythema or rash.   Neurological:      Mental Status: He is alert and oriented to person, place, and time.      Motor: No abnormal muscle tone.   Psychiatric:         Behavior: Behavior normal.         Thought Content: Thought content normal.       Past Medical History:   Diagnosis Date   • Atrial fibrillation    •  Coronary artery disease    • Hyperlipidemia    • Pre-diabetes        Family History   Problem Relation Age of Onset   • Cancer Sister    • Diabetes Sister    • Cancer Brother    • Heart disease Brother    • Diabetes Maternal Grandmother    • Hypertension Neg Hx        Social History     Tobacco Use   • Smoking status: Former     Types: Pipe, Cigars     Quit date:      Years since quittin.3     Passive exposure: Past   • Smokeless tobacco: Never   • Tobacco comments:     Smoked for 25 years   Vaping Use   • Vaping status: Never Used   Substance Use Topics   • Alcohol use: No   • Drug use: No       Past Surgical History:   Procedure Laterality Date   • CARDIAC CATHETERIZATION     • CARDIAC CATHETERIZATION N/A 2023    Procedure: Left Heart Cath;  Surgeon: Shwetha Navarrete MD;  Location: [facility] CATH INVASIVE LOC[facility];  Service: Cardiology;  Laterality: N/A;   • COLONOSCOPY     • CYSTOSCOPY TRANSURETHRAL RESECTION OF PROSTATE     • INGUINAL HERNIA REPAIR     • LAPAROSCOPIC CHOLECYSTECTOMY         Current Outpatient Medications   Medication Instructions   • apixaban (ELIQUIS) 5 mg, Oral, 2 Times Daily   • atorvastatin (LIPITOR) 20 MG tablet Take 1 tablet by mouth every night.   • baclofen (LIORESAL) 10 mg, Oral, Nightly   • famotidine (PEPCID) 20 mg, Oral, Nightly   • glipiZIDE XL 2.5 mg, Oral, Daily   • HYDROcodone-acetaminophen (NORCO)  MG per tablet 1 tablet, Every 6 Hours PRN   • loratadine (CLARITIN) 10 mg, Oral, Daily PRN   • omeprazole (PRILOSEC) 20 mg, Oral, 2 Times Daily   • sacubitril-valsartan (Entresto) 24-26 MG tablet 1 tablet, Oral, 2 Times Daily         Assessment & Plan   Diagnoses and all orders for this visit:    1. Thyroid nodule (Primary)    US guided FNA of thyroid

## 2024-04-26 DIAGNOSIS — J30.2 SEASONAL ALLERGIC RHINITIS, UNSPECIFIED TRIGGER: Primary | ICD-10-CM

## 2024-04-26 RX ORDER — LORATADINE 10 MG/1
10 TABLET ORAL DAILY PRN
Qty: 90 TABLET | Refills: 3 | Status: SHIPPED | OUTPATIENT
Start: 2024-04-26

## 2024-04-29 ENCOUNTER — TELEPHONE (OUTPATIENT)
Dept: SURGERY | Facility: CLINIC | Age: 84
End: 2024-04-29
Payer: MEDICARE

## 2024-04-29 NOTE — TELEPHONE ENCOUNTER
Left message for patient to restart his Eliquis until we find out when his FNA is scheduled for. Once we have a date the patient may hold his blood thinner 2 days prior per his cardiac clearance.

## 2024-05-01 RX ORDER — SACUBITRIL AND VALSARTAN 24; 26 MG/1; MG/1
1 TABLET, FILM COATED ORAL 2 TIMES DAILY
Qty: 180 TABLET | Refills: 3 | Status: SHIPPED | OUTPATIENT
Start: 2024-05-01

## 2024-05-02 ENCOUNTER — OFFICE VISIT (OUTPATIENT)
Dept: CARDIOLOGY | Facility: CLINIC | Age: 84
End: 2024-05-02
Payer: MEDICARE

## 2024-05-02 VITALS
SYSTOLIC BLOOD PRESSURE: 113 MMHG | OXYGEN SATURATION: 98 % | HEIGHT: 66 IN | DIASTOLIC BLOOD PRESSURE: 67 MMHG | BODY MASS INDEX: 28.53 KG/M2 | HEART RATE: 99 BPM | WEIGHT: 177.5 LBS

## 2024-05-02 DIAGNOSIS — I48.11 LONGSTANDING PERSISTENT ATRIAL FIBRILLATION: Chronic | ICD-10-CM

## 2024-05-02 DIAGNOSIS — E78.5 DYSLIPIDEMIA: Chronic | ICD-10-CM

## 2024-05-02 DIAGNOSIS — I10 ESSENTIAL HYPERTENSION: Primary | Chronic | ICD-10-CM

## 2024-05-02 PROCEDURE — 3078F DIAST BP <80 MM HG: CPT | Performed by: NURSE PRACTITIONER

## 2024-05-02 PROCEDURE — 3074F SYST BP LT 130 MM HG: CPT | Performed by: NURSE PRACTITIONER

## 2024-05-02 PROCEDURE — 99214 OFFICE O/P EST MOD 30 MIN: CPT | Performed by: NURSE PRACTITIONER

## 2024-05-02 RX ORDER — MECLIZINE HYDROCHLORIDE 25 MG/1
25 TABLET ORAL AS NEEDED
COMMUNITY

## 2024-05-02 NOTE — LETTER
May 2, 2024     Patient: Aramis Carson   YOB: 1940   Date of Visit: 5/2/2024       To Whom It May Concern:    Aramis Carson has been a patient in our Interventional Cardiology Clinic since 10/16/2016. He is treated for non obstructive coronary artery disease, hypertension, atrial fibrillation and dyslipidemia.          Sincerely,        ISAIAS Samano    CC:   No Recipients

## 2024-05-02 NOTE — LETTER
May 2, 2024     Patient: Aramis Carson   YOB: 1940   Date of Visit: 5/2/2024       To Whom It May Concern:    I have been treating Aramis Carson for hypertension, dyslipidemia and atrial fibrillation since 3/5/2018 here in our Interventional Cardiology CLinic.  He has a recent diagnosis of non-obstructive coronary artery disease as as well.      Sincerely,        ISAIAS Samano    CC:   No Recipients

## 2024-05-02 NOTE — PROGRESS NOTES
"Chief Complaint  Follow-up (Pt denies CP,hx AFIB.) and Med Review (Tolerating all current medications.)    Subjective          Aramis Carson presents to [facility] CARDIOLOGY for follow up.    History of Present Illness    Aramis was last seen in clinic on 1/4/2024 by Dr. Navarrete.  He was a hospital follow-up during which time she had been doing cardiac catheterization which showed mild nonobstructive coronary artery disease, mid LAD lesion of 30% and mid circumflex lesion of 50%.  He also had been transitioned from Coumadin to Eliquis during his admission.    At today's visit Aramis denies complaint of palpitations, shortness of breath or dyspnea on exertion.  He denies any bleeding issues on Eliquis.    Objective     Vital Signs:   /67 (BP Location: Left arm, Patient Position: Sitting, Cuff Size: Adult)   Pulse 99   Ht 167.6 cm (66\")   Wt 80.5 kg (177 lb 8 oz)   SpO2 98%   BMI 28.65 kg/m²       Physical Exam  Constitutional:       Appearance: Normal appearance. He is well-developed.   Cardiovascular:      Rate and Rhythm: Normal rate. Rhythm irregular.      Heart sounds: No murmur heard.     No friction rub. No gallop.   Pulmonary:      Effort: Pulmonary effort is normal. No respiratory distress.      Breath sounds: Normal breath sounds. No wheezing or rales.   Skin:     General: Skin is warm and dry.   Neurological:      Mental Status: He is alert and oriented to person, place, and time.   Psychiatric:         Mood and Affect: Mood normal.         Behavior: Behavior normal.          Result Review :     CMP          12/7/2023    01:46 12/8/2023    01:43 12/9/2023    04:26   CMP   Glucose 140  133  125    BUN 20  20  21    Creatinine 1.17  1.19  1.08    EGFR 61.9  60.6  68.1    Sodium 138  134  136    Potassium 4.2  4.0  4.0    Chloride 106  102  103    Calcium 8.8  8.7  8.5    Total Protein 5.9  6.1  5.7    Albumin 3.5  3.5  3.1    Globulin 2.4  2.6  2.6    Total Bilirubin 0.5  0.4  0.5 "    Alkaline Phosphatase 53  50  53    AST (SGOT) 24  33  20    ALT (SGPT) 24  35  24    Albumin/Globulin Ratio 1.5  1.3  1.2    BUN/Creatinine Ratio 17.1  16.8  19.4    Anion Gap 10.9  9.8  12.9      CBC          12/7/2023    01:46 12/8/2023    01:43 12/9/2023    04:26   CBC   WBC 11.07  10.26  11.11    RBC 5.03  4.99  4.84    Hemoglobin 14.0  13.8  13.3    Hematocrit 42.7  42.3  41.6    MCV 84.9  84.8  86.0    MCH 27.8  27.7  27.5    MCHC 32.8  32.6  32.0    RDW 14.1  14.4  14.2    Platelets 210  172  157      Lipid Panel          8/7/2023    14:14   Lipid Panel   Total Cholesterol 188    Triglycerides 255    HDL Cholesterol 33    VLDL Cholesterol 44    LDL Cholesterol  111    LDL/HDL Ratio 3.15               Most recent echocardiogram  Results for orders placed during the hospital encounter of 12/03/23    Adult Transthoracic Echo Complete W/ Cont if Necessary Per Protocol    Interpretation Summary    Left ventricular systolic function is normal. Calculated left ventricular EF = 67% Left ventricular ejection fraction appears to be 66 - 70%.    Left ventricular diastolic function is consistent with (grade I) impaired relaxation.    The left atrial cavity is moderately dilated.    Estimated right ventricular systolic pressure from tricuspid regurgitation is mildly elevated (35-45 mmHg).    Mild pulmonary hypertension is present.      Most recent Stress Test  Results for orders placed during the hospital encounter of 12/03/23    Stress Test With Myocardial Perfusion One Day    Interpretation Summary  Images from the original result were not included.      A pharmacological stress test was performed using regadenoson without low-level exercise.    Findings consistent with an indeterminate ECG stress test.    Raw images reviewed with the following abnormalities noted: Has 3 bright scintigraphic spots noted in the right lung field.  Clinical correlation required to rule out any pulmonary pathology.    Myocardial  perfusion imaging indicates a moderate-sized, mild degree of ischemia located in the inferior wall and lateral wall.    TID:1.19    Normal LV cavity size. Normal LV wall motion noted.    Left ventricular ejection fraction is normal (Calculated EF = 69%).    Impressions are consistent with an intermediate risk study.       Most recent Cardiac Cath  Results for orders placed during the hospital encounter of 12/03/23    Cardiac Catheterization/Vascular Study    Conclusion    Mid LAD lesion is 30% stenosed.    Mid Cx lesion is 50% stenosed.    1.  Cardiac.  Mild nonobstructive epicardial coronary disease noted.  Medical management will be advised.  Eliquis for chronic A-fib will be advised.      Current Outpatient Medications   Medication Sig Dispense Refill    apixaban (ELIQUIS) 5 MG tablet tablet Take 1 tablet by mouth 2 (Two) Times a Day. 60 tablet 2    atorvastatin (LIPITOR) 20 MG tablet Take 1 tablet by mouth every night. 90 tablet 3    baclofen (LIORESAL) 10 MG tablet Take 1 tablet by mouth Every Night.      Entresto 24-26 MG tablet TAKE 1 TABLET TWICE A  tablet 3    famotidine (PEPCID) 20 MG tablet Take 1 tablet by mouth Every Night.      glipiZIDE XL 2.5 MG 24 hr tablet Take 1 tablet by mouth Daily.      HYDROcodone-acetaminophen (NORCO)  MG per tablet Take 1 tablet by mouth Every 6 (Six) Hours As Needed for Moderate Pain.      loratadine (CLARITIN) 10 MG tablet Take 1 tablet by mouth Daily As Needed for Allergies. 90 tablet 3    meclizine (ANTIVERT) 25 MG tablet Take 1 tablet by mouth As Needed for Dizziness.      omeprazole (priLOSEC) 20 MG capsule Take 1 capsule by mouth 2 (Two) Times a Day.       No current facility-administered medications for this visit.            Assessment and Plan    Problem List Items Addressed This Visit          Cardiac and Vasculature    Essential hypertension - Primary (Chronic)    Longstanding persistent atrial fibrillation (Chronic)    Overview     [id number] is 4  for hypertension, age, diabetes.  Chronically anticoagulated on Eliquis         Dyslipidemia (Chronic)    Overview     10/5/2020 total cholesterol 142, triglycerides 113, HDL 40, and LDL 79.                Follow Up     Medications were reviewed with the patient.    Permanent atrial fibrillation is stable.  Patient is to continue Eliquis for stroke prophylaxis.    Hypertension is controlled, continue Entresto.    Continue atorvastatin for dyslipidemia.    Return in about 6 months (around 11/2/2024).    Patient was given instructions and counseling regarding his condition or for health maintenance advice. Please see specific information pulled into the AVS if appropriate.

## 2024-05-03 ENCOUNTER — HOSPITAL ENCOUNTER (OUTPATIENT)
Dept: ULTRASOUND IMAGING | Facility: HOSPITAL | Age: 84
Discharge: HOME | End: 2024-05-03
Payer: MEDICARE

## 2024-05-03 VITALS
SYSTOLIC BLOOD PRESSURE: 116 MMHG | OXYGEN SATURATION: 98 % | DIASTOLIC BLOOD PRESSURE: 76 MMHG | RESPIRATION RATE: 18 BRPM | HEART RATE: 56 BPM

## 2024-05-03 DIAGNOSIS — E04.1 THYROID NODULE: ICD-10-CM

## 2024-05-03 PROCEDURE — 76942 ECHO GUIDE FOR BIOPSY: CPT

## 2024-05-03 PROCEDURE — 10005 FNA BX W/US GDN 1ST LES: CPT | Performed by: RADIOLOGY

## 2024-05-06 LAB — REF LAB TEST METHOD: NORMAL

## 2024-05-09 ENCOUNTER — TELEPHONE (OUTPATIENT)
Dept: CARDIOLOGY | Facility: CLINIC | Age: 84
End: 2024-05-09

## 2024-05-09 NOTE — TELEPHONE ENCOUNTER
Caller: Aramis Carson    Relationship: Self    Best call back number: [phone]    What is the best time to reach you: ANY    What was the call regarding: PLEASE ADVISE JASMIN PATIENT HAS THE REQUESTED LETTER FOR DISABILITY. CALL PATIENT BACK IF NECESSARY    Is it okay if the provider responds through MyChart: NO

## 2024-05-22 LAB — REF LAB TEST METHOD: NORMAL

## 2024-05-30 ENCOUNTER — HOSPITAL ENCOUNTER (EMERGENCY)
Facility: HOSPITAL | Age: 84
Discharge: HOME | End: 2024-05-30
Attending: EMERGENCY MEDICINE
Payer: MEDICARE

## 2024-05-30 ENCOUNTER — APPOINTMENT (OUTPATIENT)
Dept: CT IMAGING | Facility: HOSPITAL | Age: 84
End: 2024-05-30
Payer: MEDICARE

## 2024-05-30 VITALS
SYSTOLIC BLOOD PRESSURE: 119 MMHG | RESPIRATION RATE: 17 BRPM | WEIGHT: 173.5 LBS | HEART RATE: 91 BPM | HEIGHT: 66 IN | TEMPERATURE: 98.1 F | BODY MASS INDEX: 27.88 KG/M2 | OXYGEN SATURATION: 100 % | DIASTOLIC BLOOD PRESSURE: 67 MMHG

## 2024-05-30 DIAGNOSIS — R10.31 RIGHT INGUINAL PAIN: Primary | ICD-10-CM

## 2024-05-30 LAB
ALBUMIN SERPL-MCNC: 3.7 G/DL (ref 3.5–5.2)
ALBUMIN/GLOB SERPL: 1.4 G/DL
ALP SERPL-CCNC: 85 U/L (ref 39–117)
ALT SERPL W P-5'-P-CCNC: 13 U/L (ref 1–41)
ANION GAP SERPL CALCULATED.3IONS-SCNC: 8.7 MMOL/L (ref 5–15)
ANION GAP SERPL CALCULATED.3IONS-SCNC: 9 MMOL/L (ref 5–15)
ANION GAP SERPL CALCULATED.3IONS-SCNC: 9.7 MMOL/L (ref 5–15)
AST SERPL-CCNC: 16 U/L (ref 1–40)
BASOPHILS # BLD AUTO: 0.03 10*3/MM3 (ref 0–0.2)
BASOPHILS NFR BLD AUTO: 0.4 % (ref 0–1.5)
BILIRUB SERPL-MCNC: 0.6 MG/DL (ref 0–1.2)
BILIRUB UR QL STRIP: NEGATIVE
BUN SERPL-MCNC: 20 MG/DL (ref 8–23)
BUN SERPL-MCNC: 20 MG/DL (ref 8–23)
BUN SERPL-MCNC: 21 MG/DL (ref 8–23)
BUN/CREAT SERPL: 17.6 (ref 7–25)
BUN/CREAT SERPL: 17.9 (ref 7–25)
BUN/CREAT SERPL: 18.5 (ref 7–25)
CALCIUM SPEC-SCNC: 9.3 MG/DL (ref 8.6–10.5)
CALCIUM SPEC-SCNC: 9.4 MG/DL (ref 8.6–10.5)
CALCIUM SPEC-SCNC: 9.5 MG/DL (ref 8.6–10.5)
CHLORIDE SERPL-SCNC: 107 MMOL/L (ref 98–107)
CHLORIDE SERPL-SCNC: 107 MMOL/L (ref 98–107)
CHLORIDE SERPL-SCNC: 108 MMOL/L (ref 98–107)
CLARITY UR: CLEAR
CO2 SERPL-SCNC: 23 MMOL/L (ref 22–29)
CO2 SERPL-SCNC: 24.3 MMOL/L (ref 22–29)
CO2 SERPL-SCNC: 24.3 MMOL/L (ref 22–29)
COLOR UR: YELLOW
CREAT SERPL-MCNC: 1.08 MG/DL (ref 0.76–1.27)
CREAT SERPL-MCNC: 1.12 MG/DL (ref 0.76–1.27)
CREAT SERPL-MCNC: 1.19 MG/DL (ref 0.76–1.27)
DEPRECATED RDW RBC AUTO: 44.7 FL (ref 37–54)
EGFRCR SERPLBLD CKD-EPI 2021: 60.2 ML/MIN/1.73
EGFRCR SERPLBLD CKD-EPI 2021: 64.8 ML/MIN/1.73
EGFRCR SERPLBLD CKD-EPI 2021: 67.7 ML/MIN/1.73
EOSINOPHIL # BLD AUTO: 0.09 10*3/MM3 (ref 0–0.4)
EOSINOPHIL NFR BLD AUTO: 1.1 % (ref 0.3–6.2)
ERYTHROCYTE [DISTWIDTH] IN BLOOD BY AUTOMATED COUNT: 14.3 % (ref 12.3–15.4)
GLOBULIN UR ELPH-MCNC: 2.7 GM/DL
GLUCOSE BLDC GLUCOMTR-MCNC: 107 MG/DL (ref 70–130)
GLUCOSE BLDC GLUCOMTR-MCNC: 195 MG/DL (ref 70–130)
GLUCOSE SERPL-MCNC: 106 MG/DL (ref 65–99)
GLUCOSE SERPL-MCNC: 125 MG/DL (ref 65–99)
GLUCOSE SERPL-MCNC: 97 MG/DL (ref 65–99)
GLUCOSE UR STRIP-MCNC: NEGATIVE MG/DL
HCT VFR BLD AUTO: 42.9 % (ref 37.5–51)
HGB BLD-MCNC: 13.4 G/DL (ref 13–17.7)
HGB UR QL STRIP.AUTO: NEGATIVE
HOLD SPECIMEN: NORMAL
HOLD SPECIMEN: NORMAL
IMM GRANULOCYTES # BLD AUTO: 0.04 10*3/MM3 (ref 0–0.05)
IMM GRANULOCYTES NFR BLD AUTO: 0.5 % (ref 0–0.5)
KETONES UR QL STRIP: NEGATIVE
LEUKOCYTE ESTERASE UR QL STRIP.AUTO: NEGATIVE
LYMPHOCYTES # BLD AUTO: 1.45 10*3/MM3 (ref 0.7–3.1)
LYMPHOCYTES NFR BLD AUTO: 16.9 % (ref 19.6–45.3)
MCH RBC QN AUTO: 27.2 PG (ref 26.6–33)
MCHC RBC AUTO-ENTMCNC: 31.2 G/DL (ref 31.5–35.7)
MCV RBC AUTO: 87 FL (ref 79–97)
MONOCYTES # BLD AUTO: 0.58 10*3/MM3 (ref 0.1–0.9)
MONOCYTES NFR BLD AUTO: 6.8 % (ref 5–12)
NEUTROPHILS NFR BLD AUTO: 6.37 10*3/MM3 (ref 1.7–7)
NEUTROPHILS NFR BLD AUTO: 74.3 % (ref 42.7–76)
NITRITE UR QL STRIP: NEGATIVE
NRBC BLD AUTO-RTO: 0 /100 WBC (ref 0–0.2)
PH UR STRIP.AUTO: 6 [PH] (ref 5–8)
PLATELET # BLD AUTO: 250 10*3/MM3 (ref 140–450)
PMV BLD AUTO: 10.2 FL (ref 6–12)
POTASSIUM SERPL-SCNC: 3.8 MMOL/L (ref 3.5–5.2)
POTASSIUM SERPL-SCNC: 5.1 MMOL/L (ref 3.5–5.2)
POTASSIUM SERPL-SCNC: 5.8 MMOL/L (ref 3.5–5.2)
PROT SERPL-MCNC: 6.4 G/DL (ref 6–8.5)
PROT UR QL STRIP: NEGATIVE
RBC # BLD AUTO: 4.93 10*6/MM3 (ref 4.14–5.8)
SODIUM SERPL-SCNC: 140 MMOL/L (ref 136–145)
SODIUM SERPL-SCNC: 140 MMOL/L (ref 136–145)
SODIUM SERPL-SCNC: 141 MMOL/L (ref 136–145)
SP GR UR STRIP: >1.03 (ref 1–1.03)
UROBILINOGEN UR QL STRIP: ABNORMAL
WBC NRBC COR # BLD AUTO: 8.56 10*3/MM3 (ref 3.4–10.8)
WHOLE BLOOD HOLD COAG: NORMAL
WHOLE BLOOD HOLD SPECIMEN: NORMAL

## 2024-05-30 PROCEDURE — 99285 EMERGENCY DEPT VISIT HI MDM: CPT

## 2024-05-30 PROCEDURE — 85025 COMPLETE CBC W/AUTO DIFF WBC: CPT | Performed by: PHYSICIAN ASSISTANT

## 2024-05-30 PROCEDURE — 25010000002 CALCIUM GLUCONATE-NACL 1-0.675 GM/50ML-% SOLUTION: Performed by: PHYSICIAN ASSISTANT

## 2024-05-30 PROCEDURE — 36415 COLL VENOUS BLD VENIPUNCTURE: CPT

## 2024-05-30 PROCEDURE — 81003 URINALYSIS AUTO W/O SCOPE: CPT | Performed by: PHYSICIAN ASSISTANT

## 2024-05-30 PROCEDURE — 80053 COMPREHEN METABOLIC PANEL: CPT | Performed by: PHYSICIAN ASSISTANT

## 2024-05-30 PROCEDURE — 25510000001 IOPAMIDOL 61 % SOLUTION: Performed by: EMERGENCY MEDICINE

## 2024-05-30 PROCEDURE — 63710000001 INSULIN REGULAR HUMAN PER 5 UNITS: Performed by: PHYSICIAN ASSISTANT

## 2024-05-30 PROCEDURE — 96375 TX/PRO/DX INJ NEW DRUG ADDON: CPT

## 2024-05-30 PROCEDURE — 94799 UNLISTED PULMONARY SVC/PX: CPT

## 2024-05-30 PROCEDURE — 74177 CT ABD & PELVIS W/CONTRAST: CPT

## 2024-05-30 PROCEDURE — 82948 REAGENT STRIP/BLOOD GLUCOSE: CPT

## 2024-05-30 PROCEDURE — 94640 AIRWAY INHALATION TREATMENT: CPT

## 2024-05-30 PROCEDURE — 96374 THER/PROPH/DIAG INJ IV PUSH: CPT

## 2024-05-30 RX ORDER — DEXTROSE MONOHYDRATE 25 G/50ML
25 INJECTION, SOLUTION INTRAVENOUS ONCE
Status: COMPLETED | OUTPATIENT
Start: 2024-05-30 | End: 2024-05-30

## 2024-05-30 RX ORDER — CALCIUM GLUCONATE 20 MG/ML
1000 INJECTION, SOLUTION INTRAVENOUS ONCE
Status: COMPLETED | OUTPATIENT
Start: 2024-05-30 | End: 2024-05-30

## 2024-05-30 RX ADMIN — SODIUM ZIRCONIUM CYCLOSILICATE 10 G: 10 POWDER, FOR SUSPENSION ORAL at 19:09

## 2024-05-30 RX ADMIN — DEXTROSE MONOHYDRATE 25 G: 25 INJECTION, SOLUTION INTRAVENOUS at 19:09

## 2024-05-30 RX ADMIN — CALCIUM GLUCONATE 1000 MG: 20 INJECTION, SOLUTION INTRAVENOUS at 19:09

## 2024-05-30 RX ADMIN — INSULIN HUMAN 10 UNITS: 100 INJECTION, SOLUTION PARENTERAL at 19:09

## 2024-05-30 RX ADMIN — ALBUTEROL SULFATE 10 MG: 2.5 SOLUTION RESPIRATORY (INHALATION) at 19:07

## 2024-05-30 RX ADMIN — IOPAMIDOL 86 ML: 612 INJECTION, SOLUTION INTRAVENOUS at 17:55

## 2024-05-30 NOTE — ED PROVIDER NOTES
Subjective   History of Present Illness  Patient states he has been having abdominal pain around a month. Patient reports that the pain has not got better. Patient states that he does not have nausea, vomiting, or diarrhea.    Abdominal Pain  Pain location:  Generalized  Pain quality: aching    Pain radiates to:  Does not radiate  Pain severity:  Moderate  Onset quality:  Sudden  Timing:  Constant  Progression:  Worsening  Chronicity:  New  Relieved by:  Nothing  Worsened by:  Nothing  Ineffective treatments:  None tried  Associated symptoms: no nausea and no vomiting        Review of Systems   Gastrointestinal:  Positive for abdominal pain. Negative for nausea and vomiting.       Past Medical History:   Diagnosis Date    Atrial fibrillation     Coronary artery disease     Hyperlipidemia     Pre-diabetes        Allergies   Allergen Reactions    Flomax [Tamsulosin] Other (See Comments)     Heart rate to drop and pass out    Lisinopril Unknown (See Comments) and Cough     Pt. States that he is allergic however, it has been so long since he took it that he cannot remember the reaction that he had at the time.    Losartan Unknown - Low Severity       Past Surgical History:   Procedure Laterality Date    CARDIAC CATHETERIZATION      CARDIAC CATHETERIZATION N/A 2023    Procedure: Left Heart Cath;  Surgeon: Shwetha Navarrete MD;  Location: [facility] CATH INVASIVE LOCATION;  Service: Cardiology;  Laterality: N/A;    COLONOSCOPY      CYSTOSCOPY TRANSURETHRAL RESECTION OF PROSTATE      INGUINAL HERNIA REPAIR      LAPAROSCOPIC CHOLECYSTECTOMY         Family History   Problem Relation Age of Onset    Cancer Sister     Diabetes Sister     Cancer Brother     Heart disease Brother     Diabetes Maternal Grandmother     Hypertension Neg Hx        Social History     Socioeconomic History    Marital status:    Tobacco Use    Smoking status: Former     Types: Pipe, Cigars     Quit date:      Years since quittin.4      Passive exposure: Past    Smokeless tobacco: Never    Tobacco comments:     Smoked for 25 years   Vaping Use    Vaping status: Never Used   Substance and Sexual Activity    Alcohol use: No    Drug use: No    Sexual activity: Defer           Objective   Physical Exam  Vitals and nursing note reviewed.   Constitutional:       Appearance: He is well-developed.   HENT:      Head: Normocephalic.   Cardiovascular:      Rate and Rhythm: Normal rate and regular rhythm.   Pulmonary:      Effort: Pulmonary effort is normal.      Breath sounds: Normal breath sounds.   Abdominal:      General: Bowel sounds are normal.      Palpations: Abdomen is soft.      Tenderness: There is no abdominal tenderness.   Musculoskeletal:         General: Normal range of motion.      Cervical back: Neck supple.   Skin:     General: Skin is warm and dry.   Neurological:      Mental Status: He is alert and oriented to person, place, and time.   Psychiatric:         Behavior: Behavior normal.         Thought Content: Thought content normal.         Judgment: Judgment normal.         Procedures           ED Course      Results for orders placed or performed during the hospital encounter of 05/30/24   Comprehensive Metabolic Panel    Specimen: Blood   Result Value Ref Range    Glucose 125 (H) 65 - 99 mg/dL    BUN 21 8 - 23 mg/dL    Creatinine 1.19 0.76 - 1.27 mg/dL    Sodium 140 136 - 145 mmol/L    Potassium 5.8 (H) 3.5 - 5.2 mmol/L    Chloride 108 (H) 98 - 107 mmol/L    CO2 23.0 22.0 - 29.0 mmol/L    Calcium 9.4 8.6 - 10.5 mg/dL    Total Protein 6.4 6.0 - 8.5 g/dL    Albumin 3.7 3.5 - 5.2 g/dL    ALT (SGPT) 13 1 - 41 U/L    AST (SGOT) 16 1 - 40 U/L    Alkaline Phosphatase 85 39 - 117 U/L    Total Bilirubin 0.6 0.0 - 1.2 mg/dL    Globulin 2.7 gm/dL    A/G Ratio 1.4 g/dL    BUN/Creatinine Ratio 17.6 7.0 - 25.0    Anion Gap 9.0 5.0 - 15.0 mmol/L    eGFR 60.2 >60.0 mL/min/1.73   CBC Auto Differential    Specimen: Blood   Result Value Ref Range    WBC  8.56 3.40 - 10.80 10*3/mm3    RBC 4.93 4.14 - 5.80 10*6/mm3    Hemoglobin 13.4 13.0 - 17.7 g/dL    Hematocrit 42.9 37.5 - 51.0 %    MCV 87.0 79.0 - 97.0 fL    MCH 27.2 26.6 - 33.0 pg    MCHC 31.2 (L) 31.5 - 35.7 g/dL    RDW 14.3 12.3 - 15.4 %    RDW-SD 44.7 37.0 - 54.0 fl    MPV 10.2 6.0 - 12.0 fL    Platelets 250 140 - 450 10*3/mm3    Neutrophil % 74.3 42.7 - 76.0 %    Lymphocyte % 16.9 (L) 19.6 - 45.3 %    Monocyte % 6.8 5.0 - 12.0 %    Eosinophil % 1.1 0.3 - 6.2 %    Basophil % 0.4 0.0 - 1.5 %    Immature Grans % 0.5 0.0 - 0.5 %    Neutrophils, Absolute 6.37 1.70 - 7.00 10*3/mm3    Lymphocytes, Absolute 1.45 0.70 - 3.10 10*3/mm3    Monocytes, Absolute 0.58 0.10 - 0.90 10*3/mm3    Eosinophils, Absolute 0.09 0.00 - 0.40 10*3/mm3    Basophils, Absolute 0.03 0.00 - 0.20 10*3/mm3    Immature Grans, Absolute 0.04 0.00 - 0.05 10*3/mm3    nRBC 0.0 0.0 - 0.2 /100 WBC   Urinalysis With Culture If Indicated - Urine, Clean Catch    Specimen: Urine, Clean Catch   Result Value Ref Range    Color, UA Yellow Yellow, Straw    Appearance, UA Clear Clear    pH, UA 6.0 5.0 - 8.0    Specific Gravity, UA >1.030 (H) 1.005 - 1.030    Glucose, UA Negative Negative    Ketones, UA Negative Negative    Bilirubin, UA Negative Negative    Blood, UA Negative Negative    Protein, UA Negative Negative    Leuk Esterase, UA Negative Negative    Nitrite, UA Negative Negative    Urobilinogen, UA 1.0 E.U./dL 0.2 - 1.0 E.U./dL   Basic Metabolic Panel    Specimen: Blood   Result Value Ref Range    Glucose 106 (H) 65 - 99 mg/dL    BUN 20 8 - 23 mg/dL    Creatinine 1.08 0.76 - 1.27 mg/dL    Sodium 140 136 - 145 mmol/L    Potassium 5.1 3.5 - 5.2 mmol/L    Chloride 107 98 - 107 mmol/L    CO2 24.3 22.0 - 29.0 mmol/L    Calcium 9.3 8.6 - 10.5 mg/dL    BUN/Creatinine Ratio 18.5 7.0 - 25.0    Anion Gap 8.7 5.0 - 15.0 mmol/L    eGFR 67.7 >60.0 mL/min/1.73   Basic Metabolic Panel    Specimen: Arm, Left; Blood   Result Value Ref Range    Glucose 97 65 - 99 mg/dL     BUN 20 8 - 23 mg/dL    Creatinine 1.12 0.76 - 1.27 mg/dL    Sodium 141 136 - 145 mmol/L    Potassium 3.8 3.5 - 5.2 mmol/L    Chloride 107 98 - 107 mmol/L    CO2 24.3 22.0 - 29.0 mmol/L    Calcium 9.5 8.6 - 10.5 mg/dL    BUN/Creatinine Ratio 17.9 7.0 - 25.0    Anion Gap 9.7 5.0 - 15.0 mmol/L    eGFR 64.8 >60.0 mL/min/1.73   POC Glucose Once    Specimen: Blood   Result Value Ref Range    Glucose 195 (H) 70 - 130 mg/dL   POC Glucose Once    Specimen: Blood   Result Value Ref Range    Glucose 107 70 - 130 mg/dL   Green Top (Gel)   Result Value Ref Range    Extra Tube Hold for add-ons.    Lavender Top   Result Value Ref Range    Extra Tube hold for add-on    Gold Top - SST   Result Value Ref Range    Extra Tube Hold for add-ons.    Light Blue Top   Result Value Ref Range    Extra Tube Hold for add-ons.                                  CT Abdomen Pelvis With Contrast   Final Result       1.  No CT evidence of an acute abdominal or pelvic abnormality.       2.  Fatty infiltration of the liver.       3.  Surgical absence of the gallbladder.       4.  Mild to moderate diverticulosis of the sigmoid colon with no CT   features of diverticulitis at this time.           This report was finalized on 5/30/2024 6:45 PM by Delisa Stark MD.                       Medical Decision Making  Patient states he has been having abdominal pain around a month. Patient reports that the pain has not got better. Patient states that he does not have nausea, vomiting, or diarrhea.      Problems Addressed:  Right inguinal pain: complicated acute illness or injury    Amount and/or Complexity of Data Reviewed  Labs: ordered.  Radiology: ordered.    Risk  OTC drugs.  Prescription drug management.  Risk Details: Mallory Rehman    Patient is stable.  Patient is medically cleared.  No EMC exist.  Patient is discharged home.  Patient's diagnostic results were discussed with him and they demonstrated understanding of diagnosis and treatment plan.   Patient was advised to return to the ER or follow-up with PCP if symptoms worsen or fail to improve as expected.         Final diagnoses:   Right inguinal pain       ED Disposition  ED Disposition       ED Disposition   Discharge    Condition   Stable    Comment   --               Neftali Lockhart MD  [address] [place], [place] [id number]  [phone]    In 2 days           Medication List      No changes were made to your prescriptions during this visit.            Mallory Rehman PA  06/01/24 0919

## 2024-06-02 ENCOUNTER — HOSPITAL ENCOUNTER (EMERGENCY)
Facility: HOSPITAL | Age: 84
Discharge: HOME | End: 2024-06-02
Attending: EMERGENCY MEDICINE | Admitting: EMERGENCY MEDICINE
Payer: MEDICARE

## 2024-06-02 VITALS
RESPIRATION RATE: 18 BRPM | HEIGHT: 66 IN | SYSTOLIC BLOOD PRESSURE: 135 MMHG | DIASTOLIC BLOOD PRESSURE: 69 MMHG | WEIGHT: 173.5 LBS | TEMPERATURE: 97.9 F | HEART RATE: 66 BPM | OXYGEN SATURATION: 98 % | BODY MASS INDEX: 27.88 KG/M2

## 2024-06-02 DIAGNOSIS — R10.31 RLQ ABDOMINAL PAIN: Primary | ICD-10-CM

## 2024-06-02 DIAGNOSIS — R10.31 ABDOMINAL WALL PAIN IN RIGHT LOWER QUADRANT: ICD-10-CM

## 2024-06-02 PROCEDURE — 99282 EMERGENCY DEPT VISIT SF MDM: CPT

## 2024-06-02 RX ORDER — LIDOCAINE 50 MG/G
1 PATCH TOPICAL EVERY 24 HOURS
Qty: 30 EACH | Refills: 0 | Status: SHIPPED | OUTPATIENT
Start: 2024-06-02

## 2024-06-02 RX ORDER — DULOXETIN HYDROCHLORIDE 20 MG/1
20 CAPSULE, DELAYED RELEASE ORAL DAILY
Qty: 30 CAPSULE | Refills: 0 | Status: SHIPPED | OUTPATIENT
Start: 2024-06-02 | End: 2024-07-02

## 2024-06-02 NOTE — ED PROVIDER NOTES
Subjective   History of Present Illness  This is a pleasant active 84-year-old male from [facility] is  accompanied by his wife.  His primary care is Kieran Blanca in [facility].    Comes the emergency room today seeking a third opinion on right lower quadrant pain is been present for approximately 3 to 4 weeks.    Was insidious in onset only thing he can think of is about 3 weeks ago [place] [address] [place] 17 hours nobody would stop and he did not have a cell phone he had to get out of the car and reach over and go out the passenger side and he was getting back inly had an awkward step and he pulled a muscle in his right calf and had to wear a boot that he did not feel anything his abdomen at that moment.  However he has had episodes of right lower quadrant pain that seem to come and go without a pattern.  The pain is dull in nature and is in the same spot all the time and does not seem to be related to position or movement.  It will come on and last several hours and then go away and sometimes last several minutes.  Sitting or standing or going to the bathroom with bowel movements and urination do not seem to affect it.  Pushing on it really does not seem to affect this area.  He has had no rash there and no direct trauma and is never recalled having pain like this in the past.    He has been seen at [place] emergency department had a CT scan and labs which were apparently unremarkable and followed up with a visit to [place] emergency department last month and had CT scan and labs which I was able to review in their entirety which were bland except for an elevated sugar.    He reports bowel movements been normal.  His urination he usually has 1 time a night nocturia had 3 TURPs in the past.    He has had remote hernia repair with mesh he is also had a cholecystectomy.  He had a colonoscopy remotely that apparently was unremarkable.  He was prescribed hydrocodone for his pain which  has been taking as needed but really does not seem to help.  He has no pain in his hips or his back.    He is chronically anticoagulated with apixaban and has had left heart catheterization in December with chest pain I reviewed the results of that.    He does report his stools are little bit firmer than what they were when he was on Coumadin in the past but is not passed blood per any orifice.    All other systems are reviewed and are negative except as noted above.        Review of Systems   All other systems reviewed and are negative.      Past Medical History:   Diagnosis Date    Atrial fibrillation     Coronary artery disease     Hyperlipidemia     Pre-diabetes    [facility] 12/23  Left Main   The vessel was visualized by angiography, is moderate in size and is angiographically normal.      Left Anterior Descending   The vessel was visualized by angiography and is moderate in size. LAD large vessel arising from the left main and maintained ventricular groove made about 20 to 30% lesion noted. First diagnosed large normal, second and third diagonals are medium and normal   Mid LAD lesion is 30% stenosed. iFR was measured for this lesion with a ratio of 0.96.      Left Circumflex   The vessel was visualized by angiography and is moderate in size. Circumflex large vessel as the left main and the left AV groove. First obtuse marginal artery small normal, second obtuse marginal artery is large with proximal 50% lesion noted In size ramus artery seen coming out between LAD and circumflex and is normal   Mid Cx lesion is 50% stenosed. iFR was measured for this lesion with a ratio of 0.98.      Right Coronary Artery   The vessel was visualized by angiography and is moderate in size. Right coronary artery is large vessel arising from the right coronary ostium and in the right AV groove and is normal. PT and post LAD branch is large and normal      Intervention     No interventions have been documented.   12/23  Interpretation  Summary         Left ventricular systolic function is normal. Calculated left ventricular EF = 67% Left ventricular ejection fraction appears to be 66 - 70%.    Left ventricular diastolic function is consistent with (grade I) impaired relaxation.    The left atrial cavity is moderately dilated.    Estimated right ventricular systolic pressure from tricuspid regurgitation is mildly elevated (35-45 mmHg).    Mild pulmonary hypertension is present.  Allergies   Allergen Reactions    Flomax [Tamsulosin] Other (See Comments)     Heart rate to drop and pass out    Lisinopril Unknown (See Comments) and Cough     Pt. States that he is allergic however, it has been so long since he took it that he cannot remember the reaction that he had at the time.    Losartan Unknown - Low Severity       Past Surgical History:   Procedure Laterality Date    CARDIAC CATHETERIZATION      CARDIAC CATHETERIZATION N/A 2023    Procedure: Left Heart Cath;  Surgeon: Shwetha Navarrete MD;  Location: [facility] CATH INVASIVE LOCATION;  Service: Cardiology;  Laterality: N/A;    COLONOSCOPY      CYSTOSCOPY TRANSURETHRAL RESECTION OF PROSTATE      INGUINAL HERNIA REPAIR      LAPAROSCOPIC CHOLECYSTECTOMY         Family History   Problem Relation Age of Onset    Cancer Sister     Diabetes Sister     Cancer Brother     Heart disease Brother     Diabetes Maternal Grandmother     Hypertension Neg Hx        Social History     Socioeconomic History    Marital status:    Tobacco Use    Smoking status: Former     Types: Pipe, Cigars     Quit date:      Years since quittin.4     Passive exposure: Past    Smokeless tobacco: Never    Tobacco comments:     Smoked for 25 years   Vaping Use    Vaping status: Never Used   Substance and Sexual Activity    Alcohol use: No    Drug use: No    Sexual activity: Defer           Objective   Physical Exam  Vitals and nursing note reviewed.   Constitutional:       Comments: Pleasant 84-year-old alert and oriented  GCS 15   HENT:      Head: Normocephalic and atraumatic.      Right Ear: External ear normal.      Left Ear: External ear normal.      Nose: Nose normal.      Mouth/Throat:      Mouth: Mucous membranes are moist.      Pharynx: Oropharynx is clear.   Eyes:      Extraocular Movements: Extraocular movements intact.      Conjunctiva/sclera: Conjunctivae normal.      Pupils: Pupils are equal, round, and reactive to light.   Cardiovascular:      Rate and Rhythm: Normal rate and regular rhythm.      Pulses: Normal pulses.      Heart sounds: Normal heart sounds.   Pulmonary:      Effort: Pulmonary effort is normal.      Breath sounds: Normal breath sounds.   Abdominal:      Comments: BMI 28 positive bowel sounds soft and really nontender no organomegaly masses or guarding.  I had the patient sit up against pressure really did not reproduce his pain.  I cannot feel any mass there.  He has a couple seborrheic keratosis that are old friends and a skin tag in the right inguinal crease that he has had for essentially his lifetime.  Straight leg raise and internal/external rotation of both hips does not seem to cause the pain.  Coughing and bearing down does not seem to cause pain I cannot feel any herniations along the rectus abdominis.   Genitourinary:     Comments: Circumcised male with testes descended bilaterally no masses or herniations I can feel with cough test.  Right inguinal area without adenopathy or mass besides a skin tag and seborrheic keratoses noted above.  Musculoskeletal:      Cervical back: Normal range of motion and neck supple.      Comments: Equal full pulses edema synovitis rash or venous cords.  He is little tender in right calf from his previous tear.   Skin:     General: Skin is warm and dry.      Capillary Refill: Capillary refill takes less than 2 seconds.   Neurological:      Mental Status: He is alert.      Comments: Face symmetric, voice strong, tongue midline.  Vision, hearing, and speech  "preserved.  No focal weakness.  His gait is normal.         Procedures           ED Course                                     HEMALATHA reviewed by Kt Yates MD     No results found for this or any previous visit (from the past 24 hour(s)).  Note: In addition to lab results from this visit, the labs listed above may include labs taken at another facility or during a different encounter within the last 24 hours. Please correlate lab times with ED admission and discharge times for further clarification of the services performed during this visit.    No orders to display     Vitals:    06/02/24 1111 06/02/24 1120 06/02/24 1130 06/02/24 1230   BP: 148/93 135/92 123/74 135/69   BP Location: Left arm      Patient Position: Sitting      Pulse: 75  66    Resp: 18      Temp: 97.9 °F (36.6 °C)      TempSrc: Oral      SpO2: 98%  98%    Weight: 78.7 kg (173 lb 8 oz)      Height: 167.6 cm (66\")        Medications - No data to display  ECG/EMG Results (last 24 hours)       ** No results found for the last 24 hours. **          No orders to display       Medical Decision Making        I reviewed all available studies at the bedside with the patient and his wife.  I personally reviewed his CT scan he had late last month as well as his labs from his visit [place].  I do not see an obvious cause of his month-long intermittent right lower quadrant pain.    We discussed differential diagnosis at length.  This would include a spigelian hernia nothing like an anterior abdominal wall nerve entrapment syndrome.    On recheck here his abdomen is benign I do not think he would benefit from additional imaging or labs.  Think it is reasonable to try him on a Lidoderm patch for this as well as a course of Cymbalta to see if this improves things but also I am referring him to pain management if he does not improve with this I do not think it is unreasonable to try a nerve block for both diagnostic and therapeutic purposes and I have " referred him to Dr. Clarke regarding this.    He has follow-up with his primary care later this week and I have encouraged him to keep this.  Return to the emergency department if worse in any way.    All are agreeable to plan    Problems Addressed:  Abdominal wall pain in right lower quadrant: complicated acute illness or injury  RLQ abdominal pain: complicated acute illness or injury    Amount and/or Complexity of Data Reviewed  Independent Historian: spouse  External Data Reviewed: labs, radiology and notes.    Risk  Prescription drug management.        Final diagnoses:   RLQ abdominal pain   Abdominal wall pain in right lower quadrant       ED Disposition  ED Disposition       ED Disposition   Discharge    Condition   Stable    Comment   --               Kieran Blanca, APRN  [address]  [place] [address] [phone]      Keep your appointment for follow-up.    Sandro Vega MD  [address] [place] [address] [place] [address] [phone]      If you have persistent pain to see about a potential nerve block for diagnosis and treatment of this chronic abdominal wall pain         Medication List        New Prescriptions      DULoxetine 20 MG capsule  Commonly known as: Cymbalta  Take 1 capsule by mouth Daily for 30 days.     lidocaine 5 %  Commonly known as: LIDODERM  Place 1 patch on the skin as directed by provider Daily. Remove & Discard patch within 12 hours or as directed by MD               Where to Get Your Medications        These medications were sent to [facility][id number] - [address] [facility] [address] - [phone] PH - [phone] [place] [address] [facility] [address]      Phone: [phone]   DULoxetine 20 MG capsule  lidocaine 5 %            Kt Yates MD  06/02/24 0316

## 2024-10-09 ENCOUNTER — TELEPHONE (OUTPATIENT)
Dept: CARDIOLOGY | Facility: CLINIC | Age: 84
End: 2024-10-09

## 2024-10-09 NOTE — TELEPHONE ENCOUNTER
Caller: Aramis Carson    Relationship: Self    Best call back number: [phone] (home)     What is the best time to reach you: ANYTIME    Who are you requesting to speak with (clinical staff, provider,  specific staff member): CLINICAL    What was the call regarding: PT SAID HE HAD A HIGH BP READING (181/74) JUST PAST 8 EST 10.9.24. PT IS ASKING TO SPEAK WITH SOMEONE REGARDING HIGH BP. PLEASE CALL PT WHEN AVAILABLE. PT IS NOT CURRENTLY HAVING ANY CARDIAC SYMPTOMS.

## 2024-10-10 ENCOUNTER — OFFICE VISIT (OUTPATIENT)
Dept: CARDIOLOGY | Facility: CLINIC | Age: 84
End: 2024-10-10
Payer: MEDICARE

## 2024-10-10 VITALS
SYSTOLIC BLOOD PRESSURE: 130 MMHG | DIASTOLIC BLOOD PRESSURE: 82 MMHG | BODY MASS INDEX: 28.28 KG/M2 | WEIGHT: 176 LBS | OXYGEN SATURATION: 98 % | HEIGHT: 66 IN | HEART RATE: 93 BPM

## 2024-10-10 DIAGNOSIS — I10 ESSENTIAL HYPERTENSION: Chronic | ICD-10-CM

## 2024-10-10 DIAGNOSIS — I48.11 LONGSTANDING PERSISTENT ATRIAL FIBRILLATION: Primary | Chronic | ICD-10-CM

## 2024-10-10 DIAGNOSIS — E78.5 DYSLIPIDEMIA: Chronic | ICD-10-CM

## 2024-10-10 PROCEDURE — 99214 OFFICE O/P EST MOD 30 MIN: CPT | Performed by: NURSE PRACTITIONER

## 2024-10-10 PROCEDURE — 3079F DIAST BP 80-89 MM HG: CPT | Performed by: NURSE PRACTITIONER

## 2024-10-10 PROCEDURE — 3075F SYST BP GE 130 - 139MM HG: CPT | Performed by: NURSE PRACTITIONER

## 2024-10-10 PROCEDURE — 93000 ELECTROCARDIOGRAM COMPLETE: CPT | Performed by: NURSE PRACTITIONER

## 2024-10-10 RX ORDER — SACUBITRIL AND VALSARTAN 49; 51 MG/1; MG/1
1 TABLET, FILM COATED ORAL 2 TIMES DAILY
Qty: 74 TABLET | Refills: 0 | COMMUNITY
Start: 2024-10-10

## 2024-10-10 NOTE — PROGRESS NOTES
"Chief Complaint  Follow-up (Denies chest pain or SOA today, states recently having episodes of high blood pressure )    Subjective          Aramis Carson presents to [facility] CARDIOLOGY for follow up.    History of Present Illness    Aramis Carson was last seen in clinic on 5/2/2024.  At that time he was doing well and no changes were made to his medications.    At today's visit Tami reports that his blood pressure has been elevated.  He expresses concern regarding this.  He denies any chest pain, shortness of breath or dyspnea on exertion.  He denies palpitations.  He denies lower extremity swelling.    He has brought with him today a blood pressure log.  Recently he has had several blood pressures in the 150s systolically, with most being in the 130s systolically.  It does appear that his blood pressure is more elevated first thing in the morning.    Objective     Vital Signs:   /82 (BP Location: Left arm, Patient Position: Sitting, Cuff Size: Adult)   Pulse 93   Ht 167.6 cm (66\")   Wt 79.8 kg (176 lb)   SpO2 98%   BMI 28.41 kg/m²       Physical Exam  Vitals reviewed.   Constitutional:       Appearance: Normal appearance. He is well-developed.   Cardiovascular:      Rate and Rhythm: Normal rate. Rhythm irregular.      Heart sounds: No murmur heard.     No friction rub. No gallop.   Pulmonary:      Effort: Pulmonary effort is normal. No respiratory distress.      Breath sounds: Normal breath sounds. No wheezing or rales.   Skin:     General: Skin is warm and dry.   Neurological:      Mental Status: He is alert and oriented to person, place, and time.   Psychiatric:         Mood and Affect: Mood normal.         Behavior: Behavior normal.          Result Review :            ECG 12 Lead    Date/Time: 10/10/2024 10:16 AM  Performed by: Jaimie Nuno APRN    Authorized by: Jaimie Nuno APRN  Comparison: compared with previous ECG from 12/7/2023  Similar to previous ECG  Rhythm: " atrial fibrillation  Rate: normal  BPM: 75  Q waves: II, III, aVF, V4 and V6      Clinical impression: abnormal EKG  Comments: QTc 465           Most recent echocardiogram  Results for orders placed during the hospital encounter of 12/03/23    Adult Transthoracic Echo Complete W/ Cont if Necessary Per Protocol    Interpretation Summary    Left ventricular systolic function is normal. Calculated left ventricular EF = 67% Left ventricular ejection fraction appears to be 66 - 70%.    Left ventricular diastolic function is consistent with (grade I) impaired relaxation.    The left atrial cavity is moderately dilated.    Estimated right ventricular systolic pressure from tricuspid regurgitation is mildly elevated (35-45 mmHg).    Mild pulmonary hypertension is present.      Most recent Stress Test  Results for orders placed during the hospital encounter of 12/03/23    Stress Test With Myocardial Perfusion One Day    Interpretation Summary  Images from the original result were not included.      A pharmacological stress test was performed using regadenoson without low-level exercise.    Findings consistent with an indeterminate ECG stress test.    Raw images reviewed with the following abnormalities noted: Has 3 bright scintigraphic spots noted in the right lung field.  Clinical correlation required to rule out any pulmonary pathology.    Myocardial perfusion imaging indicates a moderate-sized, mild degree of ischemia located in the inferior wall and lateral wall.    TID:1.19    Normal LV cavity size. Normal LV wall motion noted.    Left ventricular ejection fraction is normal (Calculated EF = 69%).    Impressions are consistent with an intermediate risk study.       Most recent Cardiac Cath  Results for orders placed during the hospital encounter of 12/03/23    Cardiac Catheterization/Vascular Study    Conclusion    Mid LAD lesion is 30% stenosed.    Mid Cx lesion is 50% stenosed.    1.  Cardiac.  Mild nonobstructive  epicardial coronary disease noted.  Medical management will be advised.  Eliquis for chronic A-fib will be advised.      Current Outpatient Medications   Medication Sig Dispense Refill    apixaban (ELIQUIS) 5 MG tablet tablet Take 1 tablet by mouth 2 (Two) Times a Day. 60 tablet 2    atorvastatin (LIPITOR) 20 MG tablet Take 1 tablet by mouth every night. 90 tablet 3    famotidine (PEPCID) 20 MG tablet Take 1 tablet by mouth Every Night.      glipiZIDE XL 2.5 MG 24 hr tablet Take 1 tablet by mouth Daily.      HYDROcodone-acetaminophen (NORCO)  MG per tablet Take 1 tablet by mouth Every 6 (Six) Hours As Needed for Moderate Pain.      lidocaine (LIDODERM) 5 % Place 1 patch on the skin as directed by provider Daily. Remove & Discard patch within 12 hours or as directed by MD 30 each 0    loratadine (CLARITIN) 10 MG tablet Take 1 tablet by mouth Daily As Needed for Allergies. 90 tablet 3    meclizine (ANTIVERT) 25 MG tablet Take 1 tablet by mouth As Needed for Dizziness.      omeprazole (priLOSEC) 20 MG capsule Take 1 capsule by mouth 2 (Two) Times a Day.      baclofen (LIORESAL) 10 MG tablet Take 1 tablet by mouth Every Night. (Patient not taking: Reported on 10/10/2024)      DULoxetine (Cymbalta) 20 MG capsule Take 1 capsule by mouth Daily for 30 days. 30 capsule 0    montelukast (SINGULAIR) 10 MG tablet Take 1 tablet by mouth Every Night. 30 tablet 5    sacubitril-valsartan (Entresto) 49-51 MG tablet Take 1 tablet by mouth 2 (Two) Times a Day. 74 tablet 0     No current facility-administered medications for this visit.            Assessment and Plan    Problem List Items Addressed This Visit          Cardiac and Vasculature    Essential hypertension (Chronic)    Longstanding persistent atrial fibrillation - Primary (Chronic)    Overview     [id number] is 4 for hypertension, age, diabetes.  Chronically anticoagulated on Eliquis         Relevant Medications    sacubitril-valsartan (Entresto) 49-51 MG tablet     Other Relevant Orders    ECG 12 Lead    Dyslipidemia (Chronic)    Overview     10/5/2020 total cholesterol 142, triglycerides 113, HDL 40, and LDL 79.                Follow Up     Medications were reviewed with the patient.    Hypertension is not well-controlled.  Increased today.  Samples given.    Continue atorvastatin for dyslipidemia.    Atrial fibrillation is stable and rate controlled.  Continue Eliquis for stroke prophylaxis.   [id number] Score: 5 (11/17/2024  8:33 PM)          Return in about 6 weeks (around 11/21/2024).    Patient was given instructions and counseling regarding his condition or for health maintenance advice. Please see specific information pulled into the AVS if appropriate.

## 2024-10-23 ENCOUNTER — OFFICE VISIT (OUTPATIENT)
Dept: PULMONOLOGY | Facility: CLINIC | Age: 84
End: 2024-10-23
Payer: MEDICARE

## 2024-10-23 VITALS
OXYGEN SATURATION: 95 % | BODY MASS INDEX: 28.61 KG/M2 | SYSTOLIC BLOOD PRESSURE: 124 MMHG | DIASTOLIC BLOOD PRESSURE: 70 MMHG | HEIGHT: 66 IN | HEART RATE: 68 BPM | TEMPERATURE: 97 F | WEIGHT: 178 LBS

## 2024-10-23 DIAGNOSIS — Z87.891 FORMER SMOKER: ICD-10-CM

## 2024-10-23 DIAGNOSIS — J30.2 SEASONAL ALLERGIC RHINITIS, UNSPECIFIED TRIGGER: ICD-10-CM

## 2024-10-23 DIAGNOSIS — R91.8 PULMONARY NODULES: ICD-10-CM

## 2024-10-23 DIAGNOSIS — G47.33 OBSTRUCTIVE SLEEP APNEA SYNDROME: Primary | ICD-10-CM

## 2024-10-23 PROCEDURE — 3074F SYST BP LT 130 MM HG: CPT | Performed by: PHYSICIAN ASSISTANT

## 2024-10-23 PROCEDURE — 1159F MED LIST DOCD IN RCRD: CPT | Performed by: PHYSICIAN ASSISTANT

## 2024-10-23 PROCEDURE — 3078F DIAST BP <80 MM HG: CPT | Performed by: PHYSICIAN ASSISTANT

## 2024-10-23 PROCEDURE — 1160F RVW MEDS BY RX/DR IN RCRD: CPT | Performed by: PHYSICIAN ASSISTANT

## 2024-10-23 PROCEDURE — 99214 OFFICE O/P EST MOD 30 MIN: CPT | Performed by: PHYSICIAN ASSISTANT

## 2024-10-23 RX ORDER — MONTELUKAST SODIUM 10 MG/1
10 TABLET ORAL NIGHTLY
Qty: 30 TABLET | Refills: 5 | Status: SHIPPED | OUTPATIENT
Start: 2024-10-23

## 2024-10-23 NOTE — PROGRESS NOTES
"Chief Complaint  Obstructive sleep apnea syndrome    Subjective        Aramis Carson presents to [facility] PULMONARY & [facility]ITICAL [facility]ARE MEDICINE  History of Present Illness    Mr. Carson presents today for follow up. Still doing well with his CPAP machine since noting significant weight loss. States that another doctor decided to repeat the test to see if it had resolved, but results were worse than the original.   He was able to receive a new device and is tolerating this very well.     Still has prominent nasal allergy symptoms. No benefit from multiple nasal sprays, antihistamines. Allergy shots would negatively mix with some of his other current medications.     No other acute respiratory complaints at this time.         Objective   Vital Signs:  /70   Pulse 68   Temp 97 °F (36.1 °C)   Ht 167.6 cm (66\")   Wt 80.7 kg (178 lb)   SpO2 95%   BMI 28.73 kg/m²   Estimated body mass index is 28.73 kg/m² as calculated from the following:    Height as of this encounter: 167.6 cm (66\").    Weight as of this encounter: 80.7 kg (178 lb).        Physical Exam  Vitals reviewed.   Constitutional:       General: He is not in acute distress.     Appearance: He is not diaphoretic.   HENT:      Head: Normocephalic and atraumatic.   Cardiovascular:      Rate and Rhythm: Normal rate and regular rhythm.   Pulmonary:      Effort: Pulmonary effort is normal.      Breath sounds: No wheezing, rhonchi or rales.   Neurological:      Mental Status: He is alert and oriented to person, place, and time.   Psychiatric:         Behavior: Behavior normal.          Result Review :  The following data was reviewed by: Alison Combs PA-C on 10/23/2024:    CT chest imaging/report from September 2023   Per personal review  - Previous opacities of the right and left lower lungs resolved  - Image 29 - 3.7 mm nodule on (stable since April 2023)  - Image 71 - 4.5 mm calcified nodule of the right lower lung (stable since April " Thoracic Surgery Progress Note    Subjective       History Of Present Illness  S/P Pericardial Window   POD #1    Allergies  ALLERGIES:  Adhesive   (environmental), Aspirin, Isopropamide, and Prochlorperazine    Current Facility-Administered Medications   Medication    PHENYLephrine (YONY-SYNEPHRINE) 50 mg/250 mL sodium chloride 0.9 % infusion    ipratropium-albuterol (DUONEB) 0.5-2.5 (3) MG/3ML nebulizer solution 3 mL    sodium chloride 0.9 % injection 10 mL    sodium chloride 0.9 % injection 10 mL    sodium chloride 0.9 % injection 10 mL    sodium chloride 0.9 % injection 10 mL    sodium chloride 0.9 % injection 10 mL    sodium chloride 0.9 % injection 20 mL    dextrose 50 % injection 25 g    dextrose 50 % injection 12.5 g    glucagon (GLUCAGEN) injection 1 mg    dextrose (GLUTOSE) 40 % gel 15 g    dextrose (GLUTOSE) 40 % gel 30 g    levothyroxine (SYNTHROID, LEVOTHROID) tablet 37.5 mcg    acetaminophen (TYLENOL) tablet 650 mg    Or    acetaminophen (TYLENOL) suppository 650 mg    morphine injection 2 mg    ipratropium-albuterol (DUONEB) 0.5-2.5 (3) MG/3ML nebulizer solution 3 mL    ondansetron (ZOFRAN ODT) disintegrating tablet 4 mg    Or    ondansetron (ZOFRAN) injection 4 mg    polyethylene glycol (MIRALAX) packet 17 g    docusate sodium-sennosides (SENOKOT S) 50-8.6 MG 2 tablet    bisacodyl (DULCOLAX) suppository 10 mg    magnesium hydroxide (MILK OF MAGNESIA) 400 MG/5ML suspension 30 mL    enoxaparin (LOVENOX) injection 40 mg    sodium chloride 0.9% infusion    traMADol (ULTRAM) tablet 50 mg    cefTRIAXone (ROCEPHIN) syringe 1,000 mg    mirtazapine (REMERON) tablet 7.5 mg    OLANZapine (ZyPREXA) tablet 2.5 mg    phenyTOIN (DILANTIN) EX capsule 100 mg    pantoprazole (PROTONIX) EC tablet 40 mg    midodrine (PROAMATINE) tablet 5 mg    PHENobarbital tablet 32.4 mg    sodium chloride 0.9 % injection 2 mL    sodium chloride 0.9% infusion    sodium chloride (NORMAL SALINE) 0.9 % bolus 500 mL    Potassium Standard  2023)      -----------------------------------------------------------------------------------    CT chest imaging/report April 2023            Assessment and Plan   Diagnoses and all orders for this visit:    1. Obstructive sleep apnea syndrome (Primary)    2. Pulmonary nodules  -     CT Chest Without Contrast; Future    3. Former smoker  -     CT Chest Without Contrast; Future    4. Seasonal allergic rhinitis, unspecified trigger    Other orders  -     montelukast (SINGULAIR) 10 MG tablet; Take 1 tablet by mouth Every Night.  Dispense: 30 tablet; Refill: 5        Obstructive sleep apnea:   States that he recently completed retesting, which showed a worse score than the original test.   Was notable for signficant wieight loss in the last year or so, which likely helped improved tolerance of the device.   States that he has received a new device.     Management obstructive sleep apnea also includes lifestyle modifications including weight loss, avoidance of alcohol, sedated, caffeine especially before bedtime, allowing adequate sleep time, and body position during sleep such as side versus back. 10% weight loss can result in a 50% improvement of the apnea-hypopnea index.  Untreated sleep apnea can lead to cardiovascular/metabolic disorder, neurocognitive deficit, daytime sleepiness, motor vehicle accidents, depression, mood disorders and reduced quality of life.   Recommended at least 4 hours of use per night for 70% of every month (approximately 21 out of 30 days) per CMS guidelines.         Pulmonary nodules, Former smoker:   Previous CT imaging notable for nodule stability.   CT Chest from 2023 showed  - Previous opacities of the right and left lower lungs resolved  - Image 29 - 3.7 mm nodule on (stable since April 2023)  - Image 71 - 4.5 mm calcified nodule of the right lower lung (stable since April 2023)  Stable for 5 months.   Does not qualify for LDCT due to age.   Would like to complete follow up imaging.  Replacement Protocol (Levels 3.5 and lower)    Magnesium Standard Replacement Protocol    morphine injection 1 mg    thiamine (VITAMIN B-1) injection 100 mg        Continuous Infusions:   Current Facility-Administered Medications   Medication Dose Route Frequency Provider Last Rate Last Admin    PHENYLephrine (YONY-SYNEPHRINE) 50 mg/250 mL sodium chloride 0.9 % infusion  0-300 mcg/min Intravenous Continuous Ravin Thompson MD   Completed at 06/12/24 1540    sodium chloride 0.9% infusion   Intravenous Continuous Francie Bond CNS 50 mL/hr at 06/13/24 0700 Rate Verify at 06/13/24 0700    sodium chloride 0.9% infusion   Intravenous Continuous MARYN Tejinder Luna MD           I/O's    Intake/Output Summary (Last 24 hours) at 6/13/2024 1019  Last data filed at 6/13/2024 0900  Gross per 24 hour   Intake 1009.35 ml   Output 785 ml   Net 224.35 ml       VITAL SIGNS:    Vital Last Value 24 Hour Range   Temperature 99.7 °F (37.6 °C) (06/13/24 0900) Temp  Min: 97.2 °F (36.2 °C)  Max: 100.2 °F (37.9 °C)   Pulse (!) 103 (06/13/24 0900) Pulse  Min: 61  Max: 114   Respiratory (!) 29 (06/13/24 0900) Resp  Min: 10  Max: 29   Non-Invasive  Blood Pressure 119/56 (06/13/24 0900) BP  Min: 96/67  Max: 154/56   Pulse Oximetry 97 % (06/13/24 0900) SpO2  Min: 53 %  Max: 100 %     Vital Today Admitted   Weight 60.7 kg (133 lb 13.1 oz) (06/13/24 0529) Weight: 58.7 kg (129 lb 6.6 oz) (06/10/24 1804)   Height N/A Height: 5' 1\" (154.9 cm) (06/10/24 2300)   BMI N/A BMI (Calculated): 24.58 (06/10/24 2300)     Vital Today Admission   Weight 60.7 kg (133 lb 13.1 oz) (06/13/24 0529) Weight: 58.7 kg (129 lb 6.6 oz) (06/10/24 1804)     Weight over the past 48 Hours:  Patient Vitals for the past 48 hrs:   Weight   06/13/24 0529 60.7 kg (133 lb 13.1 oz)        Review of Systems  Constitutional:  Negative except as per HPI.  Skin: Negative except as per HPI.  HEENT: Negative except as per HPI.  Respiratory: Negative except as per HPI.  Cardiovascular:    Ordered CT chest without contrast        Seasonal allergies:   Tried and failed multiple types of antihistamines, nasal sprays.   States that he cannot do specific allergy injections due to other current medications.  Will try Singulair once nightly  Possible side effects reviewed.  Discontinue if not tolerated.      Follow Up   Return in about 6 months (around 4/23/2025), or if symptoms worsen or fail to improve, for Next scheduled follow up.  Patient was given instructions and counseling regarding his condition or for health maintenance advice. Please see specific information pulled into the AVS if appropriate.              Negative except as per HPI.  Gastrointestinal: Negative except as per HPI.  Genitourinary: Negative except as per HPI.  Extremities:  Negative except as per HPI.  Endocrine: Negative except as per HPI.  Psychiatric: Negative except as per HPI.       Physical Exam   General: A&0 x3, NAD, resting in bed  Lungs:  clear anteriorly, deisi drain x 1-230cc out since OR  Cardiovascular: regular rate, rhythm  GI: tolerating liquids  : caro  Skin: Incisions CDI    Labs     Recent Labs   Lab 06/13/24  0519   WBC 17.7*   RBC 2.59*   HGB 8.3*   HCT 24.0*   *     Recent Labs   Lab 06/13/24  0519 06/12/24  0710 06/11/24  0155   SODIUM 130* 127* 125*   POTASSIUM 4.6 3.9 4.3   CHLORIDE 100 96* 91*   CO2 24 27 29   BUN 19 17 21*   CREATININE 0.55 0.44* 0.62   CALCIUM 7.5* 8.1* 7.7*   ALBUMIN  --   --  1.9*   BILIRUBIN  --   --  0.3   ALKPT  --   --  170*   GPT  --   --  18   AST  --   --  39*   GLUCOSE 143* 86 116*      Recent Labs   Lab 06/13/24 0519   WBC 17.7*   RBC 2.59*   HGB 8.3*   HCT 24.0*   *     Lab Results   Component Value Date    SODIUM 130 (L) 06/13/2024    POTASSIUM 4.6 06/13/2024    CHLORIDE 100 06/13/2024    CO2 24 06/13/2024    BUN 19 06/13/2024    CREATININE 0.55 06/13/2024    GLUCOSE 143 (H) 06/13/2024    WBC 17.7 (H) 06/13/2024    HCT 24.0 (L) 06/13/2024    HGB 8.3 (L) 06/13/2024     (L) 06/13/2024    AST 39 (H) 06/11/2024    GPT 18 06/11/2024    ALKPT 170 (H) 06/11/2024    BILIRUBIN 0.3 06/11/2024      INR (no units)   Date Value   06/10/2024 1.1     TSH (mcUnits/mL)   Date Value   06/12/2024 40.100 (H)        Imaging  EXAM: XR CHEST AP OR PA         DATE: 6/13/2024 5:44 AM     CLINICAL INDICATION: Follow-up chest tube. Postsurgical chest. Dyspnea.     COMPARISON: 6/12/2024     PROCEDURE: One view chest     FINDINGS: Patient rotated.     A PICC line catheter is stable in place. Overlying monitoring wires. A  pericardial drain is stable in place.     Stable cardiomediastinal silhouette.  Redemonstrated postsurgical changes  with multiple surgical clips project in the right superior  paramediastinal-hilar region. Irregular opacity in the right perihilar  region could be due to scarring but appears more conspicuous and  superimposed infiltrate is not excluded.     The lungs are hyperinflated. Increased opacity in the lower left hemithorax  may be due to combination pleural fluid and airspace disease. This appears  slightly decreased. Dense opacity in the lower right hemithorax obscuring  the right hemidiaphragm and adjacent right heart border may be due to  pleural fluid and consolidation with partial clearing. A calcified  granuloma left lower lung is again noted.     Degenerative changes.     IMPRESSION:  1.   PICC line and pericardial drain are stable in place.   2.   Right lung postsurgical changes. Irregular opacity in the right  perihilar region could be due to scarring but is more conspicuous and  superimposed infiltrate is not excluded. Suggest follow-up.   3.   Interval mild clearing of the bilateral lung findings.    Assessment and plan   S/P Pericardial Window   Continue drain today  VSS  Encourage IS, increase activity as tolerated  Pain controlled  CPM

## 2024-11-08 ENCOUNTER — HOSPITAL ENCOUNTER (OUTPATIENT)
Dept: CT IMAGING | Facility: HOSPITAL | Age: 84
Discharge: HOME | End: 2024-11-08
Payer: MEDICARE

## 2024-11-08 DIAGNOSIS — R91.8 PULMONARY NODULES: ICD-10-CM

## 2024-11-08 DIAGNOSIS — Z87.891 FORMER SMOKER: ICD-10-CM

## 2024-11-08 PROCEDURE — 71250 CT THORAX DX C-: CPT

## 2024-11-19 ENCOUNTER — TELEPHONE (OUTPATIENT)
Dept: CARDIOLOGY | Facility: CLINIC | Age: 84
End: 2024-11-19

## 2024-11-19 ENCOUNTER — OFFICE VISIT (OUTPATIENT)
Dept: CARDIOLOGY | Facility: CLINIC | Age: 84
End: 2024-11-19
Payer: MEDICARE

## 2024-11-19 VITALS
HEART RATE: 73 BPM | DIASTOLIC BLOOD PRESSURE: 73 MMHG | WEIGHT: 179 LBS | OXYGEN SATURATION: 98 % | BODY MASS INDEX: 28.77 KG/M2 | HEIGHT: 66 IN | SYSTOLIC BLOOD PRESSURE: 135 MMHG

## 2024-11-19 DIAGNOSIS — I10 ESSENTIAL HYPERTENSION: Chronic | ICD-10-CM

## 2024-11-19 DIAGNOSIS — I48.11 LONGSTANDING PERSISTENT ATRIAL FIBRILLATION: Chronic | ICD-10-CM

## 2024-11-19 DIAGNOSIS — E78.5 DYSLIPIDEMIA: Primary | Chronic | ICD-10-CM

## 2024-11-19 PROBLEM — I25.10 CAD (CORONARY ARTERY DISEASE): Chronic | Status: ACTIVE | Noted: 2023-02-01

## 2024-11-19 PROCEDURE — 1159F MED LIST DOCD IN RCRD: CPT | Performed by: NURSE PRACTITIONER

## 2024-11-19 PROCEDURE — 3078F DIAST BP <80 MM HG: CPT | Performed by: NURSE PRACTITIONER

## 2024-11-19 PROCEDURE — 93000 ELECTROCARDIOGRAM COMPLETE: CPT | Performed by: NURSE PRACTITIONER

## 2024-11-19 PROCEDURE — 1160F RVW MEDS BY RX/DR IN RCRD: CPT | Performed by: NURSE PRACTITIONER

## 2024-11-19 PROCEDURE — 99214 OFFICE O/P EST MOD 30 MIN: CPT | Performed by: NURSE PRACTITIONER

## 2024-11-19 PROCEDURE — 3075F SYST BP GE 130 - 139MM HG: CPT | Performed by: NURSE PRACTITIONER

## 2024-11-19 RX ORDER — SACUBITRIL AND VALSARTAN 49; 51 MG/1; MG/1
1 TABLET, FILM COATED ORAL 2 TIMES DAILY
Qty: 56 TABLET | Refills: 0 | COMMUNITY
Start: 2024-11-19

## 2024-11-19 RX ORDER — SACUBITRIL AND VALSARTAN 49; 51 MG/1; MG/1
1 TABLET, FILM COATED ORAL 2 TIMES DAILY
Qty: 180 TABLET | Refills: 3 | COMMUNITY
Start: 2024-11-19

## 2024-11-19 RX ORDER — SACUBITRIL AND VALSARTAN 49; 51 MG/1; MG/1
1 TABLET, FILM COATED ORAL 2 TIMES DAILY
Qty: 180 TABLET | Refills: 3 | COMMUNITY
Start: 2024-11-19 | End: 2024-11-19 | Stop reason: SDUPTHER

## 2024-11-19 NOTE — PROGRESS NOTES
"Chief Complaint  Follow-up (Patient states blood pressure has been better )    Subjective          Aramis Carson presents to [facility] CARDIOLOGY for follow up.    History of Present Illness  History of Present Illness  The patient is an 84-year-old male who follows in our clinic with longstanding persistent atrial fibrillation, hypertension, dyslipidemia, and coronary artery disease.    He reports satisfactory blood pressure readings at home, with the last recorded reading being 116/66. Although he did not take his blood pressure this morning, he has been monitoring it without the aid of the new medication, which he has run out of. He does not experience any chest pain or shortness of breath. Occasionally, he experiences a rapid heart rate, but it is infrequent. There is no swelling in his lower extremities. He is currently taking Eliquis and has not experienced any bleeding issues, although he notes that he bruises more easily than when he was on warfarin.    He recently visited a foot doctor due to problems with the 4th toe on each foot. He was informed that his arches were falling, causing the toes to draw together. He was provided with arch supports and devices to separate his toes. He also uses tubes to cover them. Currently, he has no pain and his feet are not swollen. This was his first visit to a foot doctor, and for the last 6 months, it was very painful to walk.     Objective     Vital Signs:   /73 (BP Location: Left arm, Patient Position: Sitting, Cuff Size: Adult)   Pulse 73   Ht 167.6 cm (66\")   Wt 81.2 kg (179 lb)   SpO2 98%   BMI 28.89 kg/m²       Physical Exam   Physical Exam  Vital Signs  Blood pressure in the clinic today is 135/73.    Result Review :     CMP          12/8/2023    01:43 12/9/2023    04:26 5/30/2024    17:02 5/30/2024    18:31 5/30/2024    20:54   CMP   Glucose 133  125  125  106  97    BUN 20  21  21  20  20    Creatinine 1.19  1.08  1.19  1.08  1.12 "    EGFR 60.6  68.1  60.2  67.7  64.8    Sodium 134  136  140  140  141    Potassium 4.0  4.0  5.8  5.1  3.8    Chloride 102  103  108  107  107    Calcium 8.7  8.5  9.4  9.3  9.5    Total Protein 6.1  5.7  6.4      Albumin 3.5  3.1  3.7      Globulin 2.6  2.6  2.7      Total Bilirubin 0.4  0.5  0.6      Alkaline Phosphatase 50  53  85      AST (SGOT) 33  20  16      ALT (SGPT) 35  24  13      Albumin/Globulin Ratio 1.3  1.2  1.4      BUN/Creatinine Ratio 16.8  19.4  17.6  18.5  17.9    Anion Gap 9.8  12.9  9.0  8.7  9.7      CBC          12/8/2023    01:43 12/9/2023    04:26 5/30/2024    17:02   CBC   WBC 10.26  11.11  8.56    RBC 4.99  4.84  4.93    Hemoglobin 13.8  13.3  13.4    Hematocrit 42.3  41.6  42.9    MCV 84.8  86.0  87.0    MCH 27.7  27.5  27.2    MCHC 32.6  32.0  31.2    RDW 14.4  14.2  14.3    Platelets 172  157  250             ECG 12 Lead    Date/Time: 11/19/2024 11:37 AM  Performed by: Jaimie Nuno APRN    Authorized by: Jaimie Nuno APRN  Comparison: compared with previous ECG from 10/10/2024  Similar to previous ECG  Rhythm: atrial fibrillation  Rate: normal  BPM: 72  Conduction: non-specific intraventricular conduction delay  Q waves: V3, V4, II and aVF    Comments: QTc 434           Most recent echocardiogram  Results for orders placed during the hospital encounter of 12/03/23    Adult Transthoracic Echo Complete W/ Cont if Necessary Per Protocol    Interpretation Summary    Left ventricular systolic function is normal. Calculated left ventricular EF = 67% Left ventricular ejection fraction appears to be 66 - 70%.    Left ventricular diastolic function is consistent with (grade I) impaired relaxation.    The left atrial cavity is moderately dilated.    Estimated right ventricular systolic pressure from tricuspid regurgitation is mildly elevated (35-45 mmHg).    Mild pulmonary hypertension is present.      Most recent Stress Test  Results for orders placed during the hospital encounter  of 12/03/23    Stress Test With Myocardial Perfusion One Day    Interpretation Summary  Images from the original result were not included.      A pharmacological stress test was performed using regadenoson without low-level exercise.    Findings consistent with an indeterminate ECG stress test.    Raw images reviewed with the following abnormalities noted: Has 3 bright scintigraphic spots noted in the right lung field.  Clinical correlation required to rule out any pulmonary pathology.    Myocardial perfusion imaging indicates a moderate-sized, mild degree of ischemia located in the inferior wall and lateral wall.    TID:1.19    Normal LV cavity size. Normal LV wall motion noted.    Left ventricular ejection fraction is normal (Calculated EF = 69%).    Impressions are consistent with an intermediate risk study.       Most recent Cardiac Cath  Results for orders placed during the hospital encounter of 12/03/23    Cardiac Catheterization/Vascular Study    Conclusion    Mid LAD lesion is 30% stenosed.    Mid Cx lesion is 50% stenosed.    1.  Cardiac.  Mild nonobstructive epicardial coronary disease noted.  Medical management will be advised.  Eliquis for chronic A-fib will be advised.      Current Outpatient Medications   Medication Sig Dispense Refill    apixaban (ELIQUIS) 5 MG tablet tablet Take 1 tablet by mouth 2 (Two) Times a Day. 60 tablet 2    atorvastatin (LIPITOR) 20 MG tablet Take 1 tablet by mouth every night. 90 tablet 3    famotidine (PEPCID) 20 MG tablet Take 1 tablet by mouth Every Night.      glipiZIDE XL 2.5 MG 24 hr tablet Take 1 tablet by mouth Daily.      HYDROcodone-acetaminophen (NORCO)  MG per tablet Take 1 tablet by mouth Every 6 (Six) Hours As Needed for Moderate Pain.      lidocaine (LIDODERM) 5 % Place 1 patch on the skin as directed by provider Daily. Remove & Discard patch within 12 hours or as directed by MD 30 each 0    loratadine (CLARITIN) 10 MG tablet Take 1 tablet by mouth Daily  As Needed for Allergies. 90 tablet 3    meclizine (ANTIVERT) 25 MG tablet Take 1 tablet by mouth As Needed for Dizziness.      omeprazole (priLOSEC) 20 MG capsule Take 1 capsule by mouth 2 (Two) Times a Day.      sacubitril-valsartan (Entresto) 49-51 MG tablet Take 1 tablet by mouth 2 (Two) Times a Day. 180 tablet 3    baclofen (LIORESAL) 10 MG tablet Take 1 tablet by mouth Every Night. (Patient not taking: Reported on 10/10/2024)      DULoxetine (Cymbalta) 20 MG capsule Take 1 capsule by mouth Daily for 30 days. 30 capsule 0    montelukast (SINGULAIR) 10 MG tablet Take 1 tablet by mouth Every Night. (Patient not taking: Reported on 11/19/2024) 30 tablet 5    sacubitril-valsartan (Entresto) 49-51 MG tablet Take 1 tablet by mouth 2 (Two) Times a Day. 56 tablet 0     No current facility-administered medications for this visit.            Assessment and Plan    Problem List Items Addressed This Visit          Cardiac and Vasculature    Essential hypertension (Chronic)    Relevant Orders    ECG 12 Lead    Longstanding persistent atrial fibrillation (Chronic)    Overview     [id number] is 4 for hypertension, age, diabetes.  Chronically anticoagulated on Eliquis         Relevant Medications    sacubitril-valsartan (Entresto) 49-51 MG tablet    sacubitril-valsartan (Entresto) 49-51 MG tablet    Other Relevant Orders    ECG 12 Lead    Dyslipidemia - Primary (Chronic)    Overview     10/5/2020 total cholesterol 142, triglycerides 113, HDL 40, and LDL 79.            Assessment & Plan  1. Hypertension.  His blood pressure at home has been stable, with a recent reading of 116/66. However, today's clinic reading was 135/73, which is acceptable. He is currently out of his new medication, which has been effective in controlling his blood pressure. A prescription for the higher dose of his current medication will be sent to [facility]. He is advised to continue monitoring his blood pressure at home and bring the list of  readings to the clinic.    2. Persistent Atrial Fibrillation.  He reports no chest pain, shortness of breath, or significant palpitations, except occasionally during physical exertion. His EKG shows he remains in atrial fibrillation. He is currently on Eliquis and reports no significant bleeding issues, although he bruises more easily than when he was on warfarin. He is advised to continue taking Eliquis as prescribed.    3. Dyslipidemia.  No specific issues were discussed regarding his dyslipidemia. He is advised to continue his current medication regimen.    4. Coronary Artery Disease.  He reports no chest pain or shortness of breath. He is advised to continue his current medication regimen and lifestyle modifications.             Follow Up     Medications were reviewed with the patient.    Return in about 6 months (around 5/19/2025).    Patient was given instructions and counseling regarding his condition or for health maintenance advice. Please see specific information pulled into the AVS if appropriate.     Patient or patient representative verbalized consent for the use of Ambient Listening during the visit with  ISAIAS Samano for chart documentation. 11/21/2024  07:42 EST

## 2024-12-03 ENCOUNTER — TELEPHONE (OUTPATIENT)
Age: 84
End: 2024-12-03
Payer: MEDICARE

## 2024-12-04 NOTE — TELEPHONE ENCOUNTER
Attempted to call patient to discuss CT scan results.  Only able to leave a message on the patient's wife phone.      Per personal review of CT scan report and imaging (compared back to the last available CT chest from April 2023) findings include:    -Image 15 - 2 mm right apical nodule, stable  -Image 21 - 5.2 x 3.8 mm left nodular opacity, stable (previously 5.7 x 3.9 mm)  -Image 28 - 3.9 mm right nodule, stable  -Image 30 - 3.6 mm right nodule, stable (previously 4.3 mm)  -Image 51 - 4.2 x 3.2 mm left nodule, stable (previously 3.4 x 2.9 mm) (*report mentions this as new, but was present previously; slightly less defined but still measurable.  Later obscured by pneumonia changes)  -Image 53 - 2.2 mm left nodule, stable  -Image 54 - 3.1 mm left nodule, stable  -Image 57 - 2 mm right nodule, stable  -Image 59 - 2 mm left groundglass anterior nodule, stable  -Image 59 - 2.6 mm left medial nodule, stable  -Image 70 - 4.2 mm right calcified nodule, stable    Other incidental findings  -Mild fatty liver changes  -Thyroid goiter with substernal extension, stable  -Cholecystectomy changes  -Duodenal diverticulum      As there are known new pulmonary nodules, and all nodules remain overall stable and within 2 mm difference over the last 19 months, can consider repeat imaging in 1 year.

## 2024-12-06 NOTE — TELEPHONE ENCOUNTER
Was able to talk to patient this evening.  Explained all findings listed below.      Personal findings and measurements were slightly different as compared to the CT scan report.  -Image 15 - 2 mm right apical nodule, stable  -Image 21 - 5.2 x 3.8 mm left nodular opacity, stable (previously 5.7 x 3.9 mm)  -Image 28 - 3.9 mm right nodule, stable  -Image 30 - 3.6 mm right nodule, stable (previously 4.3 mm)  -Image 51 - 4.2 x 3.2 mm left nodule, stable (previously 3.4 x 2.9 mm) (*report mentions this as new, but was present previously; slightly less defined but still measurable.  Later obscured by pneumonia changes)  -Image 53 - 2.2 mm left nodule, stable  -Image 54 - 3.1 mm left nodule, stable  -Image 57 - 2 mm right nodule, stable  -Image 59 - 2 mm left groundglass anterior nodule, stable  -Image 59 - 2.6 mm left medial nodule, stable  -Image 70 - 4.2 mm right calcified nodule, stable    -Mild fatty liver changes  -Thyroid goiter with substernal extension, stable  -Cholecystectomy changes  -Duodenal diverticulum    Plan:  Recommend continued intermittent follow-up of full lab testing including liver function test with PCP  As all nodules have not increased by 2+ mm and are considered stable, can consider repeat CT screening in 1 year (will order in the near future closer to November 2025)

## 2024-12-10 ENCOUNTER — PATIENT ROUNDING (BHMG ONLY) (OUTPATIENT)
Dept: SURGERY | Facility: CLINIC | Age: 84
End: 2024-12-10
Payer: MEDICARE

## 2024-12-10 ENCOUNTER — OFFICE VISIT (OUTPATIENT)
Dept: SURGERY | Facility: CLINIC | Age: 84
End: 2024-12-10
Payer: MEDICARE

## 2024-12-10 VITALS
DIASTOLIC BLOOD PRESSURE: 90 MMHG | BODY MASS INDEX: 29.89 KG/M2 | WEIGHT: 186 LBS | HEIGHT: 66 IN | SYSTOLIC BLOOD PRESSURE: 140 MMHG

## 2024-12-10 DIAGNOSIS — Z86.0100 HX OF COLONIC POLYP: Primary | ICD-10-CM

## 2024-12-10 DIAGNOSIS — L98.9 SKIN LESION OF SCALP: ICD-10-CM

## 2024-12-10 PROCEDURE — 3080F DIAST BP >= 90 MM HG: CPT

## 2024-12-10 PROCEDURE — 3077F SYST BP >= 140 MM HG: CPT

## 2024-12-10 PROCEDURE — 1159F MED LIST DOCD IN RCRD: CPT

## 2024-12-10 PROCEDURE — 1160F RVW MEDS BY RX/DR IN RCRD: CPT

## 2024-12-10 PROCEDURE — 99213 OFFICE O/P EST LOW 20 MIN: CPT

## 2024-12-10 RX ORDER — POLYETHYLENE GLYCOL-3350 AND ELECTROLYTES WITH FLAVOR PACK 240; 5.84; 2.98; 6.72; 22.72 G/278.26G; G/278.26G; G/278.26G; G/278.26G; G/278.26G
4000 POWDER, FOR SOLUTION ORAL ONCE
Qty: 4000 ML | Refills: 0 | Status: SHIPPED | OUTPATIENT
Start: 2024-12-10 | End: 2024-12-10

## 2024-12-10 NOTE — PROGRESS NOTES
December 10, 2024    Hello, may I speak with Aramis Carson?    My name is Corinna Alvarado      I am  with [facility]  [facility] GENERAL SURGERY  [address] [facility], [place] [address]  [place] [address] [phone].    Before we get started may I verify your date of birth? 1940    I am calling to officially welcome you to our practice and ask about your recent visit. Is this a good time to talk? yes    Tell me about your visit with us. What things went well?  He was thankful that we were able to get him in same day. He lives in [place] it saved him a trip.       We're always looking for ways to make our patients' experiences even better. Do you have recommendations on ways we may improve?  no    Overall were you satisfied with your first visit to our practice? yes       I appreciate you taking the time to speak with me today. Is there anything else I can do for you? no      Thank you, and have a great day.

## 2024-12-10 NOTE — PROGRESS NOTES
Subjective   Aramis Carson is a 84 y.o. male who presents today for Initial Evaluation    Chief Complaint:    Chief Complaint   Patient presents with    Colonoscopy    scalp lesion        History of Present Illness:    History of Present Illness Aramis is an 84-year-old male who presents for evaluation today for a colonoscopy and scalp lesion.  He reports that he had a CT scan and was told that he has a polyp and would like to have a colonoscopy for further evaluation.  He has had multiple colonoscopies in his lifetime.  Denies any blood in the stool.  Denies any known family history of colon cancer.  He also denies any unintentional weight loss or changes in his bowel habits.  Currently is anticoagulated on Eliquis for history of A-fib.  Also reports lesion to his scalp that has been present times the past month.  Does report it is slightly sore upon palpation.  Denies any issues with drainage.  He does report he send scalp was removed in the past.    The following portions of the patient's history were reviewed and updated as appropriate: allergies, current medications, past family history, past medical history, past social history, past surgical history and problem list.    Past Medical History:  Past Medical History:   Diagnosis Date    Atrial fibrillation     Coronary artery disease     Hyperlipidemia     Pre-diabetes        Social History:  Social History     Socioeconomic History    Marital status:    Tobacco Use    Smoking status: Former     Types: Pipe, Cigars     Quit date:      Years since quittin.9     Passive exposure: Past    Smokeless tobacco: Never    Tobacco comments:     Smoked for 25 years   Vaping Use    Vaping status: Never Used   Substance and Sexual Activity    Alcohol use: No    Drug use: No    Sexual activity: Defer       Family History:  Family History   Problem Relation Age of Onset    Cancer Sister     Diabetes Sister     Cancer Brother     Heart disease Brother      Diabetes Maternal Grandmother     Hypertension Neg Hx        Past Surgical History:  Past Surgical History:   Procedure Laterality Date    CARDIAC CATHETERIZATION      CARDIAC CATHETERIZATION N/A 12/6/2023    Procedure: Left Heart Cath;  Surgeon: Shwetha Navarrete MD;  Location: [facility] CATH INVASIVE LOCATION;  Service: Cardiology;  Laterality: N/A;    COLONOSCOPY      CYSTOSCOPY TRANSURETHRAL RESECTION OF PROSTATE      INGUINAL HERNIA REPAIR      LAPAROSCOPIC CHOLECYSTECTOMY         Problem List:  Patient Active Problem List   Diagnosis    Essential hypertension    Lateral epicondylitis of right elbow    Familial hypercholesterolemia    Controlled type 2 diabetes mellitus without complication    Epidermal cyst    Longstanding persistent atrial fibrillation    Gastroesophageal reflux disease    Ganglion cyst of finger    Pain of finger of right hand    Carpal tunnel syndrome    Obstructive sleep apnea syndrome    Dyslipidemia    Acute prostatitis    Chronic prostatitis    Right groin pain    Thyroid nodule    Pulmonary nodules    Former smoker    Nasal congestion    Abnormal CT of the chest    Chest pain    Cytokine release syndrome, grade 1    CAD (coronary artery disease)    H/O arthritis    H/O mixed hyperlipidemia    Healthcare maintenance    Hypersomnia with sleep apnea    Injury of peroneal nerve    Lumbosacral radiculopathy    Hx of colonic polyp    Skin lesion of scalp       Allergy:   Allergies   Allergen Reactions    Flomax [Tamsulosin] Other (See Comments)     Heart rate to drop and pass out    Lisinopril Unknown (See Comments) and Cough     Pt. States that he is allergic however, it has been so long since he took it that he cannot remember the reaction that he had at the time.    Losartan Unknown - Low Severity        Current Medications:   Current Outpatient Medications   Medication Sig Dispense Refill    apixaban (ELIQUIS) 5 MG tablet tablet Take 1 tablet by mouth 2 (Two) Times a Day. 60 tablet 2     atorvastatin (LIPITOR) 20 MG tablet Take 1 tablet by mouth every night. 90 tablet 3    baclofen (LIORESAL) 10 MG tablet Take 1 tablet by mouth Every Night.      famotidine (PEPCID) 20 MG tablet Take 1 tablet by mouth Every Night.      glipiZIDE XL 2.5 MG 24 hr tablet Take 1 tablet by mouth Daily.      HYDROcodone-acetaminophen (NORCO)  MG per tablet Take 1 tablet by mouth Every 6 (Six) Hours As Needed for Moderate Pain.      lidocaine (LIDODERM) 5 % Place 1 patch on the skin as directed by provider Daily. Remove & Discard patch within 12 hours or as directed by MD 30 each 0    loratadine (CLARITIN) 10 MG tablet Take 1 tablet by mouth Daily As Needed for Allergies. 90 tablet 3    meclizine (ANTIVERT) 25 MG tablet Take 1 tablet by mouth As Needed for Dizziness.      montelukast (SINGULAIR) 10 MG tablet Take 1 tablet by mouth Every Night. 30 tablet 5    omeprazole (priLOSEC) 20 MG capsule Take 1 capsule by mouth 2 (Two) Times a Day.      sacubitril-valsartan (Entresto) 49-51 MG tablet Take 1 tablet by mouth 2 (Two) Times a Day. 56 tablet 0    sacubitril-valsartan (Entresto) 49-51 MG tablet Take 1 tablet by mouth 2 (Two) Times a Day. 180 tablet 3    DULoxetine (Cymbalta) 20 MG capsule Take 1 capsule by mouth Daily for 30 days. 30 capsule 0    polyethylene glycol (GaviLyte-C) 240 g solution Take 4,000 mL by mouth 1 (One) Time for 1 dose. 4000 mL 0     No current facility-administered medications for this visit.       Review of Systems:    14 pt ROS negative except for what is noted in HPI      Physical Exam:   Physical Exam  Constitutional:       Appearance: Normal appearance.   HENT:      Head: Normocephalic and atraumatic.        Right Ear: External ear normal.      Left Ear: External ear normal.   Eyes:      Conjunctiva/sclera: Conjunctivae normal.      Pupils: Pupils are equal, round, and reactive to light.   Pulmonary:      Effort: Pulmonary effort is normal.   Abdominal:      General: Abdomen is flat.       "Palpations: Abdomen is soft.   Musculoskeletal:         General: Normal range of motion.      Cervical back: Normal range of motion and neck supple.   Skin:     General: Skin is warm and dry.      Capillary Refill: Capillary refill takes less than 2 seconds.   Neurological:      General: No focal deficit present.      Mental Status: He is alert and oriented to person, place, and time.   Psychiatric:         Mood and Affect: Mood normal.         Behavior: Behavior normal.         Vitals:  Blood pressure 140/90, height 167.6 cm (65.98\"), weight 84.4 kg (186 lb).   Body mass index is 30.04 kg/m².        Assessment & Plan   Diagnoses and all orders for this visit:    1. Hx of colonic polyp (Primary)  -     Case Request; Standing  -     Case Request    2. Skin lesion of scalp  -     Case Request; Standing  -     Case Request    Other orders  -     Follow Anesthesia Guidelines / Protocol; Future  -     Follow Anesthesia Guidelines / Protocol; Standing  -     Verify / Perform Chlorhexidine Skin Prep; Standing  -     Provide NPO Instructions to Patient; Future  -     Chlorhexidine Skin Prep; Future  -     Place Sequential Compression Device; Standing  -     Maintain Sequential Compression Device; Standing  -     polyethylene glycol (GaviLyte-C) 240 g solution; Take 4,000 mL by mouth 1 (One) Time for 1 dose.  Dispense: 4000 mL; Refill: 0    Aramis is a 89-year-old male who presents for evaluation for colonoscopy and skin lesion removal.  Patient is anticoagulated on Eliquis.  Colonoscopy and skin lesion removal will be on the same day with Dr. Lockhart to keep him from having to stop his Eliquis and additional time.  He verbalized understanding of prep instructions and procedure and wished to proceed.    Visit Diagnoses:    ICD-10-CM ICD-9-CM   1. Hx of colonic polyp  Z86.0100 V12.72   2. Skin lesion of scalp  L98.9 709.9         MEDS ORDERED DURING VISIT:  New Medications Ordered This Visit   Medications    polyethylene glycol " (GaviLyte-C) 240 g solution     Sig: Take 4,000 mL by mouth 1 (One) Time for 1 dose.     Dispense:  4000 mL     Refill:  0       Return for Follow up post-op.             This document has been electronically signed by ISAIAS Collins  December 10, 2024 10:06 EST    Please note that portions of this note were completed with a voice recognition program.

## 2025-01-02 ENCOUNTER — TELEPHONE (OUTPATIENT)
Dept: SURGERY | Facility: CLINIC | Age: 85
End: 2025-01-02
Payer: MEDICARE

## 2025-01-08 RX ORDER — APIXABAN 5 MG/1
TABLET, FILM COATED ORAL
Qty: 180 TABLET | Refills: 3 | Status: SHIPPED | OUTPATIENT
Start: 2025-01-08

## 2025-01-14 NOTE — DISCHARGE INSTRUCTIONS
Please discontinue all blood thinners and anticoagulants (except aspirin) prior to surgery as per your surgeon and cardiologist instructions.  Aspirin may be continued up to the day prior to surgery.    HOLD all diabetic medications the morning of surgery as order by physician.    Please follow instructions on use of prep cloths provided by nurse. Return instruction sheet to pre-op nurse on day of surgery.    Arrival time for surgery on 1/17/25  will be given to you by Dr. villavicencio's   Office.    A RESPONSIBLE PERSON MUST REMAIN IN THE WAITING ROOM DURING YOUR PROCEDURE AND A RESPONSIBLE  MUST BE AVAILABLE UPON YOUR DISCHARGE.    General Instructions:  Do NOT eat or drink after midnight 1/16/25 which includes water, mints, or gum.  You may brush your teeth. Dental appliances that are removable must be taken out day of surgery.  Do NOT smoke, chew tobacco, or drink alcohol within 24 hours prior to surgery.  Bring medications in original bottles, any inhalers and if applicable your C-PAP/BI-PAP machine  Bring any papers given to you in the doctor’s office  Wear clean, comfortable clothes and socks  Do NOT wear contact lenses or make-up or dark nail polish.  Bring a case for your glasses if applicable.  Bring crutches or walker if applicable  Leave all other valuables and jewelry at home  If you were given a blood bank armband, continue to wear it until discharged.    Preventing a Surgical Site Infection:  Shower the night before surgery (unless instructed otherwise) using a fresh bar of anti-bacterial soap (such as Dial) and clean washcloth.  Dry with a clean towel and dress in clean clothing.  For 2 to 3 days before surgery, avoid shaving with a razor near where you will have surgery because the razor can irritate skin and make it easier to develop an infection.  Ask your surgeon if you will be receiving antibiotics prior to surgery.  Make sure you, your family, and all healthcare providers clean their hands with  soap and water or an alcohol-based hand  before caring for you or your wound.  If at all possible, quit smoking as many days before surgery as you can.    Day of Surgery:  Upon arrival, a pre-op nurse and anesthesiologist will review your health history, obtain vital signs, and answer questions you may have.  The only belongings needed at this time will be your home medications and if applicable you C-PAP/BI-PAP machine.  If you are staying overnight, your family can leave the rest of your belongings in the car and bring them to your room later.  A pre-op nurse will start an IV and you may receive medication in preparation for surgery.  Due to patient privacy and limited space, only one member of your family will be able to accompany you in the pre-op area.  While you are in surgery your family should notify the waiting room  if they leave the waiting room area and provide a contact number.  Please be aware that surgery does come with discomfort.  We want to make every effort to control your discomfort so please discuss any uncontrolled symptoms with your nurse.  Your doctor will most likely have prescribed pain medications.  If you are going home after surgery you will receive individualized written care instructions before being discharged.  A responsible adult must drive you to and from the hospital on the day of surgery and stay with you for 24 hours.  If you are staying overnight following surgery, you will be transported to your hospital room following the recovery period.

## 2025-01-15 ENCOUNTER — PRE-ADMISSION TESTING (OUTPATIENT)
Dept: PREADMISSION TESTING | Facility: HOSPITAL | Age: 85
End: 2025-01-15
Payer: MEDICARE

## 2025-01-17 ENCOUNTER — APPOINTMENT (OUTPATIENT)
Dept: CT IMAGING | Facility: HOSPITAL | Age: 85
End: 2025-01-17
Payer: MEDICARE

## 2025-01-17 ENCOUNTER — ANESTHESIA (OUTPATIENT)
Dept: PERIOP | Facility: HOSPITAL | Age: 85
End: 2025-01-17
Payer: MEDICARE

## 2025-01-17 ENCOUNTER — ANESTHESIA EVENT (OUTPATIENT)
Dept: PERIOP | Facility: HOSPITAL | Age: 85
End: 2025-01-17
Payer: MEDICARE

## 2025-01-17 ENCOUNTER — HOSPITAL ENCOUNTER (OUTPATIENT)
Facility: HOSPITAL | Age: 85
Setting detail: HOSPITAL OUTPATIENT SURGERY
Discharge: HOME | End: 2025-01-17
Attending: SURGERY | Admitting: SURGERY
Payer: MEDICARE

## 2025-01-17 ENCOUNTER — APPOINTMENT (OUTPATIENT)
Dept: GENERAL RADIOLOGY | Facility: HOSPITAL | Age: 85
End: 2025-01-17
Payer: MEDICARE

## 2025-01-17 ENCOUNTER — HOSPITAL ENCOUNTER (OUTPATIENT)
Facility: HOSPITAL | Age: 85
Setting detail: OBSERVATION
Discharge: HOME | End: 2025-01-18
Attending: EMERGENCY MEDICINE | Admitting: HOSPITALIST
Payer: MEDICARE

## 2025-01-17 VITALS
SYSTOLIC BLOOD PRESSURE: 128 MMHG | WEIGHT: 182.6 LBS | HEART RATE: 81 BPM | OXYGEN SATURATION: 97 % | TEMPERATURE: 97.6 F | HEIGHT: 66 IN | BODY MASS INDEX: 29.35 KG/M2 | RESPIRATION RATE: 18 BRPM | DIASTOLIC BLOOD PRESSURE: 79 MMHG

## 2025-01-17 DIAGNOSIS — Z86.0100 HX OF COLONIC POLYP: ICD-10-CM

## 2025-01-17 DIAGNOSIS — L98.9 SKIN LESION OF SCALP: ICD-10-CM

## 2025-01-17 DIAGNOSIS — R07.9 CHEST PAIN, UNSPECIFIED TYPE: Primary | ICD-10-CM

## 2025-01-17 LAB
ALBUMIN SERPL-MCNC: 3.9 G/DL (ref 3.5–5.2)
ALBUMIN/GLOB SERPL: 1.3 G/DL
ALP SERPL-CCNC: 81 U/L (ref 39–117)
ALT SERPL W P-5'-P-CCNC: 23 U/L (ref 1–41)
ANION GAP SERPL CALCULATED.3IONS-SCNC: 14.8 MMOL/L (ref 5–15)
APTT PPP: 31 SECONDS (ref 26.5–34.5)
AST SERPL-CCNC: 20 U/L (ref 1–40)
B PARAPERT DNA SPEC QL NAA+PROBE: NOT DETECTED
B PERT DNA SPEC QL NAA+PROBE: NOT DETECTED
BASOPHILS # BLD AUTO: 0.05 10*3/MM3 (ref 0–0.2)
BASOPHILS NFR BLD AUTO: 0.5 % (ref 0–1.5)
BILIRUB SERPL-MCNC: 0.7 MG/DL (ref 0–1.2)
BUN SERPL-MCNC: 23 MG/DL (ref 8–23)
BUN/CREAT SERPL: 16.4 (ref 7–25)
C PNEUM DNA NPH QL NAA+NON-PROBE: NOT DETECTED
CALCIUM SPEC-SCNC: 9 MG/DL (ref 8.6–10.5)
CHLORIDE SERPL-SCNC: 107 MMOL/L (ref 98–107)
CO2 SERPL-SCNC: 23.2 MMOL/L (ref 22–29)
CREAT SERPL-MCNC: 1.4 MG/DL (ref 0.76–1.27)
CRP SERPL-MCNC: <0.3 MG/DL (ref 0–0.5)
D-LACTATE SERPL-SCNC: 1.8 MMOL/L (ref 0.5–2)
DEPRECATED RDW RBC AUTO: 43.4 FL (ref 37–54)
EGFRCR SERPLBLD CKD-EPI 2021: 49.6 ML/MIN/1.73
EOSINOPHIL # BLD AUTO: 0.08 10*3/MM3 (ref 0–0.4)
EOSINOPHIL NFR BLD AUTO: 0.8 % (ref 0.3–6.2)
ERYTHROCYTE [DISTWIDTH] IN BLOOD BY AUTOMATED COUNT: 13.8 % (ref 12.3–15.4)
FLUAV SUBTYP SPEC NAA+PROBE: NOT DETECTED
FLUBV RNA ISLT QL NAA+PROBE: NOT DETECTED
GEN 5 1HR TROPONIN T REFLEX: 18 NG/L
GLOBULIN UR ELPH-MCNC: 3.1 GM/DL
GLUCOSE BLDC GLUCOMTR-MCNC: 89 MG/DL (ref 70–130)
GLUCOSE SERPL-MCNC: 185 MG/DL (ref 65–99)
HADV DNA SPEC NAA+PROBE: NOT DETECTED
HCOV 229E RNA SPEC QL NAA+PROBE: NOT DETECTED
HCOV HKU1 RNA SPEC QL NAA+PROBE: NOT DETECTED
HCOV NL63 RNA SPEC QL NAA+PROBE: NOT DETECTED
HCOV OC43 RNA SPEC QL NAA+PROBE: NOT DETECTED
HCT VFR BLD AUTO: 49.1 % (ref 37.5–51)
HGB BLD-MCNC: 15.2 G/DL (ref 13–17.7)
HMPV RNA NPH QL NAA+NON-PROBE: NOT DETECTED
HOLD SPECIMEN: NORMAL
HOLD SPECIMEN: NORMAL
HPIV1 RNA ISLT QL NAA+PROBE: NOT DETECTED
HPIV2 RNA SPEC QL NAA+PROBE: NOT DETECTED
HPIV3 RNA NPH QL NAA+PROBE: NOT DETECTED
HPIV4 P GENE NPH QL NAA+PROBE: NOT DETECTED
IMM GRANULOCYTES # BLD AUTO: 0.03 10*3/MM3 (ref 0–0.05)
IMM GRANULOCYTES NFR BLD AUTO: 0.3 % (ref 0–0.5)
INR PPP: 1.05 (ref 0.9–1.1)
LYMPHOCYTES # BLD AUTO: 2.41 10*3/MM3 (ref 0.7–3.1)
LYMPHOCYTES NFR BLD AUTO: 22.9 % (ref 19.6–45.3)
M PNEUMO IGG SER IA-ACNC: NOT DETECTED
MAGNESIUM SERPL-MCNC: 2 MG/DL (ref 1.6–2.4)
MCH RBC QN AUTO: 26.6 PG (ref 26.6–33)
MCHC RBC AUTO-ENTMCNC: 31 G/DL (ref 31.5–35.7)
MCV RBC AUTO: 85.8 FL (ref 79–97)
MONOCYTES # BLD AUTO: 0.82 10*3/MM3 (ref 0.1–0.9)
MONOCYTES NFR BLD AUTO: 7.8 % (ref 5–12)
NEUTROPHILS NFR BLD AUTO: 67.7 % (ref 42.7–76)
NEUTROPHILS NFR BLD AUTO: 7.15 10*3/MM3 (ref 1.7–7)
NRBC BLD AUTO-RTO: 0 /100 WBC (ref 0–0.2)
NT-PROBNP SERPL-MCNC: 530.3 PG/ML (ref 0–1800)
PLATELET # BLD AUTO: 243 10*3/MM3 (ref 140–450)
PMV BLD AUTO: 10.2 FL (ref 6–12)
POTASSIUM SERPL-SCNC: 4.6 MMOL/L (ref 3.5–5.2)
PROCALCITONIN SERPL-MCNC: 0.11 NG/ML (ref 0–0.25)
PROT SERPL-MCNC: 7 G/DL (ref 6–8.5)
PROTHROMBIN TIME: 13.8 SECONDS (ref 12.1–14.7)
RBC # BLD AUTO: 5.72 10*6/MM3 (ref 4.14–5.8)
RHINOVIRUS RNA SPEC NAA+PROBE: NOT DETECTED
RSV RNA NPH QL NAA+NON-PROBE: NOT DETECTED
SARS-COV-2 RNA RESP QL NAA+PROBE: NOT DETECTED
SODIUM SERPL-SCNC: 145 MMOL/L (ref 136–145)
TROPONIN T NUMERIC DELTA: 0 NG/L
TROPONIN T SERPL HS-MCNC: 18 NG/L
WBC NRBC COR # BLD AUTO: 10.54 10*3/MM3 (ref 3.4–10.8)
WHOLE BLOOD HOLD COAG: NORMAL
WHOLE BLOOD HOLD SPECIMEN: NORMAL

## 2025-01-17 PROCEDURE — 25010000002 BUPIVACAINE 0.5 % SOLUTION: Performed by: SURGERY

## 2025-01-17 PROCEDURE — 85025 COMPLETE CBC W/AUTO DIFF WBC: CPT | Performed by: EMERGENCY MEDICINE

## 2025-01-17 PROCEDURE — 82948 REAGENT STRIP/BLOOD GLUCOSE: CPT

## 2025-01-17 PROCEDURE — S0260 H&P FOR SURGERY: HCPCS | Performed by: SURGERY

## 2025-01-17 PROCEDURE — 45385 COLONOSCOPY W/LESION REMOVAL: CPT | Performed by: SURGERY

## 2025-01-17 PROCEDURE — 93005 ELECTROCARDIOGRAM TRACING: CPT | Performed by: EMERGENCY MEDICINE

## 2025-01-17 PROCEDURE — 71045 X-RAY EXAM CHEST 1 VIEW: CPT

## 2025-01-17 PROCEDURE — 93010 ELECTROCARDIOGRAM REPORT: CPT | Performed by: INTERNAL MEDICINE

## 2025-01-17 PROCEDURE — 0202U NFCT DS 22 TRGT SARS-COV-2: CPT | Performed by: EMERGENCY MEDICINE

## 2025-01-17 PROCEDURE — 83880 ASSAY OF NATRIURETIC PEPTIDE: CPT | Performed by: EMERGENCY MEDICINE

## 2025-01-17 PROCEDURE — G0378 HOSPITAL OBSERVATION PER HR: HCPCS

## 2025-01-17 PROCEDURE — 25810000003 SODIUM CHLORIDE 0.9 % SOLUTION: Performed by: EMERGENCY MEDICINE

## 2025-01-17 PROCEDURE — 99285 EMERGENCY DEPT VISIT HI MDM: CPT

## 2025-01-17 PROCEDURE — 71045 X-RAY EXAM CHEST 1 VIEW: CPT | Performed by: RADIOLOGY

## 2025-01-17 PROCEDURE — 96365 THER/PROPH/DIAG IV INF INIT: CPT

## 2025-01-17 PROCEDURE — 74174 CTA ABD&PLVS W/CONTRAST: CPT | Performed by: RADIOLOGY

## 2025-01-17 PROCEDURE — 85610 PROTHROMBIN TIME: CPT | Performed by: EMERGENCY MEDICINE

## 2025-01-17 PROCEDURE — 85730 THROMBOPLASTIN TIME PARTIAL: CPT | Performed by: EMERGENCY MEDICINE

## 2025-01-17 PROCEDURE — 83735 ASSAY OF MAGNESIUM: CPT | Performed by: EMERGENCY MEDICINE

## 2025-01-17 PROCEDURE — 96376 TX/PRO/DX INJ SAME DRUG ADON: CPT

## 2025-01-17 PROCEDURE — 25010000002 ONDANSETRON PER 1 MG: Performed by: EMERGENCY MEDICINE

## 2025-01-17 PROCEDURE — 80053 COMPREHEN METABOLIC PANEL: CPT | Performed by: EMERGENCY MEDICINE

## 2025-01-17 PROCEDURE — 83036 HEMOGLOBIN GLYCOSYLATED A1C: CPT | Performed by: HOSPITALIST

## 2025-01-17 PROCEDURE — 25010000002 PROPOFOL 200 MG/20ML EMULSION: Performed by: NURSE ANESTHETIST, CERTIFIED REGISTERED

## 2025-01-17 PROCEDURE — 84484 ASSAY OF TROPONIN QUANT: CPT | Performed by: EMERGENCY MEDICINE

## 2025-01-17 PROCEDURE — 25010000002 HYDROMORPHONE PER 4 MG: Performed by: EMERGENCY MEDICINE

## 2025-01-17 PROCEDURE — 74174 CTA ABD&PLVS W/CONTRAST: CPT

## 2025-01-17 PROCEDURE — 96375 TX/PRO/DX INJ NEW DRUG ADDON: CPT

## 2025-01-17 PROCEDURE — 14020 TIS TRNFR S/A/L 10 SQ CM/<: CPT | Performed by: SURGERY

## 2025-01-17 PROCEDURE — 25010000002 ONDANSETRON PER 1 MG: Performed by: NURSE ANESTHETIST, CERTIFIED REGISTERED

## 2025-01-17 PROCEDURE — 71275 CT ANGIOGRAPHY CHEST: CPT | Performed by: RADIOLOGY

## 2025-01-17 PROCEDURE — 84145 PROCALCITONIN (PCT): CPT | Performed by: EMERGENCY MEDICINE

## 2025-01-17 PROCEDURE — 25010000002 CEFTRIAXONE PER 250 MG: Performed by: EMERGENCY MEDICINE

## 2025-01-17 PROCEDURE — 83605 ASSAY OF LACTIC ACID: CPT | Performed by: EMERGENCY MEDICINE

## 2025-01-17 PROCEDURE — 93010 ELECTROCARDIOGRAM REPORT: CPT | Performed by: SPECIALIST

## 2025-01-17 PROCEDURE — 86140 C-REACTIVE PROTEIN: CPT | Performed by: EMERGENCY MEDICINE

## 2025-01-17 PROCEDURE — 25010000002 FENTANYL CITRATE (PF) 50 MCG/ML SOLUTION: Performed by: NURSE ANESTHETIST, CERTIFIED REGISTERED

## 2025-01-17 PROCEDURE — 71275 CT ANGIOGRAPHY CHEST: CPT

## 2025-01-17 PROCEDURE — 25510000001 IOPAMIDOL PER 1 ML: Performed by: EMERGENCY MEDICINE

## 2025-01-17 PROCEDURE — 87040 BLOOD CULTURE FOR BACTERIA: CPT | Performed by: EMERGENCY MEDICINE

## 2025-01-17 RX ORDER — SODIUM CHLORIDE 0.9 % (FLUSH) 0.9 %
10 SYRINGE (ML) INJECTION AS NEEDED
Status: DISCONTINUED | OUTPATIENT
Start: 2025-01-17 | End: 2025-01-18 | Stop reason: HOSPADM

## 2025-01-17 RX ORDER — ONDANSETRON 2 MG/ML
4 INJECTION INTRAMUSCULAR; INTRAVENOUS AS NEEDED
Status: DISCONTINUED | OUTPATIENT
Start: 2025-01-17 | End: 2025-01-17 | Stop reason: HOSPADM

## 2025-01-17 RX ORDER — PANTOPRAZOLE SODIUM 40 MG/10ML
40 INJECTION, POWDER, LYOPHILIZED, FOR SOLUTION INTRAVENOUS ONCE
Status: COMPLETED | OUTPATIENT
Start: 2025-01-17 | End: 2025-01-17

## 2025-01-17 RX ORDER — SODIUM CHLORIDE 9 MG/ML
40 INJECTION, SOLUTION INTRAVENOUS AS NEEDED
Status: DISCONTINUED | OUTPATIENT
Start: 2025-01-17 | End: 2025-01-17 | Stop reason: HOSPADM

## 2025-01-17 RX ORDER — ACETAMINOPHEN 325 MG/1
650 TABLET ORAL EVERY 4 HOURS PRN
Qty: 30 TABLET | Refills: 0 | Status: ON HOLD | OUTPATIENT
Start: 2025-01-17 | End: 2025-01-18

## 2025-01-17 RX ORDER — SODIUM CHLORIDE, SODIUM LACTATE, POTASSIUM CHLORIDE, CALCIUM CHLORIDE 600; 310; 30; 20 MG/100ML; MG/100ML; MG/100ML; MG/100ML
125 INJECTION, SOLUTION INTRAVENOUS ONCE
Status: DISCONTINUED | OUTPATIENT
Start: 2025-01-17 | End: 2025-01-17 | Stop reason: HOSPADM

## 2025-01-17 RX ORDER — MEPERIDINE HYDROCHLORIDE 25 MG/ML
12.5 INJECTION INTRAMUSCULAR; INTRAVENOUS; SUBCUTANEOUS
Status: DISCONTINUED | OUTPATIENT
Start: 2025-01-17 | End: 2025-01-17 | Stop reason: HOSPADM

## 2025-01-17 RX ORDER — MIDAZOLAM HYDROCHLORIDE 1 MG/ML
0.5 INJECTION, SOLUTION INTRAMUSCULAR; INTRAVENOUS
Status: DISCONTINUED | OUTPATIENT
Start: 2025-01-17 | End: 2025-01-17 | Stop reason: HOSPADM

## 2025-01-17 RX ORDER — FAMOTIDINE 10 MG/ML
INJECTION, SOLUTION INTRAVENOUS AS NEEDED
Status: DISCONTINUED | OUTPATIENT
Start: 2025-01-17 | End: 2025-01-17 | Stop reason: SURG

## 2025-01-17 RX ORDER — IPRATROPIUM BROMIDE AND ALBUTEROL SULFATE 2.5; .5 MG/3ML; MG/3ML
3 SOLUTION RESPIRATORY (INHALATION) ONCE AS NEEDED
Status: DISCONTINUED | OUTPATIENT
Start: 2025-01-17 | End: 2025-01-17 | Stop reason: HOSPADM

## 2025-01-17 RX ORDER — PHENOBARBITAL, HYOSCYAMINE SULFATE, ATROPINE SULFATE AND SCOPOLAMINE HYDROBROMIDE .0194; .1037; 16.2; .0065 MG/1; MG/1; MG/1; MG/1
2 TABLET ORAL ONCE
Status: COMPLETED | OUTPATIENT
Start: 2025-01-17 | End: 2025-01-17

## 2025-01-17 RX ORDER — HYDROMORPHONE HYDROCHLORIDE 1 MG/ML
0.25 INJECTION, SOLUTION INTRAMUSCULAR; INTRAVENOUS; SUBCUTANEOUS ONCE
Status: COMPLETED | OUTPATIENT
Start: 2025-01-17 | End: 2025-01-17

## 2025-01-17 RX ORDER — ASPIRIN 81 MG/1
324 TABLET, CHEWABLE ORAL ONCE
Status: DISCONTINUED | OUTPATIENT
Start: 2025-01-17 | End: 2025-01-18

## 2025-01-17 RX ORDER — PROPOFOL 10 MG/ML
INJECTION, EMULSION INTRAVENOUS AS NEEDED
Status: DISCONTINUED | OUTPATIENT
Start: 2025-01-17 | End: 2025-01-17 | Stop reason: SURG

## 2025-01-17 RX ORDER — HYDROMORPHONE HYDROCHLORIDE 1 MG/ML
0.5 INJECTION, SOLUTION INTRAMUSCULAR; INTRAVENOUS; SUBCUTANEOUS ONCE
Status: COMPLETED | OUTPATIENT
Start: 2025-01-18 | End: 2025-01-17

## 2025-01-17 RX ORDER — FENTANYL CITRATE 50 UG/ML
INJECTION, SOLUTION INTRAMUSCULAR; INTRAVENOUS AS NEEDED
Status: DISCONTINUED | OUTPATIENT
Start: 2025-01-17 | End: 2025-01-17 | Stop reason: SURG

## 2025-01-17 RX ORDER — LIDOCAINE HYDROCHLORIDE 20 MG/ML
15 SOLUTION OROPHARYNGEAL ONCE
Status: COMPLETED | OUTPATIENT
Start: 2025-01-17 | End: 2025-01-17

## 2025-01-17 RX ORDER — IBUPROFEN 600 MG/1
600 TABLET, FILM COATED ORAL EVERY 6 HOURS PRN
Qty: 30 TABLET | Refills: 0 | Status: ON HOLD | OUTPATIENT
Start: 2025-01-17 | End: 2025-01-18

## 2025-01-17 RX ORDER — BUPIVACAINE HYDROCHLORIDE 5 MG/ML
INJECTION, SOLUTION PERINEURAL AS NEEDED
Status: DISCONTINUED | OUTPATIENT
Start: 2025-01-17 | End: 2025-01-17 | Stop reason: HOSPADM

## 2025-01-17 RX ORDER — ALUMINA, MAGNESIA, AND SIMETHICONE 2400; 2400; 240 MG/30ML; MG/30ML; MG/30ML
15 SUSPENSION ORAL ONCE
Status: COMPLETED | OUTPATIENT
Start: 2025-01-17 | End: 2025-01-17

## 2025-01-17 RX ORDER — ONDANSETRON 2 MG/ML
INJECTION INTRAMUSCULAR; INTRAVENOUS AS NEEDED
Status: DISCONTINUED | OUTPATIENT
Start: 2025-01-17 | End: 2025-01-17 | Stop reason: SURG

## 2025-01-17 RX ORDER — OXYCODONE AND ACETAMINOPHEN 5; 325 MG/1; MG/1
1 TABLET ORAL ONCE AS NEEDED
Status: DISCONTINUED | OUTPATIENT
Start: 2025-01-17 | End: 2025-01-17 | Stop reason: HOSPADM

## 2025-01-17 RX ORDER — ONDANSETRON 2 MG/ML
2 INJECTION INTRAMUSCULAR; INTRAVENOUS ONCE
Status: COMPLETED | OUTPATIENT
Start: 2025-01-17 | End: 2025-01-17

## 2025-01-17 RX ORDER — SODIUM CHLORIDE 0.9 % (FLUSH) 0.9 %
10 SYRINGE (ML) INJECTION EVERY 12 HOURS SCHEDULED
Status: DISCONTINUED | OUTPATIENT
Start: 2025-01-17 | End: 2025-01-17 | Stop reason: HOSPADM

## 2025-01-17 RX ORDER — FENTANYL CITRATE 50 UG/ML
50 INJECTION, SOLUTION INTRAMUSCULAR; INTRAVENOUS
Status: DISCONTINUED | OUTPATIENT
Start: 2025-01-17 | End: 2025-01-17 | Stop reason: HOSPADM

## 2025-01-17 RX ORDER — SODIUM CHLORIDE 0.9 % (FLUSH) 0.9 %
10 SYRINGE (ML) INJECTION AS NEEDED
Status: DISCONTINUED | OUTPATIENT
Start: 2025-01-17 | End: 2025-01-17 | Stop reason: HOSPADM

## 2025-01-17 RX ORDER — IOPAMIDOL 755 MG/ML
100 INJECTION, SOLUTION INTRAVASCULAR
Status: COMPLETED | OUTPATIENT
Start: 2025-01-17 | End: 2025-01-17

## 2025-01-17 RX ORDER — TRAMADOL HYDROCHLORIDE 50 MG/1
50 TABLET ORAL EVERY 8 HOURS PRN
Qty: 12 TABLET | Refills: 0 | Status: ON HOLD | OUTPATIENT
Start: 2025-01-17 | End: 2025-01-18

## 2025-01-17 RX ADMIN — PHENOBARBITAL, HYOSCYAMINE SULFATE, ATROPINE SULFATE AND SCOPOLAMINE HYDROBROMIDE 32.4 MG: .0194; .1037; 16.2; .0065 TABLET ORAL at 22:35

## 2025-01-17 RX ADMIN — FENTANYL CITRATE 50 MCG: 50 INJECTION INTRAMUSCULAR; INTRAVENOUS at 13:19

## 2025-01-17 RX ADMIN — PROPOFOL 140 MG: 10 INJECTION, EMULSION INTRAVENOUS at 13:25

## 2025-01-17 RX ADMIN — HYDROMORPHONE HYDROCHLORIDE 0.25 MG: 1 INJECTION, SOLUTION INTRAMUSCULAR; INTRAVENOUS; SUBCUTANEOUS at 22:33

## 2025-01-17 RX ADMIN — ALUMINUM HYDROXIDE, MAGNESIUM HYDROXIDE, AND DIMETHICONE 15 ML: 400; 400; 40 SUSPENSION ORAL at 22:35

## 2025-01-17 RX ADMIN — ONDANSETRON 4 MG: 2 INJECTION INTRAMUSCULAR; INTRAVENOUS at 13:19

## 2025-01-17 RX ADMIN — HYDROMORPHONE HYDROCHLORIDE 0.25 MG: 1 INJECTION, SOLUTION INTRAMUSCULAR; INTRAVENOUS; SUBCUTANEOUS at 20:47

## 2025-01-17 RX ADMIN — NITROGLYCERIN 0.5 INCH: 20 OINTMENT TOPICAL at 23:54

## 2025-01-17 RX ADMIN — ONDANSETRON 2 MG: 2 INJECTION INTRAMUSCULAR; INTRAVENOUS at 20:47

## 2025-01-17 RX ADMIN — PANTOPRAZOLE SODIUM 40 MG: 40 INJECTION, POWDER, FOR SOLUTION INTRAVENOUS at 21:26

## 2025-01-17 RX ADMIN — IOPAMIDOL 90 ML: 755 INJECTION, SOLUTION INTRAVENOUS at 21:09

## 2025-01-17 RX ADMIN — HYDROMORPHONE HYDROCHLORIDE 0.5 MG: 1 INJECTION, SOLUTION INTRAMUSCULAR; INTRAVENOUS; SUBCUTANEOUS at 23:54

## 2025-01-17 RX ADMIN — FENTANYL CITRATE 50 MCG: 50 INJECTION INTRAMUSCULAR; INTRAVENOUS at 13:50

## 2025-01-17 RX ADMIN — LIDOCAINE HYDROCHLORIDE 15 ML: 20 SOLUTION ORAL at 22:35

## 2025-01-17 RX ADMIN — SODIUM CHLORIDE 500 ML: 9 INJECTION, SOLUTION INTRAVENOUS at 20:50

## 2025-01-17 RX ADMIN — SODIUM CHLORIDE 2000 MG: 9 INJECTION, SOLUTION INTRAVENOUS at 23:36

## 2025-01-17 RX ADMIN — FAMOTIDINE 20 MG: 10 INJECTION, SOLUTION INTRAVENOUS at 13:19

## 2025-01-17 NOTE — H&P
Subjective  Aramis Carson is a 84 y.o. male who presents today for Initial Evaluation     Chief Complaint:        Chief Complaint   Patient presents with    Colonoscopy    scalp lesion         History of Present Illness:    History of Present Illness Aramis is an 84-year-old male who presents for evaluation today for a colonoscopy and scalp lesion.  He reports that he had a CT scan and was told that he has a polyp and would like to have a colonoscopy for further evaluation.  He has had multiple colonoscopies in his lifetime.  Denies any blood in the stool.  Denies any known family history of colon cancer.  He also denies any unintentional weight loss or changes in his bowel habits.  Currently is anticoagulated on Eliquis for history of A-fib.  Also reports lesion to his scalp that has been present times the past month.  Does report it is slightly sore upon palpation.  Denies any issues with drainage.  He does report he send scalp was removed in the past.     The following portions of the patient's history were reviewed and updated as appropriate: allergies, current medications, past family history, past medical history, past social history, past surgical history and problem list.     Past Medical History:  Medical History        Past Medical History:   Diagnosis Date    Atrial fibrillation      Coronary artery disease      Hyperlipidemia      Pre-diabetes              Social History:  Social History   Social History            Socioeconomic History    Marital status:    Tobacco Use    Smoking status: Former       Types: Pipe, Cigars       Quit date:        Years since quittin.9       Passive exposure: Past    Smokeless tobacco: Never    Tobacco comments:       Smoked for 25 years   Vaping Use    Vaping status: Never Used   Substance and Sexual Activity    Alcohol use: No    Drug use: No    Sexual activity: Defer            Family History:        Family History   Problem Relation Age of Onset     Cancer Sister      Diabetes Sister      Cancer Brother      Heart disease Brother      Diabetes Maternal Grandmother      Hypertension Neg Hx           Past Surgical History:  Surgical History         Past Surgical History:   Procedure Laterality Date    CARDIAC CATHETERIZATION        CARDIAC CATHETERIZATION N/A 12/6/2023     Procedure: Left Heart Cath;  Surgeon: Shwetha Navarrete MD;  [facility] CATH [facility]VASIVE [facility];  Service: Cardiology;  Laterality: N/A;    COLONOSCOPY        CYSTOSCOPY TRANSURETHRAL RESECTION OF PROSTATE        INGUINAL HERNIA REPAIR        LAPAROSCOPIC CHOLECYSTECTOMY                Problem List:  Problem List       Patient Active Problem List   Diagnosis    Essential hypertension    Lateral epicondylitis of right elbow    Familial hypercholesterolemia    Controlled type 2 diabetes mellitus without complication    Epidermal cyst    Longstanding persistent atrial fibrillation    Gastroesophageal reflux disease    Ganglion cyst of finger    Pain of finger of right hand    Carpal tunnel syndrome    Obstructive sleep apnea syndrome    Dyslipidemia    Acute prostatitis    Chronic prostatitis    Right groin pain    Thyroid nodule    Pulmonary nodules    Former smoker    Nasal congestion    Abnormal CT of the chest    Chest pain    Cytokine release syndrome, grade 1    CAD (coronary artery disease)    H/O arthritis    H/O mixed hyperlipidemia    Healthcare maintenance    Hypersomnia with sleep apnea    Injury of peroneal nerve    Lumbosacral radiculopathy    Hx of colonic polyp    Skin lesion of scalp            Allergy:   Allergies         Allergies   Allergen Reactions    Flomax [Tamsulosin] Other (See Comments)       Heart rate to drop and pass out    Lisinopril Unknown (See Comments) and Cough       Pt. States that he is allergic however, it has been so long since he took it that he cannot remember the reaction that he had at the time.    Losartan Unknown - Low Severity            Current  Medications:   Current Medications          Current Outpatient Medications   Medication Sig Dispense Refill    apixaban (ELIQUIS) 5 MG tablet tablet Take 1 tablet by mouth 2 (Two) Times a Day. 60 tablet 2    atorvastatin (LIPITOR) 20 MG tablet Take 1 tablet by mouth every night. 90 tablet 3    baclofen (LIORESAL) 10 MG tablet Take 1 tablet by mouth Every Night.        famotidine (PEPCID) 20 MG tablet Take 1 tablet by mouth Every Night.        glipiZIDE XL 2.5 MG 24 hr tablet Take 1 tablet by mouth Daily.        HYDROcodone-acetaminophen (NORCO)  MG per tablet Take 1 tablet by mouth Every 6 (Six) Hours As Needed for Moderate Pain.        lidocaine (LIDODERM) 5 % Place 1 patch on the skin as directed by provider Daily. Remove & Discard patch within 12 hours or as directed by MD 30 each 0    loratadine (CLARITIN) 10 MG tablet Take 1 tablet by mouth Daily As Needed for Allergies. 90 tablet 3    meclizine (ANTIVERT) 25 MG tablet Take 1 tablet by mouth As Needed for Dizziness.        montelukast (SINGULAIR) 10 MG tablet Take 1 tablet by mouth Every Night. 30 tablet 5    omeprazole (priLOSEC) 20 MG capsule Take 1 capsule by mouth 2 (Two) Times a Day.        sacubitril-valsartan (Entresto) 49-51 MG tablet Take 1 tablet by mouth 2 (Two) Times a Day. 56 tablet 0    sacubitril-valsartan (Entresto) 49-51 MG tablet Take 1 tablet by mouth 2 (Two) Times a Day. 180 tablet 3    DULoxetine (Cymbalta) 20 MG capsule Take 1 capsule by mouth Daily for 30 days. 30 capsule 0    polyethylene glycol (GaviLyte-C) 240 g solution Take 4,000 mL by mouth 1 (One) Time for 1 dose. 4000 mL 0      No current facility-administered medications for this visit.            Review of Systems:    14 pt ROS negative except for what is noted in HPI        Physical Exam:   Physical Exam  Constitutional:       Appearance: Normal appearance.   HENT:      Head: Normocephalic and atraumatic.        Right Ear: External ear normal.      Left Ear: External ear  "normal.   Eyes:      Conjunctiva/sclera: Conjunctivae normal.      Pupils: Pupils are equal, round, and reactive to light.   Pulmonary:      Effort: Pulmonary effort is normal.   Abdominal:      General: Abdomen is flat.      Palpations: Abdomen is soft.   Musculoskeletal:         General: Normal range of motion.      Cervical back: Normal range of motion and neck supple.   Skin:     General: Skin is warm and dry.      Capillary Refill: Capillary refill takes less than 2 seconds.   Neurological:      General: No focal deficit present.      Mental Status: He is alert and oriented to person, place, and time.   Psychiatric:         Mood and Affect: Mood normal.         Behavior: Behavior normal.            Vitals:  Blood pressure 140/90, height 167.6 cm (65.98\"), weight 84.4 kg (186 lb).   Body mass index is 30.04 kg/m².            Assessment & Plan  Diagnoses and all orders for this visit:     1. Hx of colonic polyp (Primary)  -     Case Request; Standing  -     Case Request     2. Skin lesion of scalp  -     Case Request; Standing  -     Case Request     Other orders  -     Follow Anesthesia Guidelines / Protocol; Future  -     Follow Anesthesia Guidelines / Protocol; Standing  -     Verify / Perform Chlorhexidine Skin Prep; Standing  -     Provide NPO Instructions to Patient; Future  -     Chlorhexidine Skin Prep; Future  -     Place Sequential Compression Device; Standing  -     Maintain Sequential Compression Device; Standing  -     polyethylene glycol (GaviLyte-C) 240 g solution; Take 4,000 mL by mouth 1 (One) Time for 1 dose.  Dispense: 4000 mL; Refill: 0     Aramis is a 89-year-old male who presents for evaluation for colonoscopy and skin lesion removal.  Patient is anticoagulated on Eliquis.  Colonoscopy and skin lesion removal will be on the same day with Dr. Lockhart to keep him from having to stop his Eliquis and additional time.  He verbalized understanding of prep instructions and procedure and wished to " proceed.     Visit Diagnoses:  Visit Diagnosis       ICD-10-CM ICD-9-CM   1. Hx of colonic polyp  Z86.0100 V12.72   2. Skin lesion of scalp  L98.9 709.9               MEDS ORDERED DURING VISIT:       New Medications Ordered This Visit   Medications    polyethylene glycol (GaviLyte-C) 240 g solution       Sig: Take 4,000 mL by mouth 1 (One) Time for 1 dose.       Dispense:  4000 mL       Refill:  0         Return for Follow up post-op.

## 2025-01-17 NOTE — ANESTHESIA PROCEDURE NOTES
Airway  Urgency: elective    Date/Time: 1/17/2025 1:26 PM  Airway not difficult    General Information and Staff    Patient location during procedure: OR  CRNA/CAA: Dalia Flores CRNA    Indications and Patient Condition  Indications for airway management: airway protection    Preoxygenated: yes  MILS maintained throughout  Mask difficulty assessment: 0 - not attempted    Final Airway Details  Final airway type: supraglottic airway      Successful airway: unique  Size 4     Number of attempts at approach: 1  Assessment: lips, teeth, and gum same as pre-op

## 2025-01-17 NOTE — OP NOTE
COLONOSCOPY, SKIN LESION EXCISION  Procedure Note    Aramis Carson  1/17/2025    Pre-op Diagnosis:   Hx of colonic polyp [Z86.0100]  Skin lesion of scalp [L98.9]    Post-op Diagnosis:     Post-Op Diagnosis Codes:     * Hx of colonic polyp [Z86.0100]     * Skin lesion of scalp [L98.9]    Procedure(s):  COLONOSCOPY  SKIN LESION EXCISION    Surgeon(s):  Neftali Lockhart MD    Anesthesia: Choice    Staff:   Circulator: Pauline Tillman RN  Scrub Person: Georgia Bustos  Endo Technician: Doug Abdul  Assistant: Aminata Sandoval CSA    Findings:   1.1 cm ulcerated skin lesion of scalp excised with 3mm margins  Ascending colon hyperplastic polyp          Operative Procedure:  Patient was taken to the operating room and LMA anesthesia was administered.  The scalp was prepped and draped.  Timeout was performed.  The ulcerated 1.1 cm skin lesion of the scalp was excised with 3mm Margins.  1.5 cm subcutaneous advancement flaps were elevated with cautery to approximate the skin.  The incision was closed with deep dermal vicryl and interrupted nylon skin approximation.  Bacitracin was applied.  The patient was then placed in left lateral position.  DANIEL was negative.  The colonoscopy was inserted into the anus and advanced to the ileocecal valve.  The prep was excellent.  There was a hyperplastic polyp of the ascending colon that was removed with a cold snare.  The remaining colon was normal.  Patient tolerated the procedure well.    Estimated Blood Loss: 5mL    Specimens:   scalp skin lesion, ascending colon polyp           Drains: none    Grafts/Implants:  none    Complications: none           Neftali Lockhart MD     Date: 1/17/2025  Time: 18:04 EST

## 2025-01-17 NOTE — ANESTHESIA PREPROCEDURE EVALUATION
Anesthesia Evaluation     Patient summary reviewed and Nursing notes reviewed   no history of anesthetic complications:   NPO Solid Status: > 8 hours  NPO Liquid Status: > 8 hours           Airway   Mallampati: II  TM distance: >3 FB  Dental - normal exam     Pulmonary     breath sounds clear to auscultation  (+) ,sleep apnea on CPAP  Cardiovascular   Exercise tolerance: good (4-7 METS)    ECG reviewed  PT is on anticoagulation therapy  Rhythm: regular  Rate: normal    (+) hypertension, CAD, dysrhythmias Atrial Fib, hyperlipidemia      Neuro/Psych  (+) numbness  GI/Hepatic/Renal/Endo    (+) GERD, diabetes mellitus, thyroid problem thyroid nodules    Musculoskeletal     Abdominal     Abdomen: soft.   Substance History - negative use     OB/GYN          Other   arthritis,     ROS/Med Hx Other: 12/6/23    ·  Mid LAD lesion is 30% stenosed.  ·  Mid Cx lesion is 50% stenosed.                     Anesthesia Plan    ASA 3     general     intravenous induction     Anesthetic plan, risks, benefits, and alternatives have been provided, discussed and informed consent has been obtained with: patient.  Pre-procedure education provided  Use of blood products discussed with  Consented to blood products.    Plan discussed with CRNA.    CODE STATUS:

## 2025-01-17 NOTE — ANESTHESIA POSTPROCEDURE EVALUATION
Patient: Aramis Carson    Procedure Summary       Date: 01/17/25 Room / Location:  [facility] 04 /  [facility]    Anesthesia Start: 1319 Anesthesia Stop: 1354    Procedures:       COLONOSCOPY      SKIN LESION EXCISION Diagnosis:       Hx of colonic polyp      Skin lesion of scalp      (Hx of colonic polyp [Z86.0100])      (Skin lesion of scalp [L98.9])    Surgeons: Neftali Lockhart MD Provider: Naun William DO    Anesthesia Type: general ASA Status: 3            Anesthesia Type: general    Vitals  Vitals Value Taken Time   /79 01/17/25 1426   Temp 97.3 °F (36.3 °C) 01/17/25 1356   Pulse 88 01/17/25 1426   Resp 18 01/17/25 1426   SpO2 96 % 01/17/25 1426           Post Anesthesia Care and Evaluation    Patient location during evaluation: PHASE II  Patient participation: complete - patient participated  Level of consciousness: awake and alert  Pain score: 1  Pain management: adequate    Airway patency: patent  Anesthetic complications: No anesthetic complications  PONV Status: controlled  Cardiovascular status: acceptable  Respiratory status: acceptable  Hydration status: acceptable

## 2025-01-18 ENCOUNTER — APPOINTMENT (OUTPATIENT)
Dept: CARDIOLOGY | Facility: HOSPITAL | Age: 85
End: 2025-01-18
Payer: MEDICARE

## 2025-01-18 VITALS
TEMPERATURE: 98.2 F | HEIGHT: 66 IN | OXYGEN SATURATION: 98 % | RESPIRATION RATE: 18 BRPM | WEIGHT: 186.51 LBS | SYSTOLIC BLOOD PRESSURE: 121 MMHG | HEART RATE: 63 BPM | BODY MASS INDEX: 29.97 KG/M2 | DIASTOLIC BLOOD PRESSURE: 60 MMHG

## 2025-01-18 LAB
ALBUMIN SERPL-MCNC: 3.6 G/DL (ref 3.5–5.2)
ALBUMIN/GLOB SERPL: 1.4 G/DL
ALP SERPL-CCNC: 71 U/L (ref 39–117)
ALT SERPL W P-5'-P-CCNC: 18 U/L (ref 1–41)
ANION GAP SERPL CALCULATED.3IONS-SCNC: 10 MMOL/L (ref 5–15)
ANION GAP SERPL CALCULATED.3IONS-SCNC: 10.5 MMOL/L (ref 5–15)
AST SERPL-CCNC: 16 U/L (ref 1–40)
AV MEAN PRESS GRAD SYS DOP V1V2: 3 MMHG
AV VMAX SYS DOP: 121 CM/SEC
BASOPHILS # BLD AUTO: 0.03 10*3/MM3 (ref 0–0.2)
BASOPHILS NFR BLD AUTO: 0.3 % (ref 0–1.5)
BH CV ECHO MEAS - ACS: 1.6 CM
BH CV ECHO MEAS - AI P1/2T: 445.5 MSEC
BH CV ECHO MEAS - AO MAX PG: 5.9 MMHG
BH CV ECHO MEAS - AO ROOT DIAM: 3.1 CM
BH CV ECHO MEAS - AO V2 VTI: 24.1 CM
BH CV ECHO MEAS - AVA(I,D): 2.19 CM2
BH CV ECHO MEAS - EDV(CUBED): 103.8 ML
BH CV ECHO MEAS - EDV(MOD-SP2): 76.9 ML
BH CV ECHO MEAS - EDV(MOD-SP4): 73.1 ML
BH CV ECHO MEAS - EF(MOD-SP2): 57.7 %
BH CV ECHO MEAS - EF(MOD-SP4): 56.9 %
BH CV ECHO MEAS - ESV(CUBED): 32.8 ML
BH CV ECHO MEAS - ESV(MOD-SP2): 32.5 ML
BH CV ECHO MEAS - ESV(MOD-SP4): 31.5 ML
BH CV ECHO MEAS - FS: 31.9 %
BH CV ECHO MEAS - IVS/LVPW: 0.89 CM
BH CV ECHO MEAS - IVSD: 0.8 CM
BH CV ECHO MEAS - LA DIMENSION: 4.1 CM
BH CV ECHO MEAS - LAT PEAK E' VEL: 10.8 CM/SEC
BH CV ECHO MEAS - LV DIASTOLIC VOL/BSA (35-75): 37.7 CM2
BH CV ECHO MEAS - LV MASS(C)D: 132.3 GRAMS
BH CV ECHO MEAS - LV MAX PG: 2.7 MMHG
BH CV ECHO MEAS - LV MEAN PG: 1 MMHG
BH CV ECHO MEAS - LV SYSTOLIC VOL/BSA (12-30): 16.2 CM2
BH CV ECHO MEAS - LV V1 MAX: 81.6 CM/SEC
BH CV ECHO MEAS - LV V1 VTI: 16.8 CM
BH CV ECHO MEAS - LVIDD: 4.7 CM
BH CV ECHO MEAS - LVIDS: 3.2 CM
BH CV ECHO MEAS - LVOT AREA: 3.1 CM2
BH CV ECHO MEAS - LVOT DIAM: 2 CM
BH CV ECHO MEAS - LVPWD: 0.9 CM
BH CV ECHO MEAS - MED PEAK E' VEL: 7.5 CM/SEC
BH CV ECHO MEAS - MR MAX PG: 17.1 MMHG
BH CV ECHO MEAS - MR MAX VEL: 204 CM/SEC
BH CV ECHO MEAS - MV A MAX VEL: 45.8 CM/SEC
BH CV ECHO MEAS - MV DEC SLOPE: 611 CM/SEC2
BH CV ECHO MEAS - MV DEC TIME: 0.17 SEC
BH CV ECHO MEAS - MV E MAX VEL: 102 CM/SEC
BH CV ECHO MEAS - MV E/A: 2.23
BH CV ECHO MEAS - MV MAX PG: 5.3 MMHG
BH CV ECHO MEAS - MV MEAN PG: 2 MMHG
BH CV ECHO MEAS - MV V2 VTI: 28.5 CM
BH CV ECHO MEAS - MVA(VTI): 1.85 CM2
BH CV ECHO MEAS - PA ACC TIME: 0.13 SEC
BH CV ECHO MEAS - PA V2 MAX: 68.9 CM/SEC
BH CV ECHO MEAS - PI END-D VEL: 107 CM/SEC
BH CV ECHO MEAS - RAP SYSTOLE: 10 MMHG
BH CV ECHO MEAS - RVSP: 48.2 MMHG
BH CV ECHO MEAS - SV(LVOT): 52.8 ML
BH CV ECHO MEAS - SV(MOD-SP2): 44.4 ML
BH CV ECHO MEAS - SV(MOD-SP4): 41.6 ML
BH CV ECHO MEAS - SVI(LVOT): 27.2 ML/M2
BH CV ECHO MEAS - SVI(MOD-SP2): 22.9 ML/M2
BH CV ECHO MEAS - SVI(MOD-SP4): 21.4 ML/M2
BH CV ECHO MEAS - TAPSE (>1.6): 2.48 CM
BH CV ECHO MEAS - TR MAX PG: 38.2 MMHG
BH CV ECHO MEAS - TR MAX VEL: 309 CM/SEC
BH CV ECHO MEASUREMENTS AVERAGE E/E' RATIO: 11.15
BILIRUB SERPL-MCNC: 0.3 MG/DL (ref 0–1.2)
BUN SERPL-MCNC: 22 MG/DL (ref 8–23)
BUN SERPL-MCNC: 22 MG/DL (ref 8–23)
BUN/CREAT SERPL: 16.1 (ref 7–25)
BUN/CREAT SERPL: 16.8 (ref 7–25)
CALCIUM SPEC-SCNC: 8.1 MG/DL (ref 8.6–10.5)
CALCIUM SPEC-SCNC: 8.2 MG/DL (ref 8.6–10.5)
CHLORIDE SERPL-SCNC: 110 MMOL/L (ref 98–107)
CHLORIDE SERPL-SCNC: 110 MMOL/L (ref 98–107)
CHOLEST SERPL-MCNC: 110 MG/DL (ref 0–200)
CO2 SERPL-SCNC: 21.5 MMOL/L (ref 22–29)
CO2 SERPL-SCNC: 22 MMOL/L (ref 22–29)
CREAT SERPL-MCNC: 1.31 MG/DL (ref 0.76–1.27)
CREAT SERPL-MCNC: 1.37 MG/DL (ref 0.76–1.27)
DEPRECATED RDW RBC AUTO: 44 FL (ref 37–54)
EGFRCR SERPLBLD CKD-EPI 2021: 50.9 ML/MIN/1.73
EGFRCR SERPLBLD CKD-EPI 2021: 53.7 ML/MIN/1.73
EOSINOPHIL # BLD AUTO: 0 10*3/MM3 (ref 0–0.4)
EOSINOPHIL NFR BLD AUTO: 0 % (ref 0.3–6.2)
ERYTHROCYTE [DISTWIDTH] IN BLOOD BY AUTOMATED COUNT: 13.9 % (ref 12.3–15.4)
GLOBULIN UR ELPH-MCNC: 2.6 GM/DL
GLUCOSE BLDC GLUCOMTR-MCNC: 147 MG/DL (ref 70–130)
GLUCOSE BLDC GLUCOMTR-MCNC: 159 MG/DL (ref 70–130)
GLUCOSE BLDC GLUCOMTR-MCNC: 162 MG/DL (ref 70–130)
GLUCOSE BLDC GLUCOMTR-MCNC: 164 MG/DL (ref 70–130)
GLUCOSE BLDC GLUCOMTR-MCNC: 99 MG/DL (ref 70–130)
GLUCOSE SERPL-MCNC: 180 MG/DL (ref 65–99)
GLUCOSE SERPL-MCNC: 188 MG/DL (ref 65–99)
HBA1C MFR BLD: 6.5 % (ref 4.8–5.6)
HCT VFR BLD AUTO: 44.7 % (ref 37.5–51)
HDLC SERPL-MCNC: 32 MG/DL (ref 40–60)
HGB BLD-MCNC: 14.2 G/DL (ref 13–17.7)
IMM GRANULOCYTES # BLD AUTO: 0.05 10*3/MM3 (ref 0–0.05)
IMM GRANULOCYTES NFR BLD AUTO: 0.5 % (ref 0–0.5)
LDLC SERPL CALC-MCNC: 65 MG/DL (ref 0–100)
LDLC/HDLC SERPL: 2.07 {RATIO}
LEFT ATRIUM VOLUME INDEX: 54.6 ML/M2
LIPASE SERPL-CCNC: 26 U/L (ref 13–60)
LV EF BIPLANE MOD: 55.2 %
LYMPHOCYTES # BLD AUTO: 0.73 10*3/MM3 (ref 0.7–3.1)
LYMPHOCYTES NFR BLD AUTO: 6.9 % (ref 19.6–45.3)
MCH RBC QN AUTO: 27.4 PG (ref 26.6–33)
MCHC RBC AUTO-ENTMCNC: 31.8 G/DL (ref 31.5–35.7)
MCV RBC AUTO: 86.3 FL (ref 79–97)
MONOCYTES # BLD AUTO: 0.26 10*3/MM3 (ref 0.1–0.9)
MONOCYTES NFR BLD AUTO: 2.4 % (ref 5–12)
NEUTROPHILS NFR BLD AUTO: 89.9 % (ref 42.7–76)
NEUTROPHILS NFR BLD AUTO: 9.57 10*3/MM3 (ref 1.7–7)
NRBC BLD AUTO-RTO: 0 /100 WBC (ref 0–0.2)
PLATELET # BLD AUTO: 209 10*3/MM3 (ref 140–450)
PMV BLD AUTO: 10.5 FL (ref 6–12)
POTASSIUM SERPL-SCNC: 5.2 MMOL/L (ref 3.5–5.2)
POTASSIUM SERPL-SCNC: 5.5 MMOL/L (ref 3.5–5.2)
PROT SERPL-MCNC: 6.2 G/DL (ref 6–8.5)
QT INTERVAL: 436 MS
QT INTERVAL: 482 MS
QTC INTERVAL: 473 MS
QTC INTERVAL: 499 MS
RBC # BLD AUTO: 5.18 10*6/MM3 (ref 4.14–5.8)
SODIUM SERPL-SCNC: 142 MMOL/L (ref 136–145)
SODIUM SERPL-SCNC: 142 MMOL/L (ref 136–145)
TRIGL SERPL-MCNC: 59 MG/DL (ref 0–150)
TROPONIN T SERPL HS-MCNC: 15 NG/L
VLDLC SERPL-MCNC: 13 MG/DL (ref 5–40)
WBC NRBC COR # BLD AUTO: 10.64 10*3/MM3 (ref 3.4–10.8)

## 2025-01-18 PROCEDURE — G0378 HOSPITAL OBSERVATION PER HR: HCPCS

## 2025-01-18 PROCEDURE — 96372 THER/PROPH/DIAG INJ SC/IM: CPT

## 2025-01-18 PROCEDURE — 82948 REAGENT STRIP/BLOOD GLUCOSE: CPT

## 2025-01-18 PROCEDURE — 96376 TX/PRO/DX INJ SAME DRUG ADON: CPT

## 2025-01-18 PROCEDURE — 93306 TTE W/DOPPLER COMPLETE: CPT | Performed by: SPECIALIST

## 2025-01-18 PROCEDURE — 96375 TX/PRO/DX INJ NEW DRUG ADDON: CPT

## 2025-01-18 PROCEDURE — 25010000002 HEPARIN (PORCINE) PER 1000 UNITS: Performed by: HOSPITALIST

## 2025-01-18 PROCEDURE — 80061 LIPID PANEL: CPT | Performed by: HOSPITALIST

## 2025-01-18 PROCEDURE — 99235 HOSP IP/OBS SAME DATE MOD 70: CPT | Performed by: INTERNAL MEDICINE

## 2025-01-18 PROCEDURE — 99214 OFFICE O/P EST MOD 30 MIN: CPT | Performed by: SURGERY

## 2025-01-18 PROCEDURE — 85025 COMPLETE CBC W/AUTO DIFF WBC: CPT | Performed by: HOSPITALIST

## 2025-01-18 PROCEDURE — 25010000002 MORPHINE PER 10 MG: Performed by: HOSPITALIST

## 2025-01-18 PROCEDURE — 93306 TTE W/DOPPLER COMPLETE: CPT

## 2025-01-18 PROCEDURE — 83690 ASSAY OF LIPASE: CPT

## 2025-01-18 PROCEDURE — 99214 OFFICE O/P EST MOD 30 MIN: CPT | Performed by: SPECIALIST

## 2025-01-18 PROCEDURE — 80053 COMPREHEN METABOLIC PANEL: CPT | Performed by: HOSPITALIST

## 2025-01-18 RX ORDER — ASPIRIN 81 MG/1
81 TABLET, CHEWABLE ORAL DAILY
Status: DISCONTINUED | OUTPATIENT
Start: 2025-01-18 | End: 2025-01-18

## 2025-01-18 RX ORDER — NITROGLYCERIN 0.4 MG/1
0.4 TABLET SUBLINGUAL
Status: DISCONTINUED | OUTPATIENT
Start: 2025-01-18 | End: 2025-01-18 | Stop reason: HOSPADM

## 2025-01-18 RX ORDER — BISACODYL 10 MG
10 SUPPOSITORY, RECTAL RECTAL DAILY
Status: DISCONTINUED | OUTPATIENT
Start: 2025-01-18 | End: 2025-01-18 | Stop reason: HOSPADM

## 2025-01-18 RX ORDER — NICOTINE POLACRILEX 4 MG
15 LOZENGE BUCCAL
Status: DISCONTINUED | OUTPATIENT
Start: 2025-01-18 | End: 2025-01-18 | Stop reason: HOSPADM

## 2025-01-18 RX ORDER — POLYETHYLENE GLYCOL 3350 17 G/17G
17 POWDER, FOR SOLUTION ORAL DAILY PRN
Status: DISCONTINUED | OUTPATIENT
Start: 2025-01-18 | End: 2025-01-18

## 2025-01-18 RX ORDER — GLIPIZIDE 5 MG/1
1.25 TABLET ORAL
Status: CANCELLED | OUTPATIENT
Start: 2025-01-18

## 2025-01-18 RX ORDER — DEXTROSE MONOHYDRATE 25 G/50ML
25 INJECTION, SOLUTION INTRAVENOUS
Status: DISCONTINUED | OUTPATIENT
Start: 2025-01-18 | End: 2025-01-18 | Stop reason: HOSPADM

## 2025-01-18 RX ORDER — SACUBITRIL AND VALSARTAN 24; 26 MG/1; MG/1
1 TABLET, FILM COATED ORAL 2 TIMES DAILY
Status: ON HOLD | COMMUNITY
End: 2025-01-18

## 2025-01-18 RX ORDER — BISACODYL 5 MG/1
5 TABLET, DELAYED RELEASE ORAL DAILY PRN
Status: DISCONTINUED | OUTPATIENT
Start: 2025-01-18 | End: 2025-01-18

## 2025-01-18 RX ORDER — HEPARIN SODIUM 5000 [USP'U]/ML
5000 INJECTION, SOLUTION INTRAVENOUS; SUBCUTANEOUS EVERY 12 HOURS SCHEDULED
Status: DISCONTINUED | OUTPATIENT
Start: 2025-01-18 | End: 2025-01-18

## 2025-01-18 RX ORDER — AMOXICILLIN 250 MG
2 CAPSULE ORAL 2 TIMES DAILY PRN
Status: DISCONTINUED | OUTPATIENT
Start: 2025-01-18 | End: 2025-01-18

## 2025-01-18 RX ORDER — ATORVASTATIN CALCIUM 20 MG/1
20 TABLET, FILM COATED ORAL NIGHTLY
COMMUNITY

## 2025-01-18 RX ORDER — BISACODYL 10 MG
10 SUPPOSITORY, RECTAL RECTAL DAILY PRN
Status: DISCONTINUED | OUTPATIENT
Start: 2025-01-18 | End: 2025-01-18 | Stop reason: HOSPADM

## 2025-01-18 RX ORDER — SODIUM CHLORIDE 0.9 % (FLUSH) 0.9 %
10 SYRINGE (ML) INJECTION AS NEEDED
Status: DISCONTINUED | OUTPATIENT
Start: 2025-01-18 | End: 2025-01-18 | Stop reason: HOSPADM

## 2025-01-18 RX ORDER — BISACODYL 10 MG
10 SUPPOSITORY, RECTAL RECTAL DAILY PRN
Qty: 5 SUPPOSITORY | Refills: 0 | Status: SHIPPED | OUTPATIENT
Start: 2025-01-18 | End: 2025-01-19

## 2025-01-18 RX ORDER — SACUBITRIL AND VALSARTAN 24; 26 MG/1; MG/1
1 TABLET, FILM COATED ORAL EVERY 12 HOURS PRN
Start: 2025-01-18

## 2025-01-18 RX ORDER — SIMETHICONE 80 MG
80 TABLET,CHEWABLE ORAL 4 TIMES DAILY PRN
Status: DISCONTINUED | OUTPATIENT
Start: 2025-01-18 | End: 2025-01-18 | Stop reason: HOSPADM

## 2025-01-18 RX ORDER — FAMOTIDINE 20 MG/1
20 TABLET, FILM COATED ORAL 2 TIMES DAILY
Status: DISCONTINUED | OUTPATIENT
Start: 2025-01-18 | End: 2025-01-18 | Stop reason: HOSPADM

## 2025-01-18 RX ORDER — SODIUM CHLORIDE 0.9 % (FLUSH) 0.9 %
10 SYRINGE (ML) INJECTION EVERY 12 HOURS SCHEDULED
Status: DISCONTINUED | OUTPATIENT
Start: 2025-01-18 | End: 2025-01-18 | Stop reason: HOSPADM

## 2025-01-18 RX ORDER — ALUMINA, MAGNESIA, AND SIMETHICONE 2400; 2400; 240 MG/30ML; MG/30ML; MG/30ML
15 SUSPENSION ORAL EVERY 6 HOURS PRN
Status: DISCONTINUED | OUTPATIENT
Start: 2025-01-18 | End: 2025-01-18

## 2025-01-18 RX ORDER — SIMETHICONE 80 MG
80 TABLET,CHEWABLE ORAL 4 TIMES DAILY PRN
Qty: 30 TABLET | Refills: 0 | Status: SHIPPED | OUTPATIENT
Start: 2025-01-18 | End: 2025-01-19

## 2025-01-18 RX ORDER — GLUCAGON 1 MG/ML
1 KIT INJECTION
Status: DISCONTINUED | OUTPATIENT
Start: 2025-01-18 | End: 2025-01-18 | Stop reason: HOSPADM

## 2025-01-18 RX ORDER — SODIUM CHLORIDE 9 MG/ML
40 INJECTION, SOLUTION INTRAVENOUS AS NEEDED
Status: DISCONTINUED | OUTPATIENT
Start: 2025-01-18 | End: 2025-01-18 | Stop reason: HOSPADM

## 2025-01-18 RX ORDER — ATORVASTATIN CALCIUM 40 MG/1
80 TABLET, FILM COATED ORAL NIGHTLY
Status: DISCONTINUED | OUTPATIENT
Start: 2025-01-18 | End: 2025-01-18

## 2025-01-18 RX ORDER — ATORVASTATIN CALCIUM 20 MG/1
20 TABLET, FILM COATED ORAL NIGHTLY
Status: DISCONTINUED | OUTPATIENT
Start: 2025-01-18 | End: 2025-01-18 | Stop reason: HOSPADM

## 2025-01-18 RX ORDER — MORPHINE SULFATE 2 MG/ML
2 INJECTION, SOLUTION INTRAMUSCULAR; INTRAVENOUS EVERY 4 HOURS PRN
Status: DISCONTINUED | OUTPATIENT
Start: 2025-01-18 | End: 2025-01-18 | Stop reason: HOSPADM

## 2025-01-18 RX ADMIN — MORPHINE SULFATE 2 MG: 2 INJECTION, SOLUTION INTRAMUSCULAR; INTRAVENOUS at 11:12

## 2025-01-18 RX ADMIN — HEPARIN SODIUM 5000 UNITS: 5000 INJECTION INTRAVENOUS; SUBCUTANEOUS at 08:49

## 2025-01-18 RX ADMIN — MORPHINE SULFATE 2 MG: 2 INJECTION, SOLUTION INTRAMUSCULAR; INTRAVENOUS at 06:35

## 2025-01-18 RX ADMIN — Medication 10 ML: at 08:49

## 2025-01-18 RX ADMIN — FAMOTIDINE 20 MG: 20 TABLET, FILM COATED ORAL at 11:13

## 2025-01-18 RX ADMIN — BISACODYL 10 MG: 10 SUPPOSITORY RECTAL at 11:13

## 2025-01-18 RX ADMIN — NITROGLYCERIN 1 INCH: 20 OINTMENT TOPICAL at 02:29

## 2025-01-18 RX ADMIN — APIXABAN 5 MG: 5 TABLET, FILM COATED ORAL at 15:41

## 2025-01-18 RX ADMIN — MORPHINE SULFATE 2 MG: 2 INJECTION, SOLUTION INTRAMUSCULAR; INTRAVENOUS at 15:41

## 2025-01-18 NOTE — CONSULTS
Consults  Date of Admit: 1/17/2025  Date of Consult: 01/18/25  No ref. provider found  Aramis Carson  1940    Primary cardiologist-ISAIAS Adames with last visit to clinic 11/19/2024    Consulting Physician: Ileana Ornelas MD    Cardiology consultation    Reason for consultation: Need for ischemic evaluation?      History of Present Illness    Subjective     Chief Complaint   Patient presents with    Chest Pain       Aramis Carson is a 84 y.o. male with nonobstructive coronary artery disease, persistent atrial fibrillation ([id number] at least 4: Age, hypertension, vascular disease), hypertension, hyperlipidemia, prediabetes, obstructive sleep apnea, GERD, hypothyroidism, and obesity.    He was at the hospital 01/17/2025 for excision of an ulcerated skin lesion on his scalp and colonoscopy with ascending colon polyp removal.  He returned home in his usual state of health and ate a dinner of fried chicken.  Approximately 90 minutes later he began to have sudden, severe epigastric pain that lasted about 15 minutes before it began to claudia.  He presented to the emergency room for further evaluation.  He noted that his pain would wax and wane with no total resolution.  The pain radiated into his left scapula and was not improved with nitroglycerin or GI cocktail.  He got brief relief from Dilaudid.  His pain was made worse with movement.    Admission vital signs significant for heart rate of 124.    Admission labs and studies reveal high-sensitivity troponin 18-18-15.  proBNP 530.  CMP remarkable for potassium 5.5, chloride 110, creatinine 1.37, glucose 188, and calcium 8.1.  CBC normal.  CTA of the chest was negative for PE and aneurysmal dilation/dissection.  It did note patchy prominent groundglass opacities within both lungs.  CTA of the abdomen and pelvis showed no acute inflammatory process/obstruction/perforation.  EKG noted atrial fibrillation with PVCs    Patient is awake in bed with wife at the  bedside.  He reports that he continues to have low-grade pain below the left rib border that radiates into his back.  It is made worse by movement.    Orders placed:  Okay to resume diet per cardiology standpoint  Apixaban resumed      Cardiac risk factors:arteriosclerotic heart disease, hypercholesterolemia, hypertension, and Obesity    Last Echo:  Results for orders placed during the hospital encounter of 12/03/23    Adult Transthoracic Echo Complete W/ Cont if Necessary Per Protocol    Interpretation Summary    Left ventricular systolic function is normal. Calculated left ventricular EF = 67% Left ventricular ejection fraction appears to be 66 - 70%.    Left ventricular diastolic function is consistent with (grade I) impaired relaxation.    The left atrial cavity is moderately dilated.    Estimated right ventricular systolic pressure from tricuspid regurgitation is mildly elevated (35-45 mmHg).    Mild pulmonary hypertension is present.      Last Stress:   Results for orders placed during the hospital encounter of 12/03/23    Stress Test With Myocardial Perfusion One Day    Interpretation Summary  Images from the original result were not included.      A pharmacological stress test was performed using regadenoson without low-level exercise.    Findings consistent with an indeterminate ECG stress test.    Raw images reviewed with the following abnormalities noted: Has 3 bright scintigraphic spots noted in the right lung field.  Clinical correlation required to rule out any pulmonary pathology.    Myocardial perfusion imaging indicates a moderate-sized, mild degree of ischemia located in the inferior wall and lateral wall.    TID:1.19    Normal LV cavity size. Normal LV wall motion noted.    Left ventricular ejection fraction is normal (Calculated EF = 69%).    Impressions are consistent with an intermediate risk study.      Last Cath:  Results for orders placed during the hospital encounter of 12/03/23    Cardiac  Catheterization/Vascular Study    Conclusion    Mid LAD lesion is 30% stenosed.    Mid Cx lesion is 50% stenosed.    1.  Cardiac.  Mild nonobstructive epicardial coronary disease noted.  Medical management will be advised.  Eliquis for chronic A-fib will be advised.      Past Medical History:   Diagnosis Date    Arthritis     Atrial fibrillation     Coronary artery disease     GERD (gastroesophageal reflux disease)     Hyperlipidemia     Hypertension     Pre-diabetes     Sleep apnea     BiPap    Thyroid nodule      Past Surgical History:   Procedure Laterality Date    CARDIAC CATHETERIZATION      CARDIAC CATHETERIZATION N/A 2023    Procedure: Left Heart Cath;  Surgeon: Shwetha Navarrete MD;  Location:  [facility];  Service: Cardiology;  Laterality: N/A;    COLONOSCOPY      CYSTOSCOPY TRANSURETHRAL RESECTION OF PROSTATE      ENDOSCOPY      EYE SURGERY Right     INGUINAL HERNIA REPAIR Right     LAPAROSCOPIC CHOLECYSTECTOMY       Family History   Problem Relation Age of Onset    Cancer Sister     Diabetes Sister     Cancer Brother     Heart disease Brother     Diabetes Maternal Grandmother     Hypertension Neg Hx      Social History     Tobacco Use    Smoking status: Former     Types: Pipe, Cigars     Quit date:      Years since quittin.0     Passive exposure: Past    Smokeless tobacco: Never    Tobacco comments:     Smoked for 25 years   Vaping Use    Vaping status: Never Used   Substance Use Topics    Alcohol use: No    Drug use: No     Medications Prior to Admission   Medication Sig Dispense Refill Last Dose/Taking    atorvastatin (LIPITOR) 20 MG tablet Take 1 tablet by mouth Every Night.   2025 Evening    Eliquis 5 MG tablet tablet TAKE 1 TABLET EVERY 12 HOURS FOR ATRIAL FIBRILLATION 180 tablet 3 2025    famotidine (PEPCID) 20 MG tablet Take 1 tablet by mouth 2 (Two) Times a Day.   2025 Evening    glipiZIDE XL 2.5 MG 24 hr tablet Take 1 tablet by mouth Daily.   2025     sacubitril-valsartan (Entresto) 24-26 MG tablet Take 1 tablet by mouth 2 (Two) Times a Day.   1/16/2025 Evening     Allergies:  Flomax [tamsulosin], Lisinopril, and Losartan    Review of Systems   Constitutional:  Negative for fatigue and fever.   Respiratory:  Negative for shortness of breath.    Cardiovascular:  Positive for chest pain.   Gastrointestinal:  Positive for abdominal pain.   Neurological:  Negative for weakness.          Objective      Vital Signs  Temp:  [97.3 °F (36.3 °C)-98.2 °F (36.8 °C)] 98.2 °F (36.8 °C)  Heart Rate:  [] 63  Resp:  [15-20] 16  BP: (107-143)/(50-92) 115/50  Body mass index is 30.1 kg/m².  No intake or output data in the 24 hours ending 01/18/25 0924    Physical Exam  Vitals reviewed.   Constitutional:       General: He is not in acute distress.     Appearance: He is obese.   HENT:      Head: Normocephalic and atraumatic.      Nose: Nose normal.   Eyes:      Conjunctiva/sclera: Conjunctivae normal.   Cardiovascular:      Rate and Rhythm: Normal rate. Rhythm irregular.      Pulses:           Dorsalis pedis pulses are 2+ on the right side and 2+ on the left side.        Posterior tibial pulses are 2+ on the right side and 2+ on the left side.      Heart sounds: No murmur heard.  Pulmonary:      Effort: Pulmonary effort is normal.      Breath sounds: Normal breath sounds.   Abdominal:      General: There is distension (Mildly).      Comments: Tympanic with percussion   Musculoskeletal:         General: Normal range of motion.      Cervical back: Normal range of motion.      Right lower leg: No edema.      Left lower leg: No edema.   Skin:     General: Skin is warm and dry.   Neurological:      General: No focal deficit present.      Mental Status: He is alert.   Psychiatric:         Mood and Affect: Mood normal.         Behavior: Behavior normal.         Telemetry: Atrial fibrillation 60s    Results Review:   I reviewed the patient's new clinical results.  Results from last  7 days   Lab Units 01/17/25  2346 01/17/25  2112 01/17/25 2004   HSTROP T ng/L 15 18 18     Results from last 7 days   Lab Units 01/18/25  0331 01/17/25 2004   WBC 10*3/mm3 10.64 10.54   HEMOGLOBIN g/dL 14.2 15.2   PLATELETS 10*3/mm3 209 243     Results from last 7 days   Lab Units 01/18/25  0703 01/18/25  0331 01/17/25 2004   SODIUM mmol/L 142 142 145   POTASSIUM mmol/L 5.2 5.5* 4.6   CHLORIDE mmol/L 110* 110* 107   CO2 mmol/L 21.5* 22.0 23.2   BUN mg/dL 22 22 23   CREATININE mg/dL 1.31* 1.37* 1.40*   CALCIUM mg/dL 8.2* 8.1* 9.0   GLUCOSE mg/dL 180* 188* 185*   ALT (SGPT) U/L  --  18 23   AST (SGOT) U/L  --  16 20     Lab Results   Component Value Date    INR 1.05 01/17/2025    INR 1.37 (H) 12/06/2023    INR 1.98 (H) 12/05/2023    INR 2.19 (H) 12/04/2023    INR 2.91 (H) 12/03/2023    INR 1.88 (H) 04/30/2023    INR 1.89 (H) 04/20/2023     Lab Results   Component Value Date    MG 2.0 01/17/2025    MG 2.0 12/08/2023    MG 2.0 12/07/2023     Lab Results   Component Value Date    TSH 2.800 08/07/2023    PSA 1.770 10/05/2020    CHLPL 107 03/19/2016    TRIG 59 01/18/2025    HDL 32 (L) 01/18/2025    LDL 65 01/18/2025      Lab Results   Component Value Date    PROBNP 530.3 01/17/2025    PROBNP 934.2 08/24/2019        EKG:         Imaging Results (Last 72 Hours)       Procedure Component Value Units Date/Time    CT Angiogram Abdomen Pelvis [[id number]] Collected: 01/17/25 2232     Updated: 01/17/25 2237    Narrative:      EXAMINATION: CT angiogram abdomen and pelvis with contrast     CLINICAL HISTORY: Pain.     COMPARISON: None.     TECHNIQUE: Contiguous 3 mm thick slices were obtained through the  abdomen and pelvis after the administration of Isovue-370 intravenous  contrast. Coronal and sagittal reformats were performed.     FINDINGS:     ABDOMEN:  Normal caliber abdominal aorta. No aneurysmal dilatation or dissection.  The celiac, superior and inferior mesenteric arteries are patent. The  main renal arteries are widely  patent. No upper abdominal ascites. Prior  cholecystectomy. The spleen, adrenal glands and pancreas appear  unremarkable. The kidneys enhance rather homogeneously and  symmetrically. No hydronephrosis is appreciated. No definite ureteral  stone is identified.     PELVIS:   The appendix is well-visualized and is normal. Scattered colonic  diverticula are noted. There is no acute diverticulitis. No free air. No  significant free or loculated collection is identified.     Evaluation of the bones demonstrates no acute or suspicious osseous  lesions.       Impression:      1. No acute inflammatory process.  2. No bowel obstruction or perforation.  3. No acute appendicitis, colitis or diverticulitis.     This report was finalized on 1/17/2025 10:35 PM by Sadi Tellez MD.       CT Angiogram Chest Pulmonary Embolism [[id number]] Collected: 01/17/25 2227     Updated: 01/17/25 2234    Narrative:      EXAMINATION: CT angiogram chest with contrast     CLINICAL HISTORY: Left anterior chest pain.     COMPARISON: 11/8/2024     TECHNIQUE: Contiguous 3 mm thick slices were obtained through the chest  after the administration of Isovue-370 intravenous contrast. Coronal and  sagittal reformats were performed.     FINDINGS:  The thoracic aorta is normal in caliber. No aneurysmal dilatation. No  dissection. The central pulmonary arteries are normal in caliber. No  pulmonary embolus is appreciated. No pleural or pericardial effusion.  There is diffuse groundglass opacity within both lungs. This may be  related to atelectasis or perhaps edema. However, an atypical infectious  process could result in a similar appearance. No pneumothorax is  appreciated. The left thyroid lobe is markedly heterogeneous with a  somewhat exophytic nodular area along the lower pole. This is likely on  the basis of a goiter. However, the findings are not entirely specific.  The overall character is similar to the prior study.       Impression:      1. Normal  caliber thoracic aorta. No aneurysmal dilatation or  dissection.  2. No pulmonary embolus.  3. Heterogeneous appearance of the thyroid, similar to the prior study.  4. Patchy predominantly groundglass opacity within both lungs. This may  be related to atelectasis or edema. However, an atypical infectious  process could result in a similar appearance.     This report was finalized on 1/17/2025 10:32 PM by Sadi Tellez MD.       XR Chest 1 View [[id number]] Collected: 01/17/25 2118     Updated: 01/17/25 2121    Narrative:      PROCEDURE: Portable chest x-ray examination performed on January 17, 2025. Single view.     HISTORY: Chest pain.     COMPARISON: None.     FINDINGS:     Normal heart size.  No lobar consolidation or edema.   No pleural effusion or pneumothorax.  No fracture or foreign body.  Cholecystectomy clips in the right upper quadrant.       Impression:         No acute process seen in the chest.  No lobar consolidation or edema.   No pleural effusion or pneumothorax  Cholecystectomy clips in the right upper quadrant.     This report was finalized on 1/17/2025 9:19 PM by Nic Deutsch MD.                Assessment:  1.  Left upper quadrant abdominal pain and low left-sided back pain since colonoscopy yesterday, no chest pain with normal troponins levels  2.  Permanent atrial fibrillation, [id number] score at least 4, rate controlled  3.  ASCVD with cardiac catheterization on 12/3/2023 with nonobstructive disease with mid LAD 30% mid circumflex 50%  4.  Essential hypertension  5.  Dyslipidemia  6.  Prediabetes  7.  Obstructive sleep apnea        Plan:  1.  Suspect abdominal pain is not related to cardiac issues suspect related to recent colonoscopy with possible gas inflation of the intestines, patient had recent ischemia workup including cardiac catheterization can resume cardiac medications as per home dosing  2.  Atrial fibrillation is rate controlled continue current management  3.  Continue  anticoagulation for thromboembolic prophylaxis  4.  Continue with the statin  5.  Will sign off please call if assistance is required.    Thank you very much for asking us to be involved in this patient's care.  We will follow along with you.      Electronically signed by ISAIAS Asif, 01/18/25, 1:04 PM EST.  Electronically signed by Ileana Ornelas MD, 01/18/25, 1:14 PM EST.

## 2025-01-18 NOTE — H&P
[facility] Medicine Services  History & Physical    Patient Identification:  Name:  Aramis Carson  Age:  84 y.o.  Sex:  male  :  1940  MRN:  [id number]   Visit Number:  [id number]  Admit Date: 2025   Primary Care Physician:  Kieran Blanca APRN    Subjective       Chief Complaint   Patient presents with    Chest Pain       History of presenting illness:    Aramis Carosn is a 84 y.o. male who presented for further evaluation of sudden onset episodic severe epigastric that progressed into constant gnawing back pain first involving his mid and upper back then localizing to his left scapular region just to the left of his spine.     Past medical history is significant for a colonoscopy that was performed earlier today, atrial fibrillation, CAD, HTN, HLD, prediabetes, sleep apnea, GERD, and thyroid.     Patient states he had been here earlier in the day for a colonoscopy performed by Dr. Lockhart, and was discharged in the early afternoon.  He went home and took one of the pain medications he was prescribed for postoperative pain then ate a dinner of fried chicken around 5 PM.  He notes that about an hour and a half later he began to experience a sudden onset of severe epigastric lasting around 15 minutes before becoming less severe but still noticeable.  Due to this he decided to return to the hospital and be seen in the ED.  While in the ED he notes that the chest pain would episodically become more severe with episodes of subsiding but never going away completely.  After some time of this he notes that the pain also affecting his mid back and upper back then localized to a specific area in his scapular region just left of midline.  This pain is made worse with movement but was not improved with the pain medications he was given in the ED.  He stated that Dilaudid only gave him 5 to 10 minutes of pain relief at most and that the Nitropaste and GI cocktail did not get the  "pain relief.    Patient states he discussed admission with the ER doctor and opted for admission due to him living at home alone where he would not have help returning to the hospital if he were to get worse.    ED, vital signs were /62 (BP Location: Right arm, Patient Position: Lying)   Pulse 50   Temp 97.5 °F (36.4 °C) (Axillary)   Resp 20   Ht 167.6 cm (66\")   Wt 84.6 kg (186 lb 8.2 oz)   SpO2 98%   BMI 30.10 kg/m²   ED workup significant for:   HS troponin initially 18, repeat 18, repeat 15.  proBNP 530.3.  Magnesium 2.0, potassium 4.6.  WBC 10.54, lactate 1.8, procalcitonin 0.11, CRP <0.30, HbA1c 6.5  EKG atrial fibrillation with PVC or aberrantly conducted complexes  CT angio of abdomen pelvis shows no acute inflammatory processes.  No bowel obstruction or perforation.  No acute appendicitis, colitis, or diverticulitis.  CT angio of the chest for PE shows normal caliber thoracic aorta.  No aneurysmal dilation or dissection.  No pulmonary embolus.  Patchy prominent groundglass opacities within both lungs this may be related to atelectasis or edema.  However an atypical infection process could result in a similar appearance.    Known Emergency Department medications received prior to my evaluation included:  Medications   sodium chloride 0.9 % flush 10 mL (has no administration in time range)   aspirin chewable tablet 324 mg ( Oral Canceled Entry 1/17/25 2012)   sodium chloride 0.9 % flush 10 mL (has no administration in time range)   sodium chloride 0.9 % flush 10 mL (has no administration in time range)   sodium chloride 0.9 % infusion 40 mL (has no administration in time range)   heparin (porcine) 5000 UNIT/ML injection 5,000 Units (has no administration in time range)   nitroglycerin (NITROSTAT) SL tablet 0.4 mg (has no administration in time range)   sennosides-docusate (PERICOLACE) 8.6-50 MG per tablet 2 tablet (has no administration in time range)     And   polyethylene glycol (MIRALAX) " packet 17 g (has no administration in time range)     And   bisacodyl (DULCOLAX) EC tablet 5 mg (has no administration in time range)     And   bisacodyl (DULCOLAX) suppository 10 mg (has no administration in time range)   aspirin chewable tablet 81 mg (has no administration in time range)   atorvastatin (LIPITOR) tablet 80 mg (80 mg Oral Not Given 1/18/25 0306)   dextrose (GLUTOSE) oral gel 15 g (has no administration in time range)   dextrose (D50W) (25 g/50 mL) IV injection 25 g (has no administration in time range)   glucagon HCl (Diagnostic) injection 1 mg (has no administration in time range)   insulin regular (humuLIN R,novoLIN R) injection 2-7 Units ( Subcutaneous Not Given 1/18/25 0307)   nitroglycerin (NITROSTAT) ointment 1 inch (1 inch Topical Given 1/18/25 0229)   sodium chloride 0.9 % bolus 500 mL (0 mL Intravenous Stopped 1/17/25 2229)   HYDROmorphone (DILAUDID) injection 0.25 mg (0.25 mg Intravenous Given 1/17/25 2047)   ondansetron (ZOFRAN) injection 2 mg (2 mg Intravenous Given 1/17/25 2047)   iopamidol (ISOVUE-370) 76 % injection 100 mL (90 mL Intravenous Given 1/17/25 2109)   pantoprazole (PROTONIX) injection 40 mg (40 mg Intravenous Given 1/17/25 2126)   aluminum-magnesium hydroxide-simethicone (MAALOX MAX) 400-400-40 MG/5ML suspension 15 mL (15 mL Oral Given 1/17/25 2235)   Lidocaine Viscous HCl (XYLOCAINE) 2 % solution 15 mL (15 mL Mouth/Throat Given 1/17/25 2235)   PB-Hyoscy-Atropine-Scopolamine (DONNATAL) per tablet 32.4 mg (32.4 mg Oral Given 1/17/25 2235)   HYDROmorphone (DILAUDID) injection 0.25 mg (0.25 mg Intravenous Given 1/17/25 2233)   cefTRIAXone (ROCEPHIN) 2,000 mg in sodium chloride 0.9 % 100 mL IVPB-VTB (0 mg Intravenous Stopped 1/18/25 0104)   HYDROmorphone (DILAUDID) injection 0.5 mg (0.5 mg Intravenous Given 1/17/25 2334)   nitroglycerin (NITROSTAT) ointment 0.5 inch (0.5 inches Topical Given 1/17/25 2354)       Room location at the time of my evaluation was [facility].      ---------------------------------------------------------------------------------------------------------------------   Review of Systems   Constitutional:  Negative for chills, fatigue and fever.   HENT:  Negative for congestion, facial swelling and voice change.    Eyes:  Negative for photophobia and visual disturbance.   Respiratory:  Negative for cough, chest tightness, shortness of breath and wheezing.    Cardiovascular:  Negative for chest pain, palpitations and leg swelling.   Gastrointestinal:  Positive for abdominal pain (Epigastric) and nausea. Negative for diarrhea and vomiting.   Genitourinary:  Negative for difficulty urinating, dysuria, frequency and hematuria.   Musculoskeletal:  Positive for myalgias (Back pain). Negative for arthralgias and joint swelling.   Skin:  Negative for pallor and rash.   Neurological:  Negative for syncope, speech difficulty, light-headedness and headaches.   Hematological:  Does not bruise/bleed easily.   Psychiatric/Behavioral:  Negative for confusion. The patient is not nervous/anxious.       ---------------------------------------------------------------------------------------------------------------------   Past Medical History:   Diagnosis Date    Arthritis     Atrial fibrillation     Coronary artery disease     GERD (gastroesophageal reflux disease)     Hyperlipidemia     Hypertension     Pre-diabetes     Sleep apnea     BiPap    Thyroid nodule      Past Surgical History:   Procedure Laterality Date    CARDIAC CATHETERIZATION      CARDIAC CATHETERIZATION N/A 12/06/2023    Procedure: Left Heart Cath;  Surgeon: Shwetha Navarrete MD;  Location: [facility] INVASIVE LOCATION;  Service: Cardiology;  Laterality: N/A;    COLONOSCOPY      CYSTOSCOPY TRANSURETHRAL RESECTION OF PROSTATE      ENDOSCOPY      EYE SURGERY Right     INGUINAL HERNIA REPAIR Right     LAPAROSCOPIC CHOLECYSTECTOMY       Family History   Problem Relation Age of Onset    Cancer Sister     Diabetes  Sister     Cancer Brother     Heart disease Brother     Diabetes Maternal Grandmother     Hypertension Neg Hx      Social History     Socioeconomic History    Marital status:    Tobacco Use    Smoking status: Former     Types: Pipe, Cigars     Quit date:      Years since quittin.0     Passive exposure: Past    Smokeless tobacco: Never    Tobacco comments:     Smoked for 25 years   Vaping Use    Vaping status: Never Used   Substance and Sexual Activity    Alcohol use: No    Drug use: No    Sexual activity: Defer     ---------------------------------------------------------------------------------------------------------------------   Allergies:  Flomax [tamsulosin], Lisinopril, and Losartan  ---------------------------------------------------------------------------------------------------------------------   Home medications:  Medications below are reported home medications pulling from within the system; at this time, these medications have not been reconciled unless otherwise specified and are in the verification process for further verifcation as current home medications.  Medications Prior to Admission   Medication Sig Dispense Refill Last Dose/Taking    acetaminophen (TYLENOL) 325 MG tablet Take 2 tablets by mouth Every 4 (Four) Hours As Needed for Mild Pain. 30 tablet 0     Eliquis 5 MG tablet tablet TAKE 1 TABLET EVERY 12 HOURS FOR ATRIAL FIBRILLATION 180 tablet 3     famotidine (PEPCID) 20 MG tablet Take 1 tablet by mouth 2 (Two) Times a Day.       glipiZIDE XL 2.5 MG 24 hr tablet Take 1 tablet by mouth Daily.       HYDROcodone-acetaminophen (NORCO)  MG per tablet Take 1 tablet by mouth Every 6 (Six) Hours As Needed for Moderate Pain.       ibuprofen (ADVIL,MOTRIN) 600 MG tablet Take 1 tablet by mouth Every 6 (Six) Hours As Needed for Mild Pain. 30 tablet 0     lidocaine (LIDODERM) 5 % Place 1 patch on the skin as directed by provider Daily. Remove & Discard patch within 12 hours or as  directed by MD 30 each 0     loratadine (CLARITIN) 10 MG tablet Take 1 tablet by mouth Daily As Needed for Allergies. 90 tablet 3     meclizine (ANTIVERT) 25 MG tablet Take 1 tablet by mouth As Needed for Dizziness.       sacubitril-valsartan (Entresto) 49-51 MG tablet Take 1 tablet by mouth 2 (Two) Times a Day. 180 tablet 3     traMADol (ULTRAM) 50 MG tablet Take 1 tablet by mouth Every 8 (Eight) Hours As Needed for Moderate Pain. 12 tablet 0        Hospital Scheduled Meds:  aspirin, 324 mg, Oral, Once  aspirin, 81 mg, Oral, Daily  atorvastatin, 80 mg, Oral, Nightly  heparin (porcine), 5,000 Units, Subcutaneous, Q12H  insulin regular, 2-7 Units, Subcutaneous, Q6H  nitroglycerin, 1 inch, Topical, Q6H  sodium chloride, 10 mL, Intravenous, Q12H           Current listed hospital scheduled medications may not yet reflect those currently placed in orders that are signed and held awaiting patient's arrival to floor.   ---------------------------------------------------------------------------------------------------------------------     Objective     Vital Signs:  Temp:  [97.3 °F (36.3 °C)-98.2 °F (36.8 °C)] 97.5 °F (36.4 °C)  Heart Rate:  [] 50  Resp:  [15-20] 20  BP: (107-143)/(62-92) 114/62      01/17/25  1943 01/18/25 0223   Weight: 82.9 kg (182 lb 11.2 oz) 84.6 kg (186 lb 8.2 oz)     Body mass index is 30.1 kg/m².  ---------------------------------------------------------------------------------------------------------------------     Physical Exam  Vitals reviewed.   Constitutional:       General: He is not in acute distress.     Appearance: Normal appearance.   HENT:      Head: Normocephalic and atraumatic.      Nose: Nose normal.      Mouth/Throat:      Mouth: Mucous membranes are moist.   Eyes:      Conjunctiva/sclera: Conjunctivae normal.      Pupils: Pupils are equal, round, and reactive to light.   Cardiovascular:      Rate and Rhythm: Normal rate and regular rhythm.      Pulses: Normal pulses.       Heart sounds: Normal heart sounds. No murmur heard.  Pulmonary:      Effort: Pulmonary effort is normal. No respiratory distress.      Breath sounds: Normal breath sounds. No wheezing or rales.   Abdominal:      General: There is no distension.      Palpations: Abdomen is soft.      Tenderness: There is no abdominal tenderness.   Musculoskeletal:         General: No deformity.      Right lower leg: No edema.      Left lower leg: No edema.   Skin:     General: Skin is warm and dry.      Findings: No erythema, lesion or rash.   Neurological:      Mental Status: He is alert and oriented to person, place, and time. Mental status is at baseline.   Psychiatric:         Mood and Affect: Mood normal.         Behavior: Behavior normal.       ---------------------------------------------------------------------------------------------------------------------  EKG:        I have personally looked at the EKG.  ---------------------------------------------------------------------------------------------------------------------   Results from last 7 days   Lab Units 01/17/25 2301 01/17/25 2112 01/17/25 2017 01/17/25 2004   CRP mg/dL  --  <0.30  --   --    LACTATE mmol/L 1.8  --   --   --    WBC 10*3/mm3  --   --   --  10.54   HEMOGLOBIN g/dL  --   --   --  15.2   HEMATOCRIT %  --   --   --  49.1   MCV fL  --   --   --  85.8   MCHC g/dL  --   --   --  31.0*   PLATELETS 10*3/mm3  --   --   --  243   INR   --   --  1.05  --          Results from last 7 days   Lab Units 01/17/25 2112 01/17/25 2004   SODIUM mmol/L  --  145   POTASSIUM mmol/L  --  4.6   MAGNESIUM mg/dL 2.0  --    CHLORIDE mmol/L  --  107   CO2 mmol/L  --  23.2   BUN mg/dL  --  23   CREATININE mg/dL  --  1.40*   CALCIUM mg/dL  --  9.0   GLUCOSE mg/dL  --  185*   ALBUMIN g/dL  --  3.9   BILIRUBIN mg/dL  --  0.7   ALK PHOS U/L  --  81   AST (SGOT) U/L  --  20   ALT (SGPT) U/L  --  23   Estimated Creatinine Clearance: 40.1 mL/min (A) (by C-G formula based on SCr of  "1.4 mg/dL (H)).  No results found for: \"AMMONIA\"  Results from last 7 days   Lab Units 01/17/25  2346 01/17/25 2112 01/17/25 2004   HSTROP T ng/L 15 18 18     Results from last 7 days   Lab Units 01/17/25 2112   PROBNP pg/mL 530.3     Lab Results   Component Value Date    HGBA1C 6.50 (H) 01/17/2025     Lab Results   Component Value Date    TSH 2.800 08/07/2023    FREET4 1.19 08/07/2023     No results found for: \"PREGTESTUR\", \"PREGSERUM\", \"HCG\", \"HCGQUANT\"  Pain Management Panel          Latest Ref Rng & Units 2/23/2018   Pain Management Panel   Amphetamine, Urine Qual Negative Negative    Barbiturates Screen, Urine Negative Negative    Benzodiazepine Screen, Urine Negative Negative    Buprenorphine, Screen, Urine Negative Negative    Cocaine Screen, Urine Negative Negative    Methadone Screen , Urine Negative Negative       Details                 No results found for: \"BLOODCX\"  No results found for: \"URINECX\"  No results found for: \"WOUNDCX\"  No results found for: \"STOOLCX\"      ---------------------------------------------------------------------------------------------------------------------  Imaging Results (Last 7 Days)       Procedure Component Value Units Date/Time    CT Angiogram Abdomen Pelvis [[id number]] Collected: 01/17/25 2232     Updated: 01/17/25 2237    Narrative:      EXAMINATION: CT angiogram abdomen and pelvis with contrast     CLINICAL HISTORY: Pain.     COMPARISON: None.     TECHNIQUE: Contiguous 3 mm thick slices were obtained through the  abdomen and pelvis after the administration of Isovue-370 intravenous  contrast. Coronal and sagittal reformats were performed.     FINDINGS:     ABDOMEN:  Normal caliber abdominal aorta. No aneurysmal dilatation or dissection.  The celiac, superior and inferior mesenteric arteries are patent. The  main renal arteries are widely patent. No upper abdominal ascites. Prior  cholecystectomy. The spleen, adrenal glands and pancreas appear  unremarkable. The " kidneys enhance rather homogeneously and  symmetrically. No hydronephrosis is appreciated. No definite ureteral  stone is identified.     PELVIS:   The appendix is well-visualized and is normal. Scattered colonic  diverticula are noted. There is no acute diverticulitis. No free air. No  significant free or loculated collection is identified.     Evaluation of the bones demonstrates no acute or suspicious osseous  lesions.       Impression:      1. No acute inflammatory process.  2. No bowel obstruction or perforation.  3. No acute appendicitis, colitis or diverticulitis.     This report was finalized on 1/17/2025 10:35 PM by Sadi Tellez MD.       CT Angiogram Chest Pulmonary Embolism [[id number]] Collected: 01/17/25 2227     Updated: 01/17/25 2234    Narrative:      EXAMINATION: CT angiogram chest with contrast     CLINICAL HISTORY: Left anterior chest pain.     COMPARISON: 11/8/2024     TECHNIQUE: Contiguous 3 mm thick slices were obtained through the chest  after the administration of Isovue-370 intravenous contrast. Coronal and  sagittal reformats were performed.     FINDINGS:  The thoracic aorta is normal in caliber. No aneurysmal dilatation. No  dissection. The central pulmonary arteries are normal in caliber. No  pulmonary embolus is appreciated. No pleural or pericardial effusion.  There is diffuse groundglass opacity within both lungs. This may be  related to atelectasis or perhaps edema. However, an atypical infectious  process could result in a similar appearance. No pneumothorax is  appreciated. The left thyroid lobe is markedly heterogeneous with a  somewhat exophytic nodular area along the lower pole. This is likely on  the basis of a goiter. However, the findings are not entirely specific.  The overall character is similar to the prior study.       Impression:      1. Normal caliber thoracic aorta. No aneurysmal dilatation or  dissection.  2. No pulmonary embolus.  3. Heterogeneous appearance of the  "thyroid, similar to the prior study.  4. Patchy predominantly groundglass opacity within both lungs. This may  be related to atelectasis or edema. However, an atypical infectious  process could result in a similar appearance.     This report was finalized on 1/17/2025 10:32 PM by Sadi Tellez MD.       XR Chest 1 View [[id number]] Collected: 01/17/25 2118     Updated: 01/17/25 2121    Narrative:      PROCEDURE: Portable chest x-ray examination performed on January 17, 2025. Single view.     HISTORY: Chest pain.     COMPARISON: None.     FINDINGS:     Normal heart size.  No lobar consolidation or edema.   No pleural effusion or pneumothorax.  No fracture or foreign body.  Cholecystectomy clips in the right upper quadrant.       Impression:         No acute process seen in the chest.  No lobar consolidation or edema.   No pleural effusion or pneumothorax  Cholecystectomy clips in the right upper quadrant.     This report was finalized on 1/17/2025 9:19 PM by Nic Deutsch MD.               Cultures:  No results found for: \"BLOODCX\", \"URINECX\", \"WOUNDCX\", \"MRSACX\", \"RESPCX\", \"STOOLCX\"    Last echocardiogram:  Results for orders placed during the hospital encounter of 12/03/23    Adult Transthoracic Echo Complete W/ Cont if Necessary Per Protocol    Interpretation Summary    Left ventricular systolic function is normal. Calculated left ventricular EF = 67% Left ventricular ejection fraction appears to be 66 - 70%.    Left ventricular diastolic function is consistent with (grade I) impaired relaxation.    The left atrial cavity is moderately dilated.    Estimated right ventricular systolic pressure from tricuspid regurgitation is mildly elevated (35-45 mmHg).    Mild pulmonary hypertension is present.      I have personally reviewed the above radiology images and read the final radiology report on " 01/18/25  ---------------------------------------------------------------------------------------------------------------------  Assessment / Plan     Active Hospital Problems    Diagnosis  POA    **Chest pain [R07.9]  Yes       ASSESSMENT/PLAN:  Chest pain vs epigastric pain  Patient presents with sudden onset episodic severe epigastric that progressed into constant gnawing back pain first involving his mid and upper back then localizing to his left scapular region just to the left of his spine. This pain started the evening after the patient received a colonoscopy.   Initial troponin 18, repeat 18, repeat 15 proBNP 530.3  EKG shows Atrial fibrillation with premature ventricular or aberrantly conducted complexes.  Left axis deviation.  Nonspecific intraventricular block.  Inferior infarct cannot rule out anterior infarct.  Consider lateral ischemia.   Continue trend troponin and obtain serial EKGs with episodes of chest pain  Received Dilaudid in ED with only mild relief of pain that was limited to 5-10 minutes.  Patient was also given Nitropaste, Zofran, Protonix, GI cocktail, and Rocephin which patient states did not relieve symptoms.   Echo from 12/03/2023 reviewed; obtain an updated TTE.   Obtain a.m. lipid panel, CBC, CMP  n.p.o. sips with meds, in anticipation of possible intervention  Continue monitor closely on telemetry   General Surgery consult for possible postoperative pain  Essential hypertension  Plan to resume home antihypertensive regimen once med rec is completed by pharmacy.  Holding parameters to prevent hypotension and bradycardia.      Chronic Conditions  Prediabetes  Chronic persistent condition  Apnea  GERD  Review and continue appropriate home medications per med rec once completed by pharmacy.    ----------  -DVT prophylaxis: Chronically anticoagulated with Eliquis  -Activity: Up with assistance  -Expected length of stay: OBSERVATION status; however, if further evaluation or treatment plans  warrant, status may change.  Based upon current information, I predict patient's care encounter to be less than or equal to 2 midnights  -Disposition: Pending clinical course     High risk secondary to chest pain versus epigastric    Code Status and Medical Interventions: CPR (Attempt to Resuscitate); Full Support   Ordered at: 01/18/25 0139     Code Status (Patient has no pulse and is not breathing):    CPR (Attempt to Resuscitate)     Medical Interventions (Patient has pulse or is breathing):    Full Support       Fredo Caldera PA-C   01/18/25  03:08 EST

## 2025-01-18 NOTE — CONSULTS
Patient Name:  Aramis Carson  YOB: 1940  [id number]       Patient Care Team:  Kieran Blanca APRN as PCP - General (Nurse Practitioner)      General Surgery Consult Note     Date of Consultation: 01/18/25    Consulting Physician : Dottie Romero MD    Reason for Consult : Abdominal pain    Subjective     I have been asked to see  Aramis Carson , a 84 y.o. male in consultation for abdominal pain.  He underwent a colonoscopy yesterday and was discharged home after the procedure.  He reported several hours after the procedure, he developed severe left upper quadrant abdominal pain that then radiated into his back.  This did not resolve and he presented to the emergency department.  CT scan was performed which did not note any acute intra-abdominal findings, notably no for intra-abdominal air concern for perforation.    Patient evaluated this morning and found to have persistent discomfort in his abdomen.      Allergy:   Allergies   Allergen Reactions    Flomax [Tamsulosin] Other (See Comments)     Heart rate to drop and pass out    Lisinopril Unknown (See Comments) and Cough     Pt. States that he is allergic however, it has been so long since he took it that he cannot remember the reaction that he had at the time.    Losartan Unknown - Low Severity       Medications:  apixaban, 5 mg, Oral, Q12H  atorvastatin, 20 mg, Oral, Nightly  bisacodyl, 10 mg, Rectal, Daily  famotidine, 20 mg, Oral, BID  insulin regular, 2-7 Units, Subcutaneous, Q6H  sodium chloride, 10 mL, Intravenous, Q12H         Current Facility-Administered Medications on File Prior to Encounter   Medication    [DISCONTINUED] fentaNYL citrate (PF) (SUBLIMAZE) injection 50 mcg    [DISCONTINUED] ipratropium-albuterol (DUO-NEB) nebulizer solution 3 mL    [DISCONTINUED] meperidine (DEMEROL) injection 12.5 mg    [DISCONTINUED] ondansetron (ZOFRAN) injection 4 mg    [DISCONTINUED] oxyCODONE-acetaminophen (PERCOCET) 5-325 MG per tablet  1 tablet     Current Outpatient Medications on File Prior to Encounter   Medication Sig    atorvastatin (LIPITOR) 20 MG tablet Take 1 tablet by mouth Every Night.    Eliquis 5 MG tablet tablet TAKE 1 TABLET EVERY 12 HOURS FOR ATRIAL FIBRILLATION    famotidine (PEPCID) 20 MG tablet Take 1 tablet by mouth 2 (Two) Times a Day.    glipiZIDE XL 2.5 MG 24 hr tablet Take 1 tablet by mouth Daily.    sacubitril-valsartan (Entresto) 24-26 MG tablet Take 1 tablet by mouth 2 (Two) Times a Day.    [DISCONTINUED] acetaminophen (TYLENOL) 325 MG tablet Take 2 tablets by mouth Every 4 (Four) Hours As Needed for Mild Pain.    [DISCONTINUED] HYDROcodone-acetaminophen (NORCO)  MG per tablet Take 1 tablet by mouth Every 6 (Six) Hours As Needed for Moderate Pain.    [DISCONTINUED] ibuprofen (ADVIL,MOTRIN) 600 MG tablet Take 1 tablet by mouth Every 6 (Six) Hours As Needed for Mild Pain.    [DISCONTINUED] lidocaine (LIDODERM) 5 % Place 1 patch on the skin as directed by provider Daily. Remove & Discard patch within 12 hours or as directed by MD    [DISCONTINUED] loratadine (CLARITIN) 10 MG tablet Take 1 tablet by mouth Daily As Needed for Allergies.    [DISCONTINUED] meclizine (ANTIVERT) 25 MG tablet Take 1 tablet by mouth As Needed for Dizziness.    [DISCONTINUED] sacubitril-valsartan (Entresto) 49-51 MG tablet Take 1 tablet by mouth 2 (Two) Times a Day.    [DISCONTINUED] traMADol (ULTRAM) 50 MG tablet Take 1 tablet by mouth Every 8 (Eight) Hours As Needed for Moderate Pain.       PMHx:   Past Medical History:   Diagnosis Date    Arthritis     Atrial fibrillation     Coronary artery disease     GERD (gastroesophageal reflux disease)     Hyperlipidemia     Hypertension     Pre-diabetes     Sleep apnea     BiPap    Thyroid nodule        Past Surgical History:  Colonoscopy yesterday    Family History: Noncontributory     Social History:  Noncontributory     Review of Systems   Pertinent items are noted in HPI     Constitutional: No  "fevers, chills or malaise   Eyes: Denies visual changes    Cardiovascular: Denies chest pain, palpitations   Pulmonary: Denies cough or shortness of breath   Abdominal/ GI: Abdominal pain   Genitourinary: Denies dysuria or hematuria   Musculoskeletal: Denies any but chronic joint aches, pains or deformities   Psychiatric: No recent mood changes   Neurologic: No paresthesias or loss of function        Objective     Physical Exam:      Vital Signs  /71 (BP Location: Right arm, Patient Position: Lying)   Pulse 63   Temp 98.2 °F (36.8 °C) (Axillary)   Resp 18   Ht 167.6 cm (66\")   Wt 84.6 kg (186 lb 8.2 oz) Comment: new admit less than 24 hours  SpO2 98%   BMI 30.10 kg/m²     Intake/Output Summary (Last 24 hours) at 1/18/2025 1553  Last data filed at 1/18/2025 1100  Gross per 24 hour   Intake 690 ml   Output --   Net 690 ml       Physical Exam:    Head: Normocephalic, atraumatic.   Eyes: Pupils equal, round, react to light   Mouth: No lesions noted  CV: Regular rate and rhythm   Lungs: Bilateral chest rise and fall, no use of accessory muscles   Abdomen: Soft, nontender, nondistended  Extremities:  No cyanosis, clubbing or edema bilaterally   Neurologic: No gross deficits      Results Review: I have personally reviewed all of the recent lab and imaging results available at this time: CT scan personally reviewed with no evidence of intra-abdominal pathology      Assessment and Plan:    Problem List Items Addressed This Visit          Cardiac and Vasculature    * (Principal) Chest pain - Primary        Active Hospital Problems    Diagnosis  POA    **Chest pain [R07.9]  Yes      Resolved Hospital Problems   No resolved problems to display.      Mr. Valladares is an 84-year-old male status post colonoscopy with subsequent development of abdominal pain.  I have reviewed the CT imaging and I do not find any intra-abdominal source of his abdominal pain.  From a surgical standpoint, he can begin a clear liquid diet and " advance as tolerated.  I did order simethicone and Dulcolax suppositories to try and alleviate any buildup of gas if this may be a source of his pain.    I discussed the patient's findings and my recommendations with the patient and/or family, as well as the primary team     Yany Eddy MD  01/18/25  15:53 EST

## 2025-01-18 NOTE — PLAN OF CARE
Goal Outcome Evaluation:  Patient is resting in bed with spouse at bedside. Patient has tolerated all interventions. No complaints or concerns at this time. No signs of acute distress noted. Will continue to follow plan of care.

## 2025-01-18 NOTE — DISCHARGE SUMMARY
[facility] HOSPITALISTS DISCHARGE SUMMARY    Patient Identification:  Name:  Aramis Carson  Age:  84 y.o.  Sex:  male  :  1940  MRN:  [id number]  Visit Number:  [id number]    Date of Admission: 2025  Date of Discharge: 2025    PCP: Kieran Blanca APRN    DISCHARGE DIAGNOSES  -Chest pain that was present on admission, suspect due to a GI issue, such as excessive gas with some constipation  -History of essential hypertension and hyperlipidemia  -History of longstanding persistent atrial fibrillation  -History of type 2 diabetes mellitus  -History of obstructive sleep apnea, for which he utilizes a BiPAP at home  -History of GERD    CONSULTS   Dr. Eddy with general surgery  Dr. Ornelas with general cardiology    PROCEDURES PERFORMED  None    HOSPITAL COURSE  Patient is a 84 y.o. male presented to [facility] on 2025 with chest pain.  It is noted that the patient had an outpatient colonoscopy on the same day as admission.  In the emergency department, cardiac studies were unremarkable for an acute myocardial infarction.  EKG did not show any acute issues.  Thus, patient was placed in observation on the telemetry floor for further evaluation and treatment.  Please see the admitting history and physical for further details.  Serial cardiac enzymes were obtained and cardiology was consulted.  Patient previously had a left heart catheterization on 2023 that showed mild and nonobstructive CAD.  Echocardiogram was obtained and no wall motion abnormalities were found.  Thus, cardiology consider this noncardiac chest pain.  General surgery was consulted as that team had just performed a colonoscopy on this patient, with 1 hyperplastic polyp in the ascending colon being removed via cold snare.  CT scan did not show free air, bowel obstruction, acute appendicitis, nor acute diverticulitis.  Thus, general surgery considered the pain and nonsurgical issue, perhaps  related to the gas instilled and the patient during the colonoscopy.  After admission, the patient had a Dulcolax suppository and started walking around the hospital.  He did have a bowel movement and he had a large flatus episode, which relieved the chest pain, which is another indicator that the chest pain was noncardiac in origin.    On the day of discharge, the patient was doing well.  He no longer had chest pain.  He was alert and oriented x 4.  He was able to ambulate without any difficulty.  He was tolerating his regular diet.  He was able to perform all of his activities of daily living.  The patient did request to go home.      VITAL SIGNS:  Temp:  [97.5 °F (36.4 °C)-98.2 °F (36.8 °C)] 98.2 °F (36.8 °C)  Heart Rate:  [] 63  Resp:  [16-20] 18  BP: (109-139)/(50-92) 121/60  SpO2:  [98 %-99 %] 98 %  on   ;   Device (Oxygen Therapy): room air    Body mass index is 30.1 kg/m².  Wt Readings from Last 3 Encounters:   01/18/25 84.6 kg (186 lb 8.2 oz)   01/17/25 82.8 kg (182 lb 9.6 oz)   12/10/24 84.4 kg (186 lb)       PHYSICAL EXAM:  Physical Exam  Vitals and nursing note reviewed. Exam conducted with a chaperone present.   Constitutional:       General: He is not in acute distress.     Appearance: He is not ill-appearing, toxic-appearing or diaphoretic.   HENT:      Head: Normocephalic and atraumatic.      Right Ear: External ear normal.      Left Ear: External ear normal.      Nose: Nose normal.   Eyes:      General: No scleral icterus.        Right eye: No discharge.         Left eye: No discharge.      Pupils: Pupils are equal, round, and reactive to light.   Cardiovascular:      Rate and Rhythm: Normal rate. Rhythm irregular.      Pulses: Normal pulses.      Heart sounds: No murmur heard.  Pulmonary:      Effort: No respiratory distress.      Breath sounds: No wheezing or rales.   Abdominal:      General: Abdomen is protuberant. Bowel sounds are normal. There is no distension.      Palpations: Abdomen is  soft.   Musculoskeletal:         General: No swelling, deformity or signs of injury.   Skin:     Capillary Refill: Capillary refill takes less than 2 seconds.      Coloration: Skin is not jaundiced or pale.   Neurological:      Mental Status: He is alert and oriented to person, place, and time. Mental status is at baseline.      Cranial Nerves: No cranial nerve deficit.   Psychiatric:         Mood and Affect: Mood normal.         Behavior: Behavior normal.         Thought Content: Thought content normal.         Judgment: Judgment normal.          DISCHARGE DISPOSITION   Stable       Discharge Medications        New Medications        Instructions Start Date   bisacodyl 10 MG suppository  Commonly known as: DULCOLAX   10 mg, Rectal, Daily PRN      simethicone 80 MG chewable tablet  Commonly known as: MYLICON   80 mg, Oral, 4 Times Daily PRN             Changes to Medications        Instructions Start Date   Entresto 24-26 MG tablet  Generic drug: sacubitril-valsartan  What changed:   when to take this  reasons to take this  additional instructions   1 tablet, Oral, Every 12 Hours PRN, Take when the blood pressures are above 140/90.  Please measure the blood pressures before and after taking this medication.             Continue These Medications        Instructions Start Date   atorvastatin 20 MG tablet  Commonly known as: LIPITOR   20 mg, Oral, Nightly      Eliquis 5 MG tablet tablet  Generic drug: apixaban   TAKE 1 TABLET EVERY 12 HOURS FOR ATRIAL FIBRILLATION      famotidine 20 MG tablet  Commonly known as: PEPCID   20 mg, 2 Times Daily      glipiZIDE XL 2.5 MG 24 hr tablet  Generic drug: glipizide   2.5 mg, Daily               Diet Instructions       Diet: Regular/House Diet, Cardiac Diets, Diabetic Diets; Healthy Heart (2-3 Na+); Regular (IDDSI 7); Thin (IDDSI 0); Consistent Carbohydrate      Discharge Diet:  Regular/House Diet  Cardiac Diets  Diabetic Diets       Cardiac Diet: Healthy Heart (2-3 Na+)     Texture: Regular (IDDSI 7)    Fluid Consistency: Thin (IDDSI 0)    Diabetic Diet: Consistent Carbohydrate          Activity Instructions       Activity as Tolerated            Additional Instructions for the Follow-ups that You Need to Schedule       Discharge Follow-up with PCP   As directed       Currently Documented PCP:    Kieran Blanca APRN    PCP Phone Number:    [phone]     Follow Up Details: 2-7 days               Follow-up Information       Kieran Blanca APRN .    Specialty: Nurse Practitioner  Why: 2-7 days  Contact information[facility]  [facility] [id number]  [phone]             TEST  RESULTS PENDING AT DISCHARGE  Pending Labs       Order Current Status    Blood Culture - Blood, Arm, Left In process    Blood Culture - Blood, Arm, Right In process             CODE STATUS  Code Status and Medical Interventions: CPR (Attempt to Resuscitate); Full Support   Ordered at: 01/18/25 0139     Code Status (Patient has no pulse and is not breathing):    CPR (Attempt to Resuscitate)     Medical Interventions (Patient has pulse or is breathing):    Full Support         Dottie Romero MD  [facility]  01/18/25  18:03 EST    Please note that this discharge summary required more than 30 minutes to complete.

## 2025-01-18 NOTE — PLAN OF CARE
Problem: Adult Inpatient Plan of Care  Goal: Plan of Care Review  Outcome: Not Progressing  Goal: Patient-Specific Goal (Individualized)  Outcome: Not Progressing  Goal: Absence of Hospital-Acquired Illness or Injury  Outcome: Not Progressing  Intervention: Identify and Manage Fall Risk  Recent Flowsheet Documentation  Taken 1/18/2025 0222 by Treva Yañez RN  Safety Promotion/Fall Prevention:   assistive device/personal items within reach   clutter free environment maintained   fall prevention program maintained   muscle strengthening facilitated   nonskid shoes/slippers when out of bed   room organization consistent   toileting scheduled   safety round/check completed  Intervention: Prevent Skin Injury  Recent Flowsheet Documentation  Taken 1/18/2025 0222 by Treva Yañez RN  Body Position: position changed independently  Intervention: Prevent Infection  Recent Flowsheet Documentation  Taken 1/18/2025 0222 by Treva Yañez RN  Infection Prevention: rest/sleep promoted  Goal: Optimal Comfort and Wellbeing  Outcome: Not Progressing  Intervention: Monitor Pain and Promote Comfort  Recent Flowsheet Documentation  Taken 1/18/2025 0222 by Treva Yañez RN  Pain Management Interventions:   position adjusted   pain medication given  Intervention: Provide Person-Centered Care  Recent Flowsheet Documentation  Taken 1/18/2025 0222 by Treva Yañez RN  Trust Relationship/Rapport:   care explained   reassurance provided   thoughts/feelings acknowledged  Goal: Readiness for Transition of Care  Outcome: Not Progressing  Intervention: Mutually Develop Transition Plan  Recent Flowsheet Documentation  Taken 1/18/2025 0259 by Treva Yañez RN  Equipment Currently Used at Home:   bipap   bp cuff   cane, straight  Taken 1/18/2025 0258 by Treva Yañez RN  Transportation Anticipated: family or friend will provide  Patient/Family Anticipated Services at Transition: none  Patient/Family Anticipates  Transition to: home with family   Goal Outcome Evaluation:

## 2025-01-18 NOTE — PAYOR COMM NOTE
"[facility]  ALEXIA ÁLVAREZ  PHONE  [phone]  FAX  [phone]  NPI:  [id number]    ADMISSION NOTIFICATION  NOTIFICATION ID#[id number]    Abdulkadir Carson (84 y.o. Male)       Date of Birth   1940    Social Security Number       Address   [address] [place] [address]    Home Phone   [phone]    MRN   [id number]       Hinduism   Mosque    Marital Status                               Admission Date   1/17/25    Admission Type   Emergency    Admitting Provider   Satish Lott MD    Attending Provider   Dottie Romero MD    Department, Room/Bed   [address], 3314/2S       Discharge Date       Discharge Disposition       Discharge Destination                                 Attending Provider: Dottie Romero MD    Allergies: Flomax [Tamsulosin], Lisinopril, Losartan    Isolation: None   Infection: None   Code Status: CPR    Ht: 167.6 cm (66\")   Wt: 84.6 kg (186 lb 8.2 oz)    Admission Cmt: None   Principal Problem: Chest pain [R07.9]                   Active Insurance as of 1/17/2025       Primary Coverage       Payor Plan Insurance Group Employer/Plan Group    MEDICARE RAILROAD MEDICARE        Payor Plan Address Payor Plan Phone Number Payor Plan Fax Number Effective Dates    [address] [phone]  2/1/2005 - None Entered    Formerly [facility] [address]         Subscriber Name Subscriber Birth Date Member ID       ABDULKADIR CARSON 1940 [id number]               Secondary Coverage       Payor Plan Insurance Group Employer/Plan Group     FOR LIFE  FOR LIFE [employer] SUP         Payor Plan Address Payor Plan Phone Number Payor Plan Fax Number Effective Dates    [address] [phone]  10/6/2016 - None Entered    [facility] [id number]         Subscriber Name Subscriber Birth Date Member ID       ABDULKADIR CARSON 1940 [id number]                     Emergency Contacts        (Rel.) Home Phone Work Phone Mobile " Phone    Kelley Carson (Spouse) [phone] -- --                 History & Physical        Fredo Caldera PA-C at 25 0307       Attestation signed by Satish Lott MD at 25 0634    I have seen and examined this patient independently from Fredo Caldera PA-C, and have reviewed patient's chart along with this documentation and agree. Presented by ED provider as chest pain workup though atypical, left lower chest wall/left upper quadrant pain radiating to lower back and mid back between scapulas, constant with no associated dyspnea or nausea. This is in the setting of colonoscopy performed yesterday morning at which time he had a polyp removed. No free air on CT abdomen/pelvis. Troponin negative x3. EKG showed afib which is chronic. Has been off eliquis for 5 or so days now leading up to his colonoscopy and is not supposed to start back until this morning. Will hold off on starting for now and allow general surgery to see in consultation for further guidance on whether this pain could be related to his colonoscopy. If they do not think that is the case, then could still consider a stress test later this morning. In the mean time, will proceed with the ECHO already ordered. Patient still complains of severe pain. Nitropaste offered no relief. Dilaudid offered some relief but was very short lived. Will give IV morphine in hopes it will offer more sustained relief.                       [facility]  History & Physical    Patient Identification:  Name:  Aramis Carson  Age:  84 y.o.  Sex:  male  :  1940  MRN:  [id number]   Visit Number:  [id number]  Admit Date: 2025   Primary Care Physician:  Kieran Blanca APRN    Subjective       Chief Complaint   Patient presents with    Chest Pain       History of presenting illness:    Aramis Carson is a 84 y.o. male who presented for further evaluation of sudden onset episodic severe epigastric  "that progressed into constant gnawing back pain first involving his mid and upper back then localizing to his left scapular region just to the left of his spine.     Past medical history is significant for a colonoscopy that was performed earlier today, atrial fibrillation, CAD, HTN, HLD, prediabetes, sleep apnea, GERD, and thyroid.     Patient states he had been here earlier in the day for a colonoscopy performed by Dr. Lockhart, and was discharged in the early afternoon.  He went home and took one of the pain medications he was prescribed for postoperative pain then ate a dinner of fried chicken around 5 PM.  He notes that about an hour and a half later he began to experience a sudden onset of severe epigastric lasting around 15 minutes before becoming less severe but still noticeable.  Due to this he decided to return to the hospital and be seen in the ED.  While in the ED he notes that the chest pain would episodically become more severe with episodes of subsiding but never going away completely.  After some time of this he notes that the pain also affecting his mid back and upper back then localized to a specific area in his scapular region just left of midline.  This pain is made worse with movement but was not improved with the pain medications he was given in the ED.  He stated that Dilaudid only gave him 5 to 10 minutes of pain relief at most and that the Nitropaste and GI cocktail did not get the pain relief.    Patient states he discussed admission with the ER doctor and opted for admission due to him living at home alone where he would not have help returning to the hospital if he were to get worse.    ED, vital signs were /62 (BP Location: Right arm, Patient Position: Lying)   Pulse 50   Temp 97.5 °F (36.4 °C) (Axillary)   Resp 20   Ht 167.6 cm (66\")   Wt 84.6 kg (186 lb 8.2 oz)   SpO2 98%   BMI 30.10 kg/m²   ED workup significant for:   HS troponin initially 18, repeat 18, repeat 15.  proBNP " 530.3.  Magnesium 2.0, potassium 4.6.  WBC 10.54, lactate 1.8, procalcitonin 0.11, CRP <0.30, HbA1c 6.5  EKG atrial fibrillation with PVC or aberrantly conducted complexes  CT angio of abdomen pelvis shows no acute inflammatory processes.  No bowel obstruction or perforation.  No acute appendicitis, colitis, or diverticulitis.  CT angio of the chest for PE shows normal caliber thoracic aorta.  No aneurysmal dilation or dissection.  No pulmonary embolus.  Patchy prominent groundglass opacities within both lungs this may be related to atelectasis or edema.  However an atypical infection process could result in a similar appearance.    Known Emergency Department medications received prior to my evaluation included:  Medications   sodium chloride 0.9 % flush 10 mL (has no administration in time range)   aspirin chewable tablet 324 mg ( Oral Canceled Entry 1/17/25 2012)   sodium chloride 0.9 % flush 10 mL (has no administration in time range)   sodium chloride 0.9 % flush 10 mL (has no administration in time range)   sodium chloride 0.9 % infusion 40 mL (has no administration in time range)   heparin (porcine) 5000 UNIT/ML injection 5,000 Units (has no administration in time range)   nitroglycerin (NITROSTAT) SL tablet 0.4 mg (has no administration in time range)   sennosides-docusate (PERICOLACE) 8.6-50 MG per tablet 2 tablet (has no administration in time range)     And   polyethylene glycol (MIRALAX) packet 17 g (has no administration in time range)     And   bisacodyl (DULCOLAX) EC tablet 5 mg (has no administration in time range)     And   bisacodyl (DULCOLAX) suppository 10 mg (has no administration in time range)   aspirin chewable tablet 81 mg (has no administration in time range)   atorvastatin (LIPITOR) tablet 80 mg (80 mg Oral Not Given 1/18/25 0306)   dextrose (GLUTOSE) oral gel 15 g (has no administration in time range)   dextrose (D50W) (25 g/50 mL) IV injection 25 g (has no administration in time range)    glucagon HCl (Diagnostic) injection 1 mg (has no administration in time range)   insulin regular (humuLIN R,novoLIN R) injection 2-7 Units ( Subcutaneous Not Given 1/18/25 0307)   nitroglycerin (NITROSTAT) ointment 1 inch (1 inch Topical Given 1/18/25 0229)   sodium chloride 0.9 % bolus 500 mL (0 mL Intravenous Stopped 1/17/25 2229)   HYDROmorphone (DILAUDID) injection 0.25 mg (0.25 mg Intravenous Given 1/17/25 2047)   ondansetron (ZOFRAN) injection 2 mg (2 mg Intravenous Given 1/17/25 2047)   iopamidol (ISOVUE-370) 76 % injection 100 mL (90 mL Intravenous Given 1/17/25 2109)   pantoprazole (PROTONIX) injection 40 mg (40 mg Intravenous Given 1/17/25 2126)   aluminum-magnesium hydroxide-simethicone (MAALOX MAX) 400-400-40 MG/5ML suspension 15 mL (15 mL Oral Given 1/17/25 2235)   Lidocaine Viscous HCl (XYLOCAINE) 2 % solution 15 mL (15 mL Mouth/Throat Given 1/17/25 2235)   PB-Hyoscy-Atropine-Scopolamine (DONNATAL) per tablet 32.4 mg (32.4 mg Oral Given 1/17/25 2235)   HYDROmorphone (DILAUDID) injection 0.25 mg (0.25 mg Intravenous Given 1/17/25 2233)   cefTRIAXone (ROCEPHIN) 2,000 mg in sodium chloride 0.9 % 100 mL IVPB-VTB (0 mg Intravenous Stopped 1/18/25 0104)   HYDROmorphone (DILAUDID) injection 0.5 mg (0.5 mg Intravenous Given 1/17/25 2354)   nitroglycerin (NITROSTAT) ointment 0.5 inch (0.5 inches Topical Given 1/17/25 2354)       Room location at the time of my evaluation was 314-2.     ---------------------------------------------------------------------------------------------------------------------   Review of Systems   Constitutional:  Negative for chills, fatigue and fever.   HENT:  Negative for congestion, facial swelling and voice change.    Eyes:  Negative for photophobia and visual disturbance.   Respiratory:  Negative for cough, chest tightness, shortness of breath and wheezing.    Cardiovascular:  Negative for chest pain, palpitations and leg swelling.   Gastrointestinal:  Positive for abdominal  pain (Epigastric) and nausea. Negative for diarrhea and vomiting.   Genitourinary:  Negative for difficulty urinating, dysuria, frequency and hematuria.   Musculoskeletal:  Positive for myalgias (Back pain). Negative for arthralgias and joint swelling.   Skin:  Negative for pallor and rash.   Neurological:  Negative for syncope, speech difficulty, light-headedness and headaches.   Hematological:  Does not bruise/bleed easily.   Psychiatric/Behavioral:  Negative for confusion. The patient is not nervous/anxious.       ---------------------------------------------------------------------------------------------------------------------   Past Medical History:   Diagnosis Date    Arthritis     Atrial fibrillation     Coronary artery disease     GERD (gastroesophageal reflux disease)     Hyperlipidemia     Hypertension     Pre-diabetes     Sleep apnea     BiPap    Thyroid nodule      Past Surgical History:   Procedure Laterality Date    CARDIAC CATHETERIZATION      CARDIAC CATHETERIZATION N/A 2023    Procedure: Left Heart Cath;  Surgeon: Shwetha Navarrete MD;  Location: [facility] INVAS[facility] LOC[facility];  Service: Cardiology;  Laterality: N/A;    COLONOSCOPY      CYSTOSCOPY TRANSURETHRAL RESECTION OF PROSTATE      ENDOSCOPY      EYE SURGERY Right     INGUINAL HERNIA REPAIR Right     LAPAROSCOPIC CHOLECYSTECTOMY       Family History   Problem Relation Age of Onset    Cancer Sister     Diabetes Sister     Cancer Brother     Heart disease Brother     Diabetes Maternal Grandmother     Hypertension Neg Hx      Social History     Socioeconomic History    Marital status:    Tobacco Use    Smoking status: Former     Types: Pipe, Cigars     Quit date:      Years since quittin.0     Passive exposure: Past    Smokeless tobacco: Never    Tobacco comments:     Smoked for 25 years   Vaping Use    Vaping status: Never Used   Substance and Sexual Activity    Alcohol use: No    Drug use: No    Sexual activity: Defer      ---------------------------------------------------------------------------------------------------------------------   Allergies:  Flomax [tamsulosin], Lisinopril, and Losartan  ---------------------------------------------------------------------------------------------------------------------   Home medications:  Medications below are reported home medications pulling from within the system; at this time, these medications have not been reconciled unless otherwise specified and are in the verification process for further verifcation as current home medications.  Medications Prior to Admission   Medication Sig Dispense Refill Last Dose/Taking    acetaminophen (TYLENOL) 325 MG tablet Take 2 tablets by mouth Every 4 (Four) Hours As Needed for Mild Pain. 30 tablet 0     Eliquis 5 MG tablet tablet TAKE 1 TABLET EVERY 12 HOURS FOR ATRIAL FIBRILLATION 180 tablet 3     famotidine (PEPCID) 20 MG tablet Take 1 tablet by mouth 2 (Two) Times a Day.       glipiZIDE XL 2.5 MG 24 hr tablet Take 1 tablet by mouth Daily.       HYDROcodone-acetaminophen (NORCO)  MG per tablet Take 1 tablet by mouth Every 6 (Six) Hours As Needed for Moderate Pain.       ibuprofen (ADVIL,MOTRIN) 600 MG tablet Take 1 tablet by mouth Every 6 (Six) Hours As Needed for Mild Pain. 30 tablet 0     lidocaine (LIDODERM) 5 % Place 1 patch on the skin as directed by provider Daily. Remove & Discard patch within 12 hours or as directed by MD 30 each 0     loratadine (CLARITIN) 10 MG tablet Take 1 tablet by mouth Daily As Needed for Allergies. 90 tablet 3     meclizine (ANTIVERT) 25 MG tablet Take 1 tablet by mouth As Needed for Dizziness.       sacubitril-valsartan (Entresto) 49-51 MG tablet Take 1 tablet by mouth 2 (Two) Times a Day. 180 tablet 3     traMADol (ULTRAM) 50 MG tablet Take 1 tablet by mouth Every 8 (Eight) Hours As Needed for Moderate Pain. 12 tablet 0        Hospital Scheduled Meds:  aspirin, 324 mg, Oral, Once  aspirin, 81 mg, Oral,  Daily  atorvastatin, 80 mg, Oral, Nightly  heparin (porcine), 5,000 Units, Subcutaneous, Q12H  insulin regular, 2-7 Units, Subcutaneous, Q6H  nitroglycerin, 1 inch, Topical, Q6H  sodium chloride, 10 mL, Intravenous, Q12H           Current listed hospital scheduled medications may not yet reflect those currently placed in orders that are signed and held awaiting patient's arrival to floor.   ---------------------------------------------------------------------------------------------------------------------     Objective     Vital Signs:  Temp:  [97.3 °F (36.3 °C)-98.2 °F (36.8 °C)] 97.5 °F (36.4 °C)  Heart Rate:  [] 50  Resp:  [15-20] 20  BP: (107-143)/(62-92) 114/62      01/17/25  1943 01/18/25 0223   Weight: 82.9 kg (182 lb 11.2 oz) 84.6 kg (186 lb 8.2 oz)     Body mass index is 30.1 kg/m².  ---------------------------------------------------------------------------------------------------------------------     Physical Exam  Vitals reviewed.   Constitutional:       General: He is not in acute distress.     Appearance: Normal appearance.   HENT:      Head: Normocephalic and atraumatic.      Nose: Nose normal.      Mouth/Throat:      Mouth: Mucous membranes are moist.   Eyes:      Conjunctiva/sclera: Conjunctivae normal.      Pupils: Pupils are equal, round, and reactive to light.   Cardiovascular:      Rate and Rhythm: Normal rate and regular rhythm.      Pulses: Normal pulses.      Heart sounds: Normal heart sounds. No murmur heard.  Pulmonary:      Effort: Pulmonary effort is normal. No respiratory distress.      Breath sounds: Normal breath sounds. No wheezing or rales.   Abdominal:      General: There is no distension.      Palpations: Abdomen is soft.      Tenderness: There is no abdominal tenderness.   Musculoskeletal:         General: No deformity.      Right lower leg: No edema.      Left lower leg: No edema.   Skin:     General: Skin is warm and dry.      Findings: No erythema, lesion or rash.  "  Neurological:      Mental Status: He is alert and oriented to person, place, and time. Mental status is at baseline.   Psychiatric:         Mood and Affect: Mood normal.         Behavior: Behavior normal.       ---------------------------------------------------------------------------------------------------------------------  EKG:        I have personally looked at the EKG.  ---------------------------------------------------------------------------------------------------------------------   Results from last 7 days   Lab Units 01/17/25 2301 01/17/25 2112 01/17/25 2017 01/17/25 2004   CRP mg/dL  --  <0.30  --   --    LACTATE mmol/L 1.8  --   --   --    WBC 10*3/mm3  --   --   --  10.54   HEMOGLOBIN g/dL  --   --   --  15.2   HEMATOCRIT %  --   --   --  49.1   MCV fL  --   --   --  85.8   MCHC g/dL  --   --   --  31.0*   PLATELETS 10*3/mm3  --   --   --  243   INR   --   --  1.05  --          Results from last 7 days   Lab Units 01/17/25 2112 01/17/25 2004   SODIUM mmol/L  --  145   POTASSIUM mmol/L  --  4.6   MAGNESIUM mg/dL 2.0  --    CHLORIDE mmol/L  --  107   CO2 mmol/L  --  23.2   BUN mg/dL  --  23   CREATININE mg/dL  --  1.40*   CALCIUM mg/dL  --  9.0   GLUCOSE mg/dL  --  185*   ALBUMIN g/dL  --  3.9   BILIRUBIN mg/dL  --  0.7   ALK PHOS U/L  --  81   AST (SGOT) U/L  --  20   ALT (SGPT) U/L  --  23   Estimated Creatinine Clearance: 40.1 mL/min (A) (by C-G formula based on SCr of 1.4 mg/dL (H)).  No results found for: \"AMMONIA\"  Results from last 7 days   Lab Units 01/17/25  2346 01/17/25  2112 01/17/25 2004   HSTROP T ng/L 15 18 18     Results from last 7 days   Lab Units 01/17/25 2112   PROBNP pg/mL 530.3     Lab Results   Component Value Date    HGBA1C 6.50 (H) 01/17/2025     Lab Results   Component Value Date    TSH 2.800 08/07/2023    FREET4 1.19 08/07/2023     No results found for: \"PREGTESTUR\", \"PREGSERUM\", \"HCG\", \"HCGQUANT\"  Pain Management Panel          Latest Ref Rng & Units 2/23/2018 " "  Pain Management Panel   Amphetamine, Urine Qual Negative Negative    Barbiturates Screen, Urine Negative Negative    Benzodiazepine Screen, Urine Negative Negative    Buprenorphine, Screen, Urine Negative Negative    Cocaine Screen, Urine Negative Negative    Methadone Screen , Urine Negative Negative       Details                 No results found for: \"BLOODCX\"  No results found for: \"URINECX\"  No results found for: \"WOUNDCX\"  No results found for: \"STOOLCX\"      ---------------------------------------------------------------------------------------------------------------------  Imaging Results (Last 7 Days)       Procedure Component Value Units Date/Time    CT Angiogram Abdomen Pelvis [[id number]] Collected: 01/17/25 2232     Updated: 01/17/25 2237    Narrative:      EXAMINATION: CT angiogram abdomen and pelvis with contrast     CLINICAL HISTORY: Pain.     COMPARISON: None.     TECHNIQUE: Contiguous 3 mm thick slices were obtained through the  abdomen and pelvis after the administration of Isovue-370 intravenous  contrast. Coronal and sagittal reformats were performed.     FINDINGS:     ABDOMEN:  Normal caliber abdominal aorta. No aneurysmal dilatation or dissection.  The celiac, superior and inferior mesenteric arteries are patent. The  main renal arteries are widely patent. No upper abdominal ascites. Prior  cholecystectomy. The spleen, adrenal glands and pancreas appear  unremarkable. The kidneys enhance rather homogeneously and  symmetrically. No hydronephrosis is appreciated. No definite ureteral  stone is identified.     PELVIS:   The appendix is well-visualized and is normal. Scattered colonic  diverticula are noted. There is no acute diverticulitis. No free air. No  significant free or loculated collection is identified.     Evaluation of the bones demonstrates no acute or suspicious osseous  lesions.       Impression:      1. No acute inflammatory process.  2. No bowel obstruction or perforation.  3. No " acute appendicitis, colitis or diverticulitis.     This report was finalized on 1/17/2025 10:35 PM by Sadi Tellez MD.       CT Angiogram Chest Pulmonary Embolism [[id number]] Collected: 01/17/25 2227     Updated: 01/17/25 2234    Narrative:      EXAMINATION: CT angiogram chest with contrast     CLINICAL HISTORY: Left anterior chest pain.     COMPARISON: 11/8/2024     TECHNIQUE: Contiguous 3 mm thick slices were obtained through the chest  after the administration of Isovue-370 intravenous contrast. Coronal and  sagittal reformats were performed.     FINDINGS:  The thoracic aorta is normal in caliber. No aneurysmal dilatation. No  dissection. The central pulmonary arteries are normal in caliber. No  pulmonary embolus is appreciated. No pleural or pericardial effusion.  There is diffuse groundglass opacity within both lungs. This may be  related to atelectasis or perhaps edema. However, an atypical infectious  process could result in a similar appearance. No pneumothorax is  appreciated. The left thyroid lobe is markedly heterogeneous with a  somewhat exophytic nodular area along the lower pole. This is likely on  the basis of a goiter. However, the findings are not entirely specific.  The overall character is similar to the prior study.       Impression:      1. Normal caliber thoracic aorta. No aneurysmal dilatation or  dissection.  2. No pulmonary embolus.  3. Heterogeneous appearance of the thyroid, similar to the prior study.  4. Patchy predominantly groundglass opacity within both lungs. This may  be related to atelectasis or edema. However, an atypical infectious  process could result in a similar appearance.     This report was finalized on 1/17/2025 10:32 PM by Sadi Tellez MD.       XR Chest 1 View [[id number]] Collected: 01/17/25 2118     Updated: 01/17/25 2121    Narrative:      PROCEDURE: Portable chest x-ray examination performed on January 17, 2025. Single view.     HISTORY: Chest pain.    "  COMPARISON: None.     FINDINGS:     Normal heart size.  No lobar consolidation or edema.   No pleural effusion or pneumothorax.  No fracture or foreign body.  Cholecystectomy clips in the right upper quadrant.       Impression:         No acute process seen in the chest.  No lobar consolidation or edema.   No pleural effusion or pneumothorax  Cholecystectomy clips in the right upper quadrant.     This report was finalized on 1/17/2025 9:19 PM by Nic Deutsch MD.               Cultures:  No results found for: \"BLOODCX\", \"URINECX\", \"WOUNDCX\", \"MRSACX\", \"RESPCX\", \"STOOLCX\"    Last echocardiogram:  Results for orders placed during the hospital encounter of 12/03/23    Adult Transthoracic Echo Complete W/ Cont if Necessary Per Protocol    Interpretation Summary    Left ventricular systolic function is normal. Calculated left ventricular EF = 67% Left ventricular ejection fraction appears to be 66 - 70%.    Left ventricular diastolic function is consistent with (grade I) impaired relaxation.    The left atrial cavity is moderately dilated.    Estimated right ventricular systolic pressure from tricuspid regurgitation is mildly elevated (35-45 mmHg).    Mild pulmonary hypertension is present.      I have personally reviewed the above radiology images and read the final radiology report on 01/18/25  ---------------------------------------------------------------------------------------------------------------------  Assessment / Plan     Active Hospital Problems    Diagnosis  POA    **Chest pain [R07.9]  Yes       ASSESSMENT/PLAN:  Chest pain vs epigastric pain  Patient presents with sudden onset episodic severe epigastric that progressed into constant gnawing back pain first involving his mid and upper back then localizing to his left scapular region just to the left of his spine. This pain started the evening after the patient received a colonoscopy.   Initial troponin 18, repeat 18, repeat 15 proBNP 530.3  EKG shows " Atrial fibrillation with premature ventricular or aberrantly conducted complexes.  Left axis deviation.  Nonspecific intraventricular block.  Inferior infarct cannot rule out anterior infarct.  Consider lateral ischemia.   Continue trend troponin and obtain serial EKGs with episodes of chest pain  Received Dilaudid in ED with only mild relief of pain that was limited to 5-10 minutes.  Patient was also given Nitropaste, Zofran, Protonix, GI cocktail, and Rocephin which patient states did not relieve symptoms.   Echo from 12/03/2023 reviewed; obtain an updated TTE.   Obtain a.m. lipid panel, CBC, CMP  n.p.o. sips with meds, in anticipation of possible intervention  Continue monitor closely on telemetry   General Surgery consult for possible postoperative pain  Essential hypertension  Plan to resume home antihypertensive regimen once med rec is completed by pharmacy.  Holding parameters to prevent hypotension and bradycardia.      Chronic Conditions  Prediabetes  Chronic persistent condition  Apnea  GERD  Review and continue appropriate home medications per med rec once completed by pharmacy.    ----------  -DVT prophylaxis: Chronically anticoagulated with Eliquis  -Activity: Up with assistance  -Expected length of stay: OBSERVATION status; however, if further evaluation or treatment plans warrant, status may change.  Based upon current information, I predict patient's care encounter to be less than or equal to 2 midnights  -Disposition: Pending clinical course     High risk secondary to chest pain versus epigastric    Code Status and Medical Interventions: CPR (Attempt to Resuscitate); Full Support   Ordered at: 01/18/25 0139     Code Status (Patient has no pulse and is not breathing):    CPR (Attempt to Resuscitate)     Medical Interventions (Patient has pulse or is breathing):    Full Support       Fredo Caldera PA-C   01/18/25  03:08 EST      Electronically signed by Satish Lott MD at 01/18/25  "0638          Emergency Department Notes        Leora Peck RN at 01/18/25 0202          Report called to [facility] on [address]    Electronically signed by Leora Peck RN at 01/18/25 0203       Eric Irene MD at 01/17/25 2040          Subjective   History of Present Illness  84-year-old male who states is chronically anticoagulated with Eliquis for atrial fibrillation, no Eliquis since 1/13/2025 for removal of scalp skin lesion and colonoscopy earlier today.  He presents complaining of the onset at 5 PM of very localized left anterior chest pain, unable to characterize but because it \"just a constant pain\", just to the left of the lower sternal border at the nipple level.  No associated shortness of breath, nausea, vomiting, diaphoresis, syncope or near syncope.  States that the pain has been present since that time, here over the last half an hour or so has started radiating to his left lower back.  Still with no other associated symptoms.  He denies otherwise having been acutely ill in any way.      Review of Systems   All other systems reviewed and are negative.      Past Medical History:   Diagnosis Date    Arthritis     Atrial fibrillation     Coronary artery disease     GERD (gastroesophageal reflux disease)     Hyperlipidemia     Hypertension     Pre-diabetes     Sleep apnea     BiPap    Thyroid nodule        Allergies   Allergen Reactions    Flomax [Tamsulosin] Other (See Comments)     Heart rate to drop and pass out    Lisinopril Unknown (See Comments) and Cough     Pt. States that he is allergic however, it has been so long since he took it that he cannot remember the reaction that he had at the time.    Losartan Unknown - Low Severity       Past Surgical History:   Procedure Laterality Date    CARDIAC CATHETERIZATION      CARDIAC CATHETERIZATION N/A 12/06/2023    Procedure: Left Heart Cath;  Surgeon: Shwetha Navarrete MD;  Location: [facility] CATH INVASIVE LOCATION;  Service: Cardiology;  " Laterality: N/A;    COLONOSCOPY      CYSTOSCOPY TRANSURETHRAL RESECTION OF PROSTATE      ENDOSCOPY      EYE SURGERY Right     INGUINAL HERNIA REPAIR Right     LAPAROSCOPIC CHOLECYSTECTOMY         Family History   Problem Relation Age of Onset    Cancer Sister     Diabetes Sister     Cancer Brother     Heart disease Brother     Diabetes Maternal Grandmother     Hypertension Neg Hx        Social History     Socioeconomic History    Marital status:    Tobacco Use    Smoking status: Former     Types: Pipe, Cigars     Quit date:      Years since quittin.0     Passive exposure: Past    Smokeless tobacco: Never    Tobacco comments:     Smoked for 25 years   Vaping Use    Vaping status: Never Used   Substance and Sexual Activity    Alcohol use: No    Drug use: No    Sexual activity: Defer           Objective   Physical Exam  Vitals reviewed.   Constitutional:       General: He is not in acute distress.     Appearance: He is well-developed. He is not toxic-appearing or diaphoretic.      Comments: Well-developed well-nourished male, alert and well-oriented, in no apparent acute distress.   HENT:      Head: Normocephalic and atraumatic.   Eyes:      General: No scleral icterus.     Pupils: Pupils are equal, round, and reactive to light.   Neck:      Trachea: No tracheal deviation.      Comments: No meningeal signs.  Cardiovascular:      Rate and Rhythm: Normal rate and regular rhythm.   Pulmonary:      Effort: Pulmonary effort is normal. No respiratory distress.      Breath sounds: Normal breath sounds.   Abdominal:      General: Bowel sounds are normal. There is no distension.      Palpations: Abdomen is soft.      Tenderness: There is no abdominal tenderness.   Musculoskeletal:         General: No tenderness. Normal range of motion.      Cervical back: Normal range of motion and neck supple.      Right upper leg: No edema or tenderness.      Left upper leg: No edema or tenderness.      Right lower leg: No  tenderness. No edema.      Left lower leg: No tenderness. No edema.   Skin:     General: Skin is warm and dry.      Coloration: Skin is not pale.      Comments: Warm, pink, dry.   Neurological:      General: No focal deficit present.      Mental Status: He is alert and oriented to person, place, and time.      Motor: No abnormal muscle tone.      Coordination: Coordination normal.   Psychiatric:         Mood and Affect: Mood normal.         Behavior: Behavior normal.         Procedures  CT Angiogram Abdomen Pelvis   Final Result   1. No acute inflammatory process.   2. No bowel obstruction or perforation.   3. No acute appendicitis, colitis or diverticulitis.       This report was finalized on 1/17/2025 10:35 PM by Sadi Tellez MD.          CT Angiogram Chest Pulmonary Embolism   Final Result   1. Normal caliber thoracic aorta. No aneurysmal dilatation or   dissection.   2. No pulmonary embolus.   3. Heterogeneous appearance of the thyroid, similar to the prior study.   4. Patchy predominantly groundglass opacity within both lungs. This may   be related to atelectasis or edema. However, an atypical infectious   process could result in a similar appearance.       This report was finalized on 1/17/2025 10:32 PM by Sadi Tellez MD.          XR Chest 1 View   Final Result       No acute process seen in the chest.   No lobar consolidation or edema.    No pleural effusion or pneumothorax   Cholecystectomy clips in the right upper quadrant.       This report was finalized on 1/17/2025 9:19 PM by Nic Deutsch MD.            Results for orders placed or performed during the hospital encounter of 01/17/25   ECG 12 Lead ED Triage Standing Order; Chest Pain    Collection Time: 01/17/25  7:41 PM   Result Value Ref Range    QT Interval 436 ms    QTC Interval 499 ms   Comprehensive Metabolic Panel    Collection Time: 01/17/25  8:04 PM    Specimen: Arm, Right; Blood   Result Value Ref Range    Glucose 185 (H) 65 - 99 mg/dL     BUN 23 8 - 23 mg/dL    Creatinine 1.40 (H) 0.76 - 1.27 mg/dL    Sodium 145 136 - 145 mmol/L    Potassium 4.6 3.5 - 5.2 mmol/L    Chloride 107 98 - 107 mmol/L    CO2 23.2 22.0 - 29.0 mmol/L    Calcium 9.0 8.6 - 10.5 mg/dL    Total Protein 7.0 6.0 - 8.5 g/dL    Albumin 3.9 3.5 - 5.2 g/dL    ALT (SGPT) 23 1 - 41 U/L    AST (SGOT) 20 1 - 40 U/L    Alkaline Phosphatase 81 39 - 117 U/L    Total Bilirubin 0.7 0.0 - 1.2 mg/dL    Globulin 3.1 gm/dL    A/G Ratio 1.3 g/dL    BUN/Creatinine Ratio 16.4 7.0 - 25.0    Anion Gap 14.8 5.0 - 15.0 mmol/L    eGFR 49.6 (L) >60.0 mL/min/1.73   High Sensitivity Troponin T    Collection Time: 01/17/25  8:04 PM    Specimen: Arm, Right; Blood   Result Value Ref Range    HS Troponin T 18 <22 ng/L   CBC Auto Differential    Collection Time: 01/17/25  8:04 PM    Specimen: Arm, Right; Blood   Result Value Ref Range    WBC 10.54 3.40 - 10.80 10*3/mm3    RBC 5.72 4.14 - 5.80 10*6/mm3    Hemoglobin 15.2 13.0 - 17.7 g/dL    Hematocrit 49.1 37.5 - 51.0 %    MCV 85.8 79.0 - 97.0 fL    MCH 26.6 26.6 - 33.0 pg    MCHC 31.0 (L) 31.5 - 35.7 g/dL    RDW 13.8 12.3 - 15.4 %    RDW-SD 43.4 37.0 - 54.0 fl    MPV 10.2 6.0 - 12.0 fL    Platelets 243 140 - 450 10*3/mm3    Neutrophil % 67.7 42.7 - 76.0 %    Lymphocyte % 22.9 19.6 - 45.3 %    Monocyte % 7.8 5.0 - 12.0 %    Eosinophil % 0.8 0.3 - 6.2 %    Basophil % 0.5 0.0 - 1.5 %    Immature Grans % 0.3 0.0 - 0.5 %    Neutrophils, Absolute 7.15 (H) 1.70 - 7.00 10*3/mm3    Lymphocytes, Absolute 2.41 0.70 - 3.10 10*3/mm3    Monocytes, Absolute 0.82 0.10 - 0.90 10*3/mm3    Eosinophils, Absolute 0.08 0.00 - 0.40 10*3/mm3    Basophils, Absolute 0.05 0.00 - 0.20 10*3/mm3    Immature Grans, Absolute 0.03 0.00 - 0.05 10*3/mm3    nRBC 0.0 0.0 - 0.2 /100 WBC   Procalcitonin    Collection Time: 01/17/25  8:04 PM    Specimen: Arm, Right; Blood   Result Value Ref Range    Procalcitonin 0.11 0.00 - 0.25 ng/mL   Green Top (Gel)    Collection Time: 01/17/25  8:04 PM   Result  Value Ref Range    Extra Tube Hold for add-ons.    Lavender Top    Collection Time: 01/17/25  8:04 PM   Result Value Ref Range    Extra Tube hold for add-on    Gold Top - SST    Collection Time: 01/17/25  8:04 PM   Result Value Ref Range    Extra Tube Hold for add-ons.    Light Blue Top    Collection Time: 01/17/25  8:04 PM   Result Value Ref Range    Extra Tube Hold for add-ons.    Protime-INR    Collection Time: 01/17/25  8:17 PM    Specimen: Arm, Right; Blood   Result Value Ref Range    Protime 13.8 12.1 - 14.7 Seconds    INR 1.05 0.90 - 1.10   aPTT    Collection Time: 01/17/25  8:17 PM    Specimen: Arm, Right; Blood   Result Value Ref Range    PTT 31.0 26.5 - 34.5 seconds   High Sensitivity Troponin T 1Hr    Collection Time: 01/17/25  9:12 PM    Specimen: Blood   Result Value Ref Range    HS Troponin T 18 <22 ng/L    Troponin T Numeric Delta 0 Abnormal if >/=3 ng/L   C-reactive Protein    Collection Time: 01/17/25  9:12 PM    Specimen: Blood   Result Value Ref Range    C-Reactive Protein <0.30 0.00 - 0.50 mg/dL   Magnesium    Collection Time: 01/17/25  9:12 PM    Specimen: Blood   Result Value Ref Range    Magnesium 2.0 1.6 - 2.4 mg/dL   BNP    Collection Time: 01/17/25  9:12 PM    Specimen: Blood   Result Value Ref Range    proBNP 530.3 0.0 - 1,800.0 pg/mL   Respiratory Panel PCR w/COVID-19(SARS-CoV-2) [facility]SE/GURPREET/JOANA/PAD/COR/YUMI In-House, NP Swab in [facility], 2 HR TAT - Swab, Nasopharynx    Collection Time: 01/17/25 10:49 PM    Specimen: Nasopharynx; Swab   Result Value Ref Range    ADENOVIRUS, PCR Not Detected Not Detected    Coronavirus 229E Not Detected Not Detected    Coronavirus HKU1 Not Detected Not Detected    Coronavirus NL63 Not Detected Not Detected    Coronavirus OC43 Not Detected Not Detected    COVID19 Not Detected Not Detected - Ref. Range    Human Metapneumovirus Not Detected Not Detected    Human Rhinovirus/Enterovirus Not Detected Not Detected    Influenza A PCR Not Detected Not Detected     Influenza B PCR Not Detected Not Detected    Parainfluenza Virus 1 Not Detected Not Detected    Parainfluenza Virus 2 Not Detected Not Detected    Parainfluenza Virus 3 Not Detected Not Detected    Parainfluenza Virus 4 Not Detected Not Detected    RSV, PCR Not Detected Not Detected    Bordetella pertussis pcr Not Detected Not Detected    Bordetella parapertussis PCR Not Detected Not Detected    Chlamydophila pneumoniae PCR Not Detected Not Detected    Mycoplasma pneumo by PCR Not Detected Not Detected   Lactic Acid, Plasma    Collection Time: 01/17/25 11:01 PM    Specimen: Blood   Result Value Ref Range    Lactate 1.8 0.5 - 2.0 mmol/L   ECG 12 Lead Chest Pain    Collection Time: 01/17/25 11:41 PM   Result Value Ref Range    QT Interval 482 ms    QTC Interval 473 ms   High Sensitivity Troponin T    Collection Time: 01/17/25 11:46 PM    Specimen: Blood   Result Value Ref Range    HS Troponin T 15 <22 ng/L               ED Course  ED Course as of 01/18/25 0140   Fri Jan 17, 2025 1953 Cardiac catheterization 12/26/2023:    Conclusion       ·  Mid LAD lesion is 30% stenosed.  ·  Mid Cx lesion is 50% stenosed.     1.  Cardiac.  Mild nonobstructive epicardial coronary disease noted.  Medical management will be advised.  Eliquis for chronic A-fib will be advised.      [CM]   1958 EKG was performed at 1941.  This shows atrial fibrillation, rate 79.  Much baseline artifact.  QRS duration 154, QTc 499 ms.  Nonspecific intraventricular block.  No evidence for STEMI.  No significant change compared with 10/10/2024 [CM]   2018 Repeat EKG at 2010 shows atrial fibrillation, rate 52.  Slow ventricular response.  Premature ventricular or aberrantly conducted complexes, no STEMI criteria. [CM]   2027 Echocardiogram 12/4/2023:  nterpretation Summary       ·  Left ventricular systolic function is normal. Calculated left ventricular EF = 67% Left ventricular ejection fraction appears to be 66 - 70%.  ·  Left ventricular diastolic  function is consistent with (grade I) impaired relaxation.  ·  The left atrial cavity is moderately dilated.  ·  Estimated right ventricular systolic pressure from tricuspid regurgitation is mildly elevated (35-45 mmHg).  ·  Mild pulmonary hypertension is present.      [CM]   2226 Earlier patient had complained of heartburn, requesting something for that, no relief with IV Protonix.  I have ordered a GI cocktail. [CM]   2346 Repeat EKG at 2341 shows atrial fibrillation, rate 58.  QRS duration 154, QTc 473 ms.  Right bundle branch block.  Nonspecific ST-T changes.  No STEMI criteria.  He has continued to complain of pain when the Dilaudid wears off.  I have ordered a third troponin.  First 2 are flat.  CT studies have been resulted, results noted. [CM]   Sat Jan 18, 2025   0052 Patient voices that he is feeling better, currently denies any sort of pain.  We discussed his test results.  He is not comfortable going home, he wants to be admitted to the hospital for further evaluation.  Hospitalist paged. [CM]   0138 Case discussed with Dr. Clements.  He is admitting patient to the hospitalist service. [CM]      ED Course User Index  [CM] Eric Irene MD                  HEART Score: 5   Shared Decision Making  I discussed the findings with the patient/patient representative who is in agreement with the treatment plan and the final disposition.  Risks and benefits of discharge and/or observation/admission were discussed: Yes                                      Medical Decision Making      Final diagnoses:   Chest pain, unspecified type       ED Disposition  ED Disposition       ED Disposition   Decision to Admit    Condition   --    Comment   Level of Care: Telemetry [5]   Diagnosis: Chest pain [336060]   Admitting Physician: ROBERTO CLEMENTS [[id number]]   Attending Physician: ROBERTO CLEMENTS [[id number]]                 Please note that portions of this note were completed with a voice recognition program.                 Eric Irene MD  01/18/25 0140      Electronically signed by Eric Irene MD at 01/18/25 0140       Current Facility-Administered Medications   Medication Dose Route Frequency Provider Last Rate Last Admin    aspirin chewable tablet 324 mg  324 mg Oral Once Satish Lott MD        atorvastatin (LIPITOR) tablet 20 mg  20 mg Oral Nightly Dottie Romero MD        bisacodyl (DULCOLAX) suppository 10 mg  10 mg Rectal Daily PRN Satish Lott MD        bisacodyl (DULCOLAX) suppository 10 mg  10 mg Rectal Daily Yany Eddy MD   10 mg at 01/18/25 1113    dextrose (D50W) (25 g/50 mL) IV injection 25 g  25 g Intravenous Q15 Min PRN Satish Lott MD        dextrose (GLUTOSE) oral gel 15 g  15 g Oral Q15 Min PRN Satish Lott MD        famotidine (PEPCID) tablet 20 mg  20 mg Oral BID Dottie Romero MD   20 mg at 01/18/25 1113    glucagon HCl (Diagnostic) injection 1 mg  1 mg Intramuscular Q15 Min PRN Satish Lott MD        heparin (porcine) 5000 UNIT/ML injection 5,000 Units  5,000 Units Subcutaneous Q12H Satish Lott MD   5,000 Units at 01/18/25 0849    insulin regular (humuLIN R,novoLIN R) injection 2-7 Units  2-7 Units Subcutaneous Q6H Satish Lott MD        morphine injection 2 mg  2 mg Intravenous Q4H PRN Satish Lott MD   2 mg at 01/18/25 1112    nitroglycerin (NITROSTAT) SL tablet 0.4 mg  0.4 mg Sublingual Q5 Min PRN Satish Lott MD        simethicone (MYLICON) chewable tablet 80 mg  80 mg Oral 4x Daily PRN Yany Eddy MD        sodium chloride 0.9 % flush 10 mL  10 mL Intravenous PRN Satish Lott MD        sodium chloride 0.9 % flush 10 mL  10 mL Intravenous Q12H Satish Lott MD   10 mL at 01/18/25 0849    sodium chloride 0.9 % flush 10 mL  10 mL Intravenous PRN Satish Lott MD        sodium chloride 0.9 % infusion 40 mL  40 mL Intravenous PRN Oren,  Satish Meredith MD         Orders (last 24 hrs)        Start     Ordered    01/18/25 2100  atorvastatin (LIPITOR) tablet 20 mg  Nightly         01/18/25 0916    01/18/25 1015  famotidine (PEPCID) tablet 20 mg  2 Times Daily         01/18/25 0916    01/18/25 0945  bisacodyl (DULCOLAX) suppository 10 mg  Daily         01/18/25 0857    01/18/25 0915  Inpatient Cardiology Consult  Once        Specialty:  Cardiology  Provider:  Ileana Ornelas MD    01/18/25 0914    01/18/25 0900  sodium chloride 0.9 % flush 10 mL  Every 12 Hours Scheduled         01/18/25 0222    01/18/25 0900  heparin (porcine) 5000 UNIT/ML injection 5,000 Units  Every 12 Hours Scheduled         01/18/25 0222    01/18/25 0900  aspirin chewable tablet 81 mg  Daily,   Status:  Discontinued         01/18/25 0140    01/18/25 0857  simethicone (MYLICON) chewable tablet 80 mg  4 Times Daily PRN         01/18/25 0857    01/18/25 0802  POC Glucose Once  PROCEDURE ONCE        Comments: Complete no more than 45 minutes prior to patient eating      01/18/25 0755    01/18/25 0800  Vital Signs  Every 8 Hours        Comments: Per per hospital policy    01/18/25 0222    01/18/25 0800  Oral Care  2 Times Daily       01/18/25 0222    01/18/25 0730  Basic Metabolic Panel  Timed         01/18/25 0552    01/18/25 0631  morphine injection 2 mg  Every 4 Hours PRN         01/18/25 0631    01/18/25 0602  POC Glucose Once  PROCEDURE ONCE        Comments: Complete no more than 45 minutes prior to patient eating      01/18/25 0556    01/18/25 0600  Strict Intake & Output  Every 6 Hours         01/18/25 0222    01/18/25 0600  CBC Auto Differential  Morning Draw         01/18/25 0222    01/18/25 0600  Comprehensive Metabolic Panel  Morning Draw         01/18/25 0222    01/18/25 0600  Lipid Panel  Morning Draw         01/18/25 0222    01/18/25 0556  Inpatient General Surgery Consult  Once        Specialty:  General Surgery  Provider:  Yany Eddy MD    01/18/25 0544     01/18/25 0548  aluminum-magnesium hydroxide-simethicone (MAALOX MAX) 400-400-40 MG/5ML suspension 15 mL  Every 6 Hours PRN,   Status:  Discontinued         01/18/25 0548    01/18/25 0442  Lipase  Once         01/18/25 0441    01/18/25 0239  POC Glucose Once  PROCEDURE ONCE        Comments: Complete no more than 45 minutes prior to patient eating      01/18/25 0232    01/18/25 0231  nitroglycerin (NITROSTAT) ointment 1 inch  Every 6 Hours Scheduled,   Status:  Discontinued         01/18/25 0215    01/18/25 0223  Notify Physician (with Parameters)  Until Discontinued         01/18/25 0222 01/18/25 0223  Insert Peripheral IV  Once         01/18/25 0222 01/18/25 0223  Saline Lock & Maintain IV Access  Continuous,   Status:  Canceled         01/18/25 0222 01/18/25 0223  Continuous Cardiac Monitoring  Continuous        Comments: Follow Standing Orders As Outlined in Process Instructions (Open Order Report to View Full Instructions)    01/18/25 0222 01/18/25 0223  Maintain IV Access  Continuous         01/18/25 0222 01/18/25 0223  Telemetry - Place Orders & Notify Provider of Results When Patient Experiences Acute Chest Pain, Dysrhythmia or Respiratory Distress  Continuous        Comments: Open Order Report to View Parameters Requiring Provider Notification    01/18/25 0222 01/18/25 0223  May Be Off Telemetry for Tests  Continuous         01/18/25 0222 01/18/25 0223  Continuous Pulse Oximetry  Continuous         01/18/25 0222 01/18/25 0223  Daily Weights  Daily       01/18/25 0222 01/18/25 0223  NPO Diet NPO Type: Sips with Meds  Diet Effective Now         01/18/25 0222    01/18/25 0223  Adult Transthoracic Echo Complete w/ Color, Spectral and Contrast if necessary per protocol  Once         01/18/25 0222 01/18/25 0223  Stress Test With Myocardial Perfusion One Day  Once,   Status:  Canceled         01/18/25 0222 01/18/25 0222  sodium chloride 0.9 % flush 10 mL  As Needed         01/18/25  "0222    01/18/25 0222  sodium chloride 0.9 % infusion 40 mL  As Needed         01/18/25 0222 01/18/25 0222  nitroglycerin (NITROSTAT) SL tablet 0.4 mg  Every 5 Minutes PRN         01/18/25 0222 01/18/25 0222  sennosides-docusate (PERICOLACE) 8.6-50 MG per tablet 2 tablet  2 Times Daily PRN,   Status:  Discontinued        Placed in \"And\" Linked Group    01/18/25 0222 01/18/25 0222  polyethylene glycol (MIRALAX) packet 17 g  Daily PRN,   Status:  Discontinued        Placed in \"And\" Linked Group    01/18/25 0222 01/18/25 0222  bisacodyl (DULCOLAX) EC tablet 5 mg  Daily PRN,   Status:  Discontinued        Placed in \"And\" Linked Group    01/18/25 0222 01/18/25 0222  bisacodyl (DULCOLAX) suppository 10 mg  Daily PRN        Placed in \"And\" Linked Group    01/18/25 0222 01/18/25 0157  insulin regular (humuLIN R,novoLIN R) injection 2-7 Units  Every 6 Hours Scheduled         01/18/25 0141 01/18/25 0156  atorvastatin (LIPITOR) tablet 80 mg  Nightly,   Status:  Discontinued         01/18/25 0140 01/18/25 0142  POC Glucose Q6H  Every 6 Hours      Comments: Complete no more than 45 minutes prior to patient eating      01/18/25 0141 01/18/25 0142  Hemoglobin A1c  STAT         01/18/25 0141 01/18/25 0141  dextrose (D50W) (25 g/50 mL) IV injection 25 g  Every 15 Minutes PRN         01/18/25 0141 01/18/25 0141  glucagon HCl (Diagnostic) injection 1 mg  Every 15 Minutes PRN         01/18/25 0141 01/18/25 0141  dextrose (GLUTOSE) oral gel 15 g  Every 15 Minutes PRN         01/18/25 0141 01/18/25 0139  Initiate Observation Status  Once         01/18/25 0139 01/18/25 0139  Code Status and Medical Interventions: CPR (Attempt to Resuscitate); Full Support  Continuous         01/18/25 0139    01/18/25 0005  nitroglycerin (NITROSTAT) ointment 0.5 inch  Once         01/17/25 2349    01/18/25 0002  HYDROmorphone (DILAUDID) injection 0.5 mg  Once         01/17/25 2346    01/17/25 2347  High Sensitivity " Troponin T  Once         01/17/25 2346    01/17/25 2347  ECG 12 Lead Chest Pain  Once         01/17/25 2346    01/17/25 2257  cefTRIAXone (ROCEPHIN) 2,000 mg in sodium chloride 0.9 % 100 mL IVPB-VTB  Once         01/17/25 2241    01/17/25 2244  BNP  Once         01/17/25 2243    01/17/25 2242  aluminum-magnesium hydroxide-simethicone (MAALOX MAX) 400-400-40 MG/5ML suspension 15 mL  Once         01/17/25 2226    01/17/25 2242  Lidocaine Viscous HCl (XYLOCAINE) 2 % solution 15 mL  Once         01/17/25 2226    01/17/25 2242  PB-Hyoscy-Atropine-Scopolamine (DONNATAL) per tablet 32.4 mg  Once         01/17/25 2226 01/17/25 2242  HYDROmorphone (DILAUDID) injection 0.25 mg  Once         01/17/25 2226    01/17/25 2242  Blood Culture - Blood, Arm, Right  Once         01/17/25 2241    01/17/25 2242  Blood Culture - Blood, Arm, Left  Once         01/17/25 2241    01/17/25 2242  Lactic Acid, Plasma  Once         01/17/25 2241    01/17/25 2242  Procalcitonin  Once         01/17/25 2241    01/17/25 2242  C-reactive Protein  Once         01/17/25 2241    01/17/25 2242  Magnesium  Once         01/17/25 2241 01/17/25 2240  Respiratory Panel PCR w/COVID-19(SARS-CoV-2) MOOSE/GURPREET/JOANA/PAD/COR/YUMI In-House, NP Swab in [facility], 2 HR TAT - Swab, Nasopharynx  Once         01/17/25 2239 01/17/25 2126  pantoprazole (PROTONIX) injection 40 mg  Once         01/17/25 2110 01/17/25 2125  iopamidol (ISOVUE-370) 76 % injection 100 mL  Once in Imaging         01/17/25 2109 01/17/25 2104  High Sensitivity Troponin T 1Hr  PROCEDURE ONCE         01/17/25 2032 01/17/25 2044  sodium chloride 0.9 % bolus 500 mL  Once         01/17/25 2028 01/17/25 2044  HYDROmorphone (DILAUDID) injection 0.25 mg  Once         01/17/25 2028 01/17/25 2044  ondansetron (ZOFRAN) injection 2 mg  Once         01/17/25 2028 01/17/25 2041  XR Chest 1 View  1 Time Imaging         01/17/25 1942 01/17/25 2027  CT Angiogram Chest Pulmonary Embolism  1  Time Imaging         01/17/25 2027 01/17/25 2027  CT Angiogram Abdomen Pelvis  1 Time Imaging         01/17/25 2027 01/17/25 1959  Protime-INR  Once         01/17/25 1958 01/17/25 1959  aPTT  Once         01/17/25 1958 01/17/25 1958  aspirin chewable tablet 324 mg  Once         01/17/25 1942 01/17/25 1953  Vital Signs  Per Hospital Policy         01/17/25 1952 01/17/25 1943  NPO Diet NPO Type: Strict NPO  Diet Effective Now,   Status:  Canceled         01/17/25 1942 01/17/25 1943  Undress and Gown  Once         01/17/25 1942 01/17/25 1943  Cardiac Monitoring  Continuous,   Status:  Canceled        Comments: Follow Standing Orders As Outlined in Process Instructions (Open Order Report to View Full Instructions)    01/17/25 1942 01/17/25 1943  Continuous Pulse Oximetry  Continuous,   Status:  Canceled         01/17/25 1942 01/17/25 1943  ECG 12 Lead ED Triage Standing Order; Chest Pain  Once         01/17/25 1942 01/17/25 1943  XR Chest 2 View  1 Time Imaging,   Status:  Canceled         01/17/25 1942    01/17/25 1943  Insert Peripheral IV  Once         01/17/25 1942 01/17/25 1943  [facility] Draw  Once         01/17/25 1942 01/17/25 1943  CBC & Differential  Once         01/17/25 1942 01/17/25 1943  Comprehensive Metabolic Panel  Once         01/17/25 1942 01/17/25 1943  High Sensitivity Troponin T  Once         01/17/25 1942 01/17/25 1943  Green Top (Gel)  PROCEDURE ONCE         01/17/25 1942 01/17/25 1943  Lavender Top  PROCEDURE ONCE         01/17/25 1942 01/17/25 1943  Gold Top - SST  PROCEDURE ONCE         01/17/25 1942 01/17/25 1943  Light Blue Top  PROCEDURE ONCE         01/17/25 1942 01/17/25 1943  CBC Auto Differential  PROCEDURE ONCE         01/17/25 1942 01/17/25 1942  sodium chloride 0.9 % flush 10 mL  As Needed         01/17/25 1942    Unscheduled  Oxygen Therapy- Nasal Cannula; Titrate 1-6 LPM Per SpO2; 90 - 95%  Continuous PRN       01/17/25  1942    Unscheduled  ECG 12 Lead ED Triage Standing Order; Chest Pain  As Needed      Comments: Persistent, Unrelieved or Recurrent Chest Pain    01/17/25 1942    Unscheduled  Up With Assistance  As Needed       01/18/25 0222    Unscheduled  Follow Hypoglycemia Standing Orders For Blood Glucose <70 & Notify Provider of Treatment  As Needed      Comments: Follow Hypoglycemia Orders As Outlined in Process Instructions (Open Order Report to View Full Instructions)  Notify Provider Any Time Hypoglycemia Treatment is Administered    01/18/25 0141    --  atorvastatin (LIPITOR) 20 MG tablet  Nightly         01/18/25 0621    --  sacubitril-valsartan (Entresto) 24-26 MG tablet  2 Times Daily         01/18/25 0621                  Physician Progress Notes (last 24 hours)  Notes from 01/17/25 1116 through 01/18/25 1116   No notes of this type exist for this encounter.       Consult Notes (last 24 hours)  Notes from 01/17/25 1116 through 01/18/25 1116   No notes of this type exist for this encounter.

## 2025-01-18 NOTE — ED PROVIDER NOTES
"Subjective   History of Present Illness  84-year-old male who states is chronically anticoagulated with Eliquis for atrial fibrillation, no Eliquis since 1/13/2025 for removal of scalp skin lesion and colonoscopy earlier today.  He presents complaining of the onset at 5 PM of very localized left anterior chest pain, unable to characterize but because it \"just a constant pain\", just to the left of the lower sternal border at the nipple level.  No associated shortness of breath, nausea, vomiting, diaphoresis, syncope or near syncope.  States that the pain has been present since that time, here over the last half an hour or so has started radiating to his left lower back.  Still with no other associated symptoms.  He denies otherwise having been acutely ill in any way.      Review of Systems   All other systems reviewed and are negative.      Past Medical History:   Diagnosis Date    Arthritis     Atrial fibrillation     Coronary artery disease     GERD (gastroesophageal reflux disease)     Hyperlipidemia     Hypertension     Pre-diabetes     Sleep apnea     BiPap    Thyroid nodule        Allergies   Allergen Reactions    Flomax [Tamsulosin] Other (See Comments)     Heart rate to drop and pass out    Lisinopril Unknown (See Comments) and Cough     Pt. States that he is allergic however, it has been so long since he took it that he cannot remember the reaction that he had at the time.    Losartan Unknown - Low Severity       Past Surgical History:   Procedure Laterality Date    CARDIAC CATHETERIZATION      CARDIAC CATHETERIZATION N/A 12/06/2023    Procedure: Left Heart Cath;  Surgeon: Shwetha Navarrete MD;  Location: [facility] INVASIVE LOCATION;  Service: Cardiology;  Laterality: N/A;    COLONOSCOPY      CYSTOSCOPY TRANSURETHRAL RESECTION OF PROSTATE      ENDOSCOPY      EYE SURGERY Right     INGUINAL HERNIA REPAIR Right     LAPAROSCOPIC CHOLECYSTECTOMY         Family History   Problem Relation Age of Onset    Cancer " Sister     Diabetes Sister     Cancer Brother     Heart disease Brother     Diabetes Maternal Grandmother     Hypertension Neg Hx        Social History     Socioeconomic History    Marital status:    Tobacco Use    Smoking status: Former     Types: Pipe, Cigars     Quit date:      Years since quittin.0     Passive exposure: Past    Smokeless tobacco: Never    Tobacco comments:     Smoked for 25 years   Vaping Use    Vaping status: Never Used   Substance and Sexual Activity    Alcohol use: No    Drug use: No    Sexual activity: Defer           Objective   Physical Exam  Vitals reviewed.   Constitutional:       General: He is not in acute distress.     Appearance: He is well-developed. He is not toxic-appearing or diaphoretic.      Comments: Well-developed well-nourished male, alert and well-oriented, in no apparent acute distress.   HENT:      Head: Normocephalic and atraumatic.   Eyes:      General: No scleral icterus.     Pupils: Pupils are equal, round, and reactive to light.   Neck:      Trachea: No tracheal deviation.      Comments: No meningeal signs.  Cardiovascular:      Rate and Rhythm: Normal rate and regular rhythm.   Pulmonary:      Effort: Pulmonary effort is normal. No respiratory distress.      Breath sounds: Normal breath sounds.   Abdominal:      General: Bowel sounds are normal. There is no distension.      Palpations: Abdomen is soft.      Tenderness: There is no abdominal tenderness.   Musculoskeletal:         General: No tenderness. Normal range of motion.      Cervical back: Normal range of motion and neck supple.      Right upper leg: No edema or tenderness.      Left upper leg: No edema or tenderness.      Right lower leg: No tenderness. No edema.      Left lower leg: No tenderness. No edema.   Skin:     General: Skin is warm and dry.      Coloration: Skin is not pale.      Comments: Warm, pink, dry.   Neurological:      General: No focal deficit present.      Mental Status: He  is alert and oriented to person, place, and time.      Motor: No abnormal muscle tone.      Coordination: Coordination normal.   Psychiatric:         Mood and Affect: Mood normal.         Behavior: Behavior normal.         Procedures  CT Angiogram Abdomen Pelvis   Final Result   1. No acute inflammatory process.   2. No bowel obstruction or perforation.   3. No acute appendicitis, colitis or diverticulitis.       This report was finalized on 1/17/2025 10:35 PM by Sadi Tellez MD.          CT Angiogram Chest Pulmonary Embolism   Final Result   1. Normal caliber thoracic aorta. No aneurysmal dilatation or   dissection.   2. No pulmonary embolus.   3. Heterogeneous appearance of the thyroid, similar to the prior study.   4. Patchy predominantly groundglass opacity within both lungs. This may   be related to atelectasis or edema. However, an atypical infectious   process could result in a similar appearance.       This report was finalized on 1/17/2025 10:32 PM by Sadi Tellez MD.          XR Chest 1 View   Final Result       No acute process seen in the chest.   No lobar consolidation or edema.    No pleural effusion or pneumothorax   Cholecystectomy clips in the right upper quadrant.       This report was finalized on 1/17/2025 9:19 PM by Nic Deutsch MD.            Results for orders placed or performed during the hospital encounter of 01/17/25   ECG 12 Lead ED Triage Standing Order; Chest Pain    Collection Time: 01/17/25  7:41 PM   Result Value Ref Range    QT Interval 436 ms    QTC Interval 499 ms   Comprehensive Metabolic Panel    Collection Time: 01/17/25  8:04 PM    Specimen: Arm, Right; Blood   Result Value Ref Range    Glucose 185 (H) 65 - 99 mg/dL    BUN 23 8 - 23 mg/dL    Creatinine 1.40 (H) 0.76 - 1.27 mg/dL    Sodium 145 136 - 145 mmol/L    Potassium 4.6 3.5 - 5.2 mmol/L    Chloride 107 98 - 107 mmol/L    CO2 23.2 22.0 - 29.0 mmol/L    Calcium 9.0 8.6 - 10.5 mg/dL    Total Protein 7.0 6.0 - 8.5 g/dL     Albumin 3.9 3.5 - 5.2 g/dL    ALT (SGPT) 23 1 - 41 U/L    AST (SGOT) 20 1 - 40 U/L    Alkaline Phosphatase 81 39 - 117 U/L    Total Bilirubin 0.7 0.0 - 1.2 mg/dL    Globulin 3.1 gm/dL    A/G Ratio 1.3 g/dL    BUN/Creatinine Ratio 16.4 7.0 - 25.0    Anion Gap 14.8 5.0 - 15.0 mmol/L    eGFR 49.6 (L) >60.0 mL/min/1.73   High Sensitivity Troponin T    Collection Time: 01/17/25  8:04 PM    Specimen: Arm, Right; Blood   Result Value Ref Range    HS Troponin T 18 <22 ng/L   CBC Auto Differential    Collection Time: 01/17/25  8:04 PM    Specimen: Arm, Right; Blood   Result Value Ref Range    WBC 10.54 3.40 - 10.80 10*3/mm3    RBC 5.72 4.14 - 5.80 10*6/mm3    Hemoglobin 15.2 13.0 - 17.7 g/dL    Hematocrit 49.1 37.5 - 51.0 %    MCV 85.8 79.0 - 97.0 fL    MCH 26.6 26.6 - 33.0 pg    MCHC 31.0 (L) 31.5 - 35.7 g/dL    RDW 13.8 12.3 - 15.4 %    RDW-SD 43.4 37.0 - 54.0 fl    MPV 10.2 6.0 - 12.0 fL    Platelets 243 140 - 450 10*3/mm3    Neutrophil % 67.7 42.7 - 76.0 %    Lymphocyte % 22.9 19.6 - 45.3 %    Monocyte % 7.8 5.0 - 12.0 %    Eosinophil % 0.8 0.3 - 6.2 %    Basophil % 0.5 0.0 - 1.5 %    Immature Grans % 0.3 0.0 - 0.5 %    Neutrophils, Absolute 7.15 (H) 1.70 - 7.00 10*3/mm3    Lymphocytes, Absolute 2.41 0.70 - 3.10 10*3/mm3    Monocytes, Absolute 0.82 0.10 - 0.90 10*3/mm3    Eosinophils, Absolute 0.08 0.00 - 0.40 10*3/mm3    Basophils, Absolute 0.05 0.00 - 0.20 10*3/mm3    Immature Grans, Absolute 0.03 0.00 - 0.05 10*3/mm3    nRBC 0.0 0.0 - 0.2 /100 WBC   Procalcitonin    Collection Time: 01/17/25  8:04 PM    Specimen: Arm, Right; Blood   Result Value Ref Range    Procalcitonin 0.11 0.00 - 0.25 ng/mL   Green Top (Gel)    Collection Time: 01/17/25  8:04 PM   Result Value Ref Range    Extra Tube Hold for add-ons.    Lavender Top    Collection Time: 01/17/25  8:04 PM   Result Value Ref Range    Extra Tube hold for add-on    Gold Top - SST    Collection Time: 01/17/25  8:04 PM   Result Value Ref Range    Extra Tube Hold for  add-ons.    Light Blue Top    Collection Time: 01/17/25  8:04 PM   Result Value Ref Range    Extra Tube Hold for add-ons.    Protime-INR    Collection Time: 01/17/25  8:17 PM    Specimen: Arm, Right; Blood   Result Value Ref Range    Protime 13.8 12.1 - 14.7 Seconds    INR 1.05 0.90 - 1.10   aPTT    Collection Time: 01/17/25  8:17 PM    Specimen: Arm, Right; Blood   Result Value Ref Range    PTT 31.0 26.5 - 34.5 seconds   High Sensitivity Troponin T 1Hr    Collection Time: 01/17/25  9:12 PM    Specimen: Blood   Result Value Ref Range    HS Troponin T 18 <22 ng/L    Troponin T Numeric Delta 0 Abnormal if >/=3 ng/L   C-reactive Protein    Collection Time: 01/17/25  9:12 PM    Specimen: Blood   Result Value Ref Range    C-Reactive Protein <0.30 0.00 - 0.50 mg/dL   Magnesium    Collection Time: 01/17/25  9:12 PM    Specimen: Blood   Result Value Ref Range    Magnesium 2.0 1.6 - 2.4 mg/dL   BNP    Collection Time: 01/17/25  9:12 PM    Specimen: Blood   Result Value Ref Range    proBNP 530.3 0.0 - 1,800.0 pg/mL   Respiratory Panel PCR w/COVID-19(SARS-CoV-2) M[facility]SE/GURPREET/JOANA/PAD/COR/YUMI In-House, NP Swab in [facility], 2 HR TAT - Swab, Nasopharynx    Collection Time: 01/17/25 10:49 PM    Specimen: Nasopharynx; Swab   Result Value Ref Range    ADENOVIRUS, PCR Not Detected Not Detected    Coronavirus 229E Not Detected Not Detected    Coronavirus HKU1 Not Detected Not Detected    Coronavirus NL63 Not Detected Not Detected    Coronavirus OC43 Not Detected Not Detected    COVID19 Not Detected Not Detected - Ref. Range    Human Metapneumovirus Not Detected Not Detected    Human Rhinovirus/Enterovirus Not Detected Not Detected    Influenza A PCR Not Detected Not Detected    Influenza B PCR Not Detected Not Detected    Parainfluenza Virus 1 Not Detected Not Detected    Parainfluenza Virus 2 Not Detected Not Detected    Parainfluenza Virus 3 Not Detected Not Detected    Parainfluenza Virus 4 Not Detected Not Detected    RSV, PCR Not  Detected Not Detected    Bordetella pertussis pcr Not Detected Not Detected    Bordetella parapertussis PCR Not Detected Not Detected    Chlamydophila pneumoniae PCR Not Detected Not Detected    Mycoplasma pneumo by PCR Not Detected Not Detected   Lactic Acid, Plasma    Collection Time: 01/17/25 11:01 PM    Specimen: Blood   Result Value Ref Range    Lactate 1.8 0.5 - 2.0 mmol/L   ECG 12 Lead Chest Pain    Collection Time: 01/17/25 11:41 PM   Result Value Ref Range    QT Interval 482 ms    QTC Interval 473 ms   High Sensitivity Troponin T    Collection Time: 01/17/25 11:46 PM    Specimen: Blood   Result Value Ref Range    HS Troponin T 15 <22 ng/L                ED Course  ED Course as of 01/18/25 0140   Fri Jan 17, 2025 1953 Cardiac catheterization 12/26/2023:    Conclusion       ·  Mid LAD lesion is 30% stenosed.  ·  Mid Cx lesion is 50% stenosed.     1.  Cardiac.  Mild nonobstructive epicardial coronary disease noted.  Medical management will be advised.  Eliquis for chronic A-fib will be advised.      [CM]   1958 EKG was performed at 1941.  This shows atrial fibrillation, rate 79.  Much baseline artifact.  QRS duration 154, QTc 499 ms.  Nonspecific intraventricular block.  No evidence for STEMI.  No significant change compared with 10/10/2024 [CM]   2018 Repeat EKG at 2010 shows atrial fibrillation, rate 52.  Slow ventricular response.  Premature ventricular or aberrantly conducted complexes, no STEMI criteria. [CM]   2027 Echocardiogram 12/4/2023:  nterpretation Summary       ·  Left ventricular systolic function is normal. Calculated left ventricular EF = 67% Left ventricular ejection fraction appears to be 66 - 70%.  ·  Left ventricular diastolic function is consistent with (grade I) impaired relaxation.  ·  The left atrial cavity is moderately dilated.  ·  Estimated right ventricular systolic pressure from tricuspid regurgitation is mildly elevated (35-45 mmHg).  ·  Mild pulmonary hypertension is  present.      [CM]   2226 Earlier patient had complained of heartburn, requesting something for that, no relief with IV Protonix.  I have ordered a GI cocktail. [CM]   2346 Repeat EKG at 2341 shows atrial fibrillation, rate 58.  QRS duration 154, QTc 473 ms.  Right bundle branch block.  Nonspecific ST-T changes.  No STEMI criteria.  He has continued to complain of pain when the Dilaudid wears off.  I have ordered a third troponin.  First 2 are flat.  CT studies have been resulted, results noted. [CM]   Sat Jan 18, 2025   0052 Patient voices that he is feeling better, currently denies any sort of pain.  We discussed his test results.  He is not comfortable going home, he wants to be admitted to the hospital for further evaluation.  Hospitalist paged. [CM]   0138 Case discussed with Dr. Clements.  He is admitting patient to the hospitalist service. [CM]      ED Course User Index  [CM] Eric Irene MD                  HEART Score: 5   Shared Decision Making  I discussed the findings with the patient/patient representative who is in agreement with the treatment plan and the final disposition.  Risks and benefits of discharge and/or observation/admission were discussed: Yes                                      Medical Decision Making      Final diagnoses:   Chest pain, unspecified type       ED Disposition  ED Disposition       ED Disposition   Decision to Admit    Condition   --    Comment   Level of Care: Telemetry [5]   Diagnosis: Chest pain [443912]   Admitting Physician: ROBERTO CLEMENTS [[id number]]   Attending Physician: ROBERTO CLEMENTS [[id number]]                 Please note that portions of this note were completed with a voice recognition program.                Eric Irene MD  01/18/25 0140

## 2025-01-19 RX ORDER — BISACODYL 10 MG
10 SUPPOSITORY, RECTAL RECTAL DAILY PRN
Qty: 5 SUPPOSITORY | Refills: 0 | Status: SHIPPED | OUTPATIENT
Start: 2025-01-19

## 2025-01-19 RX ORDER — SIMETHICONE 80 MG
80 TABLET,CHEWABLE ORAL 4 TIMES DAILY PRN
Qty: 30 TABLET | Refills: 0 | Status: SHIPPED | OUTPATIENT
Start: 2025-01-19

## 2025-01-20 ENCOUNTER — TELEPHONE (OUTPATIENT)
Dept: TELEMETRY | Facility: HOSPITAL | Age: 85
End: 2025-01-20
Payer: MEDICARE

## 2025-01-21 LAB
QT INTERVAL: 426 MS
QTC INTERVAL: 396 MS

## 2025-01-22 LAB
BACTERIA SPEC AEROBE CULT: NORMAL
BACTERIA SPEC AEROBE CULT: NORMAL

## 2025-01-23 LAB — REF LAB TEST METHOD: NORMAL

## 2025-01-29 ENCOUNTER — OFFICE VISIT (OUTPATIENT)
Dept: SURGERY | Facility: CLINIC | Age: 85
End: 2025-01-29
Payer: MEDICARE

## 2025-01-29 VITALS — HEIGHT: 66 IN | WEIGHT: 186 LBS | BODY MASS INDEX: 29.89 KG/M2

## 2025-01-29 DIAGNOSIS — L98.9 SKIN LESION OF SCALP: Primary | ICD-10-CM

## 2025-01-29 NOTE — PROGRESS NOTES
Subjective   Aramis Carson is a 84 y.o. male  is here today for follow-up.         Aramis Carson is a 84 y.o. male here for follow-up after colonoscopy which showed a hyperplastic polyp that was removed without atypia or malignancy as well as excision of a skin lesion of the scalp consistent with actinic keratosis.  Sutures removed in the office today.  Incision healing well.  History of Present Illness         Physical Exam  Scalp incision well-healed, sutures removed  Physical Exam              Assessment     Diagnoses and all orders for this visit:    1. Skin lesion of scalp (Primary)      Aramis Carson is a 84 y.o. male status post excision of scalp actinic keratosis and colonoscopy which was positive for only a atypical hyperplastic polyp without atypia or malignancy.  Follow-up as needed.  Assessment & Plan          This document has been electronically signed by Neftali Lockhart MD   January 29, 2025 14:45 EST    Patient or patient representative verbalized consent for the use of Ambient Listening during the visit with  Neftali Lockhart MD for chart documentation. 1/29/2025  14:46 EST

## 2025-02-10 ENCOUNTER — HOSPITAL ENCOUNTER (INPATIENT)
Facility: HOSPITAL | Age: 85
LOS: 3 days | Discharge: HOME | DRG: 378 | End: 2025-02-13
Attending: STUDENT IN AN ORGANIZED HEALTH CARE EDUCATION/TRAINING PROGRAM | Admitting: HOSPITALIST
Payer: MEDICARE

## 2025-02-10 ENCOUNTER — APPOINTMENT (OUTPATIENT)
Dept: CT IMAGING | Facility: HOSPITAL | Age: 85
DRG: 378 | End: 2025-02-10
Payer: MEDICARE

## 2025-02-10 ENCOUNTER — APPOINTMENT (OUTPATIENT)
Dept: GENERAL RADIOLOGY | Facility: HOSPITAL | Age: 85
DRG: 378 | End: 2025-02-10
Payer: MEDICARE

## 2025-02-10 DIAGNOSIS — K92.2 UPPER GI BLEED: ICD-10-CM

## 2025-02-10 DIAGNOSIS — K92.1 BLACK STOOLS: Primary | ICD-10-CM

## 2025-02-10 LAB
ALBUMIN SERPL-MCNC: 3.9 G/DL (ref 3.5–5.2)
ALBUMIN/GLOB SERPL: 1.3 G/DL
ALP SERPL-CCNC: 65 U/L (ref 39–117)
ALT SERPL W P-5'-P-CCNC: 18 U/L (ref 1–41)
ANION GAP SERPL CALCULATED.3IONS-SCNC: 11.3 MMOL/L (ref 5–15)
AST SERPL-CCNC: 19 U/L (ref 1–40)
BASOPHILS # BLD AUTO: 0.03 10*3/MM3 (ref 0–0.2)
BASOPHILS NFR BLD AUTO: 0.2 % (ref 0–1.5)
BILIRUB SERPL-MCNC: 0.5 MG/DL (ref 0–1.2)
BUN SERPL-MCNC: 24 MG/DL (ref 8–23)
BUN/CREAT SERPL: 20.5 (ref 7–25)
CALCIUM SPEC-SCNC: 8.6 MG/DL (ref 8.6–10.5)
CHLORIDE SERPL-SCNC: 106 MMOL/L (ref 98–107)
CO2 SERPL-SCNC: 21.7 MMOL/L (ref 22–29)
CREAT SERPL-MCNC: 1.17 MG/DL (ref 0.76–1.27)
DEPRECATED RDW RBC AUTO: 43.8 FL (ref 37–54)
EGFRCR SERPLBLD CKD-EPI 2021: 61.5 ML/MIN/1.73
EOSINOPHIL # BLD AUTO: 0 10*3/MM3 (ref 0–0.4)
EOSINOPHIL NFR BLD AUTO: 0 % (ref 0.3–6.2)
ERYTHROCYTE [DISTWIDTH] IN BLOOD BY AUTOMATED COUNT: 14.2 % (ref 12.3–15.4)
FLUAV RNA RESP QL NAA+PROBE: DETECTED
FLUBV RNA RESP QL NAA+PROBE: NOT DETECTED
GLOBULIN UR ELPH-MCNC: 3 GM/DL
GLUCOSE BLDC GLUCOMTR-MCNC: 88 MG/DL (ref 70–130)
GLUCOSE SERPL-MCNC: 106 MG/DL (ref 65–99)
HCT VFR BLD AUTO: 50.4 % (ref 37.5–51)
HGB BLD-MCNC: 16.1 G/DL (ref 13–17.7)
HOLD SPECIMEN: NORMAL
HOLD SPECIMEN: NORMAL
IMM GRANULOCYTES # BLD AUTO: 0.07 10*3/MM3 (ref 0–0.05)
IMM GRANULOCYTES NFR BLD AUTO: 0.5 % (ref 0–0.5)
LYMPHOCYTES # BLD AUTO: 1.27 10*3/MM3 (ref 0.7–3.1)
LYMPHOCYTES NFR BLD AUTO: 8.7 % (ref 19.6–45.3)
MCH RBC QN AUTO: 27 PG (ref 26.6–33)
MCHC RBC AUTO-ENTMCNC: 31.9 G/DL (ref 31.5–35.7)
MCV RBC AUTO: 84.6 FL (ref 79–97)
MONOCYTES # BLD AUTO: 1.07 10*3/MM3 (ref 0.1–0.9)
MONOCYTES NFR BLD AUTO: 7.3 % (ref 5–12)
NEUTROPHILS NFR BLD AUTO: 12.22 10*3/MM3 (ref 1.7–7)
NEUTROPHILS NFR BLD AUTO: 83.3 % (ref 42.7–76)
NRBC BLD AUTO-RTO: 0 /100 WBC (ref 0–0.2)
PLATELET # BLD AUTO: 245 10*3/MM3 (ref 140–450)
PMV BLD AUTO: 10.3 FL (ref 6–12)
POTASSIUM SERPL-SCNC: 3.9 MMOL/L (ref 3.5–5.2)
PROT SERPL-MCNC: 6.9 G/DL (ref 6–8.5)
RBC # BLD AUTO: 5.96 10*6/MM3 (ref 4.14–5.8)
SARS-COV-2 RNA RESP QL NAA+PROBE: NOT DETECTED
SODIUM SERPL-SCNC: 139 MMOL/L (ref 136–145)
WBC NRBC COR # BLD AUTO: 14.66 10*3/MM3 (ref 3.4–10.8)
WHOLE BLOOD HOLD COAG: NORMAL
WHOLE BLOOD HOLD SPECIMEN: NORMAL

## 2025-02-10 PROCEDURE — 93005 ELECTROCARDIOGRAM TRACING: CPT | Performed by: HOSPITALIST

## 2025-02-10 PROCEDURE — 87636 SARSCOV2 & INF A&B AMP PRB: CPT | Performed by: PHYSICIAN ASSISTANT

## 2025-02-10 PROCEDURE — 99285 EMERGENCY DEPT VISIT HI MDM: CPT

## 2025-02-10 PROCEDURE — 94660 CPAP INITIATION&MGMT: CPT

## 2025-02-10 PROCEDURE — 99223 1ST HOSP IP/OBS HIGH 75: CPT | Performed by: HOSPITALIST

## 2025-02-10 PROCEDURE — 36415 COLL VENOUS BLD VENIPUNCTURE: CPT | Performed by: PHYSICIAN ASSISTANT

## 2025-02-10 PROCEDURE — 74177 CT ABD & PELVIS W/CONTRAST: CPT | Performed by: RADIOLOGY

## 2025-02-10 PROCEDURE — 25010000002 ONDANSETRON PER 1 MG

## 2025-02-10 PROCEDURE — 25810000003 SODIUM CHLORIDE 0.9 % SOLUTION: Performed by: HOSPITALIST

## 2025-02-10 PROCEDURE — 71045 X-RAY EXAM CHEST 1 VIEW: CPT

## 2025-02-10 PROCEDURE — 85025 COMPLETE CBC W/AUTO DIFF WBC: CPT | Performed by: PHYSICIAN ASSISTANT

## 2025-02-10 PROCEDURE — 82948 REAGENT STRIP/BLOOD GLUCOSE: CPT

## 2025-02-10 PROCEDURE — 71045 X-RAY EXAM CHEST 1 VIEW: CPT | Performed by: RADIOLOGY

## 2025-02-10 PROCEDURE — 25510000001 IOPAMIDOL 61 % SOLUTION

## 2025-02-10 PROCEDURE — 74177 CT ABD & PELVIS W/CONTRAST: CPT

## 2025-02-10 PROCEDURE — 80053 COMPREHEN METABOLIC PANEL: CPT | Performed by: PHYSICIAN ASSISTANT

## 2025-02-10 RX ORDER — PANTOPRAZOLE SODIUM 40 MG/10ML
40 INJECTION, POWDER, LYOPHILIZED, FOR SOLUTION INTRAVENOUS ONCE
Status: COMPLETED | OUTPATIENT
Start: 2025-02-10 | End: 2025-02-10

## 2025-02-10 RX ORDER — IOPAMIDOL 612 MG/ML
100 INJECTION, SOLUTION INTRAVASCULAR
Status: COMPLETED | OUTPATIENT
Start: 2025-02-10 | End: 2025-02-10

## 2025-02-10 RX ORDER — ONDANSETRON 2 MG/ML
4 INJECTION INTRAMUSCULAR; INTRAVENOUS ONCE
Status: COMPLETED | OUTPATIENT
Start: 2025-02-10 | End: 2025-02-10

## 2025-02-10 RX ORDER — IBUPROFEN 800 MG/1
800 TABLET, FILM COATED ORAL EVERY 6 HOURS PRN
Status: ON HOLD | COMMUNITY
End: 2025-02-10

## 2025-02-10 RX ORDER — DEXTROSE MONOHYDRATE 25 G/50ML
25 INJECTION, SOLUTION INTRAVENOUS
Status: DISCONTINUED | OUTPATIENT
Start: 2025-02-10 | End: 2025-02-13 | Stop reason: HOSPADM

## 2025-02-10 RX ORDER — GLUCAGON 1 MG/ML
1 KIT INJECTION
Status: DISCONTINUED | OUTPATIENT
Start: 2025-02-10 | End: 2025-02-13 | Stop reason: HOSPADM

## 2025-02-10 RX ORDER — ONDANSETRON 4 MG/1
4 TABLET, ORALLY DISINTEGRATING ORAL EVERY 8 HOURS PRN
Status: CANCELLED | OUTPATIENT
Start: 2025-02-10

## 2025-02-10 RX ORDER — PANTOPRAZOLE SODIUM 40 MG/1
40 TABLET, DELAYED RELEASE ORAL
Status: CANCELLED | OUTPATIENT
Start: 2025-02-11

## 2025-02-10 RX ORDER — GLIPIZIDE 5 MG/1
1.25 TABLET ORAL
Status: CANCELLED | OUTPATIENT
Start: 2025-02-11

## 2025-02-10 RX ORDER — ONDANSETRON 4 MG/1
4 TABLET, ORALLY DISINTEGRATING ORAL EVERY 8 HOURS PRN
COMMUNITY
End: 2025-02-13 | Stop reason: HOSPADM

## 2025-02-10 RX ORDER — DEXTROMETHORPHAN HYDROBROMIDE AND PROMETHAZINE HYDROCHLORIDE 15; 6.25 MG/5ML; MG/5ML
5 SYRUP ORAL 4 TIMES DAILY PRN
Status: ON HOLD | COMMUNITY
End: 2025-02-10

## 2025-02-10 RX ORDER — MORPHINE SULFATE 2 MG/ML
1 INJECTION, SOLUTION INTRAMUSCULAR; INTRAVENOUS EVERY 4 HOURS PRN
Status: DISCONTINUED | OUTPATIENT
Start: 2025-02-10 | End: 2025-02-13 | Stop reason: HOSPADM

## 2025-02-10 RX ORDER — SUCRALFATE 1 G/1
1 TABLET ORAL
Status: DISCONTINUED | OUTPATIENT
Start: 2025-02-10 | End: 2025-02-13 | Stop reason: HOSPADM

## 2025-02-10 RX ORDER — LORATADINE 10 MG/1
10 TABLET ORAL DAILY
Status: ON HOLD | COMMUNITY
End: 2025-02-10

## 2025-02-10 RX ORDER — NICOTINE POLACRILEX 4 MG
15 LOZENGE BUCCAL
Status: DISCONTINUED | OUTPATIENT
Start: 2025-02-10 | End: 2025-02-13 | Stop reason: HOSPADM

## 2025-02-10 RX ORDER — MONTELUKAST SODIUM 10 MG/1
10 TABLET ORAL NIGHTLY
Status: ON HOLD | COMMUNITY
End: 2025-02-10

## 2025-02-10 RX ADMIN — PANTOPRAZOLE SODIUM 40 MG: 40 INJECTION, POWDER, FOR SOLUTION INTRAVENOUS at 17:14

## 2025-02-10 RX ADMIN — PANTOPRAZOLE SODIUM 40 MG: 40 INJECTION, POWDER, FOR SOLUTION INTRAVENOUS at 20:29

## 2025-02-10 RX ADMIN — SUCRALFATE 1 G: 1 TABLET ORAL at 20:31

## 2025-02-10 RX ADMIN — ONDANSETRON 4 MG: 2 INJECTION INTRAMUSCULAR; INTRAVENOUS at 17:14

## 2025-02-10 RX ADMIN — PANTOPRAZOLE SODIUM 40 MG: 40 INJECTION, POWDER, FOR SOLUTION INTRAVENOUS at 20:28

## 2025-02-10 RX ADMIN — SODIUM CHLORIDE 500 ML: 0.9 INJECTION, SOLUTION INTRAVENOUS at 20:28

## 2025-02-10 RX ADMIN — IOPAMIDOL 70 ML: 612 INJECTION, SOLUTION INTRAVENOUS at 17:44

## 2025-02-11 LAB
ALBUMIN SERPL-MCNC: 3 G/DL (ref 3.5–5.2)
ALBUMIN/GLOB SERPL: 1.1 G/DL
ALP SERPL-CCNC: 52 U/L (ref 39–117)
ALT SERPL W P-5'-P-CCNC: 14 U/L (ref 1–41)
ANION GAP SERPL CALCULATED.3IONS-SCNC: 10.7 MMOL/L (ref 5–15)
AST SERPL-CCNC: 15 U/L (ref 1–40)
BASOPHILS # BLD AUTO: 0.03 10*3/MM3 (ref 0–0.2)
BASOPHILS NFR BLD AUTO: 0.3 % (ref 0–1.5)
BILIRUB SERPL-MCNC: 0.5 MG/DL (ref 0–1.2)
BUN SERPL-MCNC: 20 MG/DL (ref 8–23)
BUN/CREAT SERPL: 21.1 (ref 7–25)
CALCIUM SPEC-SCNC: 7.8 MG/DL (ref 8.6–10.5)
CHLORIDE SERPL-SCNC: 108 MMOL/L (ref 98–107)
CO2 SERPL-SCNC: 21.3 MMOL/L (ref 22–29)
CREAT SERPL-MCNC: 0.95 MG/DL (ref 0.76–1.27)
DEPRECATED RDW RBC AUTO: 44.5 FL (ref 37–54)
EGFRCR SERPLBLD CKD-EPI 2021: 78.9 ML/MIN/1.73
EOSINOPHIL # BLD AUTO: 0.01 10*3/MM3 (ref 0–0.4)
EOSINOPHIL NFR BLD AUTO: 0.1 % (ref 0.3–6.2)
ERYTHROCYTE [DISTWIDTH] IN BLOOD BY AUTOMATED COUNT: 14.1 % (ref 12.3–15.4)
GLOBULIN UR ELPH-MCNC: 2.8 GM/DL
GLUCOSE BLDC GLUCOMTR-MCNC: 80 MG/DL (ref 70–130)
GLUCOSE BLDC GLUCOMTR-MCNC: 84 MG/DL (ref 70–130)
GLUCOSE BLDC GLUCOMTR-MCNC: 85 MG/DL (ref 70–130)
GLUCOSE BLDC GLUCOMTR-MCNC: 85 MG/DL (ref 70–130)
GLUCOSE BLDC GLUCOMTR-MCNC: 97 MG/DL (ref 70–130)
GLUCOSE SERPL-MCNC: 87 MG/DL (ref 65–99)
HCT VFR BLD AUTO: 42.3 % (ref 37.5–51)
HCT VFR BLD AUTO: 44.5 % (ref 37.5–51)
HGB BLD-MCNC: 13.4 G/DL (ref 13–17.7)
HGB BLD-MCNC: 13.9 G/DL (ref 13–17.7)
IMM GRANULOCYTES # BLD AUTO: 0.05 10*3/MM3 (ref 0–0.05)
IMM GRANULOCYTES NFR BLD AUTO: 0.4 % (ref 0–0.5)
LYMPHOCYTES # BLD AUTO: 1.7 10*3/MM3 (ref 0.7–3.1)
LYMPHOCYTES NFR BLD AUTO: 15.2 % (ref 19.6–45.3)
MCH RBC QN AUTO: 26.8 PG (ref 26.6–33)
MCHC RBC AUTO-ENTMCNC: 31.2 G/DL (ref 31.5–35.7)
MCV RBC AUTO: 85.9 FL (ref 79–97)
MONOCYTES # BLD AUTO: 0.81 10*3/MM3 (ref 0.1–0.9)
MONOCYTES NFR BLD AUTO: 7.2 % (ref 5–12)
NEUTROPHILS NFR BLD AUTO: 76.8 % (ref 42.7–76)
NEUTROPHILS NFR BLD AUTO: 8.61 10*3/MM3 (ref 1.7–7)
NRBC BLD AUTO-RTO: 0 /100 WBC (ref 0–0.2)
PLATELET # BLD AUTO: 183 10*3/MM3 (ref 140–450)
PMV BLD AUTO: 10.4 FL (ref 6–12)
POTASSIUM SERPL-SCNC: 3.7 MMOL/L (ref 3.5–5.2)
PROT SERPL-MCNC: 5.8 G/DL (ref 6–8.5)
RBC # BLD AUTO: 5.18 10*6/MM3 (ref 4.14–5.8)
SODIUM SERPL-SCNC: 140 MMOL/L (ref 136–145)
WBC NRBC COR # BLD AUTO: 11.21 10*3/MM3 (ref 3.4–10.8)

## 2025-02-11 PROCEDURE — 85014 HEMATOCRIT: CPT | Performed by: HOSPITALIST

## 2025-02-11 PROCEDURE — 99232 SBSQ HOSP IP/OBS MODERATE 35: CPT | Performed by: INTERNAL MEDICINE

## 2025-02-11 PROCEDURE — 80053 COMPREHEN METABOLIC PANEL: CPT | Performed by: HOSPITALIST

## 2025-02-11 PROCEDURE — 94660 CPAP INITIATION&MGMT: CPT

## 2025-02-11 PROCEDURE — 94799 UNLISTED PULMONARY SVC/PX: CPT

## 2025-02-11 PROCEDURE — 82948 REAGENT STRIP/BLOOD GLUCOSE: CPT

## 2025-02-11 PROCEDURE — 85025 COMPLETE CBC W/AUTO DIFF WBC: CPT | Performed by: HOSPITALIST

## 2025-02-11 PROCEDURE — 25810000003 SODIUM CHLORIDE 0.9 % SOLUTION: Performed by: HOSPITALIST

## 2025-02-11 PROCEDURE — 36415 COLL VENOUS BLD VENIPUNCTURE: CPT | Performed by: HOSPITALIST

## 2025-02-11 PROCEDURE — 99222 1ST HOSP IP/OBS MODERATE 55: CPT | Performed by: SURGERY

## 2025-02-11 PROCEDURE — 85018 HEMOGLOBIN: CPT | Performed by: HOSPITALIST

## 2025-02-11 RX ORDER — FAMOTIDINE 20 MG/1
20 TABLET, FILM COATED ORAL 2 TIMES DAILY
Status: CANCELLED | OUTPATIENT
Start: 2025-02-11

## 2025-02-11 RX ORDER — SODIUM CHLORIDE 9 MG/ML
40 INJECTION, SOLUTION INTRAVENOUS AS NEEDED
Status: DISCONTINUED | OUTPATIENT
Start: 2025-02-11 | End: 2025-02-13 | Stop reason: HOSPADM

## 2025-02-11 RX ORDER — NITROGLYCERIN 0.4 MG/1
0.4 TABLET SUBLINGUAL
Status: DISCONTINUED | OUTPATIENT
Start: 2025-02-11 | End: 2025-02-13 | Stop reason: HOSPADM

## 2025-02-11 RX ORDER — SODIUM CHLORIDE 0.9 % (FLUSH) 0.9 %
10 SYRINGE (ML) INJECTION EVERY 12 HOURS SCHEDULED
Status: DISCONTINUED | OUTPATIENT
Start: 2025-02-11 | End: 2025-02-13 | Stop reason: HOSPADM

## 2025-02-11 RX ORDER — SODIUM CHLORIDE 9 MG/ML
75 INJECTION, SOLUTION INTRAVENOUS CONTINUOUS
Status: DISCONTINUED | OUTPATIENT
Start: 2025-02-11 | End: 2025-02-12

## 2025-02-11 RX ORDER — ONDANSETRON 4 MG/1
4 TABLET, ORALLY DISINTEGRATING ORAL EVERY 8 HOURS PRN
Status: DISCONTINUED | OUTPATIENT
Start: 2025-02-11 | End: 2025-02-13 | Stop reason: HOSPADM

## 2025-02-11 RX ORDER — SODIUM CHLORIDE 9 MG/ML
75 INJECTION, SOLUTION INTRAVENOUS CONTINUOUS
Status: ACTIVE | OUTPATIENT
Start: 2025-02-11 | End: 2025-02-11

## 2025-02-11 RX ORDER — ATORVASTATIN CALCIUM 20 MG/1
20 TABLET, FILM COATED ORAL NIGHTLY
Status: DISCONTINUED | OUTPATIENT
Start: 2025-02-11 | End: 2025-02-13 | Stop reason: HOSPADM

## 2025-02-11 RX ORDER — SODIUM CHLORIDE 0.9 % (FLUSH) 0.9 %
10 SYRINGE (ML) INJECTION AS NEEDED
Status: DISCONTINUED | OUTPATIENT
Start: 2025-02-11 | End: 2025-02-13 | Stop reason: HOSPADM

## 2025-02-11 RX ADMIN — PANTOPRAZOLE SODIUM 40 MG: 40 INJECTION, POWDER, FOR SOLUTION INTRAVENOUS at 11:17

## 2025-02-11 RX ADMIN — SUCRALFATE 1 G: 1 TABLET ORAL at 11:09

## 2025-02-11 RX ADMIN — PANTOPRAZOLE SODIUM 40 MG: 40 INJECTION, POWDER, FOR SOLUTION INTRAVENOUS at 07:21

## 2025-02-11 RX ADMIN — PANTOPRAZOLE SODIUM 40 MG: 40 INJECTION, POWDER, FOR SOLUTION INTRAVENOUS at 20:52

## 2025-02-11 RX ADMIN — Medication 10 ML: at 08:19

## 2025-02-11 RX ADMIN — SODIUM CHLORIDE 75 ML/HR: 9 INJECTION, SOLUTION INTRAVENOUS at 05:41

## 2025-02-11 RX ADMIN — PANTOPRAZOLE SODIUM 40 MG: 40 INJECTION, POWDER, FOR SOLUTION INTRAVENOUS at 16:15

## 2025-02-11 RX ADMIN — SUCRALFATE 1 G: 1 TABLET ORAL at 06:41

## 2025-02-11 RX ADMIN — SUCRALFATE 1 G: 1 TABLET ORAL at 16:16

## 2025-02-11 RX ADMIN — Medication 10 ML: at 20:53

## 2025-02-11 RX ADMIN — SUCRALFATE 1 G: 1 TABLET ORAL at 20:07

## 2025-02-11 RX ADMIN — SODIUM CHLORIDE 75 ML/HR: 9 INJECTION, SOLUTION INTRAVENOUS at 16:15

## 2025-02-11 RX ADMIN — ATORVASTATIN CALCIUM 20 MG: 20 TABLET, FILM COATED ORAL at 20:07

## 2025-02-11 NOTE — ED PROVIDER NOTES
Subjective   History of Present Illness  84-year-old male with history of arthritis, A-fib and CAD presents to the ED with vomiting and dark stools as well as a cough.  Patient states the last 3 days has been having dark tarry stools and black vomit.  He endorses a lot of pain whenever he eats or drinks.      Review of Systems   Constitutional: Negative.  Negative for fever.   HENT: Negative.     Respiratory: Negative.     Cardiovascular: Negative.  Negative for chest pain.   Gastrointestinal:  Positive for abdominal pain, diarrhea, nausea and vomiting.   Endocrine: Negative.    Genitourinary: Negative.  Negative for dysuria.   Skin: Negative.    Neurological: Negative.    Psychiatric/Behavioral: Negative.     All other systems reviewed and are negative.      Past Medical History:   Diagnosis Date    Arthritis     Atrial fibrillation     Coronary artery disease     GERD (gastroesophageal reflux disease)     Hyperlipidemia     Hypertension     Pre-diabetes     Sleep apnea     BiPap    Thyroid nodule        Allergies   Allergen Reactions    Flomax [Tamsulosin] Other (See Comments)     Heart rate to drop and pass out    Lisinopril Unknown (See Comments) and Cough     Pt. States that he is allergic however, it has been so long since he took it that he cannot remember the reaction that he had at the time.    Losartan Unknown - Low Severity       Past Surgical History:   Procedure Laterality Date    CARDIAC CATHETERIZATION      CARDIAC CATHETERIZATION N/A 12/06/2023    Procedure: Left Heart Cath;  Surgeon: Shwetha Navarrete MD;  Location: [facility] CATH INVASIVE LOCATION;  Service: Cardiology;  Laterality: N/A;    COLONOSCOPY      COLONOSCOPY N/A 1/17/2025    Procedure: COLONOSCOPY;  Surgeon: Neftali Lockhart MD;  Location: [facility] OR;  Service: General;  Laterality: N/A;    CYSTOSCOPY TRANSURETHRAL RESECTION OF PROSTATE      ENDOSCOPY      EYE SURGERY Right     INGUINAL HERNIA REPAIR Right     LAPAROSCOPIC CHOLECYSTECTOMY       SKIN LESION EXCISION N/A 2025    Procedure: SKIN LESION EXCISION;  Surgeon: Neftali Lockhart MD;  Location: [facility] OR;  Service: General;  Laterality: N/A;       Family History   Problem Relation Age of Onset    Cancer Sister     Diabetes Sister     Cancer Brother     Heart disease Brother     Diabetes Maternal Grandmother     Hypertension Neg Hx        Social History     Socioeconomic History    Marital status:    Tobacco Use    Smoking status: Former     Types: Pipe, Cigars     Quit date:      Years since quittin.1     Passive exposure: Past    Smokeless tobacco: Never    Tobacco comments:     Smoked for 25 years   Vaping Use    Vaping status: Never Used   Substance and Sexual Activity    Alcohol use: No    Drug use: No    Sexual activity: Defer           Objective   Physical Exam  Vitals and nursing note reviewed.   Constitutional:       General: He is not in acute distress.     Appearance: He is well-developed. He is not diaphoretic.   HENT:      Head: Normocephalic and atraumatic.      Right Ear: External ear normal.      Left Ear: External ear normal.      Nose: Nose normal.   Eyes:      Conjunctiva/sclera: Conjunctivae normal.      Pupils: Pupils are equal, round, and reactive to light.   Neck:      Vascular: No JVD.      Trachea: No tracheal deviation.   Cardiovascular:      Rate and Rhythm: Normal rate and regular rhythm.      Heart sounds: Normal heart sounds. No murmur heard.  Pulmonary:      Effort: Pulmonary effort is normal. No respiratory distress.      Breath sounds: Normal breath sounds. No wheezing.   Abdominal:      General: Bowel sounds are normal.      Palpations: Abdomen is soft.      Tenderness: There is abdominal tenderness.   Musculoskeletal:         General: No deformity. Normal range of motion.      Cervical back: Normal range of motion and neck supple.   Skin:     General: Skin is warm and dry.      Coloration: Skin is not pale.      Findings: No erythema or  rash.   Neurological:      Mental Status: He is alert and oriented to person, place, and time.      Cranial Nerves: No cranial nerve deficit.   Psychiatric:         Behavior: Behavior normal.         Thought Content: Thought content normal.         Procedures           ED Course                                                       Medical Decision Making  Patient is high suspicion for a bleeding gastric ulcer at this time.  I discussed the case with Dr. Eddy who recommended inpatient admission PPI drip and Carafate.  Discussed the case with the hospitalist team and the patient.  All parties in agreement at this time.    Problems Addressed:  Black stools: complicated acute illness or injury    Amount and/or Complexity of Data Reviewed  Labs: ordered.  Radiology: ordered.    Risk  Prescription drug management.        Final diagnoses:   Black stools       ED Disposition  ED Disposition       ED Disposition   Intended Admit    Condition   --    Comment   --               No follow-up provider specified.       Medication List      No changes were made to your prescriptions during this visit.            Hari Villa,   02/10/25 1934

## 2025-02-11 NOTE — PAYOR COMM NOTE
"[facility]  ALEXIA ÁLVAREZ  PHONE  [phone]  FAX  [phone]  NPI:  [id number]    ADMISSION NOTIFICATION    Abdulkadir Carson (84 y.o. Male)       Date of Birth   1940    Social Security Number       Address   [address] [address]    Home Phone   [phone]    MRN   [id number]       Episcopalian   [employer]ar[employer]    Marital Status                               Admission Date   2/10/25    Admission Type   Emergency    Admitting Provider   Satish Lott MD    Attending Provider   Mary Bustos DO    Department, Room/Bed   [facility] PROGRESS CARE, P213/S2       Discharge Date       Discharge Disposition       Discharge Destination                                 Attending Provider: Mary Bustos DO    Allergies: Flomax [Tamsulosin], Lisinopril, Losartan    Isolation: Droplet   Infection: Influenza (02/10/25)   Code Status: CPR    Ht: 167.6 cm (66\")   Wt: 81.5 kg (179 lb 10.8 oz)    Admission Cmt: None   Principal Problem: Upper GI bleed [K92.2]                   Active Insurance as of 2/10/2025       Primary Coverage       Payor Plan Insurance Group Employer/Plan Group    MEDICARE RAILROAD MEDICARE        Payor Plan Address Payor Plan Phone Number Payor Plan Fax Number Effective Dates    [address] [phone]  2/1/2005 - None Entered    [facility] [id number]         Subscriber Name Subscriber Birth Date Member ID       ABDULKADIR CARSON 1940 [id number]               Secondary Coverage       Payor Plan Insurance Group Employer/Plan Group     FOR LIFE  FOR LIFE [employer] [id number]         Payor Plan Address Payor Plan Phone Number Payor Plan Fax Number Effective Dates    [address] [phone]  10/6/2016 - None Entered    [facility] [address]         Subscriber Name Subscriber Birth Date Member ID       ABDULKADIR CARSON 1940 [id number]                     Emergency Contacts        (Rel.) Home Phone Work Phone Mobile " Phone    Kelley Carson (Spouse) [phone] -- [phone]                 History & Physical        Satish Lott MD at 02/10/25 1957          Hospitalist History and Physical        Patient Identification  Name: Aramis Carson  Age/Sex: 84 y.o. male  :  1940        MRN: [id number]  Visit Number: [id number]  Admit Date: 2/10/2025   PCP: Kieran Blanca APRN          Chief complaint one episode of hematemesis, several episodes of melanotic stools, epigastric pain with eating and drinking    History of Present Illness:  Patient is a 84 y.o. 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 He reports one episode of hematochezia as well over the weekend. He reports everything he eats or drinks causes epigastric discomfort, even water. Of note, he developed a cough 4 days ago and over the weekend tested positive for influenza A. He reports other than feeling a little tired and an occasional cough, his symptoms have overall improved in this regard. He denies use of aspirin, plavix or brilinta. He denies use of NSAIDs and only takes tylenol and norco for arthritis pain.     In the ED, patient was afebrile, tachycardic up to 104 bpm, and normotensive. O2 sat has been in the mid to upper 90s on RA. Labs showed low bicarb 21.7, AG 11.3, BUN 24, Cr 1.17 (baseline 1.31-1.40 earlier this morning though around 1.12 as of 24). Glucose 106. LFTs normal. CBC showed elevated WBC 14.66, hemoglobin 16.1 (baseline around 13-14), platelets 245. He did test positive for influenza A again. CXR was unremarkable. CT abd/pelvis showed moderate duodenal wall thickening with adjacent inflammation concerning for duodenitis/ulcerative disease.     Review of Systems  Review of Systems   Constitutional:   Positive for activity change and fatigue. Negative for appetite change, chills, diaphoresis, fever and unexpected weight change.   HENT:  Negative for congestion, postnasal drip, rhinorrhea, sinus pressure, sinus pain and sore throat.    Eyes:  Negative for photophobia and visual disturbance.   Respiratory:  Positive for cough. Negative for shortness of breath and wheezing.    Cardiovascular:  Negative for chest pain, palpitations and leg swelling.   Gastrointestinal:  Positive for abdominal pain (epigastric), blood in stool (black), diarrhea, nausea and vomiting. Negative for abdominal distention and constipation.   Endocrine: Negative for polyphagia and polyuria.   Genitourinary:  Negative for difficulty urinating, dysuria, flank pain, frequency and hematuria.   Musculoskeletal:  Positive for arthralgias and back pain. Negative for joint swelling, myalgias, neck pain and neck stiffness.   Skin:  Negative for color change, pallor, rash and wound.   Neurological:  Positive for weakness (generalized). Negative for dizziness, tremors, seizures, syncope, light-headedness, numbness and headaches.   Hematological:  Negative for adenopathy. Does not bruise/bleed easily.   Psychiatric/Behavioral:  Negative for agitation, behavioral problems and confusion.        History  Past Medical History:   Diagnosis Date    Arthritis     Atrial fibrillation     Coronary artery disease     GERD (gastroesophageal reflux disease)     Hyperlipidemia     Hypertension     Pre-diabetes     Sleep apnea     BiPap    Thyroid nodule      Past Surgical History:   Procedure Laterality Date    CARDIAC CATHETERIZATION      CARDIAC CATHETERIZATION N/A 12/06/2023    Procedure: Left Heart Cath;  Surgeon: Shwetha Navarrete MD;  Location: [facility] CATH INVASIVE LOCATION;  Service: Cardiology;  Laterality: N/A;    COLONOSCOPY      COLONOSCOPY N/A 1/17/2025    Procedure: COLONOSCOPY;  Surgeon: Neftali Lockhart MD;  Location: [facility] OR;  Service: General;   Laterality: N/A;    CYSTOSCOPY TRANSURETHRAL RESECTION OF PROSTATE      ENDOSCOPY      EYE SURGERY Right     INGUINAL HERNIA REPAIR Right     LAPAROSCOPIC CHOLECYSTECTOMY      SKIN LESION EXCISION N/A 2025    Procedure: SKIN LESION EXCISION;  Surgeon: Neftali Lockhart MD;  Location: [facility];  Service: General;  Laterality: N/A;     Family History   Problem Relation Age of Onset    Cancer Sister     Diabetes Sister     Cancer Brother     Heart disease Brother     Diabetes Maternal Grandmother     Hypertension Neg Hx      Social History     Tobacco Use    Smoking status: Former     Types: Pipe, Cigars     Quit date:      Years since quittin.1     Passive exposure: Past    Smokeless tobacco: Never    Tobacco comments:     Smoked for 25 years   Vaping Use    Vaping status: Never Used   Substance Use Topics    Alcohol use: No    Drug use: No     (Not in a hospital admission)    Allergies:  Flomax [tamsulosin], Lisinopril, and Losartan    Objective    Vital Signs  Temp:  [97.5 °F (36.4 °C)] 97.5 °F (36.4 °C)  Heart Rate:  [] 92  Resp:  [14] 14  BP: (116-145)/(61-89) 127/75  Body mass index is 29.38 kg/m².    Physical Exam:  Physical Exam  Constitutional:       General: He is not in acute distress.     Appearance: Normal appearance. He is not ill-appearing.   HENT:      Head: Normocephalic and atraumatic.      Right Ear: External ear normal.      Left Ear: External ear normal.      Nose: Nose normal.      Mouth/Throat:      Mouth: Mucous membranes are dry.      Pharynx: Oropharynx is clear.   Eyes:      Extraocular Movements: Extraocular movements intact.      Conjunctiva/sclera: Conjunctivae normal.      Pupils: Pupils are equal, round, and reactive to light.   Cardiovascular:      Rate and Rhythm: Regular rhythm. Tachycardia present.      Pulses: Normal pulses.      Heart sounds: Normal heart sounds. No murmur heard.  Pulmonary:      Effort: Pulmonary effort is normal. No respiratory distress.  "     Breath sounds: Normal breath sounds. No wheezing or rales.   Abdominal:      General: Abdomen is flat. Bowel sounds are normal. There is no distension.      Palpations: Abdomen is soft.      Tenderness: There is abdominal tenderness (epigastric region). There is no guarding or rebound.   Musculoskeletal:         General: Normal range of motion.      Cervical back: Normal range of motion and neck supple. No tenderness.      Right lower leg: No edema.      Left lower leg: No edema.   Lymphadenopathy:      Cervical: No cervical adenopathy.   Skin:     General: Skin is warm and dry.      Capillary Refill: Capillary refill takes less than 2 seconds.      Coloration: Skin is not jaundiced.      Findings: No bruising or lesion.   Neurological:      General: No focal deficit present.      Mental Status: He is alert and oriented to person, place, and time.   Psychiatric:         Mood and Affect: Mood normal.         Behavior: Behavior normal.           Results Review:       Lab Results:  Results from last 7 days   Lab Units 02/10/25  1620   WBC 10*3/mm3 14.66*   HEMOGLOBIN g/dL 16.1   PLATELETS 10*3/mm3 245         Results from last 7 days   Lab Units 02/10/25  1620   SODIUM mmol/L 139   POTASSIUM mmol/L 3.9   CHLORIDE mmol/L 106   CO2 mmol/L 21.7*   BUN mg/dL 24*   CREATININE mg/dL 1.17   CALCIUM mg/dL 8.6   GLUCOSE mg/dL 106*     Results from last 7 days   Lab Units 02/09/25  0208   MAGNESIUM mg/dL 2.2*     No results found for: \"HGBA1C\"  Results from last 7 days   Lab Units 02/10/25  1620   BILIRUBIN mg/dL 0.5   ALK PHOS U/L 65   AST (SGOT) U/L 19   ALT (SGPT) U/L 18                       I have reviewed the patient's laboratory results.    Imaging:  Imaging Results (Last 72 Hours)       Procedure Component Value Units Date/Time    CT Abdomen Pelvis With Contrast [[id number]] Collected: 02/10/25 1747     Updated: 02/10/25 1752    Narrative:      INDICATION: Dark vomit and stools.     COMPARISON: CT from 1/17/2025   "   TECHNIQUE: Axial CT images of the abdomen and pelvis were obtained  following IV contrast administration. Coronal and sagittal reformations  were reviewed.     FINDINGS:  Visualized lung bases appear unremarkable.     The gallbladder is surgically absent. Mild hepatic steatosis. The  spleen, pancreas and adrenal glands appear unremarkable. No  hydronephrosis.     Moderate duodenal wall thickening with adjacent inflammation concerning  for duodenitis/ulcerative disease.     No evidence of bowel obstruction/colitis/appendicitis. No free air. No  drainable fluid collection.     The urinary bladder appears unremarkable. Mild prostate gland  enlargement. Small fat-containing left inguinal hernia.     No acute osseous abnormality evident. Degenerative changes in the hips  and spine.       Impression:      Moderate duodenal wall thickening with adjacent inflammation concerning  for duodenitis/ ulcerative disease. No free air or abscess.     This report was finalized on 2/10/2025 5:50 PM by Alex Pallas, DO.       XR Chest 1 View [[id number]] Collected: 02/10/25 1644     Updated: 02/10/25 1646    Narrative:      XR CHEST 1 VW-     CLINICAL INDICATION: cough        COMPARISON: 1/17/2025     TECHNIQUE: Single frontal view of the chest.     FINDINGS:     LUNGS: Lungs are adequately aerated.      HEART AND MEDIASTINUM: Heart and mediastinal contours are unremarkable        SKELETON: Bony and soft tissue structures are unremarkable.             Impression:      No radiographic evidence of acute cardiac or pulmonary disease.           This report was finalized on 2/10/2025 4:44 PM by Dr. Jose Jaimes MD.               I have personally reviewed the patient's radiologic imaging.        EKG: ordered, results pending        Assessment & Plan    - Upper GI bleed: based on reports of melanotic stools, epigastric pain, and one episode of hematochezia, coupled with CT evidence of duodenitis vs ulcerative disease. Admit to PCU. Give IV  protonix bolus followed by infusion. Per surgery recommendation, start carafate as well. Keep NPO except ice chips and sips of water to take with carafate for now, then NPO except sips of water with carafate after midnight anticipation of EGD in the morning. Trend H/H q6h, suspect current H/H is falsely low from dehydration.   - SIRS: based on tachycardia and leukocytosis, suspect both secondary to dehydration related to above. Influenza A could also be contributing, but no respiratory symptoms currently other than occasional cough, CXR clear, and O2 sat mid-upper 90s on RA. Furthermore, symptoms start 4 days ago, so outside window for tamiflu. Continue to monitor off antibiotics and antivirals for now.   - Prerenal azotemia/dehydration: secondary to poor PO intake and GI fluid losses from above. Hydrate with IV fluids. Repeat labs in the morning.  - History atrial fibrillation: hold eliquis due to above (last dose morning of 2/10). Obtain EKG. Monitor on telemetry.   - Type II DM, non insulin dependent: monitor accuchecks, hold glipizide for now and cover with SSI.     DVT/GI Prophylaxis: SCDs (holding eliquis), IV protonix infusion    Estimated Length of Stay >2 midnights    I discussed the patient's findings, assessment and plan with the patient, his wife at bedside, and ED provider Dr Villa    Patient is high risk due to upper GI bleed secondary to duodenitis vs duodenal ulcer, SIRS, dehydration    Satish Lott MD  02/10/25  19:57 EST      Electronically signed by Satish Lott MD at 02/10/25 2014          Emergency Department Notes        Hari Villa,  at 02/10/25 1931          Subjective   History of Present Illness  84-year-old male with history of arthritis, A-fib and CAD presents to the ED with vomiting and dark stools as well as a cough.  Patient states the last 3 days has been having dark tarry stools and black vomit.  He endorses a lot of pain whenever he eats or  drinks.      Review of Systems   Constitutional: Negative.  Negative for fever.   HENT: Negative.     Respiratory: Negative.     Cardiovascular: Negative.  Negative for chest pain.   Gastrointestinal:  Positive for abdominal pain, diarrhea, nausea and vomiting.   Endocrine: Negative.    Genitourinary: Negative.  Negative for dysuria.   Skin: Negative.    Neurological: Negative.    Psychiatric/Behavioral: Negative.     All other systems reviewed and are negative.      Past Medical History:   Diagnosis Date    Arthritis     Atrial fibrillation     Coronary artery disease     GERD (gastroesophageal reflux disease)     Hyperlipidemia     Hypertension     Pre-diabetes     Sleep apnea     BiPap    Thyroid nodule        Allergies   Allergen Reactions    Flomax [Tamsulosin] Other (See Comments)     Heart rate to drop and pass out    Lisinopril Unknown (See Comments) and Cough     Pt. States that he is allergic however, it has been so long since he took it that he cannot remember the reaction that he had at the time.    Losartan Unknown - Low Severity       Past Surgical History:   Procedure Laterality Date    CARDIAC CATHETERIZATION      CARDIAC CATHETERIZATION N/A 12/06/2023    Procedure: Left Heart Cath;  Surgeon: Shwetha Navarrete MD;  Location: [facility] CATH INVASIVE LOCATION;  Service: Cardiology;  Laterality: N/A;    COLONOSCOPY      COLONOSCOPY N/A 1/17/2025    Procedure: COLONOSCOPY;  Surgeon: Neftali Lockhart MD;  Location: [facility];  Service: General;  Laterality: N/A;    CYSTOSCOPY TRANSURETHRAL RESECTION OF PROSTATE      ENDOSCOPY      EYE SURGERY Right     INGUINAL HERNIA REPAIR Right     LAPAROSCOPIC CHOLECYSTECTOMY      SKIN LESION EXCISION N/A 1/17/2025    Procedure: SKIN LESION EXCISION;  Surgeon: Neftali Lockhart MD;  Location: [facility];  Service: General;  Laterality: N/A;       Family History   Problem Relation Age of Onset    Cancer Sister     Diabetes Sister     Cancer Brother     Heart disease  Brother     Diabetes Maternal Grandmother     Hypertension Neg Hx        Social History     Socioeconomic History    Marital status:    Tobacco Use    Smoking status: Former     Types: Pipe, Cigars     Quit date:      Years since quittin.1     Passive exposure: Past    Smokeless tobacco: Never    Tobacco comments:     Smoked for 25 years   Vaping Use    Vaping status: Never Used   Substance and Sexual Activity    Alcohol use: No    Drug use: No    Sexual activity: Defer           Objective   Physical Exam  Vitals and nursing note reviewed.   Constitutional:       General: He is not in acute distress.     Appearance: He is well-developed. He is not diaphoretic.   HENT:      Head: Normocephalic and atraumatic.      Right Ear: External ear normal.      Left Ear: External ear normal.      Nose: Nose normal.   Eyes:      Conjunctiva/sclera: Conjunctivae normal.      Pupils: Pupils are equal, round, and reactive to light.   Neck:      Vascular: No JVD.      Trachea: No tracheal deviation.   Cardiovascular:      Rate and Rhythm: Normal rate and regular rhythm.      Heart sounds: Normal heart sounds. No murmur heard.  Pulmonary:      Effort: Pulmonary effort is normal. No respiratory distress.      Breath sounds: Normal breath sounds. No wheezing.   Abdominal:      General: Bowel sounds are normal.      Palpations: Abdomen is soft.      Tenderness: There is abdominal tenderness.   Musculoskeletal:         General: No deformity. Normal range of motion.      Cervical back: Normal range of motion and neck supple.   Skin:     General: Skin is warm and dry.      Coloration: Skin is not pale.      Findings: No erythema or rash.   Neurological:      Mental Status: He is alert and oriented to person, place, and time.      Cranial Nerves: No cranial nerve deficit.   Psychiatric:         Behavior: Behavior normal.         Thought Content: Thought content normal.         Procedures          ED Course                                                        Medical Decision Making  Patient is high suspicion for a bleeding gastric ulcer at this time.  I discussed the case with Dr. Eddy who recommended inpatient admission PPI drip and Carafate.  Discussed the case with the hospitalist team and the patient.  All parties in agreement at this time.    Problems Addressed:  Black stools: complicated acute illness or injury    Amount and/or Complexity of Data Reviewed  Labs: ordered.  Radiology: ordered.    Risk  Prescription drug management.        Final diagnoses:   Black stools       ED Disposition  ED Disposition       ED Disposition   Intended Admit    Condition   --    Comment   --               No follow-up provider specified.       Medication List      No changes were made to your prescriptions during this visit.            Hari Villa DO  02/10/25 1934      Electronically signed by Hari Villa DO at 02/10/25 1934       Current Facility-Administered Medications   Medication Dose Route Frequency Provider Last Rate Last Admin    dextrose (D50W) (25 g/50 mL) IV injection 25 g  25 g Intravenous Q15 Min PRN Satish Lott MD        dextrose (GLUTOSE) oral gel 15 g  15 g Oral Q15 Min PRN Satish Lott MD        glucagon HCl (Diagnostic) injection 1 mg  1 mg Intramuscular Q15 Min PRN Satish Lott MD        insulin regular (humuLIN R,novoLIN R) injection 2-7 Units  2-7 Units Subcutaneous Q6H Satish Lott MD        morphine injection 1 mg  1 mg Intravenous Q4H PRN Satish Lott MD        nitroglycerin (NITROSTAT) SL tablet 0.4 mg  0.4 mg Sublingual Q5 Min PRN Satish Lott MD        pantoprazole (PROTONIX) 40 mg IVPB in 100 mL NS (VTB)  40 mg Intravenous Continuous Satish Lott MD 20 mL/hr at 02/11/25 0721 40 mg at 02/11/25 0721    sodium chloride 0.9 % flush 10 mL  10 mL Intravenous Q12H Satish Lott MD        sodium chloride 0.9 % flush 10 mL  10  mL Intravenous PRN Satish Lott MD        sodium chloride 0.9 % infusion 40 mL  40 mL Intravenous PRN Satish Lott MD        sodium chloride 0.9 % infusion  75 mL/hr Intravenous Continuous Satish Lott MD 75 mL/hr at 02/11/25 0541 75 mL/hr at 02/11/25 0541    sucralfate (CARAFATE) tablet 1 g  1 g Oral 4x Daily AC & at Bedtime Satish Lott MD   1 g at 02/11/25 0641     Orders (last 24 hrs)        Start     Ordered    02/11/25 2200  Hemoglobin & Hematocrit, Blood  Every 6 Hours       02/10/25 1936    02/11/25 0900  sodium chloride 0.9 % flush 10 mL  Every 12 Hours Scheduled         02/11/25 0455    02/11/25 0800  Vital Signs  Every 8 Hours        Comments: Per per hospital policy    02/11/25 0455    02/11/25 0800  Oral Care  2 Times Daily       02/11/25 0455    02/11/25 0600  Strict Intake & Output  Every 6 Hours         02/11/25 0455    02/11/25 0600  CBC Auto Differential  Morning Draw         02/11/25 0455    02/11/25 0600  Comprehensive Metabolic Panel  Morning Draw         02/11/25 0455    02/11/25 0545  sodium chloride 0.9 % infusion  Continuous         02/11/25 0455    02/11/25 0545  POC Glucose Once  PROCEDURE ONCE        Comments: Complete no more than 45 minutes prior to patient eating      02/11/25 0538    02/11/25 0456  Notify Physician (with Parameters)  Until Discontinued         02/11/25 0455    02/11/25 0456  Insert Peripheral IV  Once         02/11/25 0455    02/11/25 0456  Saline Lock & Maintain IV Access  Continuous,   Status:  Canceled         02/11/25 0455    02/11/25 0456  Place Sequential Compression Device  Once         02/11/25 0455    02/11/25 0456  Maintain Sequential Compression Device  Continuous         02/11/25 0455    02/11/25 0456  Continuous Cardiac Monitoring  Continuous        Comments: Follow Standing Orders As Outlined in Process Instructions (Open Order Report to View Full Instructions)    02/11/25 0455    02/11/25 0456  Maintain IV Access   Continuous         02/11/25 0455    02/11/25 0456  Telemetry - Place Orders & Notify Provider of Results When Patient Experiences Acute Chest Pain, Dysrhythmia or Respiratory Distress  Continuous        Comments: Open Order Report to View Parameters Requiring Provider Notification    02/11/25 0455    02/11/25 0456  May Be Off Telemetry for Tests  Continuous         02/11/25 0455    02/11/25 0456  Continuous Pulse Oximetry  Continuous         02/11/25 0455    02/11/25 0456  Daily Weights  Daily       02/11/25 0455    02/11/25 0456  Inpatient General Surgery Consult  Once        Specialty:  General Surgery  Provider:  (Not yet assigned)    02/11/25 0455    02/11/25 0456  Bowel Regimen Not Indicated  Once         02/11/25 0455    02/11/25 0455  sodium chloride 0.9 % flush 10 mL  As Needed         02/11/25 0455    02/11/25 0455  sodium chloride 0.9 % infusion 40 mL  As Needed         02/11/25 0455 02/11/25 0455  nitroglycerin (NITROSTAT) SL tablet 0.4 mg  Every 5 Minutes PRN         02/11/25 0455    02/11/25 0310  Connectors / Hubs Must Be Scrubbed 15 Seconds Using 70% Alcohol Before Access - Allow to Dry Before Accessing Line  Continuous         02/11/25 0310    02/11/25 0005  POC Glucose Once  PROCEDURE ONCE        Comments: Complete no more than 45 minutes prior to patient eating      02/10/25 2358    02/11/25 0001  NPO Diet NPO Type: Sips with Meds  Diet Effective Midnight         02/10/25 1958    02/10/25 2158  Patient Isolation Droplet  Continuous         02/10/25 2157    02/10/25 2029  POC Glucose Once  PROCEDURE ONCE        Comments: Complete no more than 45 minutes prior to patient eating      02/10/25 2023    02/10/25 2010  ECG 12 Lead Tachycardia  STAT         02/10/25 2009    02/10/25 2004  NIPPV (CPAP or BIPAP)  At Bedtime & As Needed-RT        Comments: Respiratory therapy to manage    02/10/25 2003    02/10/25 2003  morphine injection 1 mg  Every 4 Hours PRN         02/10/25 2003    02/10/25 1959  NPO  "Diet NPO Type: Sips with Meds  Diet Effective Now,   Status:  Canceled         02/10/25 1958    02/10/25 1959  NPO Diet NPO Type: Sips with Meds, Ice Chips  Diet Effective Now,   Status:  Canceled         02/10/25 1958    02/10/25 1959  NPO Diet NPO Type: Sips with Meds, Ice Chips  Diet Effective Now,   Status:  Canceled         02/10/25 1958    02/10/25 1952  sodium chloride 0.9 % bolus 500 mL  Once         02/10/25 1936    02/10/25 1952  insulin regular (humuLIN R,novoLIN R) injection 2-7 Units  Every 6 Hours Scheduled         02/10/25 1936    02/10/25 1946  pantoprazole (PROTONIX) injection 40 mg  Once        Placed in \"And\" Linked Group    02/10/25 1930    02/10/25 1946  pantoprazole (PROTONIX) 40 mg IVPB in 100 mL NS (VTB)  Continuous        Placed in \"And\" Linked Group    02/10/25 1930    02/10/25 1946  sucralfate (CARAFATE) tablet 1 g  4 Times Daily Before Meals & Nightly         02/10/25 1930    02/10/25 1937  POC Glucose Q6H  Every 6 Hours      Comments: Complete no more than 45 minutes prior to patient eating      02/10/25 1936    02/10/25 1936  dextrose (GLUTOSE) oral gel 15 g  Every 15 Minutes PRN         02/10/25 1936    02/10/25 1936  dextrose (D50W) (25 g/50 mL) IV injection 25 g  Every 15 Minutes PRN         02/10/25 1936    02/10/25 1936  glucagon HCl (Diagnostic) injection 1 mg  Every 15 Minutes PRN         02/10/25 1936    02/10/25 1933  Inpatient Admission  Once         02/10/25 1934    02/10/25 1933  Code Status and Medical Interventions: CPR (Attempt to Resuscitate); Full Support  Continuous         02/10/25 1934    02/10/25 1901  Occult Blood X 1, Stool - Stool, Per Rectum  Once,   Status:  Canceled         02/10/25 1900    02/10/25 1759  iopamidol (ISOVUE-300) 61 % injection 100 mL  Once in Imaging         02/10/25 1743    02/10/25 1721  pantoprazole (PROTONIX) injection 40 mg  Once         02/10/25 1705    02/10/25 1721  ondansetron (ZOFRAN) injection 4 mg  Once         02/10/25 1705    " 02/10/25 1719  CT Abdomen Pelvis With Contrast  1 Time Imaging        Comments: IV CONTRAST ONLY      02/10/25 1718    02/10/25 1639  XR Chest 1 View  1 Time Imaging         02/10/25 1547    02/10/25 1548  CBC & Differential  Once         02/10/25 1547    02/10/25 1548  Comprehensive Metabolic Panel  Once         02/10/25 1547    02/10/25 1548  [place] Draw  Once         02/10/25 1547    02/10/25 1548  COVID-19 and FLU A/B PCR, 1 HR TAT - Swab, Nasopharynx  Once         02/10/25 1547    02/10/25 1548  XR Chest 2 View  1 Time Imaging,   Status:  Canceled         02/10/25 1547    02/10/25 1548  CBC Auto Differential  PROCEDURE ONCE         02/10/25 1547    02/10/25 1548  Green Top (Gel)  PROCEDURE ONCE         02/10/25 1547    02/10/25 1548  Lavender Top  PROCEDURE ONCE         02/10/25 1547    02/10/25 1548  Gold Top - SST  PROCEDURE ONCE         02/10/25 1547    02/10/25 1548  Light Blue Top  PROCEDURE ONCE         02/10/25 1547    02/07/25 0000  amoxicillin-clavulanate (AUGMENTIN) 875-125 MG per tablet  2 Times Daily,   Status:  Discontinued         02/10/25 2051    Unscheduled  Up With Assistance  As Needed       02/11/25 0455    Unscheduled  Follow Hypoglycemia Standing Orders For Blood Glucose <70 & Notify Provider of Treatment  As Needed      Comments: Follow Hypoglycemia Orders As Outlined in Process Instructions (Open Order Report to View Full Instructions)  Notify Provider Any Time Hypoglycemia Treatment is Administered    02/10/25 1936    Unscheduled  Change Dressing to IV Site As Needed When Damp, Loose or Soiled  As Needed       02/11/25 0310    Unscheduled  Change Needleless Connectors  As Needed      Comments: Change Needleless Connectors When:  - Administration Set Changed  - Dressing Changed  - Removed For Any Reason  - Residual Blood or Debris Within Connector  - Prior to Drawing Blood Cultures  - Contamination of Connector  - After Administration of Blood or Blood Components    02/11/25 0310    --   ibuprofen (ADVIL,MOTRIN) 800 MG tablet  Every 6 Hours PRN,   Status:  Discontinued         02/10/25 2051    --  loratadine (CLARITIN) 10 MG tablet  Daily,   Status:  Discontinued         02/10/25 2051    --  montelukast (SINGULAIR) 10 MG tablet  Nightly,   Status:  Discontinued         02/10/25 2051    --  omeprazole (priLOSEC) 20 MG capsule  Daily         02/10/25 2051    --  ondansetron ODT (ZOFRAN-ODT) 4 MG disintegrating tablet  Every 8 Hours PRN         02/10/25 2051    --  promethazine-dextromethorphan (PROMETHAZINE-DM) 6.25-15 MG/5ML syrup  4 Times Daily PRN,   Status:  Discontinued         02/10/25 2051    Pending  famotidine (PEPCID) tablet 20 mg  2 Times Daily         Pending    Pending  atorvastatin (LIPITOR) tablet 20 mg  Nightly         Pending    Pending  sacubitril-valsartan (ENTRESTO) 24-26 MG tablet 1 tablet  Every 12 Hours PRN         Pending    Pending  ondansetron ODT (ZOFRAN-ODT) disintegrating tablet 4 mg  Every 8 Hours PRN         Pending    Pending  glipizide (GLUCOTROL) tablet 1.25 mg  2 Times Daily Before Meals         Pending    Pending  pantoprazole (PROTONIX) EC tablet 40 mg  Every Early Morning         Pending                  Physician Progress Notes (last 24 hours)  Notes from 02/10/25 0815 through 02/11/25 0815   No notes of this type exist for this encounter.       Consult Notes (last 24 hours)  Notes from 02/10/25 0815 through 02/11/25 0815   No notes of this type exist for this encounter.

## 2025-02-11 NOTE — H&P
Hospitalist History and Physical        Patient Identification  Name: Aramis Carson  Age/Sex: 84 y.o. male  :  1940        MRN: [id number]  Visit Number: [id number]  Admit Date: 2/10/2025   PCP: Kieran Blanca APRN          Chief complaint one episode of hematemesis, several episodes of melanotic stools, epigastric pain with eating and drinking    History of Present Illness:  Patient is a 84 y.o. 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 He reports one episode of hematochezia as well over the weekend. He reports everything he eats or drinks causes epigastric discomfort, even water. Of note, he developed a cough 4 days ago and over the weekend tested positive for influenza A. He reports other than feeling a little tired and an occasional cough, his symptoms have overall improved in this regard. He denies use of aspirin, plavix or brilinta. He denies use of NSAIDs and only takes tylenol and norco for arthritis pain.     In the ED, patient was afebrile, tachycardic up to 104 bpm, and normotensive. O2 sat has been in the mid to upper 90s on RA. Labs showed low bicarb 21.7, AG 11.3, BUN 24, Cr 1.17 (baseline 1.31-1.40 earlier this morning though around 1.12 as of 24). Glucose 106. LFTs normal. CBC showed elevated WBC 14.66, hemoglobin 16.1 (baseline around 13-14), platelets 245. He did test positive for influenza A again. CXR was unremarkable. CT abd/pelvis showed moderate duodenal wall thickening with adjacent inflammation concerning for duodenitis/ulcerative disease.     Review of Systems  Review of Systems   Constitutional:  Positive for activity change and fatigue. Negative for appetite change, chills, diaphoresis, fever and unexpected weight change.   HENT:  Negative for  congestion, postnasal drip, rhinorrhea, sinus pressure, sinus pain and sore throat.    Eyes:  Negative for photophobia and visual disturbance.   Respiratory:  Positive for cough. Negative for shortness of breath and wheezing.    Cardiovascular:  Negative for chest pain, palpitations and leg swelling.   Gastrointestinal:  Positive for abdominal pain (epigastric), blood in stool (black), diarrhea, nausea and vomiting. Negative for abdominal distention and constipation.   Endocrine: Negative for polyphagia and polyuria.   Genitourinary:  Negative for difficulty urinating, dysuria, flank pain, frequency and hematuria.   Musculoskeletal:  Positive for arthralgias and back pain. Negative for joint swelling, myalgias, neck pain and neck stiffness.   Skin:  Negative for color change, pallor, rash and wound.   Neurological:  Positive for weakness (generalized). Negative for dizziness, tremors, seizures, syncope, light-headedness, numbness and headaches.   Hematological:  Negative for adenopathy. Does not bruise/bleed easily.   Psychiatric/Behavioral:  Negative for agitation, behavioral problems and confusion.        History  Past Medical History:   Diagnosis Date    Arthritis     Atrial fibrillation     Coronary artery disease     GERD (gastroesophageal reflux disease)     Hyperlipidemia     Hypertension     Pre-diabetes     Sleep apnea     BiPap    Thyroid nodule      Past Surgical History:   Procedure Laterality Date    CARDIAC CATHETERIZATION      CARDIAC CATHETERIZATION N/A 12/06/2023    Procedure: Left Heart Cath;  Surgeon: Shwetha Navarrete MD;  Location: [facility] CATH INVASIVE LOCATION;  Service: Cardiology;  Laterality: N/A;    COLONOSCOPY      COLONOSCOPY N/A 1/17/2025    Procedure: COLONOSCOPY;  Surgeon: Neftali Lockhart MD;  Location: [facility] OR;  Service: General;  Laterality: N/A;    CYSTOSCOPY TRANSURETHRAL RESECTION OF PROSTATE      ENDOSCOPY      EYE SURGERY Right     INGUINAL HERNIA REPAIR Right      LAPAROSCOPIC CHOLECYSTECTOMY      SKIN LESION EXCISION N/A 2025    Procedure: SKIN LESION EXCISION;  Surgeon: Neftali Lockhart MD;  Location: [facility];  Service: General;  Laterality: N/A;     Family History   Problem Relation Age of Onset    Cancer Sister     Diabetes Sister     Cancer Brother     Heart disease Brother     Diabetes Maternal Grandmother     Hypertension Neg Hx      Social History     Tobacco Use    Smoking status: Former     Types: Pipe, Cigars     Quit date:      Years since quittin.1     Passive exposure: Past    Smokeless tobacco: Never    Tobacco comments:     Smoked for 25 years   Vaping Use    Vaping status: Never Used   Substance Use Topics    Alcohol use: No    Drug use: No     (Not in a hospital admission)    Allergies:  Flomax [tamsulosin], Lisinopril, and Losartan    Objective     Vital Signs  Temp:  [97.5 °F (36.4 °C)] 97.5 °F (36.4 °C)  Heart Rate:  [] 92  Resp:  [14] 14  BP: (116-145)/(61-89) 127/75  Body mass index is 29.38 kg/m².    Physical Exam:  Physical Exam  Constitutional:       General: He is not in acute distress.     Appearance: Normal appearance. He is not ill-appearing.   HENT:      Head: Normocephalic and atraumatic.      Right Ear: External ear normal.      Left Ear: External ear normal.      Nose: Nose normal.      Mouth/Throat:      Mouth: Mucous membranes are dry.      Pharynx: Oropharynx is clear.   Eyes:      Extraocular Movements: Extraocular movements intact.      Conjunctiva/sclera: Conjunctivae normal.      Pupils: Pupils are equal, round, and reactive to light.   Cardiovascular:      Rate and Rhythm: Regular rhythm. Tachycardia present.      Pulses: Normal pulses.      Heart sounds: Normal heart sounds. No murmur heard.  Pulmonary:      Effort: Pulmonary effort is normal. No respiratory distress.      Breath sounds: Normal breath sounds. No wheezing or rales.   Abdominal:      General: Abdomen is flat. Bowel sounds are normal. There is  "no distension.      Palpations: Abdomen is soft.      Tenderness: There is abdominal tenderness (epigastric region). There is no guarding or rebound.   Musculoskeletal:         General: Normal range of motion.      Cervical back: Normal range of motion and neck supple. No tenderness.      Right lower leg: No edema.      Left lower leg: No edema.   Lymphadenopathy:      Cervical: No cervical adenopathy.   Skin:     General: Skin is warm and dry.      Capillary Refill: Capillary refill takes less than 2 seconds.      Coloration: Skin is not jaundiced.      Findings: No bruising or lesion.   Neurological:      General: No focal deficit present.      Mental Status: He is alert and oriented to person, place, and time.   Psychiatric:         Mood and Affect: Mood normal.         Behavior: Behavior normal.           Results Review:       Lab Results:  Results from last 7 days   Lab Units 02/10/25  1620   WBC 10*3/mm3 14.66*   HEMOGLOBIN g/dL 16.1   PLATELETS 10*3/mm3 245         Results from last 7 days   Lab Units 02/10/25  1620   SODIUM mmol/L 139   POTASSIUM mmol/L 3.9   CHLORIDE mmol/L 106   CO2 mmol/L 21.7*   BUN mg/dL 24*   CREATININE mg/dL 1.17   CALCIUM mg/dL 8.6   GLUCOSE mg/dL 106*     Results from last 7 days   Lab Units 02/09/25  0208   MAGNESIUM mg/dL 2.2*     No results found for: \"HGBA1C\"  Results from last 7 days   Lab Units 02/10/25  1620   BILIRUBIN mg/dL 0.5   ALK PHOS U/L 65   AST (SGOT) U/L 19   ALT (SGPT) U/L 18                       I have reviewed the patient's laboratory results.    Imaging:  Imaging Results (Last 72 Hours)       Procedure Component Value Units Date/Time    CT Abdomen Pelvis With Contrast [[id number]] Collected: 02/10/25 1747     Updated: 02/10/25 1752    Narrative:      INDICATION: Dark vomit and stools.     COMPARISON: CT from 1/17/2025     TECHNIQUE: Axial CT images of the abdomen and pelvis were obtained  following IV contrast administration. Coronal and sagittal " reformations  were reviewed.     FINDINGS:  Visualized lung bases appear unremarkable.     The gallbladder is surgically absent. Mild hepatic steatosis. The  spleen, pancreas and adrenal glands appear unremarkable. No  hydronephrosis.     Moderate duodenal wall thickening with adjacent inflammation concerning  for duodenitis/ulcerative disease.     No evidence of bowel obstruction/colitis/appendicitis. No free air. No  drainable fluid collection.     The urinary bladder appears unremarkable. Mild prostate gland  enlargement. Small fat-containing left inguinal hernia.     No acute osseous abnormality evident. Degenerative changes in the hips  and spine.       Impression:      Moderate duodenal wall thickening with adjacent inflammation concerning  for duodenitis/ ulcerative disease. No free air or abscess.     This report was finalized on 2/10/2025 5:50 PM by Alex Pallas, DO.       XR Chest 1 View [[id number]] Collected: 02/10/25 1644     Updated: 02/10/25 1646    Narrative:      XR CHEST 1 VW-     CLINICAL INDICATION: cough        COMPARISON: 1/17/2025     TECHNIQUE: Single frontal view of the chest.     FINDINGS:     LUNGS: Lungs are adequately aerated.      HEART AND MEDIASTINUM: Heart and mediastinal contours are unremarkable        SKELETON: Bony and soft tissue structures are unremarkable.             Impression:      No radiographic evidence of acute cardiac or pulmonary disease.           This report was finalized on 2/10/2025 4:44 PM by Dr. Jose Jaimes MD.               I have personally reviewed the patient's radiologic imaging.        EKG: ordered, results pending        Assessment & Plan     - Upper GI bleed: based on reports of melanotic stools, epigastric pain, and one episode of hematochezia, coupled with CT evidence of duodenitis vs ulcerative disease. Admit to PCU. Give IV protonix bolus followed by infusion. Per surgery recommendation, start carafate as well. Keep NPO except ice chips and sips of  water to take with carafate for now, then NPO except sips of water with carafate after midnight anticipation of EGD in the morning. Trend H/H q6h, suspect current H/H is falsely low from dehydration.   - SIRS: based on tachycardia and leukocytosis, suspect both secondary to dehydration related to above. Influenza A could also be contributing, but no respiratory symptoms currently other than occasional cough, CXR clear, and O2 sat mid-upper 90s on RA. Furthermore, symptoms start 4 days ago, so outside window for tamiflu. Continue to monitor off antibiotics and antivirals for now.   - Prerenal azotemia/dehydration: secondary to poor PO intake and GI fluid losses from above. Hydrate with IV fluids. Repeat labs in the morning.  - History atrial fibrillation: hold eliquis due to above (last dose morning of 2/10). Obtain EKG. Monitor on telemetry.   - Type II DM, non insulin dependent: monitor accuchecks, hold glipizide for now and cover with SSI.     DVT/GI Prophylaxis: SCDs (holding eliquis), IV protonix infusion    Estimated Length of Stay >2 midnights    I discussed the patient's findings, assessment and plan with the patient, his wife at bedside, and ED provider Dr Villa    Patient is high risk due to upper GI bleed secondary to duodenitis vs duodenal ulcer, SIRS, dehydration    Satish Lott MD  02/10/25  19:57 EST

## 2025-02-11 NOTE — CASE MANAGEMENT/SOCIAL WORK
Discharge Planning Assessment  [facility]     Patient Name: Aramis Carson  MRN: [id number]  Today's Date: 2/11/2025    Admit Date: 2/10/2025    Plan: SS received CM consult for Advanced Age (84). SS spoke with pt spouse Kelley at bedside on this date. Pt lives at home with spouse and plans to return back home at discharge. Pt PCP Kieran Blanca. Pt does not utilize HH services at this time. Pt has (s) cane PRN and BIPAP via [employer]. Per spouse pt independent with all ADL's prior to admission. Pt spouse to provide transportation at discharge. SS to follow.   Discharge Needs Assessment       Row Name 02/11/25 1155       Living Environment    People in Home spouse    Name(s) of People in Home spouse Kelley    Current Living Arrangements home    Primary Care Provided by self    Provides Primary Care For no one    Family Caregiver if Needed spouse    Family Caregiver Names spouse Kelley    Quality of Family Relationships helpful;involved;supportive    Able to Return to Prior Arrangements yes       Resource/Environmental Concerns    Resource/Environmental Concerns none       Transition Planning    Patient/Family Anticipates Transition to home with family    Patient/Family Anticipated Services at Transition none    Transportation Anticipated family or friend will provide       Discharge Needs Assessment    Equipment Currently Used at Home bipap;cane, straight    Concerns to be Addressed --  Advanced Age (84)                   Discharge Plan       Row Name 02/11/25 1155       Plan    Plan SS received CM consult for Advanced Age (84). SS spoke with pt spouse Kelley at bedside on this date. Pt lives at home with spouse and plans to return back home at discharge. Pt PCP Kieran Blanca. Pt does not utilize HH services at this time. Pt has (s) cane PRN and BIPAP via [employer]. Per spouse pt independent with all ADL's prior to admission. Pt spouse to provide transportation at discharge. SS to follow.               Expected  Discharge Date and Time       Expected Discharge Date Expected Discharge Time    Feb 13, 2025            Demographic Summary       Row Name 02/11/25 1154       General Information    Admission Type inpatient    Arrived From emergency department    Referral Source nursing    Reason for Consult --  Advanced Age (84)    Preferred Language English               MADDY SalehW

## 2025-02-11 NOTE — PLAN OF CARE
Goal Outcome Evaluation:  Plan of Care Reviewed With: patient        Progress: no change  Outcome Evaluation: pt alert and oriented. VSS during shift. Pt on RA, tolerating well. Pt will be NPO at midnight. No request at this time. Will continue to follow plan of care til 7p.

## 2025-02-11 NOTE — PLAN OF CARE
Goal Outcome Evaluation:              Outcome Evaluation: Patient  is resting in bed, eyes closed. Skin warm/dry to touch. Noted to be afib controlled, history noted. Patient on protonix drip and received 500cc boluse of NS. Droplet precautions maintained. Call bell in reach.

## 2025-02-11 NOTE — PROGRESS NOTES
[facility]     Progress Note    Patient Name: Aramis Carson  : 1940  MRN: [id number]  Primary Care Physician:  Kieran Blanca APRN  Date of admission: 2/10/2025    Subjective   Subjective     Chief Complaint: 84-year-old male being seen in follow-up for suspected upper GI bleed    History of Present Illness  Patient Reports no symptoms when seen at bedside today.    The patient's wife is present at bedside and stated that he had a remote EGD in the past and more recently has had a colonoscopy with polypectomy.  Patient's last dose of Eliquis was on the morning of 2/10/2025.    Review of Systems  Since arrival to the PCU, patient denies further hematemesis, hemoptysis, melena, hematochezia. No abdominal pain - states the carafate he had yesterday improved his upper abdominal pain.     Objective   Objective     Vitals:   Temp:  [97.5 °F (36.4 °C)-98.5 °F (36.9 °C)] 97.5 °F (36.4 °C)  Heart Rate:  [] 80  Resp:  [13-23] 23  BP: ()/(44-98) 111/81    Physical Exam  Constitutional:       General: He is not in acute distress.     Appearance: He is well-developed.   HENT:      Head: Normocephalic and atraumatic.   Eyes:      Conjunctiva/sclera: Conjunctivae normal.   Neck:      Trachea: No tracheal deviation.   Cardiovascular:      Rate and Rhythm: Normal rate. Rhythm irregular.      Pulses:           Dorsalis pedis pulses are 2+ on the right side and 2+ on the left side.      Heart sounds: No murmur heard.     No friction rub. No gallop.      Comments: Telemetry: Rate controlled A-fib in the 80s  Pulmonary:      Effort: No respiratory distress.      Breath sounds: Normal breath sounds. No wheezing or rales.   Abdominal:      General: Bowel sounds are normal. There is no distension.      Palpations: Abdomen is soft.      Tenderness: There is no abdominal tenderness. There is no guarding.   Skin:     General: Skin is warm and dry.      Findings: No erythema or rash.   Neurological:       Mental Status: He is alert and oriented to person, place, and time.      Cranial Nerves: No cranial nerve deficit.          Result Review    Result Review:  I have personally reviewed the results from the time of this admission to 2/11/2025 15:18 EST and agree with these findings:  [x]  Laboratory list / accordion  []  Microbiology  []  Radiology  []  EKG/Telemetry   []  Cardiology/Vascular   []  Pathology  []  Old records  []  Other:  Most notable findings include:     Results from last 7 days   Lab Units 02/11/25  0517 02/10/25  1620   SODIUM mmol/L 140 139   POTASSIUM mmol/L 3.7 3.9   CHLORIDE mmol/L 108* 106   CO2 mmol/L 21.3* 21.7*   BUN mg/dL 20 24*   CREATININE mg/dL 0.95 1.17   CALCIUM mg/dL 7.8* 8.6   BILIRUBIN mg/dL 0.5 0.5   ALK PHOS U/L 52 65   ALT (SGPT) U/L 14 18   AST (SGOT) U/L 15 19   GLUCOSE mg/dL 87 106*     Results from last 7 days   Lab Units 02/11/25  0517 02/10/25  1620   WBC 10*3/mm3 11.21* 14.66*   HEMOGLOBIN g/dL 13.9 16.1   HEMATOCRIT % 44.5 50.4   PLATELETS 10*3/mm3 183 245     CT abdomen/pelvis with contrast:  FINDINGS:  Visualized lung bases appear unremarkable.     The gallbladder is surgically absent. Mild hepatic steatosis. The  spleen, pancreas and adrenal glands appear unremarkable. No  hydronephrosis.     Moderate duodenal wall thickening with adjacent inflammation concerning  for duodenitis/ulcerative disease.     No evidence of bowel obstruction/colitis/appendicitis. No free air. No  drainable fluid collection.     The urinary bladder appears unremarkable. Mild prostate gland  enlargement. Small fat-containing left inguinal hernia.     No acute osseous abnormality evident. Degenerative changes in the hips  and spine.     IMPRESSION:  Moderate duodenal wall thickening with adjacent inflammation concerning  for duodenitis/ ulcerative disease. No free air or abscess.    Assessment & Plan   Assessment / Plan     #Upper GI bleed, CT evidence of duodenitis versus ulcerative  disease  #Pre-renal azotemia, resolved  #Pseudo hypocalcemia, corrected calcium 8.6 mg/dL  #Mild leukocytosis, suspect reactive and resolved  #Atrial fibrillation, currently rate controlled  #NIDDM type II, controlled  -Discussed with on-call surgeon who recommends to provide patient a clear liquid diet today and nothing by mouth after midnight to allow more time for Eliquis to metabolize out of patient's system  -Orders placed for clear liquid diet and n.p.o. after midnight  -Continue with the pantoprazole infusion  -Continue to hold Eliquis  -Continue with low-dose IV fluid hydration, NS @75 ml/hr; may cause some hemo-dilution  -Repeat CBC in a.m.; patient denies any further episodes of active bleeding  -Continue carafate  -Continue SSI PRN  -Repeat chemistry panel in a.m.     VTE Prophylaxis:  SCDs    CODE STATUS:    Code Status (Patient has no pulse and is not breathing): CPR (Attempt to Resuscitate)  Medical Interventions (Patient has pulse or is breathing): Full Support    Disposition:  I expect patient to be discharged home when stable from a surgical standpoint; possibly 24-48 hours.    Case d/w patient, wife, RN and consulting provider    Mary Bustos, DO

## 2025-02-12 ENCOUNTER — ANESTHESIA EVENT (OUTPATIENT)
Dept: PERIOP | Facility: HOSPITAL | Age: 85
End: 2025-02-12
Payer: MEDICARE

## 2025-02-12 ENCOUNTER — ANESTHESIA (OUTPATIENT)
Dept: PERIOP | Facility: HOSPITAL | Age: 85
End: 2025-02-12
Payer: MEDICARE

## 2025-02-12 LAB
ALBUMIN SERPL-MCNC: 2.9 G/DL (ref 3.5–5.2)
ALBUMIN/GLOB SERPL: 1.1 G/DL
ALP SERPL-CCNC: 53 U/L (ref 39–117)
ALT SERPL W P-5'-P-CCNC: 14 U/L (ref 1–41)
ANION GAP SERPL CALCULATED.3IONS-SCNC: 10.3 MMOL/L (ref 5–15)
AST SERPL-CCNC: 18 U/L (ref 1–40)
BILIRUB SERPL-MCNC: 0.6 MG/DL (ref 0–1.2)
BUN SERPL-MCNC: 17 MG/DL (ref 8–23)
BUN/CREAT SERPL: 17.7 (ref 7–25)
CALCIUM SPEC-SCNC: 7.8 MG/DL (ref 8.6–10.5)
CHLORIDE SERPL-SCNC: 108 MMOL/L (ref 98–107)
CO2 SERPL-SCNC: 20.7 MMOL/L (ref 22–29)
CREAT SERPL-MCNC: 0.96 MG/DL (ref 0.76–1.27)
DEPRECATED RDW RBC AUTO: 43.4 FL (ref 37–54)
EGFRCR SERPLBLD CKD-EPI 2021: 77.9 ML/MIN/1.73
ERYTHROCYTE [DISTWIDTH] IN BLOOD BY AUTOMATED COUNT: 14 % (ref 12.3–15.4)
GLOBULIN UR ELPH-MCNC: 2.7 GM/DL
GLUCOSE BLDC GLUCOMTR-MCNC: 103 MG/DL (ref 70–130)
GLUCOSE BLDC GLUCOMTR-MCNC: 128 MG/DL (ref 70–130)
GLUCOSE BLDC GLUCOMTR-MCNC: 97 MG/DL (ref 70–130)
GLUCOSE SERPL-MCNC: 94 MG/DL (ref 65–99)
HCT VFR BLD AUTO: 40.8 % (ref 37.5–51)
HCT VFR BLD AUTO: 42 % (ref 37.5–51)
HCT VFR BLD AUTO: 42.7 % (ref 37.5–51)
HGB BLD-MCNC: 13 G/DL (ref 13–17.7)
HGB BLD-MCNC: 13.1 G/DL (ref 13–17.7)
HGB BLD-MCNC: 13.5 G/DL (ref 13–17.7)
MCH RBC QN AUTO: 27 PG (ref 26.6–33)
MCHC RBC AUTO-ENTMCNC: 31.9 G/DL (ref 31.5–35.7)
MCV RBC AUTO: 84.8 FL (ref 79–97)
PLATELET # BLD AUTO: 164 10*3/MM3 (ref 140–450)
PMV BLD AUTO: 10.5 FL (ref 6–12)
POTASSIUM SERPL-SCNC: 3.4 MMOL/L (ref 3.5–5.2)
PROT SERPL-MCNC: 5.6 G/DL (ref 6–8.5)
RBC # BLD AUTO: 4.81 10*6/MM3 (ref 4.14–5.8)
SODIUM SERPL-SCNC: 139 MMOL/L (ref 136–145)
WBC NRBC COR # BLD AUTO: 8.25 10*3/MM3 (ref 3.4–10.8)

## 2025-02-12 PROCEDURE — 85018 HEMOGLOBIN: CPT | Performed by: HOSPITALIST

## 2025-02-12 PROCEDURE — 63710000001 PROCHLORPERAZINE MALEATE PER 10 MG: Performed by: HOSPITALIST

## 2025-02-12 PROCEDURE — 0DB68ZX EXCISION OF STOMACH, VIA NATURAL OR ARTIFICIAL OPENING ENDOSCOPIC, DIAGNOSTIC: ICD-10-PCS | Performed by: SURGERY

## 2025-02-12 PROCEDURE — 85018 HEMOGLOBIN: CPT | Performed by: SURGERY

## 2025-02-12 PROCEDURE — 82948 REAGENT STRIP/BLOOD GLUCOSE: CPT

## 2025-02-12 PROCEDURE — 85014 HEMATOCRIT: CPT | Performed by: HOSPITALIST

## 2025-02-12 PROCEDURE — 94660 CPAP INITIATION&MGMT: CPT

## 2025-02-12 PROCEDURE — 85014 HEMATOCRIT: CPT | Performed by: SURGERY

## 2025-02-12 PROCEDURE — 94799 UNLISTED PULMONARY SVC/PX: CPT

## 2025-02-12 PROCEDURE — 99231 SBSQ HOSP IP/OBS SF/LOW 25: CPT | Performed by: SURGERY

## 2025-02-12 PROCEDURE — 85027 COMPLETE CBC AUTOMATED: CPT | Performed by: INTERNAL MEDICINE

## 2025-02-12 PROCEDURE — 25010000002 PROPOFOL 10 MG/ML EMULSION: Performed by: NURSE ANESTHETIST, CERTIFIED REGISTERED

## 2025-02-12 PROCEDURE — 99232 SBSQ HOSP IP/OBS MODERATE 35: CPT | Performed by: HOSPITALIST

## 2025-02-12 PROCEDURE — 63710000001 ONDANSETRON ODT 4 MG TABLET DISPERSIBLE: Performed by: SURGERY

## 2025-02-12 PROCEDURE — 80053 COMPREHEN METABOLIC PANEL: CPT | Performed by: INTERNAL MEDICINE

## 2025-02-12 PROCEDURE — 0DB38ZX EXCISION OF LOWER ESOPHAGUS, VIA NATURAL OR ARTIFICIAL OPENING ENDOSCOPIC, DIAGNOSTIC: ICD-10-PCS | Performed by: SURGERY

## 2025-02-12 PROCEDURE — 94761 N-INVAS EAR/PLS OXIMETRY MLT: CPT

## 2025-02-12 PROCEDURE — 0DB98ZX EXCISION OF DUODENUM, VIA NATURAL OR ARTIFICIAL OPENING ENDOSCOPIC, DIAGNOSTIC: ICD-10-PCS | Performed by: SURGERY

## 2025-02-12 RX ORDER — KETOROLAC TROMETHAMINE 30 MG/ML
15 INJECTION, SOLUTION INTRAMUSCULAR; INTRAVENOUS EVERY 6 HOURS PRN
Status: DISCONTINUED | OUTPATIENT
Start: 2025-02-12 | End: 2025-02-12

## 2025-02-12 RX ORDER — IPRATROPIUM BROMIDE AND ALBUTEROL SULFATE 2.5; .5 MG/3ML; MG/3ML
3 SOLUTION RESPIRATORY (INHALATION) ONCE AS NEEDED
Status: DISCONTINUED | OUTPATIENT
Start: 2025-02-12 | End: 2025-02-12

## 2025-02-12 RX ORDER — MEPERIDINE HYDROCHLORIDE 25 MG/ML
12.5 INJECTION INTRAMUSCULAR; INTRAVENOUS; SUBCUTANEOUS
Status: DISCONTINUED | OUTPATIENT
Start: 2025-02-12 | End: 2025-02-12

## 2025-02-12 RX ORDER — SODIUM CHLORIDE, SODIUM LACTATE, POTASSIUM CHLORIDE, CALCIUM CHLORIDE 600; 310; 30; 20 MG/100ML; MG/100ML; MG/100ML; MG/100ML
100 INJECTION, SOLUTION INTRAVENOUS ONCE AS NEEDED
Status: DISCONTINUED | OUTPATIENT
Start: 2025-02-12 | End: 2025-02-12

## 2025-02-12 RX ORDER — SODIUM CHLORIDE, SODIUM LACTATE, POTASSIUM CHLORIDE, CALCIUM CHLORIDE 600; 310; 30; 20 MG/100ML; MG/100ML; MG/100ML; MG/100ML
125 INJECTION, SOLUTION INTRAVENOUS ONCE
Status: DISCONTINUED | OUTPATIENT
Start: 2025-02-12 | End: 2025-02-12 | Stop reason: HOSPADM

## 2025-02-12 RX ORDER — SODIUM CHLORIDE 9 MG/ML
40 INJECTION, SOLUTION INTRAVENOUS AS NEEDED
Status: DISCONTINUED | OUTPATIENT
Start: 2025-02-12 | End: 2025-02-12 | Stop reason: HOSPADM

## 2025-02-12 RX ORDER — OXYCODONE AND ACETAMINOPHEN 5; 325 MG/1; MG/1
1 TABLET ORAL ONCE AS NEEDED
Status: DISCONTINUED | OUTPATIENT
Start: 2025-02-12 | End: 2025-02-12

## 2025-02-12 RX ORDER — ONDANSETRON 2 MG/ML
4 INJECTION INTRAMUSCULAR; INTRAVENOUS AS NEEDED
Status: DISCONTINUED | OUTPATIENT
Start: 2025-02-12 | End: 2025-02-12

## 2025-02-12 RX ORDER — SODIUM CHLORIDE 0.9 % (FLUSH) 0.9 %
10 SYRINGE (ML) INJECTION EVERY 12 HOURS SCHEDULED
Status: DISCONTINUED | OUTPATIENT
Start: 2025-02-12 | End: 2025-02-12 | Stop reason: HOSPADM

## 2025-02-12 RX ORDER — PROCHLORPERAZINE MALEATE 10 MG
10 TABLET ORAL EVERY 6 HOURS PRN
Status: DISCONTINUED | OUTPATIENT
Start: 2025-02-12 | End: 2025-02-13 | Stop reason: HOSPADM

## 2025-02-12 RX ORDER — SODIUM CHLORIDE 0.9 % (FLUSH) 0.9 %
10 SYRINGE (ML) INJECTION AS NEEDED
Status: DISCONTINUED | OUTPATIENT
Start: 2025-02-12 | End: 2025-02-12 | Stop reason: HOSPADM

## 2025-02-12 RX ORDER — PANTOPRAZOLE SODIUM 40 MG/1
40 TABLET, DELAYED RELEASE ORAL
Status: DISCONTINUED | OUTPATIENT
Start: 2025-02-12 | End: 2025-02-13 | Stop reason: HOSPADM

## 2025-02-12 RX ORDER — PROCHLORPERAZINE EDISYLATE 5 MG/ML
5 INJECTION INTRAMUSCULAR; INTRAVENOUS EVERY 6 HOURS PRN
Status: DISCONTINUED | OUTPATIENT
Start: 2025-02-12 | End: 2025-02-13 | Stop reason: HOSPADM

## 2025-02-12 RX ORDER — PROPOFOL 10 MG/ML
VIAL (ML) INTRAVENOUS AS NEEDED
Status: DISCONTINUED | OUTPATIENT
Start: 2025-02-12 | End: 2025-02-12 | Stop reason: SURG

## 2025-02-12 RX ORDER — FENTANYL CITRATE 50 UG/ML
50 INJECTION, SOLUTION INTRAMUSCULAR; INTRAVENOUS
Status: DISCONTINUED | OUTPATIENT
Start: 2025-02-12 | End: 2025-02-12

## 2025-02-12 RX ADMIN — PANTOPRAZOLE SODIUM 40 MG: 40 INJECTION, POWDER, FOR SOLUTION INTRAVENOUS at 08:04

## 2025-02-12 RX ADMIN — PROPOFOL 200 MG: 10 INJECTION, EMULSION INTRAVENOUS at 09:20

## 2025-02-12 RX ADMIN — PROCHLORPERAZINE MALEATE 10 MG: 10 TABLET ORAL at 17:48

## 2025-02-12 RX ADMIN — PANTOPRAZOLE SODIUM 40 MG: 40 INJECTION, POWDER, FOR SOLUTION INTRAVENOUS at 02:40

## 2025-02-12 RX ADMIN — SUCRALFATE 1 G: 1 TABLET ORAL at 21:31

## 2025-02-12 RX ADMIN — PROPOFOL 100 MG: 10 INJECTION, EMULSION INTRAVENOUS at 09:28

## 2025-02-12 RX ADMIN — SUCRALFATE 1 G: 1 TABLET ORAL at 06:31

## 2025-02-12 RX ADMIN — ONDANSETRON 4 MG: 4 TABLET, ORALLY DISINTEGRATING ORAL at 13:39

## 2025-02-12 RX ADMIN — SUCRALFATE 1 G: 1 TABLET ORAL at 11:47

## 2025-02-12 RX ADMIN — ATORVASTATIN CALCIUM 20 MG: 20 TABLET, FILM COATED ORAL at 21:31

## 2025-02-12 RX ADMIN — Medication 10 ML: at 08:05

## 2025-02-12 RX ADMIN — Medication 10 ML: at 21:31

## 2025-02-12 RX ADMIN — SUCRALFATE 1 G: 1 TABLET ORAL at 16:46

## 2025-02-12 NOTE — ANESTHESIA POSTPROCEDURE EVALUATION
Patient: Aramis Carson    Procedure Summary       Date: 02/12/25 Room / Location: [facility] OR [address] [place] [address] OR    Anesthesia Start: 0918 Anesthesia Stop: 0927    Procedure: ESOPHAGOGASTRODUODENOSCOPY (Esophagus) Diagnosis:       Upper GI bleed      (Upper GI bleed [K92.2])    Surgeons: Neftali Lockhart MD Provider: Delroy Au MD    Anesthesia Type: general ASA Status: 3            Anesthesia Type: general    Vitals  Vitals Value Taken Time   /82 02/12/25 1001   Temp 97.9 °F (36.6 °C) 02/12/25 0931   Pulse 80 02/12/25 1001   Resp 18 02/12/25 1001   SpO2 100 % 02/12/25 1001           Post Anesthesia Care and Evaluation    Patient location during evaluation: PACU  Patient participation: complete - patient participated  Level of consciousness: awake  Pain score: 0  Pain management: adequate    Airway patency: patent  Anesthetic complications: No anesthetic complications  PONV Status: controlled  Cardiovascular status: acceptable and blood pressure returned to baseline  Respiratory status: acceptable and room air  Hydration status: acceptable

## 2025-02-12 NOTE — PROGRESS NOTES
Chief complaint: Melena    No acute events overnight.  Patient awaiting endoscopic evaluation.    Abdomen soft nontender nondistended  Patient reports melanic stools.    84-year-old male with hematemesis and melena concerning for upper GI bleed with CT showing duodenitis.  Recent colonoscopy within normal limits without bleeding source.  -Transfuse as needed  -Continue to hold anticoagulation.  -OR for EGD

## 2025-02-12 NOTE — ANESTHESIA PREPROCEDURE EVALUATION
Anesthesia Evaluation     Patient summary reviewed and Nursing notes reviewed   no history of anesthetic complications:   NPO Solid Status: > 8 hours  NPO Liquid Status: > 8 hours           Airway   Mallampati: II  TM distance: >3 FB  Neck ROM: full  No difficulty expected  Dental - normal exam     Pulmonary - normal exam    breath sounds clear to auscultation  (+) ,sleep apnea on CPAP  Cardiovascular - normal exam  Exercise tolerance: good (4-7 METS)    ECG reviewed  PT is on anticoagulation therapy    (+) hypertension, CAD, dysrhythmias Atrial Fib, hyperlipidemia      Neuro/Psych  (+) numbness  GI/Hepatic/Renal/Endo    (+) GERD, GI bleeding upper , diabetes mellitus, thyroid problem thyroid nodules    Musculoskeletal     Abdominal  - normal exam    Abdomen: soft.   Substance History - negative use     OB/GYN          Other   arthritis,     ROS/Med Hx Other: 12/6/23    ·  Mid LAD lesion is 30% stenosed.  ·  Mid Cx lesion is 50% stenosed.                         Anesthesia Plan    ASA 3     general   total IV anesthesia  intravenous induction     Anesthetic plan, risks, benefits, and alternatives have been provided, discussed and informed consent has been obtained with: patient.  Pre-procedure education provided    CODE STATUS:    Code Status (Patient has no pulse and is not breathing): CPR (Attempt to Resuscitate)  Medical Interventions (Patient has pulse or is breathing): Full Support

## 2025-02-12 NOTE — PLAN OF CARE
Goal Outcome Evaluation:              Outcome Evaluation: Patient resting in bed, alert.oriented. VSS. Tolerating protonix drip. Noted to be NPO, d/t EGD in the morning. Patient showered and chg bath completed. No signs of distress nor pain at this time. Call bell in reach

## 2025-02-12 NOTE — PROGRESS NOTES
[facility]     Patient Identification:  Name:  Aramis Carson  Age:  84 y.o.  Sex:  male  :  1940  MRN:  [id number]  Visit Number:  [id number]  Primary Care Provider:  Kieran Blanca APRN    Length of stay:  2    Subjective: Patient seen and examined, no complaints no nausea or vomiting no BM or melena.  Scheduled for upper endoscopy today, surgery help appreciated, H&H remained stable, potassium slightly low.  Accu-Chek is excellent.  No fever or chills.  No sore throat or rhinorrhea.    Chief Complaint: Hematemesis and melena  ----------------------------------------------------------------------------------------------------------------------  Current Hospital Meds:  atorvastatin, 20 mg, Oral, Nightly  insulin regular, 2-7 Units, Subcutaneous, Q6H  sodium chloride, 10 mL, Intravenous, Q12H  sucralfate, 1 g, Oral, 4x Daily AC & at Bedtime      pantoprazole, 40 mg, Last Rate: 40 mg (25 0804)  sodium chloride, 75 mL/hr, Last Rate: 75 mL/hr (25 1540)      ----------------------------------------------------------------------------------------------------------------------  Vital Signs:  Temp:  [97.5 °F (36.4 °C)-98.7 °F (37.1 °C)] 98.7 °F (37.1 °C)  Heart Rate:  [60-98] 66  Resp:  [13] 18  BP: (108-150)/(44-99) 132/65       Tele: Atrial fibrillation rate of 66 bpm      02/10/25  1550 25  0400 25  0400   Weight: 82.6 kg (182 lb) 81.5 kg (179 lb 10.8 oz) 82.4 kg (181 lb 10.5 oz)     Body mass index is 29.32 kg/m².    Intake/Output Summary (Last 24 hours) at 2025 0845  Last data filed at 2025 0800  Gross per 24 hour   Intake 240 ml   Output 0 ml   Net 240 ml     NPO Diet NPO Type: Sips with Meds  ----------------------------------------------------------------------------------------------------------------------  Physical exam:  General: Comfortable,awake, alert, oriented to self, place, and time, well-developed and  well-nourished.  No respiratory distress.    Skin:  Skin is warm and dry. No rash noted. No pallor.    HENT:  Head:  Normocephalic and atraumatic.  Mouth:  Moist mucous membranes.    Eyes:  Conjunctivae and EOM are normal.  Pupils are equal, round, and reactive to light.  No scleral icterus.    Neck:  Neck supple.  No JVD present.    Pulmonary/Chest:  No respiratory distress, no wheezes, no crackles, with normal breath sounds and good air movement.  Cardiovascular: Irregular irregular rhythm, rate controlled, no obvious murmur noted.   Abdominal:  Soft.  Bowel sounds are normal.  No distension and no tenderness.   Extremities:  No edema, no tenderness, and no deformity.  No red or swollen joints anywhere.  Strong pulses in all 4 extremities with no clubbing, no cyanosis, no edema.  Neurological:  Motor strength equal no obvious deficit, sensory grossly intact.   No cranial nerve deficit.  No tongue deviation.  No facial droop.  No slurred speech.    Genitourinary: No Bocanegra catheter  Back:  ----------------------------------------------------------------------------------------------------------------------  ----------------------------------------------------------------------------------------------------------------------      Results from last 7 days   Lab Units 02/12/25  0530 02/12/25  0037 02/11/25  2214 02/11/25  0517 02/10/25  1620   WBC 10*3/mm3  --  8.25  --  11.21* 14.66*   HEMOGLOBIN g/dL 13.1 13.0 13.4 13.9 16.1   HEMATOCRIT % 42.0 40.8 42.3 44.5 50.4   MCV fL  --  84.8  --  85.9 84.6   MCHC g/dL  --  31.9  --  31.2* 31.9   PLATELETS 10*3/mm3  --  164  --  183 245         Results from last 7 days   Lab Units 02/12/25  0037 02/11/25  0517 02/10/25  1620 02/09/25  0208   SODIUM mmol/L 139 140 139  --    POTASSIUM mmol/L 3.4* 3.7 3.9  --    MAGNESIUM mg/dL  --   --   --  2.2*   CHLORIDE mmol/L 108* 108* 106  --    CO2 mmol/L 20.7* 21.3* 21.7*  --    BUN mg/dL 17 20 24*  --    CREATININE mg/dL 0.96 0.95 1.17  " --    CALCIUM mg/dL 7.8* 7.8* 8.6  --    GLUCOSE mg/dL 94 87 106*  --    ALBUMIN g/dL 2.9* 3.0* 3.9  --    BILIRUBIN mg/dL 0.6 0.5 0.5  --    ALK PHOS U/L 53 52 65  --    AST (SGOT) U/L 18 15 19  --    ALT (SGPT) U/L 14 14 18  --    Estimated Creatinine Clearance: 57.7 mL/min (by C-G formula based on SCr of 0.96 mg/dL).    No results found for: \"AMMONIA\"      No results found for: \"BLOODCX\"  No results found for: \"URINECX\"  No results found for: \"WOUNDCX\"  No results found for: \"STOOLCX\"    I have personally looked at the labs and they are summarized above.  ----------------------------------------------------------------------------------------------------------------------  Imaging Results (Last 24 Hours)       ** No results found for the last 24 hours. **          ----------------------------------------------------------------------------------------------------------------------  Assessment and Plan:  -Acute GI bleed likely upper with abnormal CAT scan suggestive of duodenitis versus ulcerative disease for endoscopy today  -Acute hypokalemia  -Influenza A infection  -Diabetes fairly controlled  -Transient BUN elevation likely from GI bleed  -History of nonobstructive coronary disease  -History of chronic persistent atrial fibrillation rate controlled    Replace electrolytes, no recurrence of melena or hematemesis, H&H remained stable, avoid nephrotoxic medications, continue Accu-Chek and sliding scale, scheduled for endoscopy today, depending on the findings will be started on diet, if remains stable hopefully home tomorrow.      Disposition pending      Selena Ridley MD  02/12/25  08:45 EST  "

## 2025-02-12 NOTE — PLAN OF CARE
Goal Outcome Evaluation:  Plan of Care Reviewed With: patient        Progress: no change  Outcome Evaluation: pt resting in bed at this time. pt alert and oriented. VSS. Pt on RA. No request at this time. Will continue to follow plan of care til 7p

## 2025-02-12 NOTE — CONSULTS
Patient Name:  Aramis Carson  YOB: 1940  [id number]       Patient Care Team:  iKeran Blanca APRN as PCP - General (Nurse Practitioner)      General Surgery Consult Note     Date of Consultation: 02/11/25    Consulting Physician : Mary Bustos DO    Reason for Consult : bleeding ulcer    Subjective     I have been asked to see  Aramis Carson , a 84 y.o. male in consultation for a bleeding ulcer. He is usually anticoagulated on Eliquis and had an episode of hematemesis and melanotic stools. Found to have concern for a bleeding ulcer on CT imaging.       Allergy:   Allergies   Allergen Reactions    Flomax [Tamsulosin] Other (See Comments)     Heart rate to drop and pass out    Lisinopril Unknown (See Comments) and Cough     Pt. States that he is allergic however, it has been so long since he took it that he cannot remember the reaction that he had at the time.    Losartan Unknown - Low Severity       Medications:  atorvastatin, 20 mg, Oral, Nightly  insulin regular, 2-7 Units, Subcutaneous, Q6H  sodium chloride, 10 mL, Intravenous, Q12H  sucralfate, 1 g, Oral, 4x Daily AC & at Bedtime      pantoprazole, 40 mg, Last Rate: 40 mg (02/11/25 1615)  sodium chloride, 75 mL/hr, Last Rate: 75 mL/hr (02/11/25 1540)      No current facility-administered medications on file prior to encounter.     Current Outpatient Medications on File Prior to Encounter   Medication Sig    atorvastatin (LIPITOR) 20 MG tablet Take 1 tablet by mouth Every Night.    Eliquis 5 MG tablet tablet TAKE 1 TABLET EVERY 12 HOURS FOR ATRIAL FIBRILLATION    famotidine (PEPCID) 20 MG tablet Take 1 tablet by mouth 2 (Two) Times a Day.    glipiZIDE XL 2.5 MG 24 hr tablet Take 1 tablet by mouth Daily.    omeprazole (priLOSEC) 20 MG capsule Take 1 capsule by mouth Daily.    ondansetron ODT (ZOFRAN-ODT) 4 MG disintegrating tablet Place 1 tablet on the tongue Every 8 (Eight) Hours As Needed for Nausea or Vomiting.     "sacubitril-valsartan (Entresto) 24-26 MG tablet Take 1 tablet by mouth Every 12 (Twelve) Hours As Needed (Elevated blood pressures.). Take when the blood pressures are above 140/90.  Please measure the blood pressures before and after taking this medication.       PMHx:   Past Medical History:   Diagnosis Date    Arthritis     Atrial fibrillation     Coronary artery disease     GERD (gastroesophageal reflux disease)     Hyperlipidemia     Hypertension     Pre-diabetes     Sleep apnea     BiPap    Thyroid nodule        Past Surgical History:  History of skin lesion removal, history of colonoscopy     Family History: Noncontributory     Social History:  Noncontributory      Review of Systems   Pertinent items are noted in HPI     Constitutional: No fevers, chills or malaise   Eyes: Denies visual changes    Cardiovascular: Denies chest pain, palpitations   Pulmonary: Denies cough or shortness of breath   Abdominal/ GI: Nontender    Genitourinary: Denies dysuria or hematuria   Musculoskeletal: Denies any but chronic joint aches, pains or deformities   Psychiatric: No recent mood changes   Neurologic: No paresthesias or loss of function          Objective     Physical Exam:      Vital Signs  /69   Pulse 70   Temp 98.5 °F (36.9 °C) (Oral)   Resp 18   Ht 167.6 cm (66\")   Wt 81.5 kg (179 lb 10.8 oz)   SpO2 100%   BMI 29.00 kg/m²     Intake/Output Summary (Last 24 hours) at 2/11/2025 1917  Last data filed at 2/11/2025 1700  Gross per 24 hour   Intake 240 ml   Output 0 ml   Net 240 ml         Physical Exam:    Head: Normocephalic, atraumatic.   Eyes: Pupils equal, round, react to light   Mouth: No lesions noted  CV: Regular rate and rhythm   Lungs: Bilateral chest rise and fall, no use of accessory muscles   Abdomen: Soft, nontender, nondistended  Extremities:  No cyanosis, clubbing or edema bilaterally   Neurologic: No gross deficits      Assessment and Plan:    Problem List Items Addressed This Visit          " Gastrointestinal Abdominal     * (Principal) Upper GI bleed    Relevant Orders    Case request (Completed)     Other Visit Diagnoses       Black stools    -  Primary             Active Hospital Problems    Diagnosis  POA    **Upper GI bleed [K92.2]  Yes      Resolved Hospital Problems   No resolved problems to display.      Mr Carson presents for evaluation of a bleeding ulcer. Eliquis has been held. Will plan for an upper endoscopy tomorrow by Dr Lockhart. Patient is familiar with Dr Lockhart and is comfortable with this plan.    I discussed the patient's findings and my recommendations with the patient and/or family, as well as the primary team     Yany Eddy MD  02/11/25  19:17 EST

## 2025-02-12 NOTE — CASE MANAGEMENT/SOCIAL WORK
Discharge Planning Assessment   Mike     Patient Name: Aramis Carson  MRN: [id number]  Today's Date: 2/12/2025    Admit Date: 2/10/2025     Discharge Plan       Row Name 02/12/25 1143       Plan    Plan SS completed initial consult on 2/11/25. Pt lives at home with spouse and plans to return back home at discharge. Pt PCP Kieran Blanca. Pt does not utilize  services at this time. Pt has (s) cane PRN and BIPAP via [employer]. Per spouse pt independent with all ADL's prior to admission. Pt spouse to provide transportation at discharge. SS to follow.               Expected Discharge Date and Time       Expected Discharge Date Expected Discharge Time    Feb 13, 2025      JERMAINE Saleh

## 2025-02-13 ENCOUNTER — READMISSION MANAGEMENT (OUTPATIENT)
Dept: CALL CENTER | Facility: HOSPITAL | Age: 85
End: 2025-02-13
Payer: MEDICARE

## 2025-02-13 VITALS
OXYGEN SATURATION: 99 % | HEIGHT: 66 IN | RESPIRATION RATE: 28 BRPM | WEIGHT: 188.71 LBS | DIASTOLIC BLOOD PRESSURE: 56 MMHG | BODY MASS INDEX: 30.33 KG/M2 | HEART RATE: 68 BPM | SYSTOLIC BLOOD PRESSURE: 109 MMHG | TEMPERATURE: 98.3 F

## 2025-02-13 LAB
ACANTHOCYTES BLD QL SMEAR: ABNORMAL
ANION GAP SERPL CALCULATED.3IONS-SCNC: 10 MMOL/L (ref 5–15)
ANISOCYTOSIS BLD QL: ABNORMAL
BUN SERPL-MCNC: 15 MG/DL (ref 8–23)
BUN/CREAT SERPL: 16.7 (ref 7–25)
BURR CELLS BLD QL SMEAR: ABNORMAL
CALCIUM SPEC-SCNC: 7.9 MG/DL (ref 8.6–10.5)
CHLORIDE SERPL-SCNC: 109 MMOL/L (ref 98–107)
CO2 SERPL-SCNC: 22 MMOL/L (ref 22–29)
CREAT SERPL-MCNC: 0.9 MG/DL (ref 0.76–1.27)
DEPRECATED RDW RBC AUTO: 43.7 FL (ref 37–54)
EGFRCR SERPLBLD CKD-EPI 2021: 84.2 ML/MIN/1.73
ELLIPTOCYTES BLD QL SMEAR: ABNORMAL
ERYTHROCYTE [DISTWIDTH] IN BLOOD BY AUTOMATED COUNT: 13.6 % (ref 12.3–15.4)
GLUCOSE BLDC GLUCOMTR-MCNC: 107 MG/DL (ref 70–130)
GLUCOSE BLDC GLUCOMTR-MCNC: 110 MG/DL (ref 70–130)
GLUCOSE SERPL-MCNC: 110 MG/DL (ref 65–99)
HCT VFR BLD AUTO: 40.9 % (ref 37.5–51)
HGB BLD-MCNC: 12.7 G/DL (ref 13–17.7)
HYPOCHROMIA BLD QL: ABNORMAL
LYMPHOCYTES # BLD MANUAL: 1.69 10*3/MM3 (ref 0.7–3.1)
LYMPHOCYTES NFR BLD MANUAL: 6 % (ref 5–12)
MAGNESIUM SERPL-MCNC: 2.1 MG/DL (ref 1.6–2.4)
MCH RBC QN AUTO: 26.9 PG (ref 26.6–33)
MCHC RBC AUTO-ENTMCNC: 31.1 G/DL (ref 31.5–35.7)
MCV RBC AUTO: 86.7 FL (ref 79–97)
METAMYELOCYTES NFR BLD MANUAL: 1 % (ref 0–0)
MONOCYTES # BLD: 0.4 10*3/MM3 (ref 0.1–0.9)
MYELOCYTES NFR BLD MANUAL: 1 % (ref 0–0)
NEUTROPHILS # BLD AUTO: 4.52 10*3/MM3 (ref 1.7–7)
NEUTROPHILS NFR BLD MANUAL: 64 % (ref 42.7–76)
NEUTS BAND NFR BLD MANUAL: 3 % (ref 0–5)
PLAT MORPH BLD: NORMAL
PLATELET # BLD AUTO: 161 10*3/MM3 (ref 140–450)
PMV BLD AUTO: 10.4 FL (ref 6–12)
POTASSIUM SERPL-SCNC: 3.6 MMOL/L (ref 3.5–5.2)
RBC # BLD AUTO: 4.72 10*6/MM3 (ref 4.14–5.8)
SODIUM SERPL-SCNC: 141 MMOL/L (ref 136–145)
TOXIC GRANULATION: ABNORMAL
VARIANT LYMPHS NFR BLD MANUAL: 25 % (ref 19.6–45.3)
WBC NRBC COR # BLD AUTO: 6.74 10*3/MM3 (ref 3.4–10.8)

## 2025-02-13 PROCEDURE — 99239 HOSP IP/OBS DSCHRG MGMT >30: CPT | Performed by: HOSPITALIST

## 2025-02-13 PROCEDURE — 94799 UNLISTED PULMONARY SVC/PX: CPT

## 2025-02-13 PROCEDURE — 85025 COMPLETE CBC W/AUTO DIFF WBC: CPT | Performed by: SURGERY

## 2025-02-13 PROCEDURE — 94660 CPAP INITIATION&MGMT: CPT

## 2025-02-13 PROCEDURE — 85007 BL SMEAR W/DIFF WBC COUNT: CPT | Performed by: SURGERY

## 2025-02-13 PROCEDURE — 25010000002 POTASSIUM CHLORIDE 10 MEQ/100ML SOLUTION: Performed by: HOSPITALIST

## 2025-02-13 PROCEDURE — 94761 N-INVAS EAR/PLS OXIMETRY MLT: CPT

## 2025-02-13 PROCEDURE — 83735 ASSAY OF MAGNESIUM: CPT | Performed by: SURGERY

## 2025-02-13 PROCEDURE — 80048 BASIC METABOLIC PNL TOTAL CA: CPT | Performed by: SURGERY

## 2025-02-13 PROCEDURE — 82948 REAGENT STRIP/BLOOD GLUCOSE: CPT

## 2025-02-13 RX ORDER — FLUCONAZOLE 200 MG/1
200 TABLET ORAL DAILY
Qty: 14 TABLET | Refills: 0 | Status: SHIPPED | OUTPATIENT
Start: 2025-02-13

## 2025-02-13 RX ORDER — POTASSIUM CHLORIDE 7.45 MG/ML
10 INJECTION INTRAVENOUS
Status: DISCONTINUED | OUTPATIENT
Start: 2025-02-13 | End: 2025-02-13 | Stop reason: HOSPADM

## 2025-02-13 RX ORDER — SUCRALFATE 1 G/1
1 TABLET ORAL
Qty: 120 TABLET | Refills: 3 | Status: SHIPPED | OUTPATIENT
Start: 2025-02-13

## 2025-02-13 RX ORDER — PANTOPRAZOLE SODIUM 40 MG/1
40 TABLET, DELAYED RELEASE ORAL DAILY
Qty: 30 TABLET | Refills: 6 | Status: SHIPPED | OUTPATIENT
Start: 2025-02-13

## 2025-02-13 RX ADMIN — SUCRALFATE 1 G: 1 TABLET ORAL at 07:48

## 2025-02-13 RX ADMIN — PANTOPRAZOLE SODIUM 40 MG: 40 TABLET, DELAYED RELEASE ORAL at 06:09

## 2025-02-13 RX ADMIN — POTASSIUM CHLORIDE 10 MEQ: 7.46 INJECTION, SOLUTION INTRAVENOUS at 07:48

## 2025-02-13 NOTE — PAYOR COMM NOTE
"[facility]  NPI:[id number]    Utilization Review  Contact: Edna Clay RN  Phone: [phone]  Fax:[phone]    CLINICAL UPDATE    Abdulkadir Carson (84 y.o. Male)       Date of Birth   1940    Social Security Number       Address   [address] [place] [address]    Home Phone   [phone]    MRN   [id number]       Zoroastrianism   Sabianism    Marital Status                               Admission Date   2/10/25    Admission Type   Emergency    Admitting Provider   Satish Lott MD    Attending Provider   Selena Ridley MD    Department, Room/Bed   [facility] PROGRESS CARE, P213/S2       Discharge Date       Discharge Disposition       Discharge Destination                                 Attending Provider: Selena Ridley MD    Allergies: Flomax [Tamsulosin], Lisinopril, Losartan    Isolation: Droplet   Infection: Influenza (02/10/25)   Code Status: CPR    Ht: 167.6 cm (66\")   Wt: 85.6 kg (188 lb 11.4 oz)    Admission Cmt: None   Principal Problem: Upper GI bleed [K92.2]                   Active Insurance as of 2/10/2025       Primary Coverage       Payor Plan Insurance Group Employer/Plan Group    MEDICARE RAILROAD MEDICARE        Payor Plan Address Payor Plan Phone Number Payor Plan Fax Number Effective Dates    [address] [phone]  2/1/2005 - None Entered    [facility] [id number]         Subscriber Name Subscriber Birth Date Member ID       ABDULKADIR CARSON 1940 [id number]               Secondary Coverage       Payor Plan Insurance Group Employer/Plan Group     FOR LIFE  FOR LIFE MC SUP         Payor Plan Address Payor Plan Phone Number Payor Plan Fax Number Effective Dates    [address] [phone]  10/6/2016 - None Entered    [facility] [phone]         Subscriber Name Subscriber Birth Date Member ID       ABDULKADIR CARSON 1940 [id number]                     Emergency Contacts        " (Rel.) Home Phone Work Phone Mobile Phone    Kelley Carson (Spouse) [phone] -- [phone]                 Physician Progress Notes (last 48 hours)        Selena Ridley MD at 25 0845              [facility]     Patient Identification:  Name:  Aramis Carson  Age:  84 y.o.  Sex:  male  :  1940  MRN:  [id number]  Visit Number:  [id number]  Primary Care Provider:  Kieran Blanca APRN    Length of stay:  2    Subjective: Patient seen and examined, no complaints no nausea or vomiting no BM or melena.  Scheduled for upper endoscopy today, surgery help appreciated, H&H remained stable, potassium slightly low.  Accu-Chek is excellent.  No fever or chills.  No sore throat or rhinorrhea.    Chief Complaint: Hematemesis and melena  ----------------------------------------------------------------------------------------------------------------------  Current Hospital Meds:  atorvastatin, 20 mg, Oral, Nightly  insulin regular, 2-7 Units, Subcutaneous, Q6H  sodium chloride, 10 mL, Intravenous, Q12H  sucralfate, 1 g, Oral, 4x Daily AC & at Bedtime      pantoprazole, 40 mg, Last Rate: 40 mg (25 0804)  sodium chloride, 75 mL/hr, Last Rate: 75 mL/hr (25 1540)      ----------------------------------------------------------------------------------------------------------------------  Vital Signs:  Temp:  [97.5 °F (36.4 °C)-98.7 °F (37.1 °C)] 98.7 °F (37.1 °C)  Heart Rate:  [60-98] 66  Resp:  [13-23] 18  BP: (108-150)/(44-99) 132/65       Tele: Atrial fibrillation rate of 66 bpm      02/10/25  1550 25  0400 25  0400   Weight: 82.6 kg (182 lb) 81.5 kg (179 lb 10.8 oz) 82.4 kg (181 lb 10.5 oz)     Body mass index is 29.32 kg/m².    Intake/Output Summary (Last 24 hours) at 2025 0845  Last data filed at 2025 0800  Gross per 24 hour   Intake 240 ml   Output 0 ml   Net 240 ml     NPO Diet NPO Type: Sips with  Meds  ----------------------------------------------------------------------------------------------------------------------  Physical exam:  General: Comfortable,awake, alert, oriented to self, place, and time, well-developed and well-nourished.  No respiratory distress.    Skin:  Skin is warm and dry. No rash noted. No pallor.    HENT:  Head:  Normocephalic and atraumatic.  Mouth:  Moist mucous membranes.    Eyes:  Conjunctivae and EOM are normal.  Pupils are equal, round, and reactive to light.  No scleral icterus.    Neck:  Neck supple.  No JVD present.    Pulmonary/Chest:  No respiratory distress, no wheezes, no crackles, with normal breath sounds and good air movement.  Cardiovascular: Irregular irregular rhythm, rate controlled, no obvious murmur noted.   Abdominal:  Soft.  Bowel sounds are normal.  No distension and no tenderness.   Extremities:  No edema, no tenderness, and no deformity.  No red or swollen joints anywhere.  Strong pulses in all 4 extremities with no clubbing, no cyanosis, no edema.  Neurological:  Motor strength equal no obvious deficit, sensory grossly intact.   No cranial nerve deficit.  No tongue deviation.  No facial droop.  No slurred speech.    Genitourinary: No Bocanegra catheter  Back:  ----------------------------------------------------------------------------------------------------------------------  ----------------------------------------------------------------------------------------------------------------------      Results from last 7 days   Lab Units 02/12/25  0530 02/12/25  0037 02/11/25  2214 02/11/25  0517 02/10/25  1620   WBC 10*3/mm3  --  8.25  --  11.21* 14.66*   HEMOGLOBIN g/dL 13.1 13.0 13.4 13.9 16.1   HEMATOCRIT % 42.0 40.8 42.3 44.5 50.4   MCV fL  --  84.8  --  85.9 84.6   MCHC g/dL  --  31.9  --  31.2* 31.9   PLATELETS 10*3/mm3  --  164  --  183 245         Results from last 7 days   Lab Units 02/12/25  0037 02/11/25  0517 02/10/25  1620 02/09/25  0208   SODIUM  "mmol/L 139 140 139  --    POTASSIUM mmol/L 3.4* 3.7 3.9  --    MAGNESIUM mg/dL  --   --   --  2.2*   CHLORIDE mmol/L 108* 108* 106  --    CO2 mmol/L 20.7* 21.3* 21.7*  --    BUN mg/dL 17 20 24*  --    CREATININE mg/dL 0.96 0.95 1.17  --    CALCIUM mg/dL 7.8* 7.8* 8.6  --    GLUCOSE mg/dL 94 87 106*  --    ALBUMIN g/dL 2.9* 3.0* 3.9  --    BILIRUBIN mg/dL 0.6 0.5 0.5  --    ALK PHOS U/L 53 52 65  --    AST (SGOT) U/L 18 15 19  --    ALT (SGPT) U/L 14 14 18  --    Estimated Creatinine Clearance: 57.7 mL/min (by C-G formula based on SCr of 0.96 mg/dL).    No results found for: \"AMMONIA\"      No results found for: \"BLOODCX\"  No results found for: \"URINECX\"  No results found for: \"WOUNDCX\"  No results found for: \"STOOLCX\"    I have personally looked at the labs and they are summarized above.  ----------------------------------------------------------------------------------------------------------------------  Imaging Results (Last 24 Hours)       ** No results found for the last 24 hours. **          ----------------------------------------------------------------------------------------------------------------------  Assessment and Plan:  -Acute GI bleed likely upper with abnormal CAT scan suggestive of duodenitis versus ulcerative disease for endoscopy today  -Acute hypokalemia  -Influenza A infection  -Diabetes fairly controlled  -Transient BUN elevation likely from GI bleed  -History of nonobstructive coronary disease  -History of chronic persistent atrial fibrillation rate controlled    Replace electrolytes, no recurrence of melena or hematemesis, H&H remained stable, avoid nephrotoxic medications, continue Accu-Chek and sliding scale, scheduled for endoscopy today, depending on the findings will be started on diet, if remains stable hopefully home tomorrow.      Disposition pending      Selena Ridley MD  02/12/25  08:45 EST    Electronically signed by Selena Ridley MD at 02/12/25 0902       [place], " Neftali Burton MD at 25 0756          Chief complaint: Melena    No acute events overnight.  Patient awaiting endoscopic evaluation.    Abdomen soft nontender nondistended  Patient reports melanic stools.    84-year-old male with hematemesis and melena concerning for upper GI bleed with CT showing duodenitis.  Recent colonoscopy within normal limits without bleeding source.  -Transfuse as needed  -Continue to hold anticoagulation.  -OR for EGD      Electronically signed by Neftali Lockhart MD at 25 0757       Mary Bustos DO at 25 1518           [facility]     Progress Note    Patient Name: Aramis Carson  : 1940  MRN: [id number]  Primary Care Physician:  Kieran Blanca APRN  Date of admission: 2/10/2025    Subjective   Subjective     Chief Complaint: 84-year-old male being seen in follow-up for suspected upper GI bleed    History of Present Illness  Patient Reports no symptoms when seen at bedside today.    The patient's wife is present at bedside and stated that he had a remote EGD in the past and more recently has had a colonoscopy with polypectomy.  Patient's last dose of Eliquis was on the morning of 2/10/2025.    Review of Systems  Since arrival to the PCU, patient denies further hematemesis, hemoptysis, melena, hematochezia. No abdominal pain - states the carafate he had yesterday improved his upper abdominal pain.     Objective   Objective     Vitals:   Temp:  [97.5 °F (36.4 °C)-98.5 °F (36.9 °C)] 97.5 °F (36.4 °C)  Heart Rate:  [] 80  Resp:  [13-23] 23  BP: ()/(44-98) 111/81    Physical Exam  Constitutional:       General: He is not in acute distress.     Appearance: He is well-developed.   HENT:      Head: Normocephalic and atraumatic.   Eyes:      Conjunctiva/sclera: Conjunctivae normal.   Neck:      Trachea: No tracheal deviation.   Cardiovascular:      Rate and Rhythm: Normal rate. Rhythm irregular.      Pulses:           Dorsalis pedis  pulses are 2+ on the right side and 2+ on the left side.      Heart sounds: No murmur heard.     No friction rub. No gallop.      Comments: Telemetry: Rate controlled A-fib in the 80s  Pulmonary:      Effort: No respiratory distress.      Breath sounds: Normal breath sounds. No wheezing or rales.   Abdominal:      General: Bowel sounds are normal. There is no distension.      Palpations: Abdomen is soft.      Tenderness: There is no abdominal tenderness. There is no guarding.   Skin:     General: Skin is warm and dry.      Findings: No erythema or rash.   Neurological:      Mental Status: He is alert and oriented to person, place, and time.      Cranial Nerves: No cranial nerve deficit.          Result Review    Result Review:  I have personally reviewed the results from the time of this admission to 2/11/2025 15:18 EST and agree with these findings:  [x]  Laboratory list / accordion  []  Microbiology  []  Radiology  []  EKG/Telemetry   []  Cardiology/Vascular   []  Pathology  []  Old records  []  Other:  Most notable findings include:     Results from last 7 days   Lab Units 02/11/25  0517 02/10/25  1620   SODIUM mmol/L 140 139   POTASSIUM mmol/L 3.7 3.9   CHLORIDE mmol/L 108* 106   CO2 mmol/L 21.3* 21.7*   BUN mg/dL 20 24*   CREATININE mg/dL 0.95 1.17   CALCIUM mg/dL 7.8* 8.6   BILIRUBIN mg/dL 0.5 0.5   ALK PHOS U/L 52 65   ALT (SGPT) U/L 14 18   AST (SGOT) U/L 15 19   GLUCOSE mg/dL 87 106*     Results from last 7 days   Lab Units 02/11/25  0517 02/10/25  1620   WBC 10*3/mm3 11.21* 14.66*   HEMOGLOBIN g/dL 13.9 16.1   HEMATOCRIT % 44.5 50.4   PLATELETS 10*3/mm3 183 245     CT abdomen/pelvis with contrast:  FINDINGS:  Visualized lung bases appear unremarkable.     The gallbladder is surgically absent. Mild hepatic steatosis. The  spleen, pancreas and adrenal glands appear unremarkable. No  hydronephrosis.     Moderate duodenal wall thickening with adjacent inflammation concerning  for duodenitis/ulcerative  disease.     No evidence of bowel obstruction/colitis/appendicitis. No free air. No  drainable fluid collection.     The urinary bladder appears unremarkable. Mild prostate gland  enlargement. Small fat-containing left inguinal hernia.     No acute osseous abnormality evident. Degenerative changes in the hips  and spine.     IMPRESSION:  Moderate duodenal wall thickening with adjacent inflammation concerning  for duodenitis/ ulcerative disease. No free air or abscess.    Assessment & Plan   Assessment / Plan     #Upper GI bleed, CT evidence of duodenitis versus ulcerative disease  #Pre-renal azotemia, resolved  #Pseudo hypocalcemia, corrected calcium 8.6 mg/dL  #Mild leukocytosis, suspect reactive and resolved  #Atrial fibrillation, currently rate controlled  #NIDDM type II, controlled  -Discussed with on-call surgeon who recommends to provide patient a clear liquid diet today and nothing by mouth after midnight to allow more time for Eliquis to metabolize out of patient's system  -Orders placed for clear liquid diet and n.p.o. after midnight  -Continue with the pantoprazole infusion  -Continue to hold Eliquis  -Continue with low-dose IV fluid hydration, NS @75 ml/hr; may cause some hemo-dilution  -Repeat CBC in a.m.; patient denies any further episodes of active bleeding  -Continue carafate  -Continue SSI PRN  -Repeat chemistry panel in a.m.     VTE Prophylaxis:  SCDs    CODE STATUS:    Code Status (Patient has no pulse and is not breathing): CPR (Attempt to Resuscitate)  Medical Interventions (Patient has pulse or is breathing): Full Support    Disposition:  I expect patient to be discharged home when stable from a surgical standpoint; possibly 24-48 hours.    Case d/w patient, wife, RN and consulting provider    Mary Bustos DO               Electronically signed by Mary Bustos DO at 02/11/25 5881          Consult Notes (last 48 hours)        Yany Eddy MD at 02/11/25 1914         Consult Orders    1. Inpatient General Surgery Consult [[id number]] ordered by Satish Lott MD at 02/10/25 1934                 Patient Name:  Aramis Carson  YOB: 1940  [id number]       Patient Care Team:  Kieran Blanca APRN as PCP - General (Nurse Practitioner)      General Surgery Consult Note     Date of Consultation: 02/11/25    Consulting Physician : Mary Bustos DO    Reason for Consult : bleeding ulcer    Subjective     I have been asked to see  Aramis Carson , a 84 y.o. male in consultation for a bleeding ulcer. He is usually anticoagulated on Eliquis and had an episode of hematemesis and melanotic stools. Found to have concern for a bleeding ulcer on CT imaging.       Allergy:   Allergies   Allergen Reactions    Flomax [Tamsulosin] Other (See Comments)     Heart rate to drop and pass out    Lisinopril Unknown (See Comments) and Cough     Pt. States that he is allergic however, it has been so long since he took it that he cannot remember the reaction that he had at the time.    Losartan Unknown - Low Severity       Medications:  atorvastatin, 20 mg, Oral, Nightly  insulin regular, 2-7 Units, Subcutaneous, Q6H  sodium chloride, 10 mL, Intravenous, Q12H  sucralfate, 1 g, Oral, 4x Daily AC & at Bedtime      pantoprazole, 40 mg, Last Rate: 40 mg (02/11/25 1615)  sodium chloride, 75 mL/hr, Last Rate: 75 mL/hr (02/11/25 1540)      No current facility-administered medications on file prior to encounter.     Current Outpatient Medications on File Prior to Encounter   Medication Sig    atorvastatin (LIPITOR) 20 MG tablet Take 1 tablet by mouth Every Night.    Eliquis 5 MG tablet tablet TAKE 1 TABLET EVERY 12 HOURS FOR ATRIAL FIBRILLATION    famotidine (PEPCID) 20 MG tablet Take 1 tablet by mouth 2 (Two) Times a Day.    glipiZIDE XL 2.5 MG 24 hr tablet Take 1 tablet by mouth Daily.    omeprazole (priLOSEC) 20 MG capsule Take 1 capsule by mouth Daily.    ondansetron ODT  "(ZOFRAN-ODT) 4 MG disintegrating tablet Place 1 tablet on the tongue Every 8 (Eight) Hours As Needed for Nausea or Vomiting.    sacubitril-valsartan (Entresto) 24-26 MG tablet Take 1 tablet by mouth Every 12 (Twelve) Hours As Needed (Elevated blood pressures.). Take when the blood pressures are above 140/90.  Please measure the blood pressures before and after taking this medication.       PMHx:   Past Medical History:   Diagnosis Date    Arthritis     Atrial fibrillation     Coronary artery disease     GERD (gastroesophageal reflux disease)     Hyperlipidemia     Hypertension     Pre-diabetes     Sleep apnea     BiPap    Thyroid nodule        Past Surgical History:  History of skin lesion removal, history of colonoscopy     Family History: Noncontributory     Social History:  Noncontributory      Review of Systems   Pertinent items are noted in HPI     Constitutional: No fevers, chills or malaise   Eyes: Denies visual changes    Cardiovascular: Denies chest pain, palpitations   Pulmonary: Denies cough or shortness of breath   Abdominal/ GI: Nontender    Genitourinary: Denies dysuria or hematuria   Musculoskeletal: Denies any but chronic joint aches, pains or deformities   Psychiatric: No recent mood changes   Neurologic: No paresthesias or loss of function         Objective     Physical Exam:      Vital Signs  /69   Pulse 70   Temp 98.5 °F (36.9 °C) (Oral)   Resp 18   Ht 167.6 cm (66\")   Wt 81.5 kg (179 lb 10.8 oz)   SpO2 100%   BMI 29.00 kg/m²     Intake/Output Summary (Last 24 hours) at 2/11/2025 1917  Last data filed at 2/11/2025 1700  Gross per 24 hour   Intake 240 ml   Output 0 ml   Net 240 ml         Physical Exam:    Head: Normocephalic, atraumatic.   Eyes: Pupils equal, round, react to light   Mouth: No lesions noted  CV: Regular rate and rhythm   Lungs: Bilateral chest rise and fall, no use of accessory muscles   Abdomen: Soft, nontender, nondistended  Extremities:  No cyanosis, clubbing or " edema bilaterally   Neurologic: No gross deficits      Assessment and Plan:    Problem List Items Addressed This Visit          Gastrointestinal Abdominal     * (Principal) Upper GI bleed    Relevant Orders    Case request (Completed)     Other Visit Diagnoses       Black stools    -  Primary             Active Hospital Problems    Diagnosis  POA    **Upper GI bleed [K92.2]  Yes      Resolved Hospital Problems   No resolved problems to display.      Mr Carson presents for evaluation of a bleeding ulcer. Eliquis has been held. Will plan for an upper endoscopy tomorrow by Dr Lockhart. Patient is familiar with Dr Lockhart and is comfortable with this plan.    I discussed the patient's findings and my recommendations with the patient and/or family, as well as the primary team     Yany Eddy MD  02/11/25  19:17 EST          Electronically signed by Yany Eddy MD at 02/11/25 0063

## 2025-02-13 NOTE — PLAN OF CARE
Goal Outcome Evaluation:         Patient is being discharged   Problem: Adult Inpatient Plan of Care  Goal: Plan of Care Review  Outcome: Adequate for Care Transition  Goal: Patient-Specific Goal (Individualized)  Outcome: Adequate for Care Transition  Goal: Absence of Hospital-Acquired Illness or Injury  Outcome: Adequate for Care Transition  Goal: Optimal Comfort and Wellbeing  Outcome: Adequate for Care Transition  Goal: Readiness for Transition of Care  Outcome: Adequate for Care Transition     Problem: Comorbidity Management  Goal: Blood Pressure in Desired Range  Outcome: Adequate for Care Transition     Problem: Noninvasive Ventilation Acute  Goal: Effective Unassisted Ventilation and Oxygenation  Outcome: Adequate for Care Transition     Problem: Sepsis/Septic Shock  Goal: Optimal Coping  Outcome: Adequate for Care Transition  Goal: Absence of Bleeding  Outcome: Adequate for Care Transition  Goal: Blood Glucose Level Within Target Range  Outcome: Adequate for Care Transition  Goal: Absence of Infection Signs and Symptoms  Outcome: Adequate for Care Transition  Goal: Optimal Nutrition Delivery  Outcome: Adequate for Care Transition     Problem: Fall Injury Risk  Goal: Absence of Fall and Fall-Related Injury  Outcome: Adequate for Care Transition

## 2025-02-13 NOTE — PLAN OF CARE
Goal Outcome Evaluation:              Outcome Evaluation: Patient resting in bed at this time. Patient is alert and oriented x 4. Patient remains on room air. Patient denies complaints. Call light within reach.

## 2025-02-13 NOTE — DISCHARGE SUMMARY
[facility] HOSPITALIST MEDICINE DISCHARGE SUMMARY    Patient Identification:  Name:  Aramis Carson  Age:  84 y.o.  Sex:  male  :  1940  MRN:  [id number]  Visit Number:  [id number]    Date of Admission: 2/10/2025  Date of Discharge: 2025  DISCHARGE DISPOSITION   Stable  PCP: Kieran Blanca APRN    DISCHARGE DIAGNOSIS : Acute upper GI bleed H&H so far remains stable, duodenitis and gastritis on EGD, esophageal candidiasis on EGD.  Acute flu A infection, transient BUN elevation likely from GI bleed, diabetes fairly controlled, history of chronic persistent atrial fibrillation rate controlled, chronic anticoagulation for stroke prophylaxis.  Thickening of the duodenal bulb on CAT scan likely secondary to duodenitis.  History of nonobstructive coronary artery disease.  Obesity with a BMI of 30.5 recommended lifestyle modification and weight loss.  Acute hypokalemia corrected.    HOSPITAL COURSE  Patient is a 84 y.o. male presented to [facility] complaining of melena and hematemesis preceded by nausea and vomiting with diarrhea for 2 days.  At the emergency room he tested positive for flu A, he denies sore throat rhinorrhea or coughing.  No myalgia or fever.  H&H was closely monitored temporarily Eliquis was held, he was placed on IV PPI.  Hemoglobin adequate has not dropped, there is no recurrence of melena or hematemesis no vomiting.  Surgery was consulted on admission and recommended the patient to undergo upper endoscopy.  Endoscopy evaluation revealed duodenitis, esophageal candidiasis and gastritis.  No active bleeding or ulcers.  Biopsy performed.  Postop patient was started on diet tolerating well.  Plan is to discharge him home today.  Patient was cleared to start on Eliquis.  He will be started on Diflucan for 10 days.      VITAL SIGNS:      25  0400 25  0855 25  0400   Weight: 82.4 kg (181 lb 10.5 oz) 82.1 kg (181 lb) 85.6 kg (188 lb  11.4 oz)     Body mass index is 30.46 kg/m².  Vitals:    02/13/25 0700   BP: 143/74   Pulse: 60   Resp: 10   Temp:    SpO2: 98%     PHYSICAL EXAM:  General: Comfortable,awake, alert, oriented to self, place, and time, well-developed and well-nourished.  No respiratory distress.    Skin:  Skin is warm and dry. No rash noted. No pallor.    HENT:  Head:  Normocephalic and atraumatic.  Mouth:  Moist mucous membranes.    Eyes:  Conjunctivae and EOM are normal.  Pupils are equal, round, and reactive to light.  No scleral icterus.    Neck:  Neck supple.  No JVD present.    Pulmonary/Chest:  No respiratory distress, no wheezes, no crackles, with normal breath sounds and good air movement.  Cardiovascular:  Normal rate, regular rhythm and normal heart sounds with no murmur.  Abdominal:  Soft.  Bowel sounds are normal.  No distension and no tenderness.   Extremities:  No edema, no tenderness, and no deformity.  No red or swollen joints anywhere.  Strong pulses in all 4 extremities with no clubbing, no cyanosis, no edema.  Neurological:  Motor strength equal no obvious deficit, sensory grossly intact.   No cranial nerve deficit.  No tongue deviation.  No facial droop.  No slurred speech.    Genitourinary: No Bocanegra catheter  Back:  ----------    DISCHARGE MEDICATIONS:     Discharge Medications        New Medications        Instructions Start Date   fluconazole 200 MG tablet  Commonly known as: DIFLUCAN   200 mg, Oral, Daily      pantoprazole 40 MG EC tablet  Commonly known as: Protonix   40 mg, Oral, Daily      sucralfate 1 g tablet  Commonly known as: CARAFATE   1 g, Oral, 4 Times Daily Before Meals & Nightly             Continue These Medications        Instructions Start Date   atorvastatin 20 MG tablet  Commonly known as: LIPITOR   20 mg, Nightly      Eliquis 5 MG tablet tablet  Generic drug: apixaban   TAKE 1 TABLET EVERY 12 HOURS FOR ATRIAL FIBRILLATION      Entresto 24-26 MG tablet  Generic drug: sacubitril-valsartan   1  tablet, Oral, Every 12 Hours PRN, Take when the blood pressures are above 140/90.  Please measure the blood pressures before and after taking this medication.      glipiZIDE XL 2.5 MG 24 hr tablet  Generic drug: glipizide   2.5 mg, Daily             Stop These Medications      famotidine 20 MG tablet  Commonly known as: PEPCID     omeprazole 20 MG capsule  Commonly known as: priLOSEC     ondansetron ODT 4 MG disintegrating tablet  Commonly known as: ZOFRAN-ODT                Your Scheduled Appointments      Feb 26, 2025 1:40 PM  Follow Up with Neftali Lockhart MD  [facility] GENERAL SURGERY ([facility]) [address]  [address]  [facility] [address] [phone]   Arrive 15 minutes prior to appointment.         Apr 23, 2025 9:30 AM  Follow Up with Alison Combs PA-C  [facility] PULMONARY & CRITICAL CARE MEDICINE ([facility] [address]  [facility] [address] [phone]   Established: Please bring outside images or reports.         May 21, 2025 8:45 AM  Follow Up with ISAIAS Samano  [facility] CARDIOLOGY ([place]) [address]  [place] [address] [phone]   -Bring photo ID, insurance card, and list of medications to appointment  -If testing was completed outside of [facility] then patient must bring images on a disc  -Copay will be collected at time of appointment  -Established patients should arrive at time of appointment                Additional Instructions for the Follow-ups that You Need to Schedule       Discharge Follow-up with PCP   As directed       Currently Documented PCP:    Kieran Blanca APRN    PCP Phone Number:    [phone]     Follow Up Details: ISAIAS Santiago (post hospitalization follow-up)        Discharge Follow-up with Specified Provider: Surgery (Dr. Lockhart)   As directed      To: Surgery (Dr. Lockhart)   Follow Up Details: Post EGD               Follow-up Information        Kieran Blanca APRN .    Specialty: Nurse Practitioner  Why: ISAIAS Santiago (post hospitalization follow-up)  Contact information:  [address] [address]  [phone]                             Pending Labs       Order Current Status    TISSUE EXAM, [facility] ([facility]) In process             Selena Ridley MD  02/13/25  09:29 EST    Please note that this discharge summary required more than 30 minutes to complete.

## 2025-02-13 NOTE — CASE MANAGEMENT/SOCIAL WORK
Case Management Discharge Note      Final Note: Patient is being discharged home on this date 2/13/25.  No needs identified.         Selected Continued Care - Admitted Since 2/10/2025               Final Discharge Disposition Code: 01 - home or self-care

## 2025-02-13 NOTE — DISCHARGE INSTR - APPOINTMENTS
Follow up appointments:  PCP- February 19th @11:00am  [phone]  Surgery-February 26th @1:40pm

## 2025-02-14 LAB — REF LAB TEST METHOD: NORMAL

## 2025-02-14 NOTE — OUTREACH NOTE
Prep Survey      Flowsheet Row Responses   [facility] discharged from? [place]   Is LACE score < 7 ? No   Eligibility Readm Mgmt   Discharge diagnosis Upper GI bleed   Does the patient have one of the following disease processes/diagnoses(primary or secondary)? Other   Does the patient have Home health ordered? No   Is there a DME ordered? No   Prep survey completed? Yes            JULIÁN TERRAZAS - Registered Nurse

## 2025-02-14 NOTE — PAYOR COMM NOTE
"[facility]  NPI:[id number]    Utilization Review  Contact: Edna Clay RN  Phone: [phone]  Fax:[phone]    DISCHARGE NOTIFICATION    Abdulkadir Karimi (84 y.o. Male)       Date of Birth   1940    Social Security Number       Address   [address] [place] [address]    Home Phone   [phone]    MRN   [id number]       [employer]   Latter-day    Marital Status                               Admission Date   2/10/25    Admission Type   Emergency    Admitting Provider   Satish Lott MD    Attending Provider       Department, Room/Bed   [facility] PROGRESS CARE, P213/S2       Discharge Date   2/13/2025    Discharge Disposition   Home or Self Care    Discharge Destination                                 Attending Provider: (none)   Allergies: Flomax [Tamsulosin], Lisinopril, Losartan    Isolation: None   Infection: Influenza (02/10/25)   Code Status: Prior    Ht: 167.6 cm (66\")   Wt: 85.6 kg (188 lb 11.4 oz)    Admission Cmt: None   Principal Problem: Upper GI bleed [K92.2]                   Active Insurance as of 2/10/2025       Primary Coverage       Payor Plan Insurance Group Employer/Plan Group    MEDICARE RAILROAD MEDICARE        Payor Plan Address Payor Plan Phone Number Payor Plan Fax Number Effective Dates    [address] [phone]  2/1/2005 - None Entered    [facility] [id number]         Subscriber Name Subscriber Birth Date Member ID       ABDULKADIR KARIMI 1940 [id number]               Secondary Coverage       Payor Plan Insurance Group Employer/Plan Group     FOR LIFE  FOR LIFE MC SUP         Payor Plan Address Payor Plan Phone Number Payor Plan Fax Number Effective Dates    [address] [phone]  10/6/2016 - None Entered    [facility] [id number]         Subscriber Name Subscriber Birth Date Member ID       ABDULKADIR KARIMI 1940 [id number]                     Emergency Contacts        (Rel.) Home " Phone Work Phone Mobile Phone    Kelley Carson (Spouse) [phone] -- [phone]                 Discharge Summary        Selena Ridley MD at 25 0928              [facility] HOSPITALIST MEDICINE DISCHARGE SUMMARY    Patient Identification:  Name:  Aramis Carson  Age:  84 y.o.  Sex:  male  :  1940  MRN:  [id number]  Visit Number:  [id number]    Date of Admission: 2/10/2025  Date of Discharge: 2025  DISCHARGE DISPOSITION   Stable  PCP: Kieran Blanca APRN    DISCHARGE DIAGNOSIS : Acute upper GI bleed H&H so far remains stable, duodenitis and gastritis on EGD, esophageal candidiasis on EGD.  Acute flu A infection, transient BUN elevation likely from GI bleed, diabetes fairly controlled, history of chronic persistent atrial fibrillation rate controlled, chronic anticoagulation for stroke prophylaxis.  Thickening of the duodenal bulb on CAT scan likely secondary to duodenitis.  History of nonobstructive coronary artery disease.  Obesity with a BMI of 30.5 recommended lifestyle modification and weight loss.  Acute hypokalemia corrected.    HOSPITAL COURSE  Patient is a 84 y.o. male presented to [facility] complaining of melena and hematemesis preceded by nausea and vomiting with diarrhea for 2 days.  At the emergency room he tested positive for flu A, he denies sore throat rhinorrhea or coughing.  No myalgia or fever.  H&H was closely monitored temporarily Eliquis was held, he was placed on IV PPI.  Hemoglobin adequate has not dropped, there is no recurrence of melena or hematemesis no vomiting.  Surgery was consulted on admission and recommended the patient to undergo upper endoscopy.  Endoscopy evaluation revealed duodenitis, esophageal candidiasis and gastritis.  No active bleeding or ulcers.  Biopsy performed.  Postop patient was started on diet tolerating well.  Plan is to discharge him home today.  Patient was cleared to start on Eliquis.  He will  be started on Diflucan for 10 days.      VITAL SIGNS:      02/12/25  0400 02/12/25  0855 02/13/25  0400   Weight: 82.4 kg (181 lb 10.5 oz) 82.1 kg (181 lb) 85.6 kg (188 lb 11.4 oz)     Body mass index is 30.46 kg/m².  Vitals:    02/13/25 0700   BP: 143/74   Pulse: 60   Resp: 10   Temp:    SpO2: 98%     PHYSICAL EXAM:  General: Comfortable,awake, alert, oriented to self, place, and time, well-developed and well-nourished.  No respiratory distress.    Skin:  Skin is warm and dry. No rash noted. No pallor.    HENT:  Head:  Normocephalic and atraumatic.  Mouth:  Moist mucous membranes.    Eyes:  Conjunctivae and EOM are normal.  Pupils are equal, round, and reactive to light.  No scleral icterus.    Neck:  Neck supple.  No JVD present.    Pulmonary/Chest:  No respiratory distress, no wheezes, no crackles, with normal breath sounds and good air movement.  Cardiovascular:  Normal rate, regular rhythm and normal heart sounds with no murmur.  Abdominal:  Soft.  Bowel sounds are normal.  No distension and no tenderness.   Extremities:  No edema, no tenderness, and no deformity.  No red or swollen joints anywhere.  Strong pulses in all 4 extremities with no clubbing, no cyanosis, no edema.  Neurological:  Motor strength equal no obvious deficit, sensory grossly intact.   No cranial nerve deficit.  No tongue deviation.  No facial droop.  No slurred speech.    Genitourinary: No Bocanegra catheter  Back:  ----------    DISCHARGE MEDICATIONS:     Discharge Medications        New Medications        Instructions Start Date   fluconazole 200 MG tablet  Commonly known as: DIFLUCAN   200 mg, Oral, Daily      pantoprazole 40 MG EC tablet  Commonly known as: Protonix   40 mg, Oral, Daily      sucralfate 1 g tablet  Commonly known as: CARAFATE   1 g, Oral, 4 Times Daily Before Meals & Nightly             Continue These Medications        Instructions Start Date   atorvastatin 20 MG tablet  Commonly known as: LIPITOR   20 mg, Nightly       Eliquis 5 MG tablet tablet  Generic drug: apixaban   TAKE 1 TABLET EVERY 12 HOURS FOR ATRIAL FIBRILLATION      Entresto 24-26 MG tablet  Generic drug: sacubitril-valsartan   1 tablet, Oral, Every 12 Hours PRN, Take when the blood pressures are above 140/90.  Please measure the blood pressures before and after taking this medication.      glipiZIDE XL 2.5 MG 24 hr tablet  Generic drug: glipizide   2.5 mg, Daily             Stop These Medications      famotidine 20 MG tablet  Commonly known as: PEPCID     omeprazole 20 MG capsule  Commonly known as: priLOSEC     ondansetron ODT 4 MG disintegrating tablet  Commonly known as: ZOFRAN-ODT                Your Scheduled Appointments      Feb 26, 2025 1:40 PM  Follow Up with Neftali Lockhart MD  [facility] GENERAL SURGERY ([facility]) [address]  [facility] [address] [phone]   Arrive 15 minutes prior to appointment.         Apr 23, 2025 9:30 AM  Follow Up with Alison Combs PA-C  [facility] PULMONARY & CRITICAL CARE MEDICINE ([facility] [address]  [facility] [address] [phone]   Established: Please bring outside images or reports.         May 21, 2025 8:45 AM  Follow Up with ISAIAS Samano  [facility] CARDIOLOGY ([place]) [address]  [facility] [address] [phone]   -Bring photo ID, insurance card, and list of medications to appointment  -If testing was completed outside of [facility] then patient must bring images on a disc  -Copay will be collected at time of appointment  -Established patients should arrive at time of appointment                Additional Instructions for the Follow-ups that You Need to Schedule       Discharge Follow-up with PCP   As directed       Currently Documented PCP:    Kieran Blanca APRN    PCP Phone Number:    [phone]     Follow Up Details: ISAIAS Santiago (post hospitalization follow-up)         Discharge Follow-up with Specified Provider: Surgery (Dr. Lockhart)   As directed      To: Surgery (Dr. Lockhart)   Follow Up Details: Post EGD               Follow-up Information       Kieran Blanca APRN .    Specialty: Nurse Practitioner  Why: ISAIAS Santiago (post hospitalization follow-up)  Contact information:  [address]  [place] [address]  [phone]                             Pending Labs       Order Current Status    TISSUE EXAM, [facility] ([facility]) In process             Selena Ridley MD  02/13/25  09:29 EST    Please note that this discharge summary required more than 30 minutes to complete.      Electronically signed by Selena Ridley MD at 02/13/25 2073

## 2025-02-17 ENCOUNTER — READMISSION MANAGEMENT (OUTPATIENT)
Dept: CALL CENTER | Facility: HOSPITAL | Age: 85
End: 2025-02-17
Payer: MEDICARE

## 2025-02-17 NOTE — OUTREACH NOTE
Medical Week 1 Survey      Flowsheet Row Responses   [facility] patient discharged from? [facility]   Does the patient have one of the following disease processes/diagnoses(primary or secondary)? Other   Week 1 attempt successful? Yes   Call start time 1200   Call end time 1205   Meds reviewed with patient/caregiver? Yes   Is the patient having any side effects they believe may be caused by any medication additions or changes? No   Does the patient have all medications ordered at discharge? Yes   Is the patient taking all medications as directed (includes completed medication regime)? Yes   Comments regarding appointments PCP appt 02/19/25. Surgery appt 02/26/25.   Does the patient have a primary care provider?  Yes   Does the patient have an appointment with their PCP within 7 days of discharge? Yes   Has the patient kept scheduled appointments due by today? N/A   Has home health visited the patient within 72 hours of discharge? N/A   Psychosocial issues? No   Did the patient receive a copy of their discharge instructions? Yes   Nursing interventions Reviewed instructions with patient   What is the patient's perception of their health status since discharge? Improving   Is the patient/caregiver able to teach back signs and symptoms related to disease process for when to call PCP? Yes   Is the patient/caregiver able to teach back signs and symptoms related to disease process for when to call 911? Yes   Is the patient/caregiver able to teach back the hierarchy of who to call/visit for symptoms/problems? PCP, Specialist, Home health nurse, Urgent Care, ED, 911 Yes   If the patient is a current smoker, are they able to teach back resources for cessation? Not a smoker   Week 1 call completed? Yes   Graduated Yes   Would this patient benefit from a Referral to Amb Social Work? No   Is the patient interested in additional calls from an ambulatory ? No   Wrap up additional comments Patient states is  improving. Denies any further s/s of GI bleeding. States cough is improving. Reports appetite improving, and will f/u with surgeon on 02/26/25, PCP on 02/19/25. Denies any needs or concerns today.   Call end time 1205            Terri CHAVEZ - Registered Nurse

## 2025-02-26 ENCOUNTER — OFFICE VISIT (OUTPATIENT)
Dept: SURGERY | Facility: CLINIC | Age: 85
End: 2025-02-26
Payer: MEDICARE

## 2025-02-26 VITALS — WEIGHT: 188 LBS | BODY MASS INDEX: 30.22 KG/M2 | HEIGHT: 66 IN

## 2025-02-26 DIAGNOSIS — K92.2 UPPER GI BLEED: Primary | ICD-10-CM

## 2025-02-26 NOTE — PROGRESS NOTES
Subjective   Aramis Carson is a 85 y.o. male is being seen for consultation today at the request of Kieran Blanca APRN    Aramis Carson is a 85 y.o. male with recent melena and upper GI bleed secondary to duodenal ulcer and duodenitis.  Patient also had esophagitis.  Biopsy showed no H. pylori but ulcer was present.  Patient is on PPI therapy.  He cannot tolerate sucralfate and this has been discontinued.    History of Present Illness      Past Medical History:   Diagnosis Date    Arthritis     Atrial fibrillation     Coronary artery disease     GERD (gastroesophageal reflux disease)     Hyperlipidemia     Hypertension     Pre-diabetes     Sleep apnea     BiPap    Thyroid nodule        Family History   Problem Relation Age of Onset    Cancer Sister     Diabetes Sister     Cancer Brother     Heart disease Brother     Diabetes Maternal Grandmother     Hypertension Neg Hx        Social History     Socioeconomic History    Marital status:    Tobacco Use    Smoking status: Former     Types: Pipe, Cigars     Quit date:      Years since quittin.1     Passive exposure: Past    Smokeless tobacco: Never    Tobacco comments:     Smoked for 25 years   Vaping Use    Vaping status: Never Used   Substance and Sexual Activity    Alcohol use: No    Drug use: No    Sexual activity: Defer       Past Surgical History:   Procedure Laterality Date    CARDIAC CATHETERIZATION      CARDIAC CATHETERIZATION N/A 2023    Procedure: Left Heart Cath;  Surgeon: Shwetha Navarrete MD;  Location: [facility] CATH INVASIVE LOCATION;  Service: Cardiology;  Laterality: N/A;    COLONOSCOPY      COLONOSCOPY N/A 2025    Procedure: COLONOSCOPY;  Surgeon: Neftali Lockhart MD;  Location: [facility];  Service: General;  Laterality: N/A;    CYSTOSCOPY TRANSURETHRAL RESECTION OF PROSTATE      ENDOSCOPY      ENDOSCOPY N/A 2025    Procedure: ESOPHAGOGASTRODUODENOSCOPY;  Surgeon: Neftali Lockhart MD;  Location: [facility];  " Service: Gastroenterology;  Laterality: N/A;    EYE SURGERY Right     INGUINAL HERNIA REPAIR Right     LAPAROSCOPIC CHOLECYSTECTOMY      SKIN LESION EXCISION N/A 1/17/2025    Procedure: SKIN LESION EXCISION;  Surgeon: Neftali Lockhart MD;  Location: [facility];  Service: General;  Laterality: N/A;       Review of Systems   Constitutional:  Negative for activity change, appetite change, chills and fever.   HENT:  Negative for sore throat and trouble swallowing.    Eyes:  Negative for visual disturbance.   Respiratory:  Negative for cough and shortness of breath.    Cardiovascular:  Negative for chest pain and palpitations.   Gastrointestinal:  Negative for abdominal distention, abdominal pain, blood in stool, constipation, diarrhea, nausea and vomiting.   Endocrine: Negative for cold intolerance and heat intolerance.   Genitourinary:  Negative for dysuria.   Musculoskeletal:  Negative for joint swelling.   Skin:  Negative for color change, rash and wound.   Allergic/Immunologic: Negative for immunocompromised state.   Neurological:  Negative for dizziness, seizures, weakness and headaches.   Hematological:  Negative for adenopathy. Does not bruise/bleed easily.   Psychiatric/Behavioral:  Negative for agitation and confusion.        Results        Ht 167.6 cm (66\")   Wt 85.3 kg (188 lb)   BMI 30.34 kg/m²   Objective   Physical Exam  Constitutional:       Appearance: He is well-developed.   HENT:      Head: Normocephalic and atraumatic.   Eyes:      Conjunctiva/sclera: Conjunctivae normal.      Pupils: Pupils are equal, round, and reactive to light.   Neck:      Thyroid: No thyromegaly.      Vascular: No JVD.      Trachea: No tracheal deviation.   Cardiovascular:      Rate and Rhythm: Normal rate and regular rhythm.      Heart sounds: No murmur heard.     No friction rub. No gallop.   Pulmonary:      Effort: Pulmonary effort is normal.      Breath sounds: Normal breath sounds.   Abdominal:      General: There is " no distension.      Palpations: Abdomen is soft. There is no hepatomegaly or splenomegaly.      Tenderness: There is no abdominal tenderness.      Hernia: No hernia is present.   Musculoskeletal:         General: No deformity. Normal range of motion.      Cervical back: Neck supple.   Skin:     General: Skin is warm and dry.   Neurological:      Mental Status: He is alert and oriented to person, place, and time.       Physical Exam      Assessment & Plan            Assessment   Diagnoses and all orders for this visit:    1. Upper GI bleed (Primary)        Assessment & Plan      Aramis Carson is a 85 y.o. male with melena due to esophagitis and duodenitis.  Continue PPI therapy and follow-up in 1 to 2 weeks.  If no improvement may require twice daily PPI therapy.                 This document has been electronically signed by Neftali Lockhart MD   February 26, 2025 15:44 EST    Patient or patient representative verbalized consent for the use of Ambient Listening during the visit with  Neftali Lockhart MD for chart documentation. 2/26/2025  15:45 EST

## 2025-03-12 ENCOUNTER — TELEPHONE (OUTPATIENT)
Dept: SURGERY | Facility: CLINIC | Age: 85
End: 2025-03-12

## 2025-03-12 NOTE — TELEPHONE ENCOUNTER
Caller: Aramis Carson    Relationship:  Self    Best call back number: [phone]     PATIENT CALLED REQUESTING TO CANCEL SAME DAY APPT.    Did the patient call AFTER the start time of their scheduled appointment?  []YES  [x]NO    Was the patient's appointment rescheduled? [x]YES  []NO    Any additional information: PT STATES DIARRHEA X 4 DAYS

## 2025-03-26 ENCOUNTER — OFFICE VISIT (OUTPATIENT)
Dept: SURGERY | Facility: CLINIC | Age: 85
End: 2025-03-26
Payer: MEDICARE

## 2025-03-26 VITALS — BODY MASS INDEX: 30.22 KG/M2 | WEIGHT: 188 LBS | HEIGHT: 66 IN

## 2025-03-26 DIAGNOSIS — K92.2 UPPER GI BLEED: Primary | ICD-10-CM

## 2025-03-26 NOTE — PROGRESS NOTES
Subjective   Aramis Carson is a 85 y.o. male is being seen for consultation today at the request of Kieran Blanca APRN    Aramis Carson is a 85 y.o. male with recent melena and upper GI bleed secondary to duodenal ulcer and duodenitis.  Patient also had esophagitis.  Biopsy showed no H. pylori but ulcer was present.  Patient is on PPI therapy.  He cannot tolerate sucralfate and this has been discontinued.  Symptoms resolved with PPI. No further blood loss noted.    History of Present Illness      Past Medical History:   Diagnosis Date    Arthritis     Atrial fibrillation     Coronary artery disease     GERD (gastroesophageal reflux disease)     Hyperlipidemia     Hypertension     Pre-diabetes     Sleep apnea     BiPap    Thyroid nodule        Family History   Problem Relation Age of Onset    Cancer Sister     Diabetes Sister     Cancer Brother     Heart disease Brother     Diabetes Maternal Grandmother     Hypertension Neg Hx        Social History     Socioeconomic History    Marital status:    Tobacco Use    Smoking status: Former     Types: Pipe, Cigars     Quit date:      Years since quittin.2     Passive exposure: Past    Smokeless tobacco: Never    Tobacco comments:     Smoked for 25 years   Vaping Use    Vaping status: Never Used   Substance and Sexual Activity    Alcohol use: No    Drug use: No    Sexual activity: Defer       Past Surgical History:   Procedure Laterality Date    CARDIAC CATHETERIZATION      CARDIAC CATHETERIZATION N/A 2023    Procedure: Left Heart Cath;  Surgeon: Shwetha Navarrete MD;  Location: [facility] CATH INVASIVE LOCATION;  Service: Cardiology;  Laterality: N/A;    COLONOSCOPY      COLONOSCOPY N/A 2025    Procedure: COLONOSCOPY;  Surgeon: Neftali Lockhart MD;  Location: [facility] OR;  Service: General;  Laterality: N/A;    CYSTOSCOPY TRANSURETHRAL RESECTION OF PROSTATE      ENDOSCOPY      ENDOSCOPY N/A 2025    Procedure: ESOPHAGOGASTRODUODENOSCOPY;  " Surgeon: Neftali Lockhart MD;  Location: [facility] OR;  Service: Gastroenterology;  Laterality: N/A;    EYE SURGERY Right     INGUINAL HERNIA REPAIR Right     LAPAROSCOPIC CHOLECYSTECTOMY      SKIN LESION EXCISION N/A 1/17/2025    Procedure: SKIN LESION EXCISION;  Surgeon: Neftali Lockhart MD;  Location: [facility] OR;  Service: General;  Laterality: N/A;       Review of Systems   Constitutional:  Negative for activity change, appetite change, chills and fever.   HENT:  Negative for sore throat and trouble swallowing.    Eyes:  Negative for visual disturbance.   Respiratory:  Negative for cough and shortness of breath.    Cardiovascular:  Negative for chest pain and palpitations.   Gastrointestinal:  Negative for abdominal distention, abdominal pain, blood in stool, constipation, diarrhea, nausea and vomiting.   Endocrine: Negative for cold intolerance and heat intolerance.   Genitourinary:  Negative for dysuria.   Musculoskeletal:  Negative for joint swelling.   Skin:  Negative for color change, rash and wound.   Allergic/Immunologic: Negative for immunocompromised state.   Neurological:  Negative for dizziness, seizures, weakness and headaches.   Hematological:  Negative for adenopathy. Does not bruise/bleed easily.   Psychiatric/Behavioral:  Negative for agitation and confusion.        Results        Ht 167.6 cm (66\")   Wt 85.3 kg (188 lb)   BMI 30.34 kg/m²   Objective   Physical Exam  Constitutional:       Appearance: He is well-developed.   HENT:      Head: Normocephalic and atraumatic.   Eyes:      Conjunctiva/sclera: Conjunctivae normal.      Pupils: Pupils are equal, round, and reactive to light.   Neck:      Thyroid: No thyromegaly.      Vascular: No JVD.      Trachea: No tracheal deviation.   Cardiovascular:      Rate and Rhythm: Normal rate and regular rhythm.      Heart sounds: No murmur heard.     No friction rub. No gallop.   Pulmonary:      Effort: Pulmonary effort is normal.      Breath sounds: " Normal breath sounds.   Abdominal:      General: There is no distension.      Palpations: Abdomen is soft. There is no hepatomegaly or splenomegaly.      Tenderness: There is no abdominal tenderness.      Hernia: No hernia is present.   Musculoskeletal:         General: No deformity. Normal range of motion.      Cervical back: Neck supple.   Skin:     General: Skin is warm and dry.   Neurological:      Mental Status: He is alert and oriented to person, place, and time.       Physical Exam      Assessment & Plan            Assessment   Diagnoses and all orders for this visit:    1. Upper GI bleed (Primary)        Assessment & Plan      Aramis Carson is a 85 y.o. male with melena due to esophagitis and duodenitis.  Continue PPI therapy and follow up in 2 weeks.  Patient aware of symptoms that should prompt return to care.                 This document has been electronically signed by Neftali Lockhart MD   March 26, 2025 13:22 EDT    Patient or patient representative verbalized consent for the use of Ambient Listening during the visit with  Neftali Lockhart MD for chart documentation. 3/26/2025  15:45 EST

## 2025-04-08 ENCOUNTER — OFFICE VISIT (OUTPATIENT)
Dept: CARDIOLOGY | Facility: CLINIC | Age: 85
End: 2025-04-08
Payer: MEDICARE

## 2025-04-08 VITALS
HEART RATE: 83 BPM | DIASTOLIC BLOOD PRESSURE: 82 MMHG | SYSTOLIC BLOOD PRESSURE: 136 MMHG | BODY MASS INDEX: 27.97 KG/M2 | WEIGHT: 174 LBS | OXYGEN SATURATION: 98 % | HEIGHT: 66 IN

## 2025-04-08 DIAGNOSIS — E78.5 DYSLIPIDEMIA: Primary | Chronic | ICD-10-CM

## 2025-04-08 DIAGNOSIS — I10 ESSENTIAL HYPERTENSION: Chronic | ICD-10-CM

## 2025-04-08 DIAGNOSIS — I48.11 LONGSTANDING PERSISTENT ATRIAL FIBRILLATION: Chronic | ICD-10-CM

## 2025-04-08 DIAGNOSIS — I25.10 CORONARY ARTERY DISEASE INVOLVING NATIVE CORONARY ARTERY OF NATIVE HEART WITHOUT ANGINA PECTORIS: Chronic | ICD-10-CM

## 2025-04-08 RX ORDER — DIGOXIN 125 MCG
TABLET ORAL
Qty: 30 TABLET | Refills: 0 | Status: SHIPPED | OUTPATIENT
Start: 2025-04-08

## 2025-04-08 RX ORDER — DIGOXIN 125 MCG
TABLET ORAL
Qty: 96 TABLET | OUTPATIENT
Start: 2025-04-08

## 2025-04-08 NOTE — PROGRESS NOTES
"Chief Complaint  Follow-up (Heart rate has been elevated)    Subjective          Aramis Carson presents to [facility] CARDIOLOGY for follow up.    History of Present Illness  History of Present Illness  The patient is an 85-year-old male who follows up in the clinic with persistent atrial fibrillation, dyslipidemia, and hypertension. He was last seen in the clinic on 11/19/2024. At that visit, he was stable from a cardiac standpoint, and no changes were made to his medications.    He reports satisfactory blood pressure control, with a systolic reading of 101 this morning. However, he experienced a significant increase in blood pressure during the last 3 days of March.     He has not had any influenza-like symptoms, cold, cough, or allergies and has not taken any over-the-counter medications during this period. He is not experiencing any chest pain, shortness of breath, or fatigue.     His heart rate, typically in the 60s and 70s, has been elevated for some time, reaching as high as 100s. He recalls a recent episode where his heart rate was 101, which decreased to 73 after lying on the floor with his feet elevated for an hour. He expresses a desire to maintain his heart rate in the 60s and 70s.     He maintains an active lifestyle and prefers to avoid antiarrhythmic medications due to a previous adverse reaction to Rythmol. He was previously prescribed metoprolol, which effectively controlled his heart rate, but it was discontinued due to excessively low heart rates. He still has some of this medication at home. He underwent blood work approximately 3 weeks ago, which yielded normal results.      Objective     Vital Signs:   /82 (BP Location: Left arm, Patient Position: Sitting, Cuff Size: Adult)   Pulse 83   Ht 167.6 cm (66\")   Wt 78.9 kg (174 lb)   SpO2 98%   BMI 28.08 kg/m²       Physical Exam  Vitals reviewed.   Constitutional:       Appearance: Normal appearance. He is " well-developed.   Cardiovascular:      Rate and Rhythm: Tachycardia present. Rhythm irregular.      Heart sounds: No murmur heard.     No friction rub. No gallop.   Pulmonary:      Effort: Pulmonary effort is normal. No respiratory distress.      Breath sounds: Normal breath sounds. No wheezing or rales.   Skin:     General: Skin is warm and dry.   Neurological:      Mental Status: He is alert and oriented to person, place, and time.   Psychiatric:         Mood and Affect: Mood normal.         Behavior: Behavior normal.          Result Review :     CMP          2/11/2025    05:17 2/12/2025    00:37 2/13/2025    01:56   CMP   Glucose 87  94  110    BUN 20  17  15    Creatinine 0.95  0.96  0.90    EGFR 78.9  77.9  84.2    Sodium 140  139  141    Potassium 3.7  3.4  3.6    Chloride 108  108  109    Calcium 7.8  7.8  7.9    Total Protein 5.8  5.6     Albumin 3.0  2.9     Globulin 2.8  2.7     Total Bilirubin 0.5  0.6     Alkaline Phosphatase 52  53     AST (SGOT) 15  18     ALT (SGPT) 14  14     Albumin/Globulin Ratio 1.1  1.1     BUN/Creatinine Ratio 21.1  17.7  16.7    Anion Gap 10.7  10.3  10.0      CBC          2/11/2025    05:17 2/11/2025    22:14 2/12/2025    00:37 2/12/2025    05:30 2/12/2025    11:48 2/13/2025    01:56   CBC   WBC 11.21   8.25    6.74    RBC 5.18   4.81    4.72    Hemoglobin 13.9  13.4  13.0  13.1  13.5  12.7    Hematocrit 44.5  42.3  40.8  42.0  42.7  40.9    MCV 85.9   84.8    86.7    MCH 26.8   27.0    26.9    MCHC 31.2   31.9    31.1    RDW 14.1   14.0    13.6    Platelets 183   164    161      Lipid Panel          1/18/2025    03:31   Lipid Panel   Total Cholesterol 110    Triglycerides 59    HDL Cholesterol 32    VLDL Cholesterol 13    LDL Cholesterol  65    LDL/HDL Ratio 2.07               Most recent echocardiogram  Results for orders placed during the hospital encounter of 01/17/25    Adult Transthoracic Echo Complete w/ Color, Spectral and Contrast if necessary per  protocol    Interpretation Summary    Left ventricular systolic function is hyperdynamic (EF > 70%). Left ventricular ejection fraction appears to be greater than 70%.    Left ventricular diastolic function is consistent with (grade III w/high LAP) fixed restrictive pattern.    The left atrial cavity is moderately dilated.    The right atrial cavity is moderately  dilated.    Estimated right ventricular systolic pressure from tricuspid regurgitation is moderately elevated (45-55 mmHg).      Most recent Stress Test  Results for orders placed during the hospital encounter of 12/03/23    Stress Test With Myocardial Perfusion One Day    Interpretation Summary  Images from the original result were not included.      A pharmacological stress test was performed using regadenoson without low-level exercise.    Findings consistent with an indeterminate ECG stress test.    Raw images reviewed with the following abnormalities noted: Has 3 bright scintigraphic spots noted in the right lung field.  Clinical correlation required to rule out any pulmonary pathology.    Myocardial perfusion imaging indicates a moderate-sized, mild degree of ischemia located in the inferior wall and lateral wall.    TID:1.19    Normal LV cavity size. Normal LV wall motion noted.    Left ventricular ejection fraction is normal (Calculated EF = 69%).    Impressions are consistent with an intermediate risk study.       Most recent Cardiac Cath  Results for orders placed during the hospital encounter of 12/03/23    Cardiac Catheterization/Vascular Study    Conclusion    Mid LAD lesion is 30% stenosed.    Mid Cx lesion is 50% stenosed.    1.  Cardiac.  Mild nonobstructive epicardial coronary disease noted.  Medical management will be advised.  Eliquis for chronic A-fib will be advised.      Current Outpatient Medications   Medication Sig Dispense Refill    atorvastatin (LIPITOR) 20 MG tablet Take 1 tablet by mouth Every Night.      Eliquis 5 MG tablet  tablet TAKE 1 TABLET EVERY 12 HOURS FOR ATRIAL FIBRILLATION 180 tablet 3    glipiZIDE XL 2.5 MG 24 hr tablet Take 1 tablet by mouth Daily.      pantoprazole (Protonix) 40 MG EC tablet Take 1 tablet by mouth Daily. 30 tablet 6    sacubitril-valsartan (Entresto) 24-26 MG tablet Take 1 tablet by mouth Every 12 (Twelve) Hours As Needed (Elevated blood pressures.). Take when the blood pressures are above 140/90.  Please measure the blood pressures before and after taking this medication.      digoxin (LANOXIN) 125 MCG tablet Take 2 (two) tablets daily for 2 (two) days, then 1 (one) tablet daily 30 tablet 0    fluconazole (DIFLUCAN) 200 MG tablet Take 1 tablet by mouth Daily. (Patient not taking: Reported on 4/8/2025) 14 tablet 0    sucralfate (CARAFATE) 1 g tablet Take 1 tablet by mouth 4 (Four) Times a Day Before Meals & at Bedtime. (Patient not taking: Reported on 4/8/2025) 120 tablet 3     No current facility-administered medications for this visit.               Problem List Items Addressed This Visit          Cardiac and Vasculature    Essential hypertension (Chronic)    Longstanding persistent atrial fibrillation (Chronic)    Overview   [id number] is 4 for hypertension, age, diabetes.  Chronically anticoagulated on Eliquis         Relevant Medications    digoxin (LANOXIN) 125 MCG tablet    Other Relevant Orders    Basic Metabolic Panel    Digoxin Level    Dyslipidemia - Primary (Chronic)    Overview   10/5/2020 total cholesterol 142, triglycerides 113, HDL 40, and LDL 79.  1/18/25  Total cholesterol 110, triglycerides 59, HDL 32, and LDL 65         Non obstructive coronary artery disease (Chronic)    Overview   12/3/23  [facility] for abnormal stress:  mid LAD 30% stenosis, Mid LCx 50% stenosis  1/17/25  TTE LVEF >70%, grade III w high LAP diastolic dysfunction (restrictive pattern), moderately dilated left atrium, moderately dilated right atrium, RVSP 45-55 mmHg           Current Assessment & Plan   Coronary Artery  Disease: Coronary artery disease is stable.  Goals of Care:Medication tolerance and compliance  Plan: Continue current medications.  Cardiac status will be reassessed  4 months .           Relevant Medications    digoxin (LANOXIN) 125 MCG tablet       Assessment & Plan  1. Persistent atrial fibrillation.  He reports elevated heart rates, which have been as high as 101 bpm. His blood pressure readings have been mostly in the 120s, with some in the 150s, 110s, and 140s. Kidney function is normal. He will be started on digoxin with a loading dose of 2 tablets daily for 2 days, followed by 1 tablet daily. He is advised to check his pulse before each dose and to withhold digoxin if his pulse is below 60 bpm. A basic metabolic panel will be ordered 2 weeks after starting digoxin to monitor kidney function. A prescription for a 1-month supply of digoxin will be sent to [facility]. If his heart rate remains elevated or if there are any concerns, adjustments to the medication will be considered.    2.  Hypertension  Stable.  Continue current medications    3.  Dyslipidemia  Given his age, will aim for an LDL cholesterol less than 70.  Continue current medications:  atorvastain.    4. Non obstructive CAD  He is not experiencing chest pain, shortness of breath, or fatigue. Stable.  Continue atorvastatin.  Pt previously on beta blocker however it was stopped due to low heart rate.      Follow-up  The patient will follow up in 4 months.             Follow Up     Return in about 6 weeks (around 5/20/2025).    I have assumed longitudinal care for complex medical condition(s) that require long-term management involving monitoring and adjustments to medications.  Goals of care, history of important events, and historical results can be found with each problem.  Episodic Plan of Care can be found  in the order section.      Patient was given instructions and counseling regarding his condition or for health maintenance advice. Please  see specific information pulled into the AVS if appropriate.     Patient or patient representative verbalized consent for the use of Ambient Listening during the visit with  ISAIAS Samano for chart documentation. 4/11/2025  18:16 EDT

## 2025-04-11 NOTE — ASSESSMENT & PLAN NOTE
Dyslipidemia is Controlled  Goals of care: Medication compliance and tolerance, Control progression of disease, and Maintain LDL <70  Plan: Continue current medications  Follow up:  4 months

## 2025-04-11 NOTE — ASSESSMENT & PLAN NOTE
Hypertension is Controlled  Goals of Care:Medication compliance and tolerance, Systolic blood pressure <130, and Diastolic blood pressure  <80  Plan:  Continue current medications  Follow up: 4 months

## 2025-04-11 NOTE — ASSESSMENT & PLAN NOTE
Atrial fibrillation rate is elevated  Goals of care:  Medication compliance and toleration, heart rate between 60 -80 bpm  Plan:  add digoxin, (metoprolol avoided due to the fact that he previously went very low on it and his blood pressure is sometimes low) check digoxin level and kidneys in two weeks  Follow up:  labs in two weeks and clinic follow up in 4 months

## 2025-04-11 NOTE — ASSESSMENT & PLAN NOTE
Coronary Artery Disease: Coronary artery disease is stable.  Goals of Care:Medication tolerance and compliance  Plan: Continue current medications.  Cardiac status will be reassessed  4 months .

## 2025-04-14 NOTE — TELEPHONE ENCOUNTER
Patient returned call and was informed that after he has labs drawn around 05/20/25 dose may be adjusted. At that point 90 supply can be ordered. Patient voiced understanding and will keep upcoming appointments.

## 2025-04-22 ENCOUNTER — LAB (OUTPATIENT)
Dept: LAB | Facility: HOSPITAL | Age: 85
End: 2025-04-22
Payer: MEDICARE

## 2025-04-22 DIAGNOSIS — I48.11 LONGSTANDING PERSISTENT ATRIAL FIBRILLATION: Chronic | ICD-10-CM

## 2025-04-22 LAB
ANION GAP SERPL CALCULATED.3IONS-SCNC: 8.2 MMOL/L (ref 5–15)
BUN SERPL-MCNC: 16 MG/DL (ref 8–23)
BUN/CREAT SERPL: 16 (ref 7–25)
CALCIUM SPEC-SCNC: 8.9 MG/DL (ref 8.6–10.5)
CHLORIDE SERPL-SCNC: 108 MMOL/L (ref 98–107)
CO2 SERPL-SCNC: 25.8 MMOL/L (ref 22–29)
CREAT SERPL-MCNC: 1 MG/DL (ref 0.76–1.27)
DIGOXIN SERPL-MCNC: 1.1 NG/ML (ref 0.6–1.2)
EGFRCR SERPLBLD CKD-EPI 2021: 73.8 ML/MIN/1.73
GLUCOSE SERPL-MCNC: 126 MG/DL (ref 65–99)
POTASSIUM SERPL-SCNC: 4.4 MMOL/L (ref 3.5–5.2)
SODIUM SERPL-SCNC: 142 MMOL/L (ref 136–145)

## 2025-04-22 PROCEDURE — 36415 COLL VENOUS BLD VENIPUNCTURE: CPT

## 2025-04-22 PROCEDURE — 80162 ASSAY OF DIGOXIN TOTAL: CPT

## 2025-04-22 PROCEDURE — 80048 BASIC METABOLIC PNL TOTAL CA: CPT

## 2025-04-23 ENCOUNTER — TELEPHONE (OUTPATIENT)
Dept: CARDIOLOGY | Facility: CLINIC | Age: 85
End: 2025-04-23
Payer: MEDICARE

## 2025-04-23 ENCOUNTER — OFFICE VISIT (OUTPATIENT)
Dept: PULMONOLOGY | Facility: CLINIC | Age: 85
End: 2025-04-23
Payer: MEDICARE

## 2025-04-23 VITALS
TEMPERATURE: 96.5 F | SYSTOLIC BLOOD PRESSURE: 122 MMHG | BODY MASS INDEX: 29.7 KG/M2 | HEART RATE: 64 BPM | DIASTOLIC BLOOD PRESSURE: 64 MMHG | OXYGEN SATURATION: 99 % | WEIGHT: 184.8 LBS | HEIGHT: 66 IN

## 2025-04-23 DIAGNOSIS — Z87.891 FORMER SMOKER: ICD-10-CM

## 2025-04-23 DIAGNOSIS — G47.33 OBSTRUCTIVE SLEEP APNEA SYNDROME: ICD-10-CM

## 2025-04-23 DIAGNOSIS — R91.8 PULMONARY NODULES: Primary | ICD-10-CM

## 2025-04-23 DIAGNOSIS — J30.2 SEASONAL ALLERGIC RHINITIS, UNSPECIFIED TRIGGER: ICD-10-CM

## 2025-04-23 PROCEDURE — 3078F DIAST BP <80 MM HG: CPT | Performed by: PHYSICIAN ASSISTANT

## 2025-04-23 PROCEDURE — 99214 OFFICE O/P EST MOD 30 MIN: CPT | Performed by: PHYSICIAN ASSISTANT

## 2025-04-23 PROCEDURE — 1159F MED LIST DOCD IN RCRD: CPT | Performed by: PHYSICIAN ASSISTANT

## 2025-04-23 PROCEDURE — 3074F SYST BP LT 130 MM HG: CPT | Performed by: PHYSICIAN ASSISTANT

## 2025-04-23 PROCEDURE — 1160F RVW MEDS BY RX/DR IN RCRD: CPT | Performed by: PHYSICIAN ASSISTANT

## 2025-04-23 RX ORDER — DIGOXIN 125 MCG
TABLET ORAL
Qty: 90 TABLET | Refills: 1 | Status: SHIPPED | OUTPATIENT
Start: 2025-04-23

## 2025-04-23 NOTE — PROGRESS NOTES
"Chief Complaint  Obstructive sleep apnea syndrome    Subjective        Aramis Carson presents to [facility] PULMONARY & CRITICAL CARE MEDICINE  History of Present Illness      Patient presents today for follow-up evaluation.  Accompanied by his wife.  Feels that he notes that occasionally wake with a sleep apnea device, otherwise tolerates this fairly well.  States that he follows up in [place] with Nafisa Luong so specifically for GARRISON.  We briefly discussed use today.  Following up with us for pulmonary nodules and former smoking history, along with allergic rhinitis.  Notes frequent rhinitis symptoms.  Not effectively managed in the past by most all tried over-the-counter options.  This includes antihistamines, nasal sprays.  No other acute respiratory symptoms at this time.  Was notable for influenza A in February 2025.  Prior smoking history noted of approximately 25 years, quit back in the 1980s.         Objective   Vital Signs:  /64   Pulse 64   Temp 96.5 °F (35.8 °C)   Ht 167.6 cm (66\")   Wt 83.8 kg (184 lb 12.8 oz)   SpO2 99%   BMI 29.83 kg/m²   Estimated body mass index is 29.83 kg/m² as calculated from the following:    Height as of this encounter: 167.6 cm (66\").    Weight as of this encounter: 83.8 kg (184 lb 12.8 oz).        Physical Exam  Vitals reviewed.   Constitutional:       General: He is not in acute distress.  Cardiovascular:      Rate and Rhythm: Normal rate and regular rhythm.   Pulmonary:      Effort: Pulmonary effort is normal.      Breath sounds: No wheezing, rhonchi or rales.   Neurological:      Mental Status: He is alert and oriented to person, place, and time.   Psychiatric:         Behavior: Behavior normal.        Result Review :  The following data was reviewed by: Alison Combs PA-C on 04/23/2025:      Chest x-ray imaging/report February 2025      -Image 19 - 2 mm right apical nodule, stable  -Image 26 - 5 x 3.8 mm left nodule, stable  -Image " 30 - 4 mm right nodule, stable  -Image 32 - 4.1 mm right nodule, stable  -Image 49 - 3.9 mm left medial nodule, stable  -Image 51 - 3.3 mm left nodule, stable  -Image 51 - 2.3 mm left nodule, stable  -Image 63 - 4.2 mm right calcified nodule, stable  - Groundglass changes bilaterally, edema versus infectious  All stable since 2023    -----------------------------------------------------------------------------------    CT angiogram chest January 2025        -----------------------------------------------------------------------------------    Chest imaging/report November 2024    -Image 15 - 2 mm right apical nodule, stable  -Image 21 - 5.2 x 3.8 mm left nodular opacity, stable (previously 5.7 x 3.9 mm)  -Image 28 - 3.9 mm right nodule, stable  -Image 30 - 3.6 mm right nodule, stable (previously 4.3 mm)  -Image 51 - 4.2 x 3.2 mm left nodule, stable (previously 3.4 x 2.9 mm) (*report mentions this as new, but was present previously; slightly less defined but still measurable.  Later obscured by pneumonia changes)  -Image 53 - 2.2 mm left nodule, stable  -Image 54 - 3.1 mm left nodule, stable  -Image 57 - 2 mm right nodule, stable  -Image 59 - 2 mm left groundglass anterior nodule, stable  -Image 59 - 2.6 mm left medial nodule, stable  -Image 70 - 4.2 mm right calcified nodule, stable     -Mild fatty liver changes  -Thyroid goiter with substernal extension, stable  -Cholecystectomy changes  -Duodenal diverticulum        -----------------------------------------------------------------------------------    Echocardiogram January 2025          Assessment and Plan   Diagnoses and all orders for this visit:    1. Pulmonary nodules (Primary)    2. Former smoker    3. Seasonal allergic rhinitis, unspecified trigger  -     Regional Allergen Zone 8 / With IgE; Future    4. Obstructive sleep apnea syndrome           Pulmonary nodules, Former smoker:   Previous CT imaging notable for nodule stability.   Recent CT imaging from  January 2025 reviewed, notable for  -Image 19 - 2 mm right apical nodule, stable  -Image 26 - 5 x 3.8 mm left nodule, stable  -Image 30 - 4 mm right nodule, stable  -Image 32 - 4.1 mm right nodule, stable  -Image 49 - 3.9 mm left medial nodule, stable  -Image 51 - 3.3 mm left nodule, stable  -Image 51 - 2.3 mm left nodule, stable  -Image 63 - 4.2 mm right calcified nodule, stable  - Groundglass changes bilaterally, edema versus infectious  On nodule stable since HRCT April 2023    Groundglass changes were resolved on lower lung field CT imaging from February 2025    Does not qualify for LDCT due to age.   Can consider follow-up CT in 1.5 to 2 years due to 2-year stability thus far.            Seasonal allergies:   Tried and failed multiple types of antihistamines, nasal sprays.   States that he cannot do specific allergy injections due to other current medications.  Singulair, claritin, nasal sprays not effective  Ordered allergy zone panel/IgE        Obstructive sleep apnea:   Follows with provider Nafisa Ohara  Occasional leak noted  Otherwise doing well on BIPAP.   Will try to obtain compliance report.           Follow Up   Return in about 6 months (around 10/23/2025), or if symptoms worsen or fail to improve, for Recheck.  Patient was given instructions and counseling regarding his condition or for health maintenance advice. Please see specific information pulled into the AVS if appropriate.

## 2025-04-23 NOTE — TELEPHONE ENCOUNTER
Called patient to discuss both his labs and his recent blood pressure/pulse log which he dropped by our office.    His labs showed a normal digoxin level and the basic metabolic panel was fairly unremarkable.  He had a slightly elevated blood glucose and chloride level, neither were significant.    On his blood pressure/pulse log he did note that 3 times since April 20 he had not taken the digoxin due to a low heart rate.  I did tell him that if his heart rate was too low to take the digoxin more than 3 times in a week he is to call our office and we will likely want to discontinue the digoxin.  He stated understanding.

## 2025-04-24 ENCOUNTER — LAB (OUTPATIENT)
Dept: LAB | Facility: HOSPITAL | Age: 85
End: 2025-04-24
Payer: MEDICARE

## 2025-04-24 DIAGNOSIS — J30.2 SEASONAL ALLERGIC RHINITIS, UNSPECIFIED TRIGGER: ICD-10-CM

## 2025-04-24 PROCEDURE — 86003 ALLG SPEC IGE CRUDE XTRC EA: CPT

## 2025-04-24 PROCEDURE — 36415 COLL VENOUS BLD VENIPUNCTURE: CPT

## 2025-04-24 PROCEDURE — 82785 ASSAY OF IGE: CPT

## 2025-04-28 RX ORDER — SACUBITRIL AND VALSARTAN 24; 26 MG/1; MG/1
1 TABLET, FILM COATED ORAL 2 TIMES DAILY
Qty: 180 TABLET | Refills: 3 | Status: SHIPPED | OUTPATIENT
Start: 2025-04-28

## 2025-04-30 LAB
A ALTERNATA IGE QN: <0.1 KU/L
A FUMIGATUS IGE QN: <0.1 KU/L
AMER ROACH IGE QN: 0.54 KU/L
BAHIA GRASS IGE QN: 0.11 KU/L
BERMUDA GRASS IGE QN: 0.34 KU/L
BOXELDER IGE QN: <0.1 KU/L
C HERBARUM IGE QN: <0.1 KU/L
CAT DANDER IGE QN: <0.1 KU/L
CMN PIGWEED IGE QN: <0.1 KU/L
COMMON RAGWEED IGE QN: 0.1 KU/L
CONV CLASS DESCRIPTION: ABNORMAL
D FARINAE IGE QN: 0.36 KU/L
D PTERONYSS IGE QN: 1.23 KU/L
DOG DANDER IGE QN: <0.1 KU/L
ENGL PLANTAIN IGE QN: <0.1 KU/L
HAZELNUT POLN IGE QN: <0.1 KU/L
IGE SERPL-ACNC: 78 IU/ML (ref 6–495)
JOHNSON GRASS IGE QN: <0.1 KU/L
KENT BLUE GRASS IGE QN: 0.17 KU/L
LONDON PLANE IGE QN: <0.1 KU/L
M RACEMOSUS IGE QN: <0.1 KU/L
MT JUNIPER IGE QN: <0.1 KU/L
MUGWORT IGE QN: <0.1 KU/L
NETTLE IGE QN: <0.1 KU/L
P NOTATUM IGE QN: <0.1 KU/L
S BOTRYOSUM IGE QN: <0.1 KU/L
SHEEP SORREL IGE QN: 0.25 KU/L
SWEET GUM IGE QN: <0.1 KU/L
WHITE ELM IGE QN: <0.1 KU/L
WHITE HICKORY IGE QN: <0.1 KU/L
WHITE MULBERRY IGE QN: <0.1 KU/L
WHITE OAK IGE QN: <0.1 KU/L

## 2025-05-08 PROBLEM — J30.2 SEASONAL ALLERGIC RHINITIS: Status: ACTIVE | Noted: 2025-05-08

## 2025-05-19 ENCOUNTER — TELEPHONE (OUTPATIENT)
Dept: CARDIOLOGY | Facility: CLINIC | Age: 85
End: 2025-05-19

## 2025-05-19 NOTE — TELEPHONE ENCOUNTER
Caller: Aramis Carson    Relationship: SELF    Best call back number: [phone] (home)     What is the best time to reach you: ANYTIME    Who are you requesting to speak with (clinical staff, provider,  specific staff member): ANYONE    What was the call regarding: PT MISSED CALL. NO NOTES IN CHART. PLEASE CALL PT WHEN AVAILABLE.

## 2025-05-20 ENCOUNTER — TELEPHONE (OUTPATIENT)
Dept: PULMONOLOGY | Facility: CLINIC | Age: 85
End: 2025-05-20
Payer: MEDICARE

## 2025-05-20 NOTE — TELEPHONE ENCOUNTER
Caller: [id number] - [place] [address] AT [facility] [address] - [phone]  - [phone] FX    Relationship: Pharmacy      Requested Prescriptions: MONOTELUKAST  Requested Prescriptions      No prescriptions requested or ordered in this encounter        Pharmacy where request should be sent:  [facility]    Last office visit with prescribing clinician: 4/23/2025   Last telemedicine visit with prescribing clinician: Visit date not found   Next office visit with prescribing clinician: 10/23/2025     Additional details provided by patient:     Does the patient have less than a 3 day supply:  [] Yes  [] No    Would you like a call back once the refill request has been completed: [] Yes [] No    If the office needs to give you a call back, can they leave a voicemail: [] Yes [] No    Melecio Amaya Rep   05/20/25 09:02 EDT

## 2025-05-21 ENCOUNTER — OFFICE VISIT (OUTPATIENT)
Dept: CARDIOLOGY | Facility: CLINIC | Age: 85
End: 2025-05-21
Payer: MEDICARE

## 2025-05-21 VITALS
HEIGHT: 66 IN | BODY MASS INDEX: 28.37 KG/M2 | SYSTOLIC BLOOD PRESSURE: 112 MMHG | DIASTOLIC BLOOD PRESSURE: 68 MMHG | WEIGHT: 176.5 LBS | OXYGEN SATURATION: 97 % | HEART RATE: 82 BPM

## 2025-05-21 DIAGNOSIS — I25.10 CORONARY ARTERY DISEASE INVOLVING NATIVE CORONARY ARTERY OF NATIVE HEART WITHOUT ANGINA PECTORIS: Chronic | ICD-10-CM

## 2025-05-21 DIAGNOSIS — I10 ESSENTIAL HYPERTENSION: Chronic | ICD-10-CM

## 2025-05-21 DIAGNOSIS — E78.5 DYSLIPIDEMIA: Primary | Chronic | ICD-10-CM

## 2025-05-21 DIAGNOSIS — I48.11 LONGSTANDING PERSISTENT ATRIAL FIBRILLATION: Chronic | ICD-10-CM

## 2025-05-21 NOTE — ASSESSMENT & PLAN NOTE
Dyslipidemia is Controlled  Goals of care: Medication compliance and tolerance, Control progression of disease, and Maintain LDL <70  Plan: Continue current medications  Follow up:  6 months

## 2025-05-21 NOTE — ASSESSMENT & PLAN NOTE
Atrial fibrillation rate is stable   Goals of care:  Medication compliance and toleration, heart rate between 60 -80 bpm  Plan: Continue digoxin as needed for elevated heart rate  Follow up: 6 months

## 2025-05-21 NOTE — PROGRESS NOTES
"Chief Complaint  Follow-up (Patient denies chest pain or palpitations today )    Subjective          Aramis Carson presents to [facility] for follow up.    History of Present Illness  History of Present Illness  The patient is an 85-year-old male who follows up in the clinic with persistent longstanding atrial fibrillation, hypertension, and dyslipidemia. He was last seen in the clinic on 04/08/2025. At that time, he reported elevated heart rates as high as 101 with fairly stable blood pressure. He was placed on digoxin and asked to have his labs drawn in the following week. He is here today for follow-up.    He has been adhering to his medication regimen, having taken his prescribed pill this morning. He reports no adverse effects from the medication and does not require any refills at this time. He is currently on digoxin, Eliquis and Entresto.  He is also on atorvastatin for dyslipidemia.        He has brought a blood pressure and heart rate log with him today.  The average blood pressure appears to be around 133/68 with the average heart rate of 69.  There are several instances where he has not taken digoxin due to a baseline low heart rate.  He appears to be averaging about 3-4 times a week when he is not taking digoxin    He reports experiencing a significant amount of coughing, which he attributes to allergies.    SOCIAL HISTORY  Exercise: Rotates garden soil and plants beans.     Objective     Vital Signs:   /68 (BP Location: Left arm, Patient Position: Sitting, Cuff Size: Adult)   Pulse 82   Ht 167.6 cm (66\")   Wt 80.1 kg (176 lb 8 oz)   SpO2 97%   BMI 28.49 kg/m²       Physical Exam  Vitals reviewed.   Constitutional:       Appearance: Normal appearance. He is well-developed.   Cardiovascular:      Rate and Rhythm: Normal rate. Rhythm irregular.      Heart sounds: No murmur heard.     No friction rub. No gallop.   Pulmonary:      Effort: Pulmonary effort is normal. " No respiratory distress.      Breath sounds: Normal breath sounds. No wheezing or rales.   Skin:     General: Skin is warm and dry.   Neurological:      Mental Status: He is alert and oriented to person, place, and time.   Psychiatric:         Mood and Affect: Mood normal.         Behavior: Behavior normal.          Result Review :     CMP          2/12/2025    00:37 2/13/2025    01:56 4/22/2025    08:44   CMP   Glucose 94  110  126    BUN 17  15  16    Creatinine 0.96  0.90  1.00    EGFR 77.9  84.2  73.8    Sodium 139  141  142    Potassium 3.4  3.6  4.4    Chloride 108  109  108    Calcium 7.8  7.9  8.9    Total Protein 5.6      Albumin 2.9      Globulin 2.7      Total Bilirubin 0.6      Alkaline Phosphatase 53      AST (SGOT) 18      ALT (SGPT) 14      Albumin/Globulin Ratio 1.1      BUN/Creatinine Ratio 17.7  16.7  16.0    Anion Gap 10.3  10.0  8.2      CBC          2/11/2025    05:17 2/11/2025    22:14 2/12/2025    00:37 2/12/2025    05:30 2/12/2025    11:48 2/13/2025    01:56   CBC   WBC 11.21   8.25    6.74    RBC 5.18   4.81    4.72    Hemoglobin 13.9  13.4  13.0  13.1  13.5  12.7    Hematocrit 44.5  42.3  40.8  42.0  42.7  40.9    MCV 85.9   84.8    86.7    MCH 26.8   27.0    26.9    MCHC 31.2   31.9    31.1    RDW 14.1   14.0    13.6    Platelets 183   164    161      Lipid Panel          1/18/2025    03:31   Lipid Panel   Total Cholesterol 110    Triglycerides 59    HDL Cholesterol 32    VLDL Cholesterol 13    LDL Cholesterol  65    LDL/HDL Ratio 2.07      4/22/2025 digoxin level 1.10             Most recent echocardiogram  Results for orders placed during the hospital encounter of 01/17/25    Adult Transthoracic Echo Complete w/ Color, Spectral and Contrast if necessary per protocol    Interpretation Summary    Left ventricular systolic function is hyperdynamic (EF > 70%). Left ventricular ejection fraction appears to be greater than 70%.    Left ventricular diastolic function is consistent with (grade  III w/high LAP) fixed restrictive pattern.    The left atrial cavity is moderately dilated.    The right atrial cavity is moderately  dilated.    Estimated right ventricular systolic pressure from tricuspid regurgitation is moderately elevated (45-55 mmHg).      Most recent Stress Test  Results for orders placed during the hospital encounter of 12/03/23    Stress Test With Myocardial Perfusion One Day    Interpretation Summary  Images from the original result were not included.      A pharmacological stress test was performed using regadenoson without low-level exercise.    Findings consistent with an indeterminate ECG stress test.    Raw images reviewed with the following abnormalities noted: Has 3 bright scintigraphic spots noted in the right lung field.  Clinical correlation required to rule out any pulmonary pathology.    Myocardial perfusion imaging indicates a moderate-sized, mild degree of ischemia located in the inferior wall and lateral wall.    TID:1.19    Normal LV cavity size. Normal LV wall motion noted.    Left ventricular ejection fraction is normal (Calculated EF = 69%).    Impressions are consistent with an intermediate risk study.       Most recent Cardiac Cath  Results for orders placed during the hospital encounter of 12/03/23    Cardiac Catheterization/Vascular Study    Conclusion    Mid LAD lesion is 30% stenosed.    Mid Cx lesion is 50% stenosed.    1.  Cardiac.  Mild nonobstructive epicardial coronary disease noted.  Medical management will be advised.  Eliquis for chronic A-fib will be advised.      Most recent Coronary CT  No results found for this or any previous visit.         Current Outpatient Medications   Medication Sig Dispense Refill    atorvastatin (LIPITOR) 20 MG tablet Take 1 tablet by mouth Every Night.      digoxin (LANOXIN) 125 MCG tablet One tablet daily.  If pulse is less 60, hold medication for the day. 90 tablet 1    Eliquis 5 MG tablet tablet TAKE 1 TABLET EVERY 12 HOURS  FOR ATRIAL FIBRILLATION 180 tablet 3    Entresto 24-26 MG tablet TAKE 1 TABLET TWICE A  tablet 3    glipiZIDE XL 2.5 MG 24 hr tablet Take 1 tablet by mouth Daily.      pantoprazole (Protonix) 40 MG EC tablet Take 1 tablet by mouth Daily. 30 tablet 6     No current facility-administered medications for this visit.               Problem List Items Addressed This Visit          Cardiac and Vasculature    Essential hypertension (Chronic)    Overview   Hypertension is Controlled  Goals of Care:Medication compliance and tolerance, Systolic blood pressure <130, and Diastolic blood pressure  <80  Plan:  Continue current medications  Follow up: 6 months            Longstanding persistent atrial fibrillation (Chronic)    Overview   [id number] is 4 for hypertension, age, diabetes.  Chronically anticoagulated on Eliquis         Current Assessment & Plan   Atrial fibrillation rate is stable   Goals of care:  Medication compliance and toleration, heart rate between 60 -80 bpm  Plan: Continue digoxin as needed for elevated heart rate  Follow up: 6 months         Dyslipidemia - Primary (Chronic)    Overview   10/5/2020 total cholesterol 142, triglycerides 113, HDL 40, and LDL 79.  1/18/25  Total cholesterol 110, triglycerides 59, HDL 32, and LDL 65         Current Assessment & Plan   Dyslipidemia is Controlled  Goals of care: Medication compliance and tolerance, Control progression of disease, and Maintain LDL <70  Plan: Continue current medications  Follow up:  6 months            Non obstructive coronary artery disease (Chronic)    Overview   12/3/23  [facility] for abnormal stress:  mid LAD 30% stenosis, Mid LCx 50% stenosis  1/17/25  TTE LVEF >70%, grade III w high LAP diastolic dysfunction (restrictive pattern), moderately dilated left atrium, moderately dilated right atrium, RVSP 45-55 mmHg              Assessment & Plan  1. Persistent longstanding atrial fibrillation:  - Continue taking digoxin three to four times a week  as currently managed.  - Monitor heart rate and blood pressure regularly.  - No refills needed for current medications.  - Continue Eliquis without any issues of bright red blood or black tarry stools reported.      2. Hypertension:  - Blood pressure is stable with an average reading of 133/68.  - Continue current antihypertensive regimen.  - Continue Entresto as it appears to be effective.    3. Dyslipidemia:  - Continue current lipid-lowering therapy.    4. Cough:  - Reports of frequent coughing, likely due to allergies.    The risks, benefits, and alternatives of continuing the current medication regimen were discussed. The patient is advised to monitor for any adverse effects and report any new symptoms.    Follow-up in 6 months or sooner if necessary.         Follow Up     Return in about 6 months (around 11/21/2025).    Patient was given instructions and counseling regarding his condition or for health maintenance advice. Please see specific information pulled into the AVS if appropriate.     Patient or patient representative verbalized consent for the use of Ambient Listening during the visit with  ISAIAS Samano for chart documentation. 5/21/2025  09:35 EDT

## 2025-05-30 ENCOUNTER — TELEPHONE (OUTPATIENT)
Dept: PULMONOLOGY | Facility: CLINIC | Age: 85
End: 2025-05-30
Payer: MEDICARE

## 2025-05-30 NOTE — TELEPHONE ENCOUNTER
Due to patient with recent lab results.    IgE within normal limits  Reviewed allergy findings       Latest Reference Range & Units 04/24/25 09:26   Cat Dander Class 0 kU/L <0.10   Dog Dander, IgE Class 0 kU/L <0.10   English Plantain Class 0 kU/L <0.10   Nettle Class 0 kU/L <0.10   Sweet Gum Class 0 kU/L <0.10   [place] Grass Class 0/I kU/L 0.11 !   [place] Grass Class I kU/L 0.34 !   [place] Bluegrass IgE Class 0/I kU/L 0.17 !   Sheep Sorrel Class 0/I kU/L 0.25 !   [place] Grass Class 0 kU/L <0.10   Cockroach, American Class I kU/L 0.54 !   D. farinae (dust mite) Class I kU/L 0.36 !   D. pteronyssinus (dust mite) Class II kU/L 1.23 !   Class Description  Comment   Aspergillus fumigatus Class 0 kU/L <0.10   Cladosporium herbarum Class 0 kU/L <0.10   Mucor racemosus Class 0 kU/L <0.10   Stemphylium herbarum Class 0 kU/L <0.10   [place], Mountain Class 0 kU/L <0.10   Elm, American Class 0 kU/L <0.10   Hazelnut Tree Class 0 kU/L <0.10   [place], White Class 0 kU/L <0.10   Maple/[place] Class 0 kU/L <0.10   Maple [place] Class 0 kU/L <0.10   [place], White Class 0 kU/L <0.10   White [place] Class 0 kU/L <0.10   Mugwort Class 0 kU/L <0.10   Pigweed, Rough/Common Class 0 kU/L <0.10   Ragweed, Common/Short Class 0/I kU/L 0.10 !   Penicillium chrysogen Class 0 kU/L <0.10   !: Data is abnormal      Decided to await allergy referral at this time for possible injections.    Can reconsider as needed he will try to wear mask while mowing which may help with the gas allergies.  Suggested air purifier, may help reduce dust exposure

## 2025-06-06 ENCOUNTER — APPOINTMENT (OUTPATIENT)
Dept: CT IMAGING | Facility: HOSPITAL | Age: 85
End: 2025-06-06
Payer: MEDICARE

## 2025-06-06 ENCOUNTER — HOSPITAL ENCOUNTER (EMERGENCY)
Facility: HOSPITAL | Age: 85
Discharge: HOME | End: 2025-06-06
Payer: MEDICARE

## 2025-06-06 VITALS
RESPIRATION RATE: 15 BRPM | TEMPERATURE: 98.3 F | WEIGHT: 176 LBS | HEART RATE: 80 BPM | SYSTOLIC BLOOD PRESSURE: 139 MMHG | BODY MASS INDEX: 28.28 KG/M2 | HEIGHT: 66 IN | OXYGEN SATURATION: 100 % | DIASTOLIC BLOOD PRESSURE: 69 MMHG

## 2025-06-06 DIAGNOSIS — S22.32XA CLOSED FRACTURE OF ONE RIB OF LEFT SIDE, INITIAL ENCOUNTER: Primary | ICD-10-CM

## 2025-06-06 DIAGNOSIS — T14.90XA SOFT TISSUE INJURY: ICD-10-CM

## 2025-06-06 PROCEDURE — 96375 TX/PRO/DX INJ NEW DRUG ADDON: CPT

## 2025-06-06 PROCEDURE — 25010000002 KETOROLAC TROMETHAMINE PER 15 MG

## 2025-06-06 PROCEDURE — 74177 CT ABD & PELVIS W/CONTRAST: CPT | Performed by: RADIOLOGY

## 2025-06-06 PROCEDURE — 25010000002 MORPHINE PER 10 MG

## 2025-06-06 PROCEDURE — 99285 EMERGENCY DEPT VISIT HI MDM: CPT

## 2025-06-06 PROCEDURE — 96374 THER/PROPH/DIAG INJ IV PUSH: CPT

## 2025-06-06 PROCEDURE — 74177 CT ABD & PELVIS W/CONTRAST: CPT

## 2025-06-06 PROCEDURE — 25510000001 IOPAMIDOL 61 % SOLUTION

## 2025-06-06 RX ORDER — HYDROCODONE BITARTRATE AND ACETAMINOPHEN 7.5; 325 MG/1; MG/1
1 TABLET ORAL ONCE
Refills: 0 | Status: COMPLETED | OUTPATIENT
Start: 2025-06-06 | End: 2025-06-06

## 2025-06-06 RX ORDER — IOPAMIDOL 612 MG/ML
85 INJECTION, SOLUTION INTRAVASCULAR
Status: COMPLETED | OUTPATIENT
Start: 2025-06-06 | End: 2025-06-06

## 2025-06-06 RX ORDER — KETOROLAC TROMETHAMINE 30 MG/ML
15 INJECTION, SOLUTION INTRAMUSCULAR; INTRAVENOUS ONCE
Status: COMPLETED | OUTPATIENT
Start: 2025-06-06 | End: 2025-06-06

## 2025-06-06 RX ORDER — LIDOCAINE 4 G/G
1 PATCH TOPICAL
Status: DISCONTINUED | OUTPATIENT
Start: 2025-06-06 | End: 2025-06-06 | Stop reason: HOSPADM

## 2025-06-06 RX ORDER — HYDROCODONE BITARTRATE AND ACETAMINOPHEN 7.5; 325 MG/1; MG/1
1 TABLET ORAL EVERY 6 HOURS PRN
Qty: 20 TABLET | Refills: 0 | Status: SHIPPED | OUTPATIENT
Start: 2025-06-06

## 2025-06-06 RX ADMIN — MORPHINE SULFATE 3 MG: 4 INJECTION, SOLUTION INTRAMUSCULAR; INTRAVENOUS at 11:37

## 2025-06-06 RX ADMIN — LIDOCAINE 1 PATCH: 4 PATCH TOPICAL at 09:25

## 2025-06-06 RX ADMIN — IOPAMIDOL 85 ML: 612 INJECTION, SOLUTION INTRAVENOUS at 10:57

## 2025-06-06 RX ADMIN — KETOROLAC TROMETHAMINE 15 MG: 30 INJECTION, SOLUTION INTRAMUSCULAR; INTRAVENOUS at 11:37

## 2025-06-06 RX ADMIN — HYDROCODONE BITARTRATE AND ACETAMINOPHEN 1 TABLET: 7.5; 325 TABLET ORAL at 09:25

## 2025-06-06 NOTE — DISCHARGE INSTRUCTIONS
The patient should return to the emergency department should he develop any worsening of his pain or develops any shortness of breath/infectious symptoms.  The patient is aware that he should use the analgesia to ensure that he is able to breathe adequately and perform his ambulation without difficulty.  The patient is aware that taking Norco can worsen his balance and he is also aware that he cannot drive while using it.

## 2025-06-06 NOTE — ED PROVIDER NOTES
"Subjective   History of Present Illness  She states that 2 days ago he fell whilst tilling his garden.  He states that it was \"heavy\" and that he has significant pain persisting into today.  Patient states that the pain runs from his left lateral ribs into his left flank/LLQ as well as into his left hip/pelvis.  The patient has been struggling to ambulate and is having difficulty walking.  The patient otherwise denies any other significant symptoms.  The patient did not suffer any head injury, had no loss of consciousness, and has no red flag symptoms.  The patient has no neurological deficits/symptoms.  The patient also denies any significant cardiorespiratory or GI/ symptoms on further ROS.      Review of Systems   Constitutional:  Positive for activity change. Negative for chills, diaphoresis and fever.   HENT: Negative.     Respiratory: Negative.     Cardiovascular:  Negative for palpitations and leg swelling.   Gastrointestinal:  Positive for abdominal pain. Negative for abdominal distention, diarrhea, nausea and vomiting.   Genitourinary: Negative.    Musculoskeletal:  Positive for back pain and gait problem. Negative for joint swelling, neck pain and neck stiffness.   Skin: Negative.    Neurological:  Negative for tremors, seizures, syncope, facial asymmetry, speech difficulty, weakness and numbness.   Psychiatric/Behavioral: Negative.         Past Medical History:   Diagnosis Date    Arthritis     Atrial fibrillation     Coronary artery disease     GERD (gastroesophageal reflux disease)     Hyperlipidemia     Hypertension     Pre-diabetes     Sleep apnea     BiPap    Thyroid nodule        Allergies   Allergen Reactions    Flomax [Tamsulosin] Other (See Comments)     Heart rate to drop and pass out    Lisinopril Unknown (See Comments) and Cough     Pt. States that he is allergic however, it has been so long since he took it that he cannot remember the reaction that he had at the time.    Losartan Unknown - " Low Severity       Past Surgical History:   Procedure Laterality Date    CARDIAC CATHETERIZATION      CARDIAC CATHETERIZATION N/A 2023    Procedure: Left Heart Cath;  Surgeon: Shwetha Navarrete MD;  Location: [facility] CATH INVASIVE LOCATION;  Service: Cardiology;  Laterality: N/A;    COLONOSCOPY      COLONOSCOPY N/A 2025    Procedure: COLONOSCOPY;  Surgeon: Neftali Lockhart MD;  Location: [facility] OR;  Service: General;  Laterality: N/A;    CYSTOSCOPY TRANSURETHRAL RESECTION OF PROSTATE      ENDOSCOPY      ENDOSCOPY N/A 2025    Procedure: ESOPHAGOGASTRODUODENOSCOPY;  Surgeon: Neftali Lcokhart MD;  Location: [facility] OR;  Service: Gastroenterology;  Laterality: N/A;    EYE SURGERY Right     INGUINAL HERNIA REPAIR Right     LAPAROSCOPIC CHOLECYSTECTOMY      SKIN LESION EXCISION N/A 2025    Procedure: SKIN LESION EXCISION;  Surgeon: Neftali Lockhart MD;  Location: [facility] OR;  Service: General;  Laterality: N/A;       Family History   Problem Relation Age of Onset    Cancer Sister     Diabetes Sister     Cancer Brother     Heart disease Brother     Diabetes Maternal Grandmother     Hypertension Neg Hx        Social History     Socioeconomic History    Marital status:    Tobacco Use    Smoking status: Former     Types: Pipe, Cigars     Quit date:      Years since quittin.4     Passive exposure: Past    Smokeless tobacco: Never    Tobacco comments:     Smoked for 25 years   Vaping Use    Vaping status: Never Used   Substance and Sexual Activity    Alcohol use: No    Drug use: No    Sexual activity: Defer           Objective   Physical Exam  Vitals and nursing note reviewed.   Constitutional:       General: He is awake.      Appearance: He is overweight.   HENT:      Head: Normocephalic and atraumatic.   Neurological:      Mental Status: He is alert.   Psychiatric:         Behavior: Behavior is cooperative.         Procedures           ED Course  ED Course as of 25 [id number]    Fri Jun 06, 2025   1150 CT Abdomen Pelvis With Contrast  1.  Subtle nondisplaced left lateral 10th rib fracture.  2.  Very small left pleural effusion which appears nonloculated.  3.  Fatty infiltration of liver.  4. No traumatic findings identified within the abdomen or pelvis.  5. Other incidental and nonacute findings detailed above stable from  previous.   [RA]      ED Course User Index  [RA] iNc Cast MD                                                       Medical Decision Making  Problems Addressed:  Closed fracture of one rib of left side, initial encounter: complicated acute illness or injury  Soft tissue injury: complicated acute illness or injury    Amount and/or Complexity of Data Reviewed  Radiology: ordered. Decision-making details documented in ED Course.    Risk  OTC drugs.  Prescription drug management.        Final diagnoses:   Closed fracture of one rib of left side, initial encounter   Soft tissue injury       ED Disposition  ED Disposition       ED Disposition   Discharge    Condition   Stable    Comment   --               Kieran Blanca, APRN  [address] [id number] [phone]    Schedule an appointment as soon as possible for a visit in 3 days  Reevaluation and ongoing management discussion         Medication List        New Prescriptions      HYDROcodone-acetaminophen 7.5-325 MG per tablet  Commonly known as: NORCO  Take 1 tablet by mouth Every 6 (Six) Hours As Needed for Moderate Pain or Severe Pain for up to 20 doses.               Where to Get Your Medications        These medications were sent to [facility][id number] - [address]  AT [facility] [address] - [phone] PH - [phone] [address] [address]      Phone: [phone]   HYDROcodone-acetaminophen 7.5-325 MG per tablet            Nic Cast MD  06/06/25 115       Nic Cast MD  06/06/25 1155

## 2025-06-23 ENCOUNTER — HOSPITAL ENCOUNTER (EMERGENCY)
Facility: HOSPITAL | Age: 85
Discharge: HOME | End: 2025-06-23
Payer: MEDICARE

## 2025-06-23 ENCOUNTER — APPOINTMENT (OUTPATIENT)
Dept: CT IMAGING | Facility: HOSPITAL | Age: 85
End: 2025-06-23
Payer: MEDICARE

## 2025-06-23 VITALS
OXYGEN SATURATION: 98 % | HEART RATE: 77 BPM | BODY MASS INDEX: 28.27 KG/M2 | TEMPERATURE: 98.7 F | SYSTOLIC BLOOD PRESSURE: 130 MMHG | RESPIRATION RATE: 16 BRPM | DIASTOLIC BLOOD PRESSURE: 74 MMHG | HEIGHT: 66 IN | WEIGHT: 175.93 LBS

## 2025-06-23 DIAGNOSIS — K57.90 DIVERTICULOSIS: Primary | ICD-10-CM

## 2025-06-23 DIAGNOSIS — N20.0 NEPHROLITHIASIS: ICD-10-CM

## 2025-06-23 DIAGNOSIS — G56.00 CARPAL TUNNEL SYNDROME, UNSPECIFIED LATERALITY: ICD-10-CM

## 2025-06-23 DIAGNOSIS — S22.32XD CLOSED FRACTURE OF ONE RIB OF LEFT SIDE WITH ROUTINE HEALING, SUBSEQUENT ENCOUNTER: ICD-10-CM

## 2025-06-23 LAB
ALBUMIN SERPL-MCNC: 3.9 G/DL (ref 3.5–5.2)
ALBUMIN/GLOB SERPL: 1.5 G/DL
ALP SERPL-CCNC: 122 U/L (ref 39–117)
ALT SERPL W P-5'-P-CCNC: 21 U/L (ref 1–41)
ANION GAP SERPL CALCULATED.3IONS-SCNC: 8.4 MMOL/L (ref 5–15)
AST SERPL-CCNC: 20 U/L (ref 1–40)
BASOPHILS # BLD AUTO: 0.03 10*3/MM3 (ref 0–0.2)
BASOPHILS NFR BLD AUTO: 0.3 % (ref 0–1.5)
BILIRUB SERPL-MCNC: 0.5 MG/DL (ref 0–1.2)
BILIRUB UR QL STRIP: NEGATIVE
BUN SERPL-MCNC: 18.9 MG/DL (ref 8–23)
BUN/CREAT SERPL: 18.7 (ref 7–25)
CALCIUM SPEC-SCNC: 9.2 MG/DL (ref 8.6–10.5)
CHLORIDE SERPL-SCNC: 107 MMOL/L (ref 98–107)
CLARITY UR: CLEAR
CO2 SERPL-SCNC: 24.6 MMOL/L (ref 22–29)
COLOR UR: YELLOW
CREAT SERPL-MCNC: 1.01 MG/DL (ref 0.76–1.27)
D-LACTATE SERPL-SCNC: 1.7 MMOL/L (ref 0.5–2)
DEPRECATED RDW RBC AUTO: 43.1 FL (ref 37–54)
EGFRCR SERPLBLD CKD-EPI 2021: 72.9 ML/MIN/1.73
EOSINOPHIL # BLD AUTO: 0.09 10*3/MM3 (ref 0–0.4)
EOSINOPHIL NFR BLD AUTO: 1 % (ref 0.3–6.2)
ERYTHROCYTE [DISTWIDTH] IN BLOOD BY AUTOMATED COUNT: 13.7 % (ref 12.3–15.4)
GLOBULIN UR ELPH-MCNC: 2.6 GM/DL
GLUCOSE SERPL-MCNC: 93 MG/DL (ref 65–99)
GLUCOSE UR STRIP-MCNC: NEGATIVE MG/DL
HCT VFR BLD AUTO: 47.6 % (ref 37.5–51)
HGB BLD-MCNC: 15.1 G/DL (ref 13–17.7)
HGB UR QL STRIP.AUTO: NEGATIVE
HOLD SPECIMEN: NORMAL
HOLD SPECIMEN: NORMAL
IMM GRANULOCYTES # BLD AUTO: 0.03 10*3/MM3 (ref 0–0.05)
IMM GRANULOCYTES NFR BLD AUTO: 0.3 % (ref 0–0.5)
KETONES UR QL STRIP: NEGATIVE
LEUKOCYTE ESTERASE UR QL STRIP.AUTO: NEGATIVE
LIPASE SERPL-CCNC: 43 U/L (ref 13–60)
LYMPHOCYTES # BLD AUTO: 1.83 10*3/MM3 (ref 0.7–3.1)
LYMPHOCYTES NFR BLD AUTO: 19.7 % (ref 19.6–45.3)
MCH RBC QN AUTO: 27.3 PG (ref 26.6–33)
MCHC RBC AUTO-ENTMCNC: 31.7 G/DL (ref 31.5–35.7)
MCV RBC AUTO: 85.9 FL (ref 79–97)
MONOCYTES # BLD AUTO: 0.65 10*3/MM3 (ref 0.1–0.9)
MONOCYTES NFR BLD AUTO: 7 % (ref 5–12)
NEUTROPHILS NFR BLD AUTO: 6.67 10*3/MM3 (ref 1.7–7)
NEUTROPHILS NFR BLD AUTO: 71.7 % (ref 42.7–76)
NITRITE UR QL STRIP: NEGATIVE
NRBC BLD AUTO-RTO: 0 /100 WBC (ref 0–0.2)
PH UR STRIP.AUTO: 5.5 [PH] (ref 5–8)
PLATELET # BLD AUTO: 224 10*3/MM3 (ref 140–450)
PMV BLD AUTO: 10.5 FL (ref 6–12)
POTASSIUM SERPL-SCNC: 5.1 MMOL/L (ref 3.5–5.2)
PROT SERPL-MCNC: 6.5 G/DL (ref 6–8.5)
PROT UR QL STRIP: NEGATIVE
RBC # BLD AUTO: 5.54 10*6/MM3 (ref 4.14–5.8)
SODIUM SERPL-SCNC: 140 MMOL/L (ref 136–145)
SP GR UR STRIP: 1.02 (ref 1–1.03)
UROBILINOGEN UR QL STRIP: NORMAL
WBC NRBC COR # BLD AUTO: 9.3 10*3/MM3 (ref 3.4–10.8)
WHOLE BLOOD HOLD COAG: NORMAL
WHOLE BLOOD HOLD SPECIMEN: NORMAL

## 2025-06-23 PROCEDURE — 81003 URINALYSIS AUTO W/O SCOPE: CPT

## 2025-06-23 PROCEDURE — 36415 COLL VENOUS BLD VENIPUNCTURE: CPT

## 2025-06-23 PROCEDURE — 71250 CT THORAX DX C-: CPT | Performed by: RADIOLOGY

## 2025-06-23 PROCEDURE — 80053 COMPREHEN METABOLIC PANEL: CPT

## 2025-06-23 PROCEDURE — 71250 CT THORAX DX C-: CPT

## 2025-06-23 PROCEDURE — 99284 EMERGENCY DEPT VISIT MOD MDM: CPT

## 2025-06-23 PROCEDURE — 83690 ASSAY OF LIPASE: CPT

## 2025-06-23 PROCEDURE — 83605 ASSAY OF LACTIC ACID: CPT

## 2025-06-23 PROCEDURE — 74176 CT ABD & PELVIS W/O CONTRAST: CPT

## 2025-06-23 PROCEDURE — 74176 CT ABD & PELVIS W/O CONTRAST: CPT | Performed by: RADIOLOGY

## 2025-06-23 PROCEDURE — 85025 COMPLETE CBC W/AUTO DIFF WBC: CPT

## 2025-06-23 RX ORDER — OXYCODONE AND ACETAMINOPHEN 5; 325 MG/1; MG/1
1 TABLET ORAL EVERY 6 HOURS PRN
Qty: 12 TABLET | Refills: 0 | Status: CANCELLED | OUTPATIENT
Start: 2025-06-23

## 2025-06-23 RX ORDER — SODIUM CHLORIDE 0.9 % (FLUSH) 0.9 %
10 SYRINGE (ML) INJECTION AS NEEDED
Status: DISCONTINUED | OUTPATIENT
Start: 2025-06-23 | End: 2025-06-23

## 2025-06-23 RX ORDER — OXYCODONE AND ACETAMINOPHEN 10; 325 MG/1; MG/1
1 TABLET ORAL EVERY 6 HOURS PRN
Qty: 12 TABLET | Refills: 0 | Status: SHIPPED | OUTPATIENT
Start: 2025-06-23

## 2025-06-23 NOTE — ED PROVIDER NOTES
Subjective   History of Present Illness  85-year-old male with past medical history of atrial fibrillation, coronary artery disease, GERD, hyperlipidemia, hypertension, sleep apnea, and prediabetes presents to the emergency room with left-sided flank pain.  Patient is accompanied by his wife, Danielle who states that patient fell over a raised garden bed earlier this month and fractured his left 10th rib.  Patient states he feels like his rib is healing, however states he is now having flank pain with radiation to left groin.  He denies any dysuria, nausea, vomiting, abdominal pain, fevers, hematuria, chills, or new injuries.  Aggravating factors include movement.  Denies any alleviating factors.  Denies any other complaints or concerns at this time.    History provided by:  Patient   used: No        Review of Systems   Constitutional: Negative.  Negative for fever.   HENT: Negative.     Respiratory: Negative.     Cardiovascular: Negative.  Negative for chest pain.   Gastrointestinal: Negative.  Negative for abdominal pain.   Endocrine: Negative.    Genitourinary:  Positive for flank pain. Negative for dysuria.   Skin: Negative.    Neurological: Negative.    Psychiatric/Behavioral: Negative.     All other systems reviewed and are negative.      Past Medical History:   Diagnosis Date    Arthritis     Atrial fibrillation     Coronary artery disease     GERD (gastroesophageal reflux disease)     Hyperlipidemia     Hypertension     Pre-diabetes     Sleep apnea     BiPap    Thyroid nodule        Allergies   Allergen Reactions    Flomax [Tamsulosin] Other (See Comments)     Heart rate to drop and pass out    Lisinopril Unknown (See Comments) and Cough     Pt. States that he is allergic however, it has been so long since he took it that he cannot remember the reaction that he had at the time.    Losartan Unknown - Low Severity       Past Surgical History:   Procedure Laterality Date    CARDIAC  CATHETERIZATION      CARDIAC CATHETERIZATION N/A 2023    Procedure: Left Heart Cath;  Surgeon: Shwetha Navarrete MD;  Location: [facility] CATH INVASIVE LOCATION;  Service: Cardiology;  Laterality: N/A;    COLONOSCOPY      COLONOSCOPY N/A 2025    Procedure: COLONOSCOPY;  Surgeon: Neftali Lockhart MD;  Location: [facility] OR;  Service: General;  Laterality: N/A;    CYSTOSCOPY TRANSURETHRAL RESECTION OF PROSTATE      ENDOSCOPY      ENDOSCOPY N/A 2025    Procedure: ESOPHAGOGASTRODUODENOSCOPY;  Surgeon: Neftali Lockhart MD;  Location: [facility] OR;  Service: Gastroenterology;  Laterality: N/A;    EYE SURGERY Right     INGUINAL HERNIA REPAIR Right     LAPAROSCOPIC CHOLECYSTECTOMY      SKIN LESION EXCISION N/A 2025    Procedure: SKIN LESION EXCISION;  Surgeon: Neftali Lockhart MD;  Location: [facility] OR;  Service: General;  Laterality: N/A;       Family History   Problem Relation Age of Onset    Cancer Sister     Diabetes Sister     Cancer Brother     Heart disease Brother     Diabetes Maternal Grandmother     Hypertension Neg Hx        Social History     Socioeconomic History    Marital status:    Tobacco Use    Smoking status: Former     Types: Pipe, Cigars     Quit date:      Years since quittin.5     Passive exposure: Past    Smokeless tobacco: Never    Tobacco comments:     Smoked for 25 years   Vaping Use    Vaping status: Never Used   Substance and Sexual Activity    Alcohol use: No    Drug use: No    Sexual activity: Defer           Objective   Physical Exam  Vitals and nursing note reviewed.   Constitutional:       General: He is not in acute distress.     Appearance: He is well-developed. He is not diaphoretic.   HENT:      Head: Normocephalic and atraumatic.      Right Ear: External ear normal.      Left Ear: External ear normal.      Nose: Nose normal.   Eyes:      Conjunctiva/sclera: Conjunctivae normal.      Pupils: Pupils are equal, round, and reactive to light.    Neck:      Vascular: No JVD.      Trachea: No tracheal deviation.   Cardiovascular:      Rate and Rhythm: Normal rate and regular rhythm.      Heart sounds: Normal heart sounds. No murmur heard.  Pulmonary:      Effort: Pulmonary effort is normal. No respiratory distress.      Breath sounds: Normal breath sounds. No wheezing.   Abdominal:      General: Bowel sounds are normal.      Palpations: Abdomen is soft.      Tenderness: There is no abdominal tenderness. There is left CVA tenderness.   Musculoskeletal:         General: No deformity. Normal range of motion.      Cervical back: Normal range of motion and neck supple.   Skin:     General: Skin is warm and dry.      Coloration: Skin is not pale.      Findings: No erythema or rash.   Neurological:      Mental Status: He is alert and oriented to person, place, and time.      Cranial Nerves: No cranial nerve deficit.   Psychiatric:         Behavior: Behavior normal.         Thought Content: Thought content normal.         Procedures           ED Course  ED Course as of 06/23/25 2113 Mon Jun 23, 2025   1605 CT Abdomen Pelvis Stone Protocol [TK]   1605 CT Chest Without Contrast Diagnostic [TK]      ED Course User Index  [TK] Charu Mays PA-C                                             Results for orders placed or performed during the hospital encounter of 06/23/25   Comprehensive Metabolic Panel    Collection Time: 06/23/25  2:14 PM    Specimen: Blood   Result Value Ref Range    Glucose 93 65 - 99 mg/dL    BUN 18.9 8.0 - 23.0 mg/dL    Creatinine 1.01 0.76 - 1.27 mg/dL    Sodium 140 136 - 145 mmol/L    Potassium 5.1 3.5 - 5.2 mmol/L    Chloride 107 98 - 107 mmol/L    CO2 24.6 22.0 - 29.0 mmol/L    Calcium 9.2 8.6 - 10.5 mg/dL    Total Protein 6.5 6.0 - 8.5 g/dL    Albumin 3.9 3.5 - 5.2 g/dL    ALT (SGPT) 21 1 - 41 U/L    AST (SGOT) 20 1 - 40 U/L    Alkaline Phosphatase 122 (H) 39 - 117 U/L    Total Bilirubin 0.5 0.0 - 1.2 mg/dL    Globulin 2.6 gm/dL    A/G  Ratio 1.5 g/dL    BUN/Creatinine Ratio 18.7 7.0 - 25.0    Anion Gap 8.4 5.0 - 15.0 mmol/L    eGFR 72.9 >60.0 mL/min/1.73   Lipase    Collection Time: 06/23/25  2:14 PM    Specimen: Blood   Result Value Ref Range    Lipase 43 13 - 60 U/L   Lactic Acid, Plasma    Collection Time: 06/23/25  2:14 PM    Specimen: Blood   Result Value Ref Range    Lactate 1.7 0.5 - 2.0 mmol/L   CBC Auto Differential    Collection Time: 06/23/25  2:14 PM    Specimen: Blood   Result Value Ref Range    WBC 9.30 3.40 - 10.80 10*3/mm3    RBC 5.54 4.14 - 5.80 10*6/mm3    Hemoglobin 15.1 13.0 - 17.7 g/dL    Hematocrit 47.6 37.5 - 51.0 %    MCV 85.9 79.0 - 97.0 fL    MCH 27.3 26.6 - 33.0 pg    MCHC 31.7 31.5 - 35.7 g/dL    RDW 13.7 12.3 - 15.4 %    RDW-SD 43.1 37.0 - 54.0 fl    MPV 10.5 6.0 - 12.0 fL    Platelets 224 140 - 450 10*3/mm3    Neutrophil % 71.7 42.7 - 76.0 %    Lymphocyte % 19.7 19.6 - 45.3 %    Monocyte % 7.0 5.0 - 12.0 %    Eosinophil % 1.0 0.3 - 6.2 %    Basophil % 0.3 0.0 - 1.5 %    Immature Grans % 0.3 0.0 - 0.5 %    Neutrophils, Absolute 6.67 1.70 - 7.00 10*3/mm3    Lymphocytes, Absolute 1.83 0.70 - 3.10 10*3/mm3    Monocytes, Absolute 0.65 0.10 - 0.90 10*3/mm3    Eosinophils, Absolute 0.09 0.00 - 0.40 10*3/mm3    Basophils, Absolute 0.03 0.00 - 0.20 10*3/mm3    Immature Grans, Absolute 0.03 0.00 - 0.05 10*3/mm3    nRBC 0.0 0.0 - 0.2 /100 WBC   Green Top (Gel)    Collection Time: 06/23/25  2:14 PM   Result Value Ref Range    Extra Tube Hold for add-ons.    Lavender Top    Collection Time: 06/23/25  2:14 PM   Result Value Ref Range    Extra Tube hold for add-on    Gold Top - SST    Collection Time: 06/23/25  2:14 PM   Result Value Ref Range    Extra Tube Hold for add-ons.    Light Blue Top    Collection Time: 06/23/25  2:14 PM   Result Value Ref Range    Extra Tube Hold for add-ons.    Urinalysis With Microscopic If Indicated (No Culture) - Urine, Clean Catch    Collection Time: 06/23/25  3:21 PM    Specimen: Urine, Clean Catch    Result Value Ref Range    Color, UA Yellow Yellow, Straw    Appearance, UA Clear Clear    pH, UA 5.5 5.0 - 8.0    Specific Gravity, UA 1.023 1.005 - 1.030    Glucose, UA Negative Negative    Ketones, UA Negative Negative    Bilirubin, UA Negative Negative    Blood, UA Negative Negative    Protein, UA Negative Negative    Leuk Esterase, UA Negative Negative    Nitrite, UA Negative Negative    Urobilinogen, UA 1.0 E.U./dL 0.2 - 1.0 E.U./dL      CT Abdomen Pelvis Stone Protocol   Final Result   1. Minimal diverticulosis sigmoid colon without diverticulitis.    Atherosclerosis of aortoiliac vasculature without aneurysm.   2.  Diffuse fatty infiltration of liver.   3.  Punctate nonobstructing left kidney stone. No ureteral stones. No   obstructive uropathy.   4. Interval healing changes noted for left lateral 10th rib fracture.       This report was finalized on 6/23/2025 3:51 PM by Dr. Anatoliy Myers MD.          CT Chest Without Contrast Diagnostic   Final Result   1.  No effusion or pneumothorax.   2.  No focal airspace disease identified.   3.  Persistent left lateral 10th rib fracture noted but with callus   formation indicating mild changes of healing. No new fractures are   identified.   4.  Mild cardiac enlargement with mild coronary artery calcifications,   stable.       This report was finalized on 6/23/2025 3:53 PM by Dr. Anatoliy Myers MD.                        Medical Decision Making  - Uncomplicated ED course.  -Patient labs unremarkable.  -CT scan of abdomen revealed mild diverticulosis without evidence of diverticulitis.  There was also a punctate renal stone in the left kidney which did not appear on previous CT scan.  CT scan of the chest showed routine healing of left lateral 10th rib.  -Patient discharged with Percocet prescription for ongoing pain and has been encouraged to follow-up outpatient with primary care provider and/or urology for further evaluation and treatment.  Patient has been  encouraged to return back to the emergency room should he have worsening or persistent pain.    Problems Addressed:  Carpal tunnel syndrome, unspecified laterality: complicated acute illness or injury  Closed fracture of one rib of left side with routine healing, subsequent encounter: complicated acute illness or injury  Diverticulosis: complicated acute illness or injury  Nephrolithiasis: complicated acute illness or injury    Amount and/or Complexity of Data Reviewed  Labs: ordered.  Radiology: ordered. Decision-making details documented in ED Course.    Risk  Prescription drug management.        Final diagnoses:   Diverticulosis   Nephrolithiasis   Closed fracture of one rib of left side with routine healing, subsequent encounter   Carpal tunnel syndrome, unspecified laterality       ED Disposition  ED Disposition       ED Disposition   Discharge    Condition   Stable    Comment   --               Kieran Blanca, ISAIAS  [address] [facility]  [address]  [place] [address]  [phone]    In 2 days      JAMES Pond MD  [address] [facility]  [address]  [facility] [address]  [phone]    In 2 days           Medication List        New Prescriptions      naloxone 4 MG/0.1ML nasal spray  Commonly known as: NARCAN  Call 911. Don't prime. [place] in 1 nostril for overdose. Repeat in 2-3 minutes in other nostril if no or minimal breathing/responsiveness.     oxyCODONE-acetaminophen  MG per tablet  Commonly known as: PERCOCET  Take 1 tablet by mouth Every 6 (Six) Hours As Needed for Moderate Pain.               Where to Get Your Medications        These medications were sent to [facility] #[id number] - [address] [facility] [address] - [phone]  - [phone] [address] [address]      Phone: [phone]   naloxone 4 MG/0.1ML nasal spray  oxyCODONE-acetaminophen  MG per tablet            Charu Mays, PAObdulioC  06/23/25 9187

## 2025-07-25 NOTE — PROGRESS NOTES
New Patient Consult      Date: 2025   Patient Name: Aramis Carson  MRN: [id number]  : 1940     Referring Physician: Kieran Blanca, *    Chief Complaint   Patient presents with    Heartburn       History of Present Illness: Aramis Carson is a 85 y.o. 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    Patient states that he had a longstanding history of reflux symptoms for which he was on Prilosec until recently earlier this year.  In February he had a GI bleed and had hematemesis and he had an EGD done at [facility] which revealed moderate diffuse erosive esophagitis.  Medication changed to Protonix 40 mg p.o. daily.  He has been doing well without any reflux symptoms now.  Denies any dysphagia.     He has been having regular bowel movement.  Denies any abdominal pain.  No blood in the stool.  Last colonoscopy was in 2025 [facility] and had a 1 polyp was removed which was tubular adenoma.  He is a non-smoker nonalcoholic.  He is currently on Eliquis.      Subjective      Past Medical History:   Past Medical History:   Diagnosis Date    Acute sinusitis     Allergic rhinitis     Arthritis     Arthropod infestation     per PCP note    Atrial fibrillation     Carpal tunnel syndrome     Coronary artery disease     COVID-19     Diabetes mellitus     Fracture, rib     GERD (gastroesophageal reflux disease)     GI hemorrhage     Heart failure     Horn cyst     Hyperglyceridemia     Hyperlipidemia     Hypertension     Inflammatory disease of prostate     Influenza     Low back pain     OA (osteoarthritis)     Postherpetic polyneuropathy     Pre-diabetes     PVD (peripheral vascular disease)     Sciatica     Sleep apnea     BiPap    Thyroid nodule     Urine retention     Zoster without complications        Past Surgical History:   Past Surgical History:   Procedure Laterality Date     CARDIAC CATHETERIZATION N/A 2023    Procedure: Left Heart Cath;  Surgeon: Shwetha Navarrete MD;  Location: [facility] CATH INVASIVE LOCATION;  Service: Cardiology;  Laterality: N/A;    CATARACT EXTRACTION Right 2025    COLONOSCOPY N/A 2025    Procedure: COLONOSCOPY;  Surgeon: Neftali Lockhart MD;  Location: [facility] OR;  Service: General;  Laterality: N/A;    CYSTOSCOPY TRANSURETHRAL RESECTION OF PROSTATE      ENDOSCOPY N/A 2025    Procedure: ESOPHAGOGASTRODUODENOSCOPY;  Surgeon: Neftali Lockhart MD;  Location: [facility] OR;  Service: Gastroenterology;  Laterality: N/A;    INGUINAL HERNIA REPAIR Right     LAPAROSCOPIC CHOLECYSTECTOMY      SKIN LESION EXCISION N/A 2025    Procedure: SKIN LESION EXCISION;  Surgeon: Neftali Lockhart MD;  Location: [facility] OR;  Service: General;  Laterality: N/A;    THYROID BIOPSY         Family History:   Family History   Problem Relation Age of Onset    Cancer Sister     Diabetes Sister     Cancer Brother     Heart disease Brother     Diabetes Maternal Grandmother     Hypertension Neg Hx     Colon cancer Neg Hx        Social History:   Social History     Socioeconomic History    Marital status:    Tobacco Use    Smoking status: Former     Types: Pipe, Cigars     Quit date:      Years since quittin.5     Passive exposure: Past    Smokeless tobacco: Never    Tobacco comments:     Smoked for 25 years   Vaping Use    Vaping status: Never Used   Substance and Sexual Activity    Alcohol use: No    Drug use: No    Sexual activity: Defer         Current Outpatient Medications:     atorvastatin (LIPITOR) 20 MG tablet, Take 1 tablet by mouth Every Night., Disp: , Rfl:     digoxin (LANOXIN) 125 MCG tablet, One tablet daily.  If pulse is less 60, hold medication for the day., Disp: 90 tablet, Rfl: 1    Eliquis 5 MG tablet tablet, TAKE 1 TABLET EVERY 12 HOURS FOR ATRIAL FIBRILLATION, Disp: 180 tablet, Rfl: 3    Entresto 24-26 MG tablet, TAKE 1 TABLET  "TWICE A DAY, Disp: 180 tablet, Rfl: 3    glipiZIDE XL 2.5 MG 24 hr tablet, Take 1 tablet by mouth Daily., Disp: , Rfl:     lidocaine (LIDODERM) 5 %, PRN, Disp: , Rfl:     pantoprazole (Protonix) 40 MG EC tablet, Take 1 tablet by mouth Daily., Disp: 30 tablet, Rfl: 6    HYDROcodone-acetaminophen (NORCO) 7.5-325 MG per tablet, Take 1 tablet by mouth Every 6 (Six) Hours As Needed for Moderate Pain or Severe Pain for up to 20 doses. (Patient not taking: Reported on 7/28/2025), Disp: 20 tablet, Rfl: 0    oxyCODONE-acetaminophen (PERCOCET)  MG per tablet, Take 1 tablet by mouth Every 6 (Six) Hours As Needed for Moderate Pain. (Patient not taking: Reported on 7/28/2025), Disp: 12 tablet, Rfl: 0    Allergies   Allergen Reactions    Flomax [Tamsulosin] Other (See Comments)     Heart rate to drop and pass out    Lisinopril Unknown (See Comments) and Cough     Pt. States that he is allergic however, it has been so long since he took it that he cannot remember the reaction that he had at the time.    Losartan Unknown - Low Severity       Review of Systems:   Review of Systems   Constitutional:  Negative for appetite change, fatigue, fever and unexpected weight loss.   HENT:  Negative for trouble swallowing.    Gastrointestinal:  Negative for abdominal distention, abdominal pain, anal bleeding, blood in stool, constipation, diarrhea, nausea, rectal pain, vomiting, GERD and indigestion.       The following portions of the patient's history were reviewed and updated as appropriate: allergies, current medications, past family history, past medical history, past social history, past surgical history and problem list.    Objective     Physical Exam:  Vital Signs:   Vitals:    07/28/25 1511   BP: 126/78  Comment: manual   Pulse: 56  Comment: manual   Temp: 98.2 °F (36.8 °C)   TempSrc: Infrared   Weight: Comment: per patient \"170 lb\"   Height: 167.6 cm (66\")       Physical Exam  Constitutional:       Appearance: Normal " appearance.   HENT:      Head: Normocephalic and atraumatic.   Eyes:      Conjunctiva/sclera: Conjunctivae normal.   Abdominal:      General: Abdomen is flat. There is no distension.      Palpations: There is no mass.      Tenderness: There is no abdominal tenderness. There is no guarding or rebound.      Hernia: No hernia is present.   Musculoskeletal:      Cervical back: Normal range of motion and neck supple.   Neurological:      Mental Status: He is alert.           Results Review:   I have reviewed the patient's new clinical and imaging results and agree with the interpretation.     Admission on 06/23/2025, Discharged on 06/23/2025   Component Date Value Ref Range Status    Glucose 06/23/2025 93  65 - 99 mg/dL Final    BUN 06/23/2025 18.9  8.0 - 23.0 mg/dL Final    Creatinine 06/23/2025 1.01  0.76 - 1.27 mg/dL Final    Sodium 06/23/2025 140  136 - 145 mmol/L Final    Potassium 06/23/2025 5.1  3.5 - 5.2 mmol/L Final    Chloride 06/23/2025 107  98 - 107 mmol/L Final    CO2 06/23/2025 24.6  22.0 - 29.0 mmol/L Final    Calcium 06/23/2025 9.2  8.6 - 10.5 mg/dL Final    Total Protein 06/23/2025 6.5  6.0 - 8.5 g/dL Final    Albumin 06/23/2025 3.9  3.5 - 5.2 g/dL Final    ALT (SGPT) 06/23/2025 21  1 - 41 U/L Final    AST (SGOT) 06/23/2025 20  1 - 40 U/L Final    Alkaline Phosphatase 06/23/2025 122 (H)  39 - 117 U/L Final    Total Bilirubin 06/23/2025 0.5  0.0 - 1.2 mg/dL Final    Globulin 06/23/2025 2.6  gm/dL Final    A/G Ratio 06/23/2025 1.5  g/dL Final    BUN/Creatinine Ratio 06/23/2025 18.7  7.0 - 25.0 Final    Anion Gap 06/23/2025 8.4  5.0 - 15.0 mmol/L Final    eGFR 06/23/2025 72.9  >60.0 mL/min/1.73 Final    Lipase 06/23/2025 43  13 - 60 U/L Final    Color, UA 06/23/2025 Yellow  Yellow, Straw Final    Appearance, UA 06/23/2025 Clear  Clear Final    pH, UA 06/23/2025 5.5  5.0 - 8.0 Final    Specific Gravity, UA 06/23/2025 1.023  1.005 - 1.030 Final    Glucose, UA 06/23/2025 Negative  Negative Final    Ketones, UA  06/23/2025 Negative  Negative Final    Bilirubin, UA 06/23/2025 Negative  Negative Final    Blood, UA 06/23/2025 Negative  Negative Final    Protein, UA 06/23/2025 Negative  Negative Final    Leuk Esterase, UA 06/23/2025 Negative  Negative Final    Nitrite, UA 06/23/2025 Negative  Negative Final    Urobilinogen, UA 06/23/2025 1.0 E.U./dL  0.2 - 1.0 E.U./dL Final    Lactate 06/23/2025 1.7  0.5 - 2.0 mmol/L Final    Extra Tube 06/23/2025 Hold for add-ons.   Final    Auto resulted.    Extra Tube 06/23/2025 hold for add-on   Final    Auto resulted    Extra Tube 06/23/2025 Hold for add-ons.   Final    Auto resulted.    Extra Tube 06/23/2025 Hold for add-ons.   Final    Auto resulted    WBC 06/23/2025 9.30  3.40 - 10.80 10*3/mm3 Final    RBC 06/23/2025 5.54  4.14 - 5.80 10*6/mm3 Final    Hemoglobin 06/23/2025 15.1  13.0 - 17.7 g/dL Final    Hematocrit 06/23/2025 47.6  37.5 - 51.0 % Final    MCV 06/23/2025 85.9  79.0 - 97.0 fL Final    MCH 06/23/2025 27.3  26.6 - 33.0 pg Final    MCHC 06/23/2025 31.7  31.5 - 35.7 g/dL Final    RDW 06/23/2025 13.7  12.3 - 15.4 % Final    RDW-SD 06/23/2025 43.1  37.0 - 54.0 fl Final    MPV 06/23/2025 10.5  6.0 - 12.0 fL Final    Platelets 06/23/2025 224  140 - 450 10*3/mm3 Final    Neutrophil % 06/23/2025 71.7  42.7 - 76.0 % Final    Lymphocyte % 06/23/2025 19.7  19.6 - 45.3 % Final    Monocyte % 06/23/2025 7.0  5.0 - 12.0 % Final    Eosinophil % 06/23/2025 1.0  0.3 - 6.2 % Final    Basophil % 06/23/2025 0.3  0.0 - 1.5 % Final    Immature Grans % 06/23/2025 0.3  0.0 - 0.5 % Final    Neutrophils, Absolute 06/23/2025 6.67  1.70 - 7.00 10*3/mm3 Final    Lymphocytes, Absolute 06/23/2025 1.83  0.70 - 3.10 10*3/mm3 Final    Monocytes, Absolute 06/23/2025 0.65  0.10 - 0.90 10*3/mm3 Final    Eosinophils, Absolute 06/23/2025 0.09  0.00 - 0.40 10*3/mm3 Final    Basophils, Absolute 06/23/2025 0.03  0.00 - 0.20 10*3/mm3 Final    Immature Grans, Absolute 06/23/2025 0.03  0.00 - 0.05 10*3/mm3 Final     [facility] 06/23/2025 0.0  0.0 - 0.2 /100 WBC Final      CT Chest Without Contrast Diagnostic  Result Date: 6/23/2025  1.  No effusion or pneumothorax. 2.  No focal airspace disease identified. 3.  Persistent left lateral 10th rib fracture noted but with callus formation indicating mild changes of healing. No new fractures are identified. 4.  Mild cardiac enlargement with mild coronary artery calcifications, stable.  This report was finalized on 6/23/2025 3:53 PM by Dr. Anatoliy Myers MD.      CT Abdomen Pelvis Stone Protocol  Result Date: 6/23/2025  1. Minimal diverticulosis sigmoid colon without diverticulitis. Atherosclerosis of aortoiliac vasculature without aneurysm. 2.  Diffuse fatty infiltration of liver. 3.  Punctate nonobstructing left kidney stone. No ureteral stones. No obstructive uropathy. 4. Interval healing changes noted for left lateral 10th rib fracture.  This report was finalized on 6/23/2025 3:51 PM by Dr. Anatoliy Myers MD.      CT Abdomen Pelvis With Contrast  Result Date: 6/6/2025  1.  Subtle nondisplaced left lateral 10th rib fracture. 2.  Very small left pleural effusion which appears nonloculated. 3.  Fatty infiltration of liver. 4. No traumatic findings identified within the abdomen or pelvis. 5. Other incidental and nonacute findings detailed above stable from previous.  This report was finalized on 6/6/2025 11:33 AM by Dr. Anatoliy Myers MD.      XR Wrist 3+ View Left  Result Date: 5/2/2025  Chronic findings. No acute process.    XR hand 3 views left  Result Date: 5/2/2025  Chronic findings. No acute process.    EGD 2/12/2025 at [facility]  Mucosal changes in the duodenum. Biopsied.  - Erythematous mucosa in the antrum. Biopsied.  - Moderate reflux and candidiasis esophagitis with no bleeding. Biopsied.      DIAGNOSIS:  A. DUODENUM, BIOPSY, BULB:  Fragments of purulent exudate, compatible with ulcer  Rare minute fragments of detached epithelium with mild atypia, favor reactive change  B. ESOPHAGUS,  BIOPSY, DISTAL:  Fragments of purulent exudate, compatible with ulcer  No intact mucosa present for evaluation  No definite fungal elements identified on GMS stain, with adequate control    Colonoscopy at [facility] in February 2025;  details unknown however pathology had a 1 polyp which was tubular adenoma without any dysplasia    Colon path 1/17/2025    DIAGNOSIS:  A. SKIN, SCALP:  Sun-damaged skin demonstrating changes of actinic keratosis and solar lentigo  No malignancy identified  B. ASCENDING COLON POLYP:  Tubular adenoma    Assessment / Plan      Assessment & Plan:  1. Gastroesophageal reflux disease with esophagitis without hemorrhage  2. History of GI bleed    Patient has been on Eliquis.  He had a coffee-ground emesis in February 2025 and had EGD done at [facility] which revealed a moderate erosive esophagitis.  His PPI has been changed to Protonix 40 mg p.o. daily since then he has been doing pretty well.  He denies any further episodes of GI bleeding.  No dysphagia.  His recent lab work on 6/23/2025 revealed a normal CMP except borderline elevated alkaline phosphatase 122.  CBC was normal with hemoglobin 15.    Antireflux measures discussed.  Will continue Protonix 40 mg p.o. daily  We will schedule for an EGD for surveillance and to biopsy and rule out Paris's    The indications, technique, alternatives and potential risk and complications were discussed with the patient including but not limited to bleeding, bowel perforations, missing lesions and anesthetic complications. The patient understands and wishes to proceed with the procedure and has given their verbal consent. Written patient education information was given to the patient.   The patient will call if they have further questions before procedure.      - Case Request; Standing  - Implement Anesthesia Orders Day of Procedure; Standing  - Follow Anesthesia Guidelines / Protocol; Future  - Verify NPO; Standing  - Oxygen Therapy- Nasal Cannula; 2 LPM;  Standing  - Obtain Informed Consent; Standing  - Case Request      3. Metabolic dysfunction-associated steatotic liver disease (MASLD)  Patient has a metabolic syndrome.  Recent CT abdomen in June 2025 revealed fatty liver disease.  Patient is nonalcoholic.  He used to be a around 192 pounds and lost about 20 pounds now.    At this time his liver enzymes are all normal.  His lab work does not indicate any liver fibrosis.    We will continue to monitor    Follow Up:   Return for Follow Up after procedure.    Servando Busby MD  [facility]  7/28/2025  18:54 EDT    Please note that portions of this note may have been completed with a voice recognition program.

## 2025-07-28 ENCOUNTER — OFFICE VISIT (OUTPATIENT)
Dept: GASTROENTEROLOGY | Facility: CLINIC | Age: 85
End: 2025-07-28
Payer: MEDICARE

## 2025-07-28 VITALS
HEART RATE: 56 BPM | HEIGHT: 66 IN | DIASTOLIC BLOOD PRESSURE: 78 MMHG | BODY MASS INDEX: 28.4 KG/M2 | SYSTOLIC BLOOD PRESSURE: 126 MMHG | TEMPERATURE: 98.2 F

## 2025-07-28 DIAGNOSIS — K21.9 GASTROESOPHAGEAL REFLUX DISEASE WITHOUT ESOPHAGITIS: ICD-10-CM

## 2025-07-28 DIAGNOSIS — K21.00 GASTROESOPHAGEAL REFLUX DISEASE WITH ESOPHAGITIS WITHOUT HEMORRHAGE: Primary | ICD-10-CM

## 2025-07-28 DIAGNOSIS — Z87.19 HISTORY OF GI BLEED: ICD-10-CM

## 2025-07-28 DIAGNOSIS — K76.0 METABOLIC DYSFUNCTION-ASSOCIATED STEATOTIC LIVER DISEASE (MASLD): ICD-10-CM

## 2025-07-28 RX ORDER — LIDOCAINE 50 MG/G
PATCH TOPICAL
COMMUNITY
Start: 2025-06-05

## 2025-07-29 ENCOUNTER — OFFICE VISIT (OUTPATIENT)
Dept: SURGERY | Facility: CLINIC | Age: 85
End: 2025-07-29
Payer: MEDICARE

## 2025-07-29 VITALS — BODY MASS INDEX: 27.48 KG/M2 | WEIGHT: 171 LBS | HEIGHT: 66 IN

## 2025-07-29 DIAGNOSIS — E04.1 THYROID NODULE: Primary | ICD-10-CM

## 2025-07-29 PROCEDURE — 1159F MED LIST DOCD IN RCRD: CPT | Performed by: SURGERY

## 2025-07-29 PROCEDURE — 99213 OFFICE O/P EST LOW 20 MIN: CPT | Performed by: SURGERY

## 2025-07-29 PROCEDURE — 1160F RVW MEDS BY RX/DR IN RCRD: CPT | Performed by: SURGERY

## 2025-07-29 NOTE — PROGRESS NOTES
Subjective   Aramis Carson is a 85 y.o. male.     Chief Complaint: follow up on thyroid nodule    History of Present Illness He is a 86 yo who had a thyroid nodule FNA a year ago. The path was not malignant but recommended follow up. He has not had a US of the thyroid since then. He has no neck pain or trouble swallowing.    The following portions of the patient's history were reviewed and updated as appropriate: current medications, past family history, past medical history, past social history, past surgical history and problem list.    Review of Systems    Objective   Physical Exam He has no distinctly palpable neck masses or adenopathy    Past Medical History:   Diagnosis Date    Acute sinusitis     Allergic rhinitis     Arthritis     Arthropod infestation     per PCP note    Atrial fibrillation     Carpal tunnel syndrome     Coronary artery disease     COVID-19     Diabetes mellitus     Fracture, rib     GERD (gastroesophageal reflux disease)     GI hemorrhage     Heart failure     Horn cyst     Hyperglyceridemia     Hyperlipidemia     Hypertension     Inflammatory disease of prostate     Influenza     Low back pain     OA (osteoarthritis)     Postherpetic polyneuropathy     Pre-diabetes     PVD (peripheral vascular disease)     Sciatica     Sleep apnea     BiPap    Thyroid nodule     Urine retention     Zoster without complications        Family History   Problem Relation Age of Onset    Cancer Sister     Diabetes Sister     Cancer Brother     Heart disease Brother     Diabetes Maternal Grandmother     Hypertension Neg Hx     Colon cancer Neg Hx        Social History     Tobacco Use    Smoking status: Former     Types: Pipe, Cigars     Quit date:      Years since quittin.6     Passive exposure: Past    Smokeless tobacco: Never    Tobacco comments:     Smoked for 25 years   Vaping Use    Vaping status: Never Used   Substance Use Topics    Alcohol use: No    Drug use: No       Past Surgical History:    Procedure Laterality Date    CARDIAC CATHETERIZATION N/A 12/06/2023    Procedure: Left Heart Cath;  Surgeon: Shwetha Navarrete MD;  [facility];  Service: Cardiology;  Laterality: N/A;    CATARACT EXTRACTION Right 01/07/2025    COLONOSCOPY N/A 01/17/2025    Procedure: COLONOSCOPY;  Surgeon: Neftali Lockhart MD;  [facility];  Service: General;  Laterality: N/A;    CYSTOSCOPY TRANSURETHRAL RESECTION OF PROSTATE      ENDOSCOPY N/A 02/12/2025    Procedure: ESOPHAGOGASTRODUODENOSCOPY;  Surgeon: Neftali Lockhart MD;  [facility];  Service: Gastroenterology;  Laterality: N/A;    INGUINAL HERNIA REPAIR Right     LAPAROSCOPIC CHOLECYSTECTOMY      SKIN LESION EXCISION N/A 01/17/2025    Procedure: SKIN LESION EXCISION;  Surgeon: Neftali Lockhart MD;  [facility];  Service: General;  Laterality: N/A;    THYROID BIOPSY         Current Outpatient Medications   Medication Instructions    atorvastatin (LIPITOR) 20 mg, Nightly    digoxin (LANOXIN) 125 MCG tablet One tablet daily.  If pulse is less 60, hold medication for the day.    Eliquis 5 MG tablet tablet TAKE 1 TABLET EVERY 12 HOURS FOR ATRIAL FIBRILLATION    Entresto 24-26 MG tablet 1 tablet, Oral, 2 Times Daily    glipiZIDE XL 2.5 mg, Daily    HYDROcodone-acetaminophen (NORCO) 7.5-325 MG per tablet 1 tablet, Oral, Every 6 Hours PRN    lidocaine (LIDODERM) 5 % PRN    oxyCODONE-acetaminophen (PERCOCET)  MG per tablet 1 tablet, Oral, Every 6 Hours PRN    pantoprazole (PROTONIX) 40 mg, Oral, Daily         Assessment & Plan   Diagnoses and all orders for this visit:    1. Thyroid nodule (Primary)    Repeat US and thyroid levels             This document has been electronically signed by Hernandez Jackson MD   July 29, 2025 10:20 EDT

## 2025-08-01 ENCOUNTER — PATIENT ROUNDING (BHMG ONLY) (OUTPATIENT)
Dept: GASTROENTEROLOGY | Facility: CLINIC | Age: 85
End: 2025-08-01
Payer: MEDICARE

## 2025-08-01 NOTE — PROGRESS NOTES
August 1, 2025    Hello, may I speak with Aramis Carson?    My name is Petra RUIZ    I am  with [facility]  [address]  [place] [address] [phone].    Before we get started may I verify your date of birth? 1940    I am calling to officially welcome you to our practice and ask about your recent visit. Is this a good time to talk? no    Tell me about your visit with us. What things went well?  left message       We're always looking for ways to make our patients' experiences even better. Do you have recommendations on ways we may improve?  no    Overall were you satisfied with your first visit to our practice? yes       I appreciate you taking the time to speak with me today. Is there anything else I can do for you? no      Thank you, and have a great day.

## 2025-08-11 ENCOUNTER — HOSPITAL ENCOUNTER (OUTPATIENT)
Dept: ULTRASOUND IMAGING | Facility: HOSPITAL | Age: 85
Discharge: HOME | End: 2025-08-11
Payer: MEDICARE

## 2025-08-11 ENCOUNTER — LAB (OUTPATIENT)
Dept: LAB | Facility: HOSPITAL | Age: 85
End: 2025-08-11
Payer: MEDICARE

## 2025-08-11 DIAGNOSIS — E04.1 THYROID NODULE: ICD-10-CM

## 2025-08-11 PROCEDURE — 36415 COLL VENOUS BLD VENIPUNCTURE: CPT

## 2025-08-11 PROCEDURE — 84443 ASSAY THYROID STIM HORMONE: CPT

## 2025-08-11 PROCEDURE — 84439 ASSAY OF FREE THYROXINE: CPT

## 2025-08-11 PROCEDURE — 76536 US EXAM OF HEAD AND NECK: CPT

## 2025-08-11 PROCEDURE — 76536 US EXAM OF HEAD AND NECK: CPT | Performed by: RADIOLOGY

## 2025-08-12 LAB
T4 FREE SERPL-MCNC: 1.33 NG/DL (ref 0.92–1.68)
TSH SERPL DL<=0.05 MIU/L-ACNC: 1.19 UIU/ML (ref 0.27–4.2)

## 2025-08-25 ENCOUNTER — OFFICE VISIT (OUTPATIENT)
Dept: SURGERY | Facility: CLINIC | Age: 85
End: 2025-08-25
Payer: MEDICARE

## 2025-08-25 VITALS — WEIGHT: 170 LBS | HEIGHT: 66 IN | BODY MASS INDEX: 27.32 KG/M2

## 2025-08-25 DIAGNOSIS — E04.1 THYROID NODULE: Primary | ICD-10-CM

## 2025-08-25 PROCEDURE — 99212 OFFICE O/P EST SF 10 MIN: CPT | Performed by: SURGERY

## 2025-08-25 PROCEDURE — 1159F MED LIST DOCD IN RCRD: CPT | Performed by: SURGERY

## 2025-08-25 PROCEDURE — 1160F RVW MEDS BY RX/DR IN RCRD: CPT | Performed by: SURGERY

## (undated) DEVICE — SKIN PREP TRAY 4 COMPARTM TRAY: Brand: MEDLINE INDUSTRIES, INC.

## (undated) DEVICE — FRCP BX RADJAW4 NDL 2.8 240CM LG OG BX40

## (undated) DEVICE — THE BITE BLOCK MAXI, LATEX FREE STRAP IS USED TO PROTECT THE ENDOSCOPE INSERTION TUBE FROM BEING BITTEN BY THE PATIENT.

## (undated) DEVICE — CATH F5 INF 3DRC 100CM: Brand: INFINITI

## (undated) DEVICE — GLV SURG PREMIERPRO MIC LTX PF SZ7.5 BRN

## (undated) DEVICE — 6F .070 XB 3.5 SH 100CM: Brand: VISTA BRITE TIP

## (undated) DEVICE — CATH F5 INF JL 4 100CM: Brand: INFINITI

## (undated) DEVICE — ENDOGATOR AUXILIARY WATER JET CONNECTOR: Brand: ENDOGATOR

## (undated) DEVICE — HOLDER: Brand: DEROYAL

## (undated) DEVICE — GW INQW FIX/CORE PTFE J/3MM .035 260CM

## (undated) DEVICE — SHEATH INTRO SUPERSHEATH SHRT .035 4F 11CM

## (undated) DEVICE — PINNACLE INTRODUCER SHEATH: Brand: PINNACLE

## (undated) DEVICE — 360 - 360I 10"/10" CMSQ 8RA: Brand: MEDLINE

## (undated) DEVICE — Device: Brand: OMNIWIRE PRESSURE GUIDE WIRE

## (undated) DEVICE — CONN Y IRR DISP 1P/U

## (undated) DEVICE — Device: Brand: DEFENDO AIR/WATER/SUCTION AND BIOPSY VALVE

## (undated) DEVICE — SUT MNCRYL 4/0 PS2 18 IN

## (undated) DEVICE — DRSNG SURESITE WNDW 4X4.5

## (undated) DEVICE — ASMBL SPK CONTRST CONTRL

## (undated) DEVICE — ANTIBACTERIAL UNDYED BRAIDED (POLYGLACTIN 910), SYNTHETIC ABSORBABLE SUTURE: Brand: COATED VICRYL

## (undated) DEVICE — PAD, DEFIB, ADULT, RADIOTRANS, ZOLL: Brand: MEDLINE

## (undated) DEVICE — THE EXACTO COLD SNARE IS INTENDED TO BE USED WITHOUT DIATHERMIC ENERGY FOR THE ENDOSCOPIC RESECTION OF POLYP TISSUE IN THE GASTROINTESTINAL TRACT.: Brand: EXACTO

## (undated) DEVICE — Device

## (undated) DEVICE — MANIFLD NAMIC PRECEPTOR INTEGR/TRANSD RT/HND 1/PRT 500PSI

## (undated) DEVICE — PK CATH CARD 70

## (undated) DEVICE — PK HD AND NK 70

## (undated) DEVICE — LN FLTR ORL/NASL MICROSTREAM NONINTUB A/LNG

## (undated) DEVICE — ENDOGATOR TUBING FOR ENDOGATOR EGP-100 IRRIGATION PUMP,OLYMPUS OFP PUMP, OLYMPUS AFU-100 PUMP AND ERBE EIP2 PUMP: Brand: ENDOGATOR

## (undated) DEVICE — ST EXT IV SMRTSTE 2VLV FIX M LL 6ML 41

## (undated) DEVICE — ST INF PRI SMRTSTE 20DRP 2VLV 24ML 117

## (undated) DEVICE — STARCLOSE SE VASCULAR CLOSURE SYSTEM: Brand: STARCLOSE SE

## (undated) DEVICE — GLV SURG PREMIERPRO MIC LTX PF SZ8 BRN

## (undated) DEVICE — PENCL ES MEGADINE EZ/CLEAN BUTN W/HOLSTR 10FT